# Patient Record
Sex: MALE | Race: OTHER | Employment: OTHER | URBAN - METROPOLITAN AREA
[De-identification: names, ages, dates, MRNs, and addresses within clinical notes are randomized per-mention and may not be internally consistent; named-entity substitution may affect disease eponyms.]

---

## 2017-02-22 ENCOUNTER — TELEPHONE (OUTPATIENT)
Dept: CARDIOLOGY CLINIC | Age: 82
End: 2017-02-22

## 2017-03-19 ENCOUNTER — APPOINTMENT (OUTPATIENT)
Dept: MRI IMAGING | Age: 82
DRG: 065 | End: 2017-03-19
Attending: INTERNAL MEDICINE
Payer: MEDICARE

## 2017-03-19 ENCOUNTER — APPOINTMENT (OUTPATIENT)
Dept: CT IMAGING | Age: 82
DRG: 065 | End: 2017-03-19
Attending: EMERGENCY MEDICINE
Payer: MEDICARE

## 2017-03-19 ENCOUNTER — HOSPITAL ENCOUNTER (INPATIENT)
Age: 82
LOS: 2 days | Discharge: NURSING, REHAB OR LONG-TERM CARE | DRG: 065 | End: 2017-03-21
Attending: EMERGENCY MEDICINE | Admitting: INTERNAL MEDICINE
Payer: MEDICARE

## 2017-03-19 ENCOUNTER — APPOINTMENT (OUTPATIENT)
Dept: GENERAL RADIOLOGY | Age: 82
DRG: 065 | End: 2017-03-19
Attending: EMERGENCY MEDICINE
Payer: MEDICARE

## 2017-03-19 DIAGNOSIS — E78.2 MIXED HYPERLIPIDEMIA: ICD-10-CM

## 2017-03-19 DIAGNOSIS — I63.02 CEREBROVASCULAR ACCIDENT (CVA) DUE TO THROMBOSIS OF BASILAR ARTERY (HCC): ICD-10-CM

## 2017-03-19 DIAGNOSIS — I10 ESSENTIAL HYPERTENSION: ICD-10-CM

## 2017-03-19 DIAGNOSIS — I63.9 ACUTE CVA (CEREBROVASCULAR ACCIDENT) (HCC): Primary | ICD-10-CM

## 2017-03-19 PROBLEM — I69.351 HEMIPLEGIA AND HEMIPARESIS FOLLOWING CEREBRAL INFARCTION AFFECTING RIGHT DOMINANT SIDE (HCC): Status: ACTIVE | Noted: 2017-03-19

## 2017-03-19 PROBLEM — E78.5 HYPERLIPIDEMIA: Status: ACTIVE | Noted: 2017-03-19

## 2017-03-19 PROBLEM — N40.0 PROSTATISM: Status: ACTIVE | Noted: 2017-03-19

## 2017-03-19 PROBLEM — R20.2 HEMIPARESTHESIA: Status: ACTIVE | Noted: 2017-03-19
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
## 2017-03-19 PROBLEM — I69.322 DYSARTHRIA FOLLOWING CEREBROVASCULAR ACCIDENT: Status: ACTIVE | Noted: 2017-03-19

## 2017-03-19 LAB
ALBUMIN SERPL BCP-MCNC: 3.4 G/DL (ref 3.5–5)
ALBUMIN/GLOB SERPL: 0.7 {RATIO} (ref 1.1–2.2)
ALP SERPL-CCNC: 86 U/L (ref 45–117)
ALT SERPL-CCNC: 14 U/L (ref 12–78)
ANION GAP BLD CALC-SCNC: 12 MMOL/L (ref 5–15)
APPEARANCE UR: ABNORMAL
APTT PPP: 25.7 SEC (ref 22.1–32.5)
AST SERPL W P-5'-P-CCNC: 17 U/L (ref 15–37)
BACTERIA URNS QL MICRO: NEGATIVE /HPF
BASOPHILS # BLD AUTO: 0 K/UL (ref 0–0.1)
BASOPHILS # BLD: 0 % (ref 0–1)
BILIRUB SERPL-MCNC: 0.5 MG/DL (ref 0.2–1)
BILIRUB UR QL CFM: NEGATIVE
BUN SERPL-MCNC: 15 MG/DL (ref 6–20)
BUN/CREAT SERPL: 11 (ref 12–20)
CALCIUM SERPL-MCNC: 9.4 MG/DL (ref 8.5–10.1)
CHLORIDE SERPL-SCNC: 103 MMOL/L (ref 97–108)
CO2 SERPL-SCNC: 26 MMOL/L (ref 21–32)
COLOR UR: ABNORMAL
CREAT SERPL-MCNC: 1.34 MG/DL (ref 0.7–1.3)
EOSINOPHIL # BLD: 0.1 K/UL (ref 0–0.4)
EOSINOPHIL NFR BLD: 2 % (ref 0–7)
EPITH CASTS URNS QL MICRO: ABNORMAL /LPF
ERYTHROCYTE [DISTWIDTH] IN BLOOD BY AUTOMATED COUNT: 13.3 % (ref 11.5–14.5)
ERYTHROCYTE [DISTWIDTH] IN BLOOD BY AUTOMATED COUNT: 13.4 % (ref 11.5–14.5)
GLOBULIN SER CALC-MCNC: 4.8 G/DL (ref 2–4)
GLUCOSE BLD STRIP.AUTO-MCNC: 116 MG/DL (ref 65–100)
GLUCOSE SERPL-MCNC: 137 MG/DL (ref 65–100)
GLUCOSE UR STRIP.AUTO-MCNC: NEGATIVE MG/DL
HCT VFR BLD AUTO: 42.5 % (ref 36.6–50.3)
HCT VFR BLD AUTO: 43.8 % (ref 36.6–50.3)
HGB BLD-MCNC: 14.1 G/DL (ref 12.1–17)
HGB BLD-MCNC: 14.5 G/DL (ref 12.1–17)
HGB UR QL STRIP: NEGATIVE
HYALINE CASTS URNS QL MICRO: ABNORMAL /LPF (ref 0–5)
INR PPP: 1 (ref 0.9–1.1)
KETONES UR QL STRIP.AUTO: ABNORMAL MG/DL
LEUKOCYTE ESTERASE UR QL STRIP.AUTO: NEGATIVE
LYMPHOCYTES # BLD AUTO: 21 % (ref 12–49)
LYMPHOCYTES # BLD: 1.2 K/UL (ref 0.8–3.5)
MCH RBC QN AUTO: 28.7 PG (ref 26–34)
MCH RBC QN AUTO: 29.1 PG (ref 26–34)
MCHC RBC AUTO-ENTMCNC: 33.1 G/DL (ref 30–36.5)
MCHC RBC AUTO-ENTMCNC: 33.2 G/DL (ref 30–36.5)
MCV RBC AUTO: 86.6 FL (ref 80–99)
MCV RBC AUTO: 87.8 FL (ref 80–99)
MONOCYTES # BLD: 0.4 K/UL (ref 0–1)
MONOCYTES NFR BLD AUTO: 8 % (ref 5–13)
NEUTS SEG # BLD: 3.7 K/UL (ref 1.8–8)
NEUTS SEG NFR BLD AUTO: 69 % (ref 32–75)
NITRITE UR QL STRIP.AUTO: NEGATIVE
PH UR STRIP: 5.5 [PH] (ref 5–8)
PLATELET # BLD AUTO: 219 K/UL (ref 150–400)
PLATELET # BLD AUTO: 242 K/UL (ref 150–400)
POTASSIUM SERPL-SCNC: 3.5 MMOL/L (ref 3.5–5.1)
PROT SERPL-MCNC: 8.2 G/DL (ref 6.4–8.2)
PROT UR STRIP-MCNC: 30 MG/DL
PROTHROMBIN TIME: 10.1 SEC (ref 9–11.1)
RBC # BLD AUTO: 4.91 M/UL (ref 4.1–5.7)
RBC # BLD AUTO: 4.99 M/UL (ref 4.1–5.7)
RBC #/AREA URNS HPF: ABNORMAL /HPF (ref 0–5)
SERVICE CMNT-IMP: ABNORMAL
SODIUM SERPL-SCNC: 141 MMOL/L (ref 136–145)
SP GR UR REFRACTOMETRY: 1.02 (ref 1–1.03)
THERAPEUTIC RANGE,PTTT: NORMAL SECS (ref 58–77)
TROPONIN I SERPL-MCNC: <0.04 NG/ML
UROBILINOGEN UR QL STRIP.AUTO: 1 EU/DL (ref 0.2–1)
WBC # BLD AUTO: 5.4 K/UL (ref 4.1–11.1)
WBC # BLD AUTO: 8.5 K/UL (ref 4.1–11.1)
WBC URNS QL MICRO: ABNORMAL /HPF (ref 0–4)

## 2017-03-19 PROCEDURE — 93005 ELECTROCARDIOGRAM TRACING: CPT

## 2017-03-19 PROCEDURE — 74011250637 HC RX REV CODE- 250/637: Performed by: INTERNAL MEDICINE

## 2017-03-19 PROCEDURE — 85610 PROTHROMBIN TIME: CPT | Performed by: EMERGENCY MEDICINE

## 2017-03-19 PROCEDURE — 80053 COMPREHEN METABOLIC PANEL: CPT | Performed by: EMERGENCY MEDICINE

## 2017-03-19 PROCEDURE — 81001 URINALYSIS AUTO W/SCOPE: CPT | Performed by: EMERGENCY MEDICINE

## 2017-03-19 PROCEDURE — 70553 MRI BRAIN STEM W/O & W/DYE: CPT

## 2017-03-19 PROCEDURE — 73502 X-RAY EXAM HIP UNI 2-3 VIEWS: CPT

## 2017-03-19 PROCEDURE — 74011250636 HC RX REV CODE- 250/636: Performed by: INTERNAL MEDICINE

## 2017-03-19 PROCEDURE — 74011250636 HC RX REV CODE- 250/636: Performed by: HOSPITALIST

## 2017-03-19 PROCEDURE — 85025 COMPLETE CBC W/AUTO DIFF WBC: CPT | Performed by: EMERGENCY MEDICINE

## 2017-03-19 PROCEDURE — 65660000000 HC RM CCU STEPDOWN

## 2017-03-19 PROCEDURE — 82962 GLUCOSE BLOOD TEST: CPT

## 2017-03-19 PROCEDURE — 70548 MR ANGIOGRAPHY NECK W/DYE: CPT

## 2017-03-19 PROCEDURE — 99285 EMERGENCY DEPT VISIT HI MDM: CPT

## 2017-03-19 PROCEDURE — 74011250637 HC RX REV CODE- 250/637: Performed by: EMERGENCY MEDICINE

## 2017-03-19 PROCEDURE — 71010 XR CHEST PORT: CPT

## 2017-03-19 PROCEDURE — 36415 COLL VENOUS BLD VENIPUNCTURE: CPT | Performed by: INTERNAL MEDICINE

## 2017-03-19 PROCEDURE — 99218 HC RM OBSERVATION: CPT

## 2017-03-19 PROCEDURE — 85027 COMPLETE CBC AUTOMATED: CPT | Performed by: INTERNAL MEDICINE

## 2017-03-19 PROCEDURE — A9585 GADOBUTROL INJECTION: HCPCS | Performed by: HOSPITALIST

## 2017-03-19 PROCEDURE — 70450 CT HEAD/BRAIN W/O DYE: CPT

## 2017-03-19 PROCEDURE — 84484 ASSAY OF TROPONIN QUANT: CPT | Performed by: EMERGENCY MEDICINE

## 2017-03-19 PROCEDURE — 85730 THROMBOPLASTIN TIME PARTIAL: CPT | Performed by: EMERGENCY MEDICINE

## 2017-03-19 RX ORDER — ACETAMINOPHEN 650 MG/1
650 SUPPOSITORY RECTAL
Status: DISCONTINUED | OUTPATIENT
Start: 2017-03-19 | End: 2017-03-21 | Stop reason: HOSPADM

## 2017-03-19 RX ORDER — DIAZEPAM 5 MG/1
5 TABLET ORAL
Status: COMPLETED | OUTPATIENT
Start: 2017-03-19 | End: 2017-03-19

## 2017-03-19 RX ORDER — ERGOCALCIFEROL 1.25 MG/1
50000 CAPSULE ORAL
COMMUNITY
End: 2017-06-22

## 2017-03-19 RX ORDER — AMLODIPINE BESYLATE 10 MG/1
10 TABLET ORAL EVERY EVENING
COMMUNITY
End: 2018-08-05

## 2017-03-19 RX ORDER — LABETALOL HYDROCHLORIDE 5 MG/ML
5 INJECTION, SOLUTION INTRAVENOUS
Status: DISCONTINUED | OUTPATIENT
Start: 2017-03-19 | End: 2017-03-21 | Stop reason: HOSPADM

## 2017-03-19 RX ORDER — PANTOPRAZOLE SODIUM 20 MG/1
20 TABLET, DELAYED RELEASE ORAL
Status: DISCONTINUED | OUTPATIENT
Start: 2017-03-20 | End: 2017-03-19 | Stop reason: SDUPTHER

## 2017-03-19 RX ORDER — GUAIFENESIN 100 MG/5ML
81 LIQUID (ML) ORAL DAILY
Status: DISCONTINUED | OUTPATIENT
Start: 2017-03-20 | End: 2017-03-21 | Stop reason: HOSPADM

## 2017-03-19 RX ORDER — AMLODIPINE BESYLATE 5 MG/1
10 TABLET ORAL DAILY
Status: DISCONTINUED | OUTPATIENT
Start: 2017-03-20 | End: 2017-03-21 | Stop reason: HOSPADM

## 2017-03-19 RX ORDER — HEPARIN SODIUM 5000 [USP'U]/ML
5000 INJECTION, SOLUTION INTRAVENOUS; SUBCUTANEOUS EVERY 8 HOURS
Status: DISCONTINUED | OUTPATIENT
Start: 2017-03-19 | End: 2017-03-20

## 2017-03-19 RX ORDER — SODIUM CHLORIDE 0.9 % (FLUSH) 0.9 %
5-10 SYRINGE (ML) INJECTION AS NEEDED
Status: DISCONTINUED | OUTPATIENT
Start: 2017-03-19 | End: 2017-03-21 | Stop reason: HOSPADM

## 2017-03-19 RX ORDER — OMEPRAZOLE 20 MG/1
20 CAPSULE, DELAYED RELEASE ORAL DAILY
COMMUNITY
End: 2018-07-19

## 2017-03-19 RX ORDER — SODIUM CHLORIDE 0.9 % (FLUSH) 0.9 %
5-10 SYRINGE (ML) INJECTION EVERY 8 HOURS
Status: DISCONTINUED | OUTPATIENT
Start: 2017-03-19 | End: 2017-03-21 | Stop reason: HOSPADM

## 2017-03-19 RX ORDER — PANTOPRAZOLE SODIUM 40 MG/1
40 TABLET, DELAYED RELEASE ORAL
Status: DISCONTINUED | OUTPATIENT
Start: 2017-03-20 | End: 2017-03-21 | Stop reason: HOSPADM

## 2017-03-19 RX ORDER — GUAIFENESIN 100 MG/5ML
162 LIQUID (ML) ORAL DAILY
Status: DISCONTINUED | OUTPATIENT
Start: 2017-03-19 | End: 2017-03-19

## 2017-03-19 RX ORDER — SODIUM CHLORIDE 9 MG/ML
50 INJECTION, SOLUTION INTRAVENOUS CONTINUOUS
Status: DISPENSED | OUTPATIENT
Start: 2017-03-19 | End: 2017-03-20

## 2017-03-19 RX ORDER — ASPIRIN 325 MG
325 TABLET ORAL ONCE
Status: COMPLETED | OUTPATIENT
Start: 2017-03-19 | End: 2017-03-19

## 2017-03-19 RX ORDER — ATORVASTATIN CALCIUM 20 MG/1
20 TABLET, FILM COATED ORAL
Status: DISCONTINUED | OUTPATIENT
Start: 2017-03-19 | End: 2017-03-21 | Stop reason: HOSPADM

## 2017-03-19 RX ORDER — ACETAMINOPHEN 325 MG/1
650 TABLET ORAL
Status: DISCONTINUED | OUTPATIENT
Start: 2017-03-19 | End: 2017-03-21 | Stop reason: HOSPADM

## 2017-03-19 RX ORDER — TAMSULOSIN HYDROCHLORIDE 0.4 MG/1
0.4 CAPSULE ORAL DAILY
Status: DISCONTINUED | OUTPATIENT
Start: 2017-03-19 | End: 2017-03-21 | Stop reason: HOSPADM

## 2017-03-19 RX ADMIN — HEPARIN SODIUM 5000 UNITS: 5000 INJECTION, SOLUTION INTRAVENOUS; SUBCUTANEOUS at 12:55

## 2017-03-19 RX ADMIN — ASPIRIN 325 MG ORAL TABLET 325 MG: 325 PILL ORAL at 07:58

## 2017-03-19 RX ADMIN — DIAZEPAM 5 MG: 5 TABLET ORAL at 07:58

## 2017-03-19 RX ADMIN — Medication 10 ML: at 21:22

## 2017-03-19 RX ADMIN — SODIUM CHLORIDE 50 ML/HR: 900 INJECTION, SOLUTION INTRAVENOUS at 11:11

## 2017-03-19 RX ADMIN — ASPIRIN 81 MG CHEWABLE TABLET 162 MG: 81 TABLET CHEWABLE at 12:56

## 2017-03-19 RX ADMIN — ATORVASTATIN CALCIUM 20 MG: 20 TABLET, FILM COATED ORAL at 21:20

## 2017-03-19 RX ADMIN — GADOBUTROL 8 ML: 604.72 INJECTION INTRAVENOUS at 17:53

## 2017-03-19 RX ADMIN — HEPARIN SODIUM 5000 UNITS: 5000 INJECTION, SOLUTION INTRAVENOUS; SUBCUTANEOUS at 18:55

## 2017-03-19 RX ADMIN — TAMSULOSIN HYDROCHLORIDE 0.4 MG: 0.4 CAPSULE ORAL at 12:55

## 2017-03-19 NOTE — PROGRESS NOTES
Stroke Education provided to patient and the following topics were discussed    1. Patients personal risk factors for stroke are hypertension and hyperlipidemia    2. Warning signs of Stroke:        * Sudden numbness or weakness of the face, arm or leg, especially on one side of          The body            * Sudden confusion, trouble speaking or understanding        * Sudden trouble seeing in one or both eyes        * Sudden trouble walking, dizziness, loss of balance or coordination        * Sudden severe headache with no known cause      3. Importance of activation Emergency Medical Services ( 9-1-1 ) immediately if experience any warning signs of stroke. 4. Be sure and schedule a follow-up appointment with your primary care doctor or any specialists as instructed. 5. You must take medicine every day to treat your risk factors for stroke. Be sure to take your medicines exactly as your doctor tells you: no more, no less. Know what your medicines are for , what they do. Anti-thrombotics /anticoagulants can help prevent strokes. You are taking the following medicine(s)  Heparin 5,000 units    6. Smoking and second-hand smoke greatly increase your risk of stroke, cardiovascular disease and death. Smoking history never    7. Information provided was [facility] Stroke Education Binder    8. Documentation of teaching completed in Patient Education Activity and on Care Plan with teaching response noted?   yes

## 2017-03-19 NOTE — ED NOTES
Bedside report received from Lauren Alcantara [facility]. Pt resting in stretcher with wife at bedside.

## 2017-03-19 NOTE — PROGRESS NOTES
STAND test redone, and patient passed purees, water by cup, water by straw. Per Dr. Andree Guy order to advance diet if patient able to pass STAND. Put patient on \"soft solids\" instead of regular texture because patient stated that he \"sometimes has trouble chewing harder foods with his dentures in.\"    Will continue to monitor.

## 2017-03-19 NOTE — PROGRESS NOTES
Oral and Written notification given to patient and/or caregiver informing them that they are currently an Outpatient receiving care in our facility. Outpatient services include Observation Services. CM placed copy on bedside chart. Pt is unable to sign.      CLAYTON Burton, [address] [place] [address]   [phone]

## 2017-03-19 NOTE — H&P
History and Physical          Pt Name  Mitra Traore   Date of Birth 9/3/1931   Medical Record Number  [id number]      Age  80 y.o. PCP Isabel Alba MD   Admit date:  3/19/2017    Room Number  DI81/29  @ [facility]   Date of Service  3/19/2017     Admission Diagnoses:  Acute CVA (cerebrovascular accident) [facility])     Certification: We are admitting Mitra Traore 80 y.o. male with a principle diagnosis of Acute CVA (cerebrovascular accident) [facility])  This patient also suffers from other comorbidities listed below. I have a high level of concern for progressive decline or recurrence of CVA  leading to life threatening complications      Assessment and plan:    * Acute CVA (cerebrovascular accident) ([facility] [address]  · Admit to tele-neurology floor   · NPO until bedside swallow evaluation is done. - then start cardiac diet   · I have initiated the stroke orderset   ·  mg PO daily (pt is Aspirin naive )   · Permissive hypertension for today ; labetalol for severe elevation of BP   · Resume antiHTN meds starting tomorrow   · Stroke labs including Lipid panel, hgbA1C, TSH, Free T4,   · MRI MRA CNS and neck arteries   · Echo complete - systolic murmur   · Fall and aspiration precautions   · neurocheck per stroke protocol   · Formal neurologist consultation    Hemiplegia and hemiparesis following cerebral infarction affecting right dominant side ([facility] [address]  Watch closely     Dysarthria following cerebrovascular accident  Improving according to his family present in the ER. Hemiparesthesia  \"right side is tingling \" remains     Fall at home  GLF with no obvious bone fracture or serious complaints. Watch for falls     Essential hypertension  Resume home meds starting tomorrow     Hyperlipidemia  Resume statin as was   Lipid panel pending     Prostatism  Pt complaints of classic symptoms and he is having to get up every three to four hours.    We can start him on Flomax starting tomorrow as well CODE STATUS  Full     Functional Status  Pt is  and lives with his wife   He is quite active and he performs most of the ADLs in their home. He is a retired     Surrogate decision maker: Pt's wife    Prophylaxis  Hep SQ   Discharge Plan: SAH/Rehab,     There are currently no Active Isolations Payor: [employer] MEDICARE / Plan: [facility] [employer] MEDICARE CHOICE PPO/PFFS / Product Type: Managed Care Medicare /    Social issues  Date Comment    03/19/17   9:26 AM  Met with pt's wife and daughter at the pt's bedside and we discussed clinical issues in simple language and answered all questions. Prognosis  Fair      Subjective Data         History of Present Illness : The patient had been feeling dizzy for the last day and a half. He didn't do much all day, since he was feeling unwell with dizziness that resembles being drunk and off balance and not spinning symptoms. His wife did the chores at his house yesterday while he rested most of the day. This morning around 5am as usual he woke up planning to go to bathroom. He felt sweaty and weak and a sensation of tingling along his RUE. He was able to go to the bathroom and then on his way to bed his RLE felt tingly and he lost power leading to Ground level fall. His wife witnessed that the patient was not able to get himself up off of floor due to lack of power in whole right side. EMS were called in and pt was brought to the ER. Since then his wife reports the pt's speech which initially was garbled is improved and his power on the Right upper extremity is better. The pt still is not able to pull himself up on the bed using his RUE.         Past Medical History:   Diagnosis Date    Hypertension     Other ill-defined conditions(799.89)     high cholesterol           Past Surgical History:   Procedure Laterality Date    HX CHOLECYSTECTOMY           Social History   Substance Use Topics    Smoking status: Never Smoker    Smokeless tobacco: Never Used    Alcohol use Yes       History reviewed. No pertinent family history. Review of Systems - History obtained from spouse and the patient  General ROS: positive for  - fatigue  negative for - fever, weight gain or weight loss  Psychological ROS: negative for - anxiety or behavioral disorder  Ophthalmic ROS: negative for - blurry vision or decreased vision  ENT ROS: negative for - epistaxis, headaches or hearing change  Allergy and Immunology ROS: negative for - nasal congestion, postnasal drip or seasonal allergies  Respiratory ROS: no cough, shortness of breath, or wheezing  Cardiovascular ROS: no chest pain or dyspnea on exertion  Gastrointestinal ROS: no abdominal pain, change in bowel habits, or black or bloody stools  Genito-Urinary ROS: positive for - nocturia and urinary frequency/urgency  negative for - dysuria or hematuria  Musculoskeletal ROS: positive for - gait disturbance and muscular weakness  negative for - joint pain or joint stiffness  Neurological ROS: positive for - dizziness, gait disturbance, speech problems and weakness  negative for - confusion or impaired coordination/balance  Dermatological ROS: negative       Objective data     Comment:  Very pleasant elderly man lying in bed in no distress   His wife and daughter are at his side. Patient Vitals for the past 24 hrs:   Temp   03/19/17 0643 97.6 °F (36.4 °C)    Patient Vitals for the past 24 hrs:   Pulse   03/19/17 0715 (!) 125   03/19/17 0700 (!) 122   03/19/17 0643 (!) 121    Patient Vitals for the past 24 hrs:   Resp   03/19/17 0715 17   03/19/17 0700 29   03/19/17 0643 18    Patient Vitals for the past 24 hrs:   BP   03/19/17 0715 (!) 130/91   03/19/17 0700 115/86   03/19/17 0643 (!) 151/94   03/19/17 0642 (!) 151/94        SpO2 Readings from Last 6 Encounters:   03/19/17 98%   01/18/11 96%         O2 Device: Room air   Body mass index is 29.59 kg/(m^2).  - Wt Readings from Last 10 Encounters:   03/19/17 85.7 kg (188 lb 15 oz)   01/18/11 86.2 kg (190 lb)          Physical Exam:             General:  Alert, cooperative,   well noursished,   well developed,   appears stated age    Ears/Eyes:  Hearing intact  Sclera anicteric. Pupils equal   Mouth/Throat:  Mucous membranes normal pink and moist  Oral pharynx clear    Neck:  I couldn't hear any bruit    Lungs:  Trachea midline  Chest excursion symmetrical   Auscultation B/L Symmetrical with   Vesicular breath sounds     No Crepitations noted   Percussion note resonant on mid Clavicular line; no sign of pneumothorax        CVS:  Regular rate and rhythm   Systolic murmur, on the left lower parasternal border - MR   No click, rub or gallop  S1 normal   S2 normal   Pedal pulses  b/l symmetrical   Abdomen:    Soft, non-tender  Bowel sounds normal  No distension   Percussion note tympanitic   Extremities:    No cyanosis, jaundice  No edema noted   No sign of DVT/cord like lesion on palpation  No sign of acute trauma   Age appropriate OA changes noted. Skin:    Skin color, texture, turgor normal. no acute rash or lesions    Lymph nodes:     Musculoskeletal Muscle bulk B/L symmetrical   Neuro Pt has tingling hypoaesthesia involving the right lower face and right neck, along with RUE and RLE   His power in both RUE and RLE are about 4/5   No clonus noted. Mild dysarthria noted    Psych:  Alert and oriented, normal mood & affect given the clinical scenario          Medications reviewed     No current facility-administered medications for this encounter. Current Outpatient Prescriptions   Medication Sig    ergocalciferol (VITAMIN D2) 50,000 unit capsule Take 50,000 Units by mouth.  omeprazole (PRILOSEC) 20 mg capsule Take 20 mg by mouth daily.  amLODIPine (NORVASC) 10 mg tablet Take 10 mg by mouth daily.  hydrochlorothiazide (HYDRODIURIL) 25 mg tablet Take 25 mg by mouth daily.  simvastatin (ZOCOR) 20 mg tablet Take 20 mg by mouth nightly.        Relevant other informations: Other medical conditions listed in this following active hospital problem list section; all of these and other pertinent data were taken into consideration when treatment plan is developed and customized to this patient's unique overall circumstances and needs. We have reviewed available old medical records within the constraints of this admission process. Present on Admission:   Hemiplegia and hemiparesis following cerebral infarction affecting right dominant side ([facility] [address]   Acute CVA (cerebrovascular accident) ([facility] [address]   Dysarthria following cerebrovascular accident   Hemiparesthesia   Essential hypertension   Hyperlipidemia   Prostatism   Fall at home       Data Review:   Recent Days:  All Micro Results     None          Recent Labs      03/19/17   0705   WBC  5.4   HGB  14.5   HCT  43.8   PLT  219     Recent Labs      03/19/17   0705   NA  141   K  3.5   CL  103   CO2  26   GLU  137*   BUN  15   CREA  1.34*   CA  9.4   ALB  3.4*   TBILI  0.5   SGOT  17   ALT  14   INR  1.0      No results found for: TSH, TSHEXT      Care Plan discussed with: Patient/Family and Nurse   Other medical conditions are listed in the active hospital problem list section; these and other pertinent data were taken into consideration when the treatment plan was developed and customized to this patient's unique overall circumstances and needs. High complexity decision making was performed for this patient who is at high risk for decompensation with multiple organ involvement. Today total floor/unit time was 65 minutes while caring for this patient and greater than 50% of that time was spent with patient (and/or family) coordinating patients clinical issues.      Lisa Maguire MD MPH  FACP                               3/19/2017

## 2017-03-19 NOTE — IP AVS SNAPSHOT
[address] [facility] 
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) Your diagnoses also included:  Hemiplegia And Hemiparesis Following Cerebral Infarction Affecting Right Dominant Side (Hcc), Dysarthria Following Cerebrovascular Accident, Hemiparesthesia, Essential Hypertension, Hyperlipidemia, Prostatism, Fall At [facility] Providers Seen During Your Hospitalizations Provider Role Specialty Primary office phone Juana White MD Attending Provider Emergency Medicine [phone] Pieter Watson MD Attending Provider Internal Medicine [phone] Denise Guevara MD Attending Provider Internal Medicine [phone] Your Primary Care Physician (PCP) Primary Care Physician Office Phone Office Fax [address] [phone] Follow-up Information Follow up With Details Comments Contact Info [address]  This is your post acute care provider of choice  [address] [place] [address] 
[phone] Mandi St MD   [address] [place] [address] 
[phone] Current Discharge Medication List  
  
START taking these medications Dose & Instructions Dispensing Information Comments Morning Noon Evening Bedtime  
 aspirin 81 mg chewable tablet Your last dose was: Your next dose is:    
   
   
 Dose:  81 mg Take 1 Tab by mouth daily. Quantity:  30 Tab Refills:  0  
     
   
   
   
  
 atorvastatin 20 mg tablet Commonly known as:  LIPITOR Replaces:  simvastatin 20 mg tablet Your last dose was: Your next dose is:    
   
   
 Dose:  20 mg Take 1 Tab by mouth nightly. Quantity:  30 Tab Refills:  0  
     
   
   
   
  
 tamsulosin 0.4 mg capsule Commonly known as:  FLOMAX Your last dose was: Your next dose is:    
   
   
 Dose:  0.4 mg Take 1 Cap by mouth daily. Quantity:  30 Cap Refills:  0 CONTINUE these medications which have NOT CHANGED Dose & Instructions Dispensing Information Comments Morning Noon Evening Bedtime  
 amLODIPine 10 mg tablet Commonly known as:  Leoniecarlos Capps Your last dose was: Your next dose is:    
   
   
 Dose:  10 mg Take 10 mg by mouth daily. Refills:  0  
     
   
   
   
  
 hydroCHLOROthiazide 25 mg tablet Commonly known as:  HYDRODIURIL Your last dose was: Your next dose is:    
   
   
 Dose:  25 mg Take 25 mg by mouth daily. Refills:  0  
     
   
   
   
  
 omeprazole 20 mg capsule Commonly known as:  PRILOSEC Your last dose was: Your next dose is:    
   
   
 Dose:  20 mg Take 20 mg by mouth daily. Refills:  0  
     
   
   
   
  
 VITAMIN D2 50,000 unit capsule Generic drug:  ergocalciferol Your last dose was: Your next dose is:    
   
   
 Dose:  20430 Units Take 50,000 Units by mouth. Refills:  0 STOP taking these medications   
 simvastatin 20 mg tablet Commonly known as:  ZOCOR Replaced by:  atorvastatin 20 mg tablet Where to Get Your Medications Information on where to get these meds will be given to you by the nurse or doctor. ! Ask your nurse or doctor about these medications  
  aspirin 81 mg chewable tablet  
 atorvastatin 20 mg tablet  
 tamsulosin 0.4 mg capsule Discharge Instructions None Discharge Orders None [employer] Announcement We are excited to announce that we are making your provider's discharge notes available to you in [employer]. You will see these notes when they are completed and signed by the physician that discharged you from your recent hospital stay. If you have any questions or concerns about any information you see in [employer], please call the Health Information Department where you were seen or reach out to your Primary Care Provider for more information about your plan of care. Introducing \[facility]\"" & HEALTH SERVICES! Rajan Youngblood introduces [employer] patient portal. Now you can access parts of your medical record, email your doctor's office, and request medication refills online. 1. In your internet browser, go to [email or web address] 2. Click on the First Time User? Click Here link in the Sign In box. You will see the New Member Sign Up page. 3. Enter your [employer] Access Code exactly as it appears below. You will not need to use this code after youve completed the sign-up process. If you do not sign up before the expiration date, you must request a new code. · [employer] Access Code: [email or web address] Expires: 6/17/2017  6:42 AM 
 
4. Enter the last four digits of your Social Security Number (xxxx) and Date of Birth (mm/dd/yyyy) as indicated and click Submit. You will be taken to the next sign-up page. 5. Create a [employer] ID. This will be your [employer] login ID and cannot be changed, so think of one that is secure and easy to remember. 6. Create a [employer] password. You can change your password at any time. 7. Enter your Password Reset Question and Answer. This can be used at a later time if you forget your password. 8. Enter your e-mail address. You will receive e-mail notification when new information is available in [address] 9. Click Sign Up. You can now view and download portions of your medical record. 10. Click the Download Summary menu link to download a portable copy of your medical information. If you have questions, please visit the Frequently Asked Questions section of the [employer] website. Remember, [employer] is NOT to be used for urgent needs. For medical emergencies, dial 911. Now available from your iPhone and Android! General Information Please provide this summary of care documentation to your next provider. Patient Signature:  ____________________________________________________________ Date:  ____________________________________________________________  
  
Melissa Nguyen Provider Signature:  ____________________________________________________________ Date:  ____________________________________________________________

## 2017-03-19 NOTE — IP AVS SNAPSHOT
Current Discharge Medication List  
  
START taking these medications Dose & Instructions Dispensing Information Comments Morning Noon Evening Bedtime  
 aspirin 81 mg chewable tablet Your last dose was: Your next dose is:    
   
   
 Dose:  81 mg Take 1 Tab by mouth daily. Quantity:  30 Tab Refills:  0  
     
   
   
   
  
 atorvastatin 20 mg tablet Commonly known as:  LIPITOR Replaces:  simvastatin 20 mg tablet Your last dose was: Your next dose is:    
   
   
 Dose:  20 mg Take 1 Tab by mouth nightly. Quantity:  30 Tab Refills:  0  
     
   
   
   
  
 tamsulosin 0.4 mg capsule Commonly known as:  FLOMAX Your last dose was: Your next dose is:    
   
   
 Dose:  0.4 mg Take 1 Cap by mouth daily. Quantity:  30 Cap Refills:  0 CONTINUE these medications which have NOT CHANGED Dose & Instructions Dispensing Information Comments Morning Noon Evening Bedtime  
 amLODIPine 10 mg tablet Commonly known as:  Lenallen Eagles Your last dose was: Your next dose is:    
   
   
 Dose:  10 mg Take 10 mg by mouth daily. Refills:  0  
     
   
   
   
  
 hydroCHLOROthiazide 25 mg tablet Commonly known as:  HYDRODIURIL Your last dose was: Your next dose is:    
   
   
 Dose:  25 mg Take 25 mg by mouth daily. Refills:  0  
     
   
   
   
  
 omeprazole 20 mg capsule Commonly known as:  PRILOSEC Your last dose was: Your next dose is:    
   
   
 Dose:  20 mg Take 20 mg by mouth daily. Refills:  0  
     
   
   
   
  
 VITAMIN D2 50,000 unit capsule Generic drug:  ergocalciferol Your last dose was: Your next dose is:    
   
   
 Dose:  63827 Units Take 50,000 Units by mouth. Refills:  0 STOP taking these medications   
 simvastatin 20 mg tablet Commonly known as:  ZOCOR  
 Replaced by:  atorvastatin 20 mg tablet Where to Get Your Medications Information on where to get these meds will be given to you by the nurse or doctor. ! Ask your nurse or doctor about these medications  
  aspirin 81 mg chewable tablet  
 atorvastatin 20 mg tablet  
 tamsulosin 0.4 mg capsule

## 2017-03-19 NOTE — ED NOTES
Bedside shift change report given to Ivan Mccormack RN (oncoming nurse) by Giulia Richardson RN (offgoing nurse).  Report included the following information SBAR, Kardex, ED Summary, Procedure Summary and Cardiac Rhythm ST.

## 2017-03-19 NOTE — PROGRESS NOTES
Orders received, chart reviewed, pt cleared for participation with therapy by RN. Pt received supine in bed, agreeable to participation with therapy. Obtain social history/PLOF with pt reporting independence w/ ambulation and ADLs prior to admission. Pt lives with wife who has dementia. Pt reports that he performs all cooking, cleaning, and household chores for he and his wife. Pt reports hx of 1 fall which occurred in the bathroom just prior to admission. Wears glasses at baseline. Pt with reports of diminished sensation/tingling in R LE, R UE, and R side of neck which extends to top of his head.  strength diminished through R UE. Attempted to initiate OOB mobility with pt requiring CGA to assist R LE during supine >> sit transfer. However, upon assuming seated position EOB, pt became extremely dizzy and was unable to tolerate seated position, immediately returning himself to half seated/half lying position as well as closing his eyes and holding his head in his hands. Unable to obtain vital signs in seated position due to increasing dizziness with associated nausea. Pt returned to supine position with BP assessed and vital signs stable. Further mobility deferred. Will follow up tomorrow AM for PT evaluation. Thank you    Lorena Patel.  Brunilda Benavidez DPT

## 2017-03-19 NOTE — PROGRESS NOTES
Primary Nurse Spring Mcdermott RN and Tamiko Jason RN performed a dual skin assessment on this patient No impairment noted  Urban score is 20

## 2017-03-19 NOTE — ED PROVIDER NOTES
HPI Comments: Marie Morrow is a 80 y.o. male with PMhx significant for HTN and HLD who presents via EMS to the ED with cc of acute numbness, tingling, and weakness to his right arm and right leg. Pt also c/o mild right lateral hip pain. He notes he fell out of bed onto his right hip this morning at ~0500. Per EMS, pt presents with R sided deficit and slurred speech. He notes his last known well time was  ~2200 last night. Pt denies any other injuries or pain complaints. He denies any prior hx of CVA and is not currently taking coagulants. Pt specifically denies any fevers, chills, chest pain, SOB, abdominal pain, N/V/D, and dysuria. Social: (-) tobacco, (+) EtOH, (-) illicit drugs  PCP: Merlyn Hayes MD    There are no other complaints, changes or physical findings at this time. The history is provided by the patient and the EMS personnel. Past Medical History:   Diagnosis Date    Hypertension     Other ill-defined conditions(799.89)     high cholesterol       Past Surgical History:   Procedure Laterality Date    HX CHOLECYSTECTOMY           History reviewed. No pertinent family history. Social History     Social History    Marital status:      Spouse name: N/A    Number of children: N/A    Years of education: N/A     Occupational History    Not on file. Social History Main Topics    Smoking status: Never Smoker    Smokeless tobacco: Never Used    Alcohol use Yes    Drug use: No    Sexual activity: Not on file     Other Topics Concern    Not on file     Social History Narrative         ALLERGIES: Review of patient's allergies indicates no known allergies. Review of Systems   Constitutional: Negative for activity change, chills and fever. HENT: Negative for congestion and sore throat. Eyes: Negative for pain and redness. Respiratory: Negative for cough, chest tightness and shortness of breath. Cardiovascular: Negative for chest pain and palpitations. Gastrointestinal: Negative for abdominal pain, diarrhea, nausea and vomiting. Genitourinary: Negative for dysuria, frequency and urgency. Musculoskeletal: Negative for back pain and neck pain.        + R hip pain   Skin: Negative for rash. Neurological: Positive for weakness (R sided) and numbness (R sided). Negative for syncope, light-headedness and headaches.        + R sided tingling  + slurred speech     Psychiatric/Behavioral: Negative for confusion. All other systems reviewed and are negative. Patient Vitals for the past 12 hrs:   Temp Pulse Resp BP SpO2   03/19/17 1057 97.7 °F (36.5 °C) 82 20 146/71 96 %   03/19/17 0732 - 99 23 119/76 (!) 86 %   03/19/17 0715 - (!) 125 17 (!) 130/91 98 %   03/19/17 0700 - (!) 122 29 115/86 97 %   03/19/17 0643 97.6 °F (36.4 °C) (!) 121 18 (!) 151/94 96 %   03/19/17 0642 - - - (!) 151/94 -            Physical Exam   Constitutional: He is oriented to person, place, and time. He appears well-developed and well-nourished. No distress. HENT:   Head: Normocephalic and atraumatic. Nose: Nose normal.   Mouth/Throat: Oropharynx is clear and moist.   Eyes: Conjunctivae and EOM are normal. Pupils are equal, round, and reactive to light. No scleral icterus. Conjunctiva clear bilaterally; Neck: Normal range of motion. Neck supple. No JVD present. No tracheal deviation present. No thyromegaly present. Cardiovascular: Normal rate, regular rhythm and intact distal pulses. Exam reveals no gallop and no friction rub. No murmur heard. Pulmonary/Chest: Effort normal and breath sounds normal. No stridor. No respiratory distress. He has no wheezes. He has no rales. He exhibits no tenderness. Abdominal: Soft. Bowel sounds are normal. He exhibits no distension. There is no tenderness. There is no rebound and no guarding. Musculoskeletal: Normal range of motion. He exhibits no edema.    Mild ttp of right lateral hip; no other leg ttp;  full arom; appears in joint; toes up/down; 2+ dp and pt pulses; brisk cr; pelvis stable to ap and lateral compression;    Neurological: He is alert and oriented to person, place, and time. He displays normal reflexes. No cranial nerve deficit. He exhibits normal muscle tone. Coordination normal.   Mildly slurred but appropriate and understandable speech; Mild weakness in rue and rle compared to left;  no past pointing; good heel to shin; negative romberg; holds both arms extended in front of them for 10 seconds without difficulty or drift; lifts legs off of bed without difficulty; no pronator drift;  no facial asymmetry;      Skin: Skin is warm and dry. No rash noted. He is not diaphoretic. No erythema. Psychiatric:   Very anxious   Nursing note and vitals reviewed. MDM  Number of Diagnoses or Management Options  Diagnosis management comments: DDx: CVA, ICH, ACS, contusion, fracture       Amount and/or Complexity of Data Reviewed  Clinical lab tests: ordered and reviewed  Tests in the radiology section of CPT®: ordered and reviewed  Tests in the medicine section of CPT®: ordered and reviewed  Obtain history from someone other than the patient: yes (EMS)  Review and summarize past medical records: yes  Discuss the patient with other providers: yes (Hospitalist)  Independent visualization of images, tracings, or specimens: yes    Risk of Complications, Morbidity, and/or Mortality  General comments:  Will admit to hospitalist for cva workup; pt awoke at 5am with right ue and le numbness, weakness; mild weakness in [place] and rle compared to left; mild slurred speech; pt very anxious; non acute head ct; no indication for tpa; cbc cmp unremarkable; negative troponin; Lukasz Graham MD      Patient Progress  Patient progress: stable    ED Course       Procedures    Cedeno Part  9/3/1931    Arrival time to ED: Lucy Ledbetter S Called: [id number]    Physician at Bedside: 0630     CT Order Time: 0630    ACT Page: Not called    ACT Call Back: Not called    Cancel Code S: [id number]      ED EKG interpretation:06:46  Rhythm: sinus tachycardia; and regular . Rate (approx.): 124; Axis: normal; PA Interval: normal; QRS interval: normal ; ST/T wave: non-specific changes; This EKG was interpreted by Nano Verdin MD,ED Provider. PROGRESS NOTE:  7:03 AM  Pt awoke with deficits. No indication for TPA. Written by Pete Pacheco, ED Scribe, as dictated by Nano Verdin MD.    CONSULT NOTE:   8:24 Micah Draper MD spoke with Dr. Jeri Cobos,   Specialty: Hospitalist  Discussed pt's hx, disposition, and available diagnostic and imaging results. Reviewed care plans. Consultant will evaluate pt for admission. Written by Pete Pacheco, ED Scribe, as dictated by Nano Verdin MD.    LABORATORY TESTS:  Recent Results (from the past 12 hour(s))   EKG, 12 LEAD, INITIAL    Collection Time: 03/19/17  6:46 AM   Result Value Ref Range    Ventricular Rate 124 BPM    Atrial Rate 125 BPM    QRS Duration 78 ms    Q-T Interval 330 ms    QTC Calculation (Bezet) 474 ms    Calculated R Axis 1 degrees    Calculated T Axis 32 degrees    Diagnosis       Accelerated Junctional rhythm  Nonspecific ST abnormality  Abnormal ECG  No previous ECGs available     CBC WITH AUTOMATED DIFF    Collection Time: 03/19/17  7:05 AM   Result Value Ref Range    WBC 5.4 4.1 - 11.1 K/uL    RBC 4.99 4. 10 - 5.70 M/uL    HGB 14.5 12.1 - 17.0 g/dL    HCT 43.8 36.6 - 50.3 %    MCV 87.8 80.0 - 99.0 FL    MCH 29.1 26.0 - 34.0 PG    MCHC 33.1 30.0 - 36.5 g/dL    RDW 13.4 11.5 - 14.5 %    PLATELET 696 111 - 016 K/uL    NEUTROPHILS 69 32 - 75 %    LYMPHOCYTES 21 12 - 49 %    MONOCYTES 8 5 - 13 %    EOSINOPHILS 2 0 - 7 %    BASOPHILS 0 0 - 1 %    ABS. NEUTROPHILS 3.7 1.8 - 8.0 K/UL    ABS. LYMPHOCYTES 1.2 0.8 - 3.5 K/UL    ABS. MONOCYTES 0.4 0.0 - 1.0 K/UL    ABS. EOSINOPHILS 0.1 0.0 - 0.4 K/UL    ABS.  BASOPHILS 0.0 0.0 - 0.1 K/UL   METABOLIC PANEL, COMPREHENSIVE    Collection Time: 03/19/17  7:05 AM   Result Value Ref Range    Sodium 141 136 - 145 mmol/L    Potassium 3.5 3.5 - 5.1 mmol/L    Chloride 103 97 - 108 mmol/L    CO2 26 21 - 32 mmol/L    Anion gap 12 5 - 15 mmol/L    Glucose 137 (H) 65 - 100 mg/dL    BUN 15 6 - 20 MG/DL    Creatinine 1.34 (H) 0.70 - 1.30 MG/DL    BUN/Creatinine ratio 11 (L) 12 - 20      GFR est AA >60 >60 ml/min/1.73m2    GFR est non-AA 51 (L) >60 ml/min/1.73m2    Calcium 9.4 8.5 - 10.1 MG/DL    Bilirubin, total 0.5 0.2 - 1.0 MG/DL    ALT (SGPT) 14 12 - 78 U/L    AST (SGOT) 17 15 - 37 U/L    Alk.  phosphatase 86 45 - 117 U/L    Protein, total 8.2 6.4 - 8.2 g/dL    Albumin 3.4 (L) 3.5 - 5.0 g/dL    Globulin 4.8 (H) 2.0 - 4.0 g/dL    A-G Ratio 0.7 (L) 1.1 - 2.2     PROTHROMBIN TIME + INR    Collection Time: 03/19/17  7:05 AM   Result Value Ref Range    INR 1.0 0.9 - 1.1      Prothrombin time 10.1 9.0 - 11.1 sec   PTT    Collection Time: 03/19/17  7:05 AM   Result Value Ref Range    aPTT 25.7 22.1 - 32.5 sec    aPTT, therapeutic range     58.0 - 77.0 SECS   TROPONIN I    Collection Time: 03/19/17  7:05 AM   Result Value Ref Range    Troponin-I, Qt. <0.04 <0.05 ng/mL   GLUCOSE, POC    Collection Time: 03/19/17  7:56 AM   Result Value Ref Range    Glucose (POC) 116 (H) 65 - 100 mg/dL    Performed by Elio William*    CBC W/O DIFF    Collection Time: 03/19/17 12:01 PM   Result Value Ref Range    WBC 8.5 4.1 - 11.1 K/uL    RBC 4.91 4.10 - 5.70 M/uL    HGB 14.1 12.1 - 17.0 g/dL    HCT 42.5 36.6 - 50.3 %    MCV 86.6 80.0 - 99.0 FL    MCH 28.7 26.0 - 34.0 PG    MCHC 33.2 30.0 - 36.5 g/dL    RDW 13.3 11.5 - 14.5 %    PLATELET 490 753 - 250 K/uL   URINALYSIS W/MICROSCOPIC    Collection Time: 03/19/17  1:26 PM   Result Value Ref Range    Color YELLOW/STRAW      Appearance CLOUDY (A) CLEAR      Specific gravity 1.020 1.003 - 1.030      pH (UA) 5.5 5.0 - 8.0      Protein 30 (A) NEG mg/dL    Glucose NEGATIVE  NEG mg/dL    Ketone TRACE (A) NEG mg/dL    Blood NEGATIVE  NEG      Urobilinogen 1.0 0.2 - 1.0 EU/dL Nitrites NEGATIVE  NEG      Leukocyte Esterase NEGATIVE  NEG      WBC 0-4 0 - 4 /hpf    RBC 0-5 0 - 5 /hpf    Epithelial cells FEW FEW /lpf    Bacteria NEGATIVE  NEG /hpf    Hyaline cast 0-2 0 - 5 /lpf   BILIRUBIN, CONFIRM    Collection Time: 03/19/17  1:26 PM   Result Value Ref Range    Bilirubin UA, confirm NEGATIVE  NEG         IMAGING RESULTS:  XR CHEST PORT   Final Result   EXAM: Portable CXR. 0816 hours      INDICATION: Chest Pain     The lungs show shallow volumes with some bibasilar haziness favoring  atelectasis. Heart is normal in size. There is no overt pulmonary edema. There  is no evident pneumothorax, apparent adenopathy or sizable pleural effusion.     IMPRESSION  IMPRESSION: No significant finding.      XR HIP RT W OR WO PELV 2-3 VWS   Final Result   EXAM: XR HIP RT W OR WO PELV 2-3 VWS     INDICATION: fall. Right hip pain.     COMPARISON: 2011.     FINDINGS: An AP view of the pelvis and a frogleg lateral view of the right hip  demonstrate no fracture, dislocation or other acute abnormality. No significant  arthritis. No change.     IMPRESSION  IMPRESSION: No acute abnormality. CT CODE NEURO HEAD WO CONTRAST   Final Result   INDICATION: CODE S Last well 2200 last evening     EXAM: HEAD CT WITHOUT CONTRAST     COMPARISON: January 18, 2011     TECHNIQUE: Routine noncontrast axial head CT was performed. Sagittal and  coronal reconstructions were generated.      CT dose reduction was achieved through use of a standardized protocol tailored  for this examination and automatic exposure control for dose modulation.     FINDINGS:     Ventricles: Midline, no hydrocephalus. Intracranial Hemorrhage: None. Brain Parenchyma/Brainstem: Normal for age. Basal Cisterns: Normal.   Paranasal Sinuses: Visualized sinuses are clear.    Additional Comments: N/A.     IMPRESSION  IMPRESSION:     No acute process.         MRI BRAIN W CONT    (Results Pending)   MRA NECK W CONT    (Results Pending)       MEDICATIONS GIVEN:  Medications   amLODIPine (NORVASC) tablet 10 mg (not administered)   atorvastatin (LIPITOR) tablet 20 mg (not administered)   sodium chloride (NS) flush 5-10 mL (not administered)   sodium chloride (NS) flush 5-10 mL (not administered)   0.9% sodium chloride infusion (50 mL/hr IntraVENous New Bag 3/19/17 1111)   aspirin chewable tablet 162 mg (162 mg Oral Given 3/19/17 1256)   labetalol (NORMODYNE;TRANDATE) injection 5 mg (not administered)   acetaminophen (TYLENOL) tablet 650 mg (not administered)     Or   acetaminophen (TYLENOL) solution 650 mg (not administered)     Or   acetaminophen (TYLENOL) suppository 650 mg (not administered)   heparin (porcine) injection 5,000 Units (5,000 Units SubCUTAneous Given 3/19/17 1255)   tamsulosin (FLOMAX) capsule 0.4 mg (0.4 mg Oral Given 3/19/17 1255)   pantoprazole (PROTONIX) tablet 40 mg (not administered)   aspirin (ASPIRIN) tablet 325 mg (325 mg Oral Given 3/19/17 0758)   diazePAM (VALIUM) tablet 5 mg (5 mg Oral Given 3/19/17 0758)       IMPRESSION:  1. Acute CVA (cerebrovascular accident) ([facility] [address]        PLAN:  1. Admit to hospitalist     8:25 AM  Patient is being admitted to the hospital by Dr. Caryle Plover. The results of their tests and reasons for their admission have been discussed with them and/or available family. They convey agreement and understanding for the need to be admitted and for their admission diagnosis. Consultation has been made with the inpatient physician specialist for hospitalization. Written by MARIKA Pacheco, as dictated by Eugenia Rubi MD.    This note is prepared by Amberly Wilson, acting as Scribe for Eugenia Rubi MD.    Eugenia Rubi MD: The scribe's documentation has been prepared under my direction and personally reviewed by me in its entirety. I confirm that the note above accurately reflects all work, treatment, procedures, and medical decision making performed by me.

## 2017-03-19 NOTE — ASSESSMENT & PLAN NOTE
· Admit to tele-neurology floor   · NPO until bedside swallow evaluation is done.  - then start cardiac diet   · I have initiated the stroke orderset   ·  mg PO daily (pt is Aspirin naive )   · Permissive hypertension for today ; labetalol for severe elevation of BP   · Resume antiHTN meds starting tomorrow   · Stroke labs including Lipid panel, hgbA1C, TSH, Free T4,   · MRI MRA CNS and neck arteries   · Echo complete - systolic murmur   · Fall and aspiration precautions   · neurocheck per stroke protocol   · Formal neurologist consultation

## 2017-03-19 NOTE — ED NOTES
Pt is tearful in bed d/t \"what's going on\", pt reassured that he is in the right place to receive the best care.

## 2017-03-19 NOTE — PROGRESS NOTES
TRANSFER - IN REPORT:    Verbal report received from Lorena Davies RN (name) on [employer]  being received from ED (unit) for routine progression of care      Report consisted of patients Situation, Background, Assessment and   Recommendations(SBAR). Information from the following report(s) SBAR, Kardex, ED Summary, Intake/Output, MAR and Recent Results was reviewed with the receiving nurse. Opportunity for questions and clarification was provided.

## 2017-03-19 NOTE — PROGRESS NOTES
* No surgery found *  Bedside and Verbal shift change report given to Dominique RN (oncoming nurse) by Marlon Henderson RN (offgoing nurse). Report included the following information SBAR, Kardex, Intake/Output, MAR and Recent Results. Zone Phone:   [phone]      Significant changes during shift:  Patient is a new admit to the unit. NIH Stroke Scale of 5. Patient passes his stand and was advanced to a cardiac soft liquid diet. Patient Information    Anh Lombardi  80 y.o.  3/19/2017  6:29 AM by Lemuel Zamudio MD. Anh Lombardi was admitted from Home    Problem List    Patient Active Problem List    Diagnosis Date Noted    Hemiplegia and hemiparesis following cerebral infarction affecting right dominant side ([facility] [address] 03/19/2017     Class: Present on Admission    Acute CVA (cerebrovascular accident) ([facility] [address] 03/19/2017     Class: Acute    Dysarthria following cerebrovascular accident 03/19/2017     Class: Present on Admission    Hemiparesthesia 03/19/2017     Class: Present on Admission    Essential hypertension 03/19/2017     Class: Chronic    Hyperlipidemia 03/19/2017     Class: Chronic    Prostatism 03/19/2017     Class: Chronic    Fall at home 03/19/2017     Class: Present on Admission     Past Medical History:   Diagnosis Date    Hypertension     Other ill-defined conditions(799.89)     high cholesterol         Core Measures:    CVA: Yes Yes  CHF:No No  PNA:No No    Post Op Surgical (If Applicable):     N/A    Activity Status:    OOB to Chair No  Ambulated this shift No   Bed Rest No    Supplemental O2: (If Applicable)    N/A      LINES AND DRAINS:    Central Line? No Placement date N/A Reason Medically Necessary N/A    PICC LINE? No Placement date N/AReason Medically Necessary N/A    Urinary Catheter?  No Placement Date N/A Reason Medically Necessary N/A    DVT prophylaxis:    DVT prophylaxis Med- Yes  DVT prophylaxis SCD or SOLIS- No     Wounds: (If Applicable)    Wounds- Not applicable    Location N/A    Patient Safety:    Falls Score Total Score: 3  Safety Level_______  Bed Alarm On? Yes  Sitter?  No    Plan for upcoming shift: Continue observation        Discharge Plan: No     Active Consults:

## 2017-03-19 NOTE — ASSESSMENT & PLAN NOTE
Pt complaints of classic symptoms and he is having to get up every three to four hours.    We can start him on Flomax starting tomorrow as well

## 2017-03-19 NOTE — CONSULTS
NEUROLOGY CONSULTATION NOTE       DATE OF CONSULTATION: 3/19/2017    CONSULTED BY: Brianna Sims MD    Reason for Consult  I have been asked to see the patient in neurological consultation to render advice and opinion regarding stroke. HISTORY OF PRESENT ILLNESS  Theron Fuller is a 80 y.o. male who presents to the hospital because since Friday, he had been having dizziness that he describes as a room spinning sensation but without nausea or vomiting. It gets worse on standing. Today am, he had gone to the bathroom and he had sudden onset right sided numbness and weakness. He fell down to the floor and was not able to get up. He was also having problems with speech. He does have risk factors in the form of HTN and HLD. He is not taking any aspirin at home. He was brought to the hospital and CT head was performed and that was normal. He has improved since admission, but still has right sided symptoms. ROS  A ten system review of constitutional, cardiovascular, respiratory, musculoskeletal, endocrine, skin, SHEENT, genitourinary, psychiatric and neurologic systems was obtained and is unremarkable except as stated in HPI     PMH  Past Medical History:   Diagnosis Date    Hypertension     Other ill-defined conditions(799.89)     high cholesterol       FH  History reviewed. No pertinent family history.     SH  Social History     Social History    Marital status:      Spouse name: N/A    Number of children: N/A    Years of education: N/A     Social History Main Topics    Smoking status: Never Smoker    Smokeless tobacco: Never Used    Alcohol use Yes    Drug use: No    Sexual activity: Not Asked     Other Topics Concern    None     Social History Narrative       ALLERGIES  No Known Allergies    PHYSICAL EXAM  EXAMINATION:   Patient Vitals for the past 24 hrs:   Temp Pulse Resp BP SpO2   03/19/17 1500 - 85 - 142/79 -   03/19/17 1057 97.7 °F (36.5 °C) 82 20 146/71 96 %   03/19/17 0732 - 99 23 119/76 (!) 86 %   03/19/17 0715 - (!) 125 17 (!) 130/91 98 %   03/19/17 0700 - (!) 122 29 115/86 97 %   03/19/17 0643 97.6 °F (36.4 °C) (!) 121 18 (!) 151/94 96 %   03/19/17 0642 - - - (!) 151/94 -        General:   General appearance: Pt is in no acute distress   Distal pulses are preserved  Fundoscopic exam: attempted    Neurological Examination:   Mental Status:  AAO x3. Speech is fluent. Follows commands, has normal fund of knowledge, attention, short term recall, comprehension and insight. Cranial Nerves: Visual fields are full. PERRL, Extraocular movements are full. Facial sensation decreased on the right. Facial movement intact, symmetric. Hearing intact to conversation. Palate elevates symmetrically. Shoulder shrug symmetric. Tongue midline. Motor: Strength is 5/5 on the left and 4/5 on the right. Normal tone. No atrophy. Sensation: Decreased LT on the right UE and LE    Reflexes: DTRs 2+ throughout. Plantar responses downgoing. Coordination/Cerebellar: Intact on the left    Gait: deferred    Skin: No significant bruising or lacerations. LAB DATA REVIEWED:    Results for orders placed or performed during the hospital encounter of 03/19/17   CBC WITH AUTOMATED DIFF   Result Value Ref Range    WBC 5.4 4.1 - 11.1 K/uL    RBC 4.99 4. 10 - 5.70 M/uL    HGB 14.5 12.1 - 17.0 g/dL    HCT 43.8 36.6 - 50.3 %    MCV 87.8 80.0 - 99.0 FL    MCH 29.1 26.0 - 34.0 PG    MCHC 33.1 30.0 - 36.5 g/dL    RDW 13.4 11.5 - 14.5 %    PLATELET 530 313 - 995 K/uL    NEUTROPHILS 69 32 - 75 %    LYMPHOCYTES 21 12 - 49 %    MONOCYTES 8 5 - 13 %    EOSINOPHILS 2 0 - 7 %    BASOPHILS 0 0 - 1 %    ABS. NEUTROPHILS 3.7 1.8 - 8.0 K/UL    ABS. LYMPHOCYTES 1.2 0.8 - 3.5 K/UL    ABS. MONOCYTES 0.4 0.0 - 1.0 K/UL    ABS. EOSINOPHILS 0.1 0.0 - 0.4 K/UL    ABS.  BASOPHILS 0.0 0.0 - 0.1 K/UL   METABOLIC PANEL, COMPREHENSIVE   Result Value Ref Range    Sodium 141 136 - 145 mmol/L    Potassium 3.5 3.5 - 5.1 mmol/L    Chloride 103 97 - 108 mmol/L    CO2 26 21 - 32 mmol/L    Anion gap 12 5 - 15 mmol/L    Glucose 137 (H) 65 - 100 mg/dL    BUN 15 6 - 20 MG/DL    Creatinine 1.34 (H) 0.70 - 1.30 MG/DL    BUN/Creatinine ratio 11 (L) 12 - 20      GFR est AA >60 >60 ml/min/1.73m2    GFR est non-AA 51 (L) >60 ml/min/1.73m2    Calcium 9.4 8.5 - 10.1 MG/DL    Bilirubin, total 0.5 0.2 - 1.0 MG/DL    ALT (SGPT) 14 12 - 78 U/L    AST (SGOT) 17 15 - 37 U/L    Alk.  phosphatase 86 45 - 117 U/L    Protein, total 8.2 6.4 - 8.2 g/dL    Albumin 3.4 (L) 3.5 - 5.0 g/dL    Globulin 4.8 (H) 2.0 - 4.0 g/dL    A-G Ratio 0.7 (L) 1.1 - 2.2     PROTHROMBIN TIME + INR   Result Value Ref Range    INR 1.0 0.9 - 1.1      Prothrombin time 10.1 9.0 - 11.1 sec   PTT   Result Value Ref Range    aPTT 25.7 22.1 - 32.5 sec    aPTT, therapeutic range     58.0 - 77.0 SECS   TROPONIN I   Result Value Ref Range    Troponin-I, Qt. <0.04 <0.05 ng/mL   URINALYSIS W/MICROSCOPIC   Result Value Ref Range    Color YELLOW/STRAW      Appearance CLOUDY (A) CLEAR      Specific gravity 1.020 1.003 - 1.030      pH (UA) 5.5 5.0 - 8.0      Protein 30 (A) NEG mg/dL    Glucose NEGATIVE  NEG mg/dL    Ketone TRACE (A) NEG mg/dL    Blood NEGATIVE  NEG      Urobilinogen 1.0 0.2 - 1.0 EU/dL    Nitrites NEGATIVE  NEG      Leukocyte Esterase NEGATIVE  NEG      WBC 0-4 0 - 4 /hpf    RBC 0-5 0 - 5 /hpf    Epithelial cells FEW FEW /lpf    Bacteria NEGATIVE  NEG /hpf    Hyaline cast 0-2 0 - 5 /lpf   CBC W/O DIFF   Result Value Ref Range    WBC 8.5 4.1 - 11.1 K/uL    RBC 4.91 4.10 - 5.70 M/uL    HGB 14.1 12.1 - 17.0 g/dL    HCT 42.5 36.6 - 50.3 %    MCV 86.6 80.0 - 99.0 FL    MCH 28.7 26.0 - 34.0 PG    MCHC 33.2 30.0 - 36.5 g/dL    RDW 13.3 11.5 - 14.5 %    PLATELET 674 274 - 797 K/uL   BILIRUBIN, CONFIRM   Result Value Ref Range    Bilirubin UA, confirm NEGATIVE  NEG     GLUCOSE, POC   Result Value Ref Range    Glucose (POC) 116 (H) 65 - 100 mg/dL    Performed by Elio Garrett*    EKG, 12 LEAD, INITIAL   Result Value Ref Range    Ventricular Rate 124 BPM    Atrial Rate 125 BPM    QRS Duration 78 ms    Q-T Interval 330 ms    QTC Calculation (Bezet) 474 ms    Calculated R Axis 1 degrees    Calculated T Axis 32 degrees    Diagnosis       Accelerated Junctional rhythm  Nonspecific ST abnormality  Abnormal ECG  No previous ECGs available          Imaging review:  CT Head  Normal    Stroke workup    MRI Brain:   Pending    MRA neck:   Pending    TTE:   Pending    Stroke labs:  HgBA1c    No results found for: HBA1C, CTA7ORXD  LDL   No results found for: LDL, DLDL, LDLC, DLDLP    HOME MEDS  Prior to Admission Medications   Prescriptions Last Dose Informant Patient Reported? Taking? amLODIPine (NORVASC) 10 mg tablet 3/18/2017 at Unknown time  Yes Yes   Sig: Take 10 mg by mouth daily. ergocalciferol (VITAMIN D2) 50,000 unit capsule 3/12/2017 at Unknown time  Yes Yes   Sig: Take 50,000 Units by mouth. hydrochlorothiazide (HYDRODIURIL) 25 mg tablet 3/18/2017 at Unknown time  Yes Yes   Sig: Take 25 mg by mouth daily. omeprazole (PRILOSEC) 20 mg capsule 3/18/2017 at Unknown time  Yes Yes   Sig: Take 20 mg by mouth daily. simvastatin (ZOCOR) 20 mg tablet 3/18/2017 at Unknown time  Yes Yes   Sig: Take 20 mg by mouth nightly.       Facility-Administered Medications: None       CURRENT MEDS  Current Facility-Administered Medications   Medication Dose Route Frequency    [START ON 3/20/2017] amLODIPine (NORVASC) tablet 10 mg  10 mg Oral DAILY    atorvastatin (LIPITOR) tablet 20 mg  20 mg Oral QHS    sodium chloride (NS) flush 5-10 mL  5-10 mL IntraVENous Q8H    0.9% sodium chloride infusion  50 mL/hr IntraVENous CONTINUOUS    aspirin chewable tablet 162 mg  162 mg Oral DAILY    heparin (porcine) injection 5,000 Units  5,000 Units SubCUTAneous Q8H    tamsulosin (FLOMAX) capsule 0.4 mg  0.4 mg Oral DAILY    [START ON 3/20/2017] pantoprazole (PROTONIX) tablet 40 mg  40 mg Oral ACB       IMPRESSION:  Mitra Traore is a 80 y.o. male who presents with new onset vertigo and right sided weakness and numbness. Suspect left hemispheric infarct. +HTN and HLD. 1. Probable left hemispheric infarct  2. HTN  3. HLD    RECOMMENDATIONS:  - MRI brain w/o C - Pending  - MRA Neck - Pending  - TTE - Pending  - Telemetry  - Permissive HTN (SBP<220/<120) for 24 hrs from symptom onset and then BP goal is less than 140/90  - Stroke labs (HgbA1c, TSH, lipid panel) - Pending  - Start ASA 81 mg daily  - Start atorvastatin 20mg daily (Pt is on statin at home) and increase to 40 mg if LDL more than 70.  - ST/OT/PT eval    Thank you very much for this consultation. We will follow up on the above studies and give further recommendations as indicated.       Georgette Black MD  Diplomate, American Board of Psychiatry & Neurology (Neurology)  [facility] Board of Psychiatry & Neurology (Clinical Neurophysiology)

## 2017-03-19 NOTE — ED NOTES
Assumed care of pt at this time. Pt states that he had been feeling dizzy all day and got up to use the bathroom around 5 am this morning, got dizzy and fell. Pt complains of right hip pain and right sided tingling. Assessment done at this time. Pt has been to CT and Dr. Katalina Steele has laid eyes on him, prior to CT. Call bell in reach, wife at bedside.

## 2017-03-20 PROBLEM — I63.02 CEREBROVASCULAR ACCIDENT (CVA) DUE TO THROMBOSIS OF BASILAR ARTERY (HCC): Status: ACTIVE | Noted: 2017-03-19

## 2017-03-20 LAB
ANION GAP BLD CALC-SCNC: 10 MMOL/L (ref 5–15)
ATRIAL RATE: 125 BPM
BUN SERPL-MCNC: 18 MG/DL (ref 6–20)
BUN/CREAT SERPL: 15 (ref 12–20)
CALCIUM SERPL-MCNC: 9.3 MG/DL (ref 8.5–10.1)
CALCULATED R AXIS, ECG10: 1 DEGREES
CALCULATED T AXIS, ECG11: 32 DEGREES
CHLORIDE SERPL-SCNC: 105 MMOL/L (ref 97–108)
CHOLEST SERPL-MCNC: 143 MG/DL
CO2 SERPL-SCNC: 29 MMOL/L (ref 21–32)
CREAT SERPL-MCNC: 1.2 MG/DL (ref 0.7–1.3)
DIAGNOSIS, 93000: NORMAL
ERYTHROCYTE [DISTWIDTH] IN BLOOD BY AUTOMATED COUNT: 13.3 % (ref 11.5–14.5)
EST. AVERAGE GLUCOSE BLD GHB EST-MCNC: 148 MG/DL
GLUCOSE SERPL-MCNC: 104 MG/DL (ref 65–100)
HBA1C MFR BLD: 6.8 % (ref 4.2–6.3)
HCT VFR BLD AUTO: 39.1 % (ref 36.6–50.3)
HDLC SERPL-MCNC: 52 MG/DL
HDLC SERPL: 2.8 {RATIO} (ref 0–5)
HGB BLD-MCNC: 13.1 G/DL (ref 12.1–17)
LDLC SERPL CALC-MCNC: 69.8 MG/DL (ref 0–100)
LIPID PROFILE,FLP: NORMAL
MCH RBC QN AUTO: 29.3 PG (ref 26–34)
MCHC RBC AUTO-ENTMCNC: 33.5 G/DL (ref 30–36.5)
MCV RBC AUTO: 87.5 FL (ref 80–99)
PLATELET # BLD AUTO: 237 K/UL (ref 150–400)
POTASSIUM SERPL-SCNC: 3.7 MMOL/L (ref 3.5–5.1)
Q-T INTERVAL, ECG07: 330 MS
QRS DURATION, ECG06: 78 MS
QTC CALCULATION (BEZET), ECG08: 474 MS
RBC # BLD AUTO: 4.47 M/UL (ref 4.1–5.7)
SODIUM SERPL-SCNC: 144 MMOL/L (ref 136–145)
T4 FREE SERPL-MCNC: 1.3 NG/DL (ref 0.8–1.5)
TRIGL SERPL-MCNC: 106 MG/DL (ref ?–150)
TSH SERPL DL<=0.05 MIU/L-ACNC: 2.44 UIU/ML (ref 0.36–3.74)
VENTRICULAR RATE, ECG03: 124 BPM
VLDLC SERPL CALC-MCNC: 21.2 MG/DL
WBC # BLD AUTO: 6.6 K/UL (ref 4.1–11.1)

## 2017-03-20 PROCEDURE — 80061 LIPID PANEL: CPT | Performed by: INTERNAL MEDICINE

## 2017-03-20 PROCEDURE — 97535 SELF CARE MNGMENT TRAINING: CPT

## 2017-03-20 PROCEDURE — G8979 MOBILITY GOAL STATUS: HCPCS

## 2017-03-20 PROCEDURE — 65660000000 HC RM CCU STEPDOWN

## 2017-03-20 PROCEDURE — 84443 ASSAY THYROID STIM HORMONE: CPT | Performed by: INTERNAL MEDICINE

## 2017-03-20 PROCEDURE — 97112 NEUROMUSCULAR REEDUCATION: CPT

## 2017-03-20 PROCEDURE — 97110 THERAPEUTIC EXERCISES: CPT

## 2017-03-20 PROCEDURE — 80048 BASIC METABOLIC PNL TOTAL CA: CPT | Performed by: INTERNAL MEDICINE

## 2017-03-20 PROCEDURE — 83036 HEMOGLOBIN GLYCOSYLATED A1C: CPT | Performed by: INTERNAL MEDICINE

## 2017-03-20 PROCEDURE — 93306 TTE W/DOPPLER COMPLETE: CPT

## 2017-03-20 PROCEDURE — 74011250637 HC RX REV CODE- 250/637: Performed by: PSYCHIATRY & NEUROLOGY

## 2017-03-20 PROCEDURE — 36415 COLL VENOUS BLD VENIPUNCTURE: CPT | Performed by: INTERNAL MEDICINE

## 2017-03-20 PROCEDURE — 92523 SPEECH SOUND LANG COMPREHEN: CPT

## 2017-03-20 PROCEDURE — 97161 PT EVAL LOW COMPLEX 20 MIN: CPT

## 2017-03-20 PROCEDURE — 97165 OT EVAL LOW COMPLEX 30 MIN: CPT

## 2017-03-20 PROCEDURE — 97116 GAIT TRAINING THERAPY: CPT

## 2017-03-20 PROCEDURE — 84439 ASSAY OF FREE THYROXINE: CPT | Performed by: INTERNAL MEDICINE

## 2017-03-20 PROCEDURE — 74011250637 HC RX REV CODE- 250/637: Performed by: INTERNAL MEDICINE

## 2017-03-20 PROCEDURE — 85027 COMPLETE CBC AUTOMATED: CPT | Performed by: INTERNAL MEDICINE

## 2017-03-20 PROCEDURE — 97530 THERAPEUTIC ACTIVITIES: CPT

## 2017-03-20 PROCEDURE — 74011250636 HC RX REV CODE- 250/636: Performed by: INTERNAL MEDICINE

## 2017-03-20 PROCEDURE — G8978 MOBILITY CURRENT STATUS: HCPCS

## 2017-03-20 PROCEDURE — 74011250637 HC RX REV CODE- 250/637: Performed by: HOSPITALIST

## 2017-03-20 RX ORDER — HYDROCHLOROTHIAZIDE 25 MG/1
25 TABLET ORAL DAILY
Status: DISCONTINUED | OUTPATIENT
Start: 2017-03-21 | End: 2017-03-21 | Stop reason: HOSPADM

## 2017-03-20 RX ORDER — ENOXAPARIN SODIUM 100 MG/ML
40 INJECTION SUBCUTANEOUS EVERY 24 HOURS
Status: DISCONTINUED | OUTPATIENT
Start: 2017-03-21 | End: 2017-03-21 | Stop reason: HOSPADM

## 2017-03-20 RX ADMIN — Medication 10 ML: at 12:49

## 2017-03-20 RX ADMIN — HEPARIN SODIUM 5000 UNITS: 5000 INJECTION, SOLUTION INTRAVENOUS; SUBCUTANEOUS at 12:47

## 2017-03-20 RX ADMIN — AMLODIPINE BESYLATE 10 MG: 5 TABLET ORAL at 08:41

## 2017-03-20 RX ADMIN — Medication 10 ML: at 03:36

## 2017-03-20 RX ADMIN — PANTOPRAZOLE SODIUM 40 MG: 40 TABLET, DELAYED RELEASE ORAL at 07:02

## 2017-03-20 RX ADMIN — ASPIRIN 81 MG CHEWABLE TABLET 81 MG: 81 TABLET CHEWABLE at 08:41

## 2017-03-20 RX ADMIN — TAMSULOSIN HYDROCHLORIDE 0.4 MG: 0.4 CAPSULE ORAL at 08:41

## 2017-03-20 RX ADMIN — ATORVASTATIN CALCIUM 20 MG: 20 TABLET, FILM COATED ORAL at 21:35

## 2017-03-20 RX ADMIN — HEPARIN SODIUM 5000 UNITS: 5000 INJECTION, SOLUTION INTRAVENOUS; SUBCUTANEOUS at 03:34

## 2017-03-20 RX ADMIN — Medication 10 ML: at 21:35

## 2017-03-20 NOTE — PROGRESS NOTES
Letter of Status Determination:   Recommend hospitalization status upgraded from Observation to Inpatient  Status    Pt Name:  Regina Cueva   MR#  [id number]   [facility]#   [id number]   [address] [place] [address]  6194/94  @ [address] date  3/19/2017  6:29 AM   Current Attending Physician  Denise Guevara MD   Principal diagnosis  Acute CVA (cerebrovascular accident) [facility])      Clinicals  80 y.o. y.o  male hospitalized with above diagnosis   The pt was admitted with significant symptoms including hemiparesis of the dominant side. MRI done on 03/19/17 shows Acute small CVA      Milliman (MCG) criteria   Does   apply Acute CVA    STATUS DETERMINATION  Based on documented presenting clinical data, comorbid conditions, high risk of adverse events and deterioration, it is our recommendation that the patient's status should be upgraded from OBSERVATION to INPATIENT status. The final decision of the patient's hospitalization status depends on the attending physician's judgment.          Additional comments     Payor: Mitchell Aguilar / Plan: [facility] [place] CHO[facility] PPO/PFFS / Product Type: Managed Care Medicare /         Katelynn Ramirez MD MPH FACP     Physician Advisor    [address]   [facility] Staff, [address] [place] [address]    Cell  [phone]

## 2017-03-20 NOTE — PROGRESS NOTES
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, dysarthria  -symptoms are improving  -appreciate neurology eval  -bilateral carotid 60% stenosis at origin  -will resume antihypertensives  -lipid panel okay, hba1c slightly elevated at 6.8%, thyroid studies okay  -f/u echo  -PT recommending inpatient rehab    Fall at home  -ground level    Hypertension  -resume home medications    Hyperlipidemia  -statin    Likely BPH  -started flomax, continue on dc      Code status: Full  Prophylaxis: Lovenox  Recommended Disposition: [facility]/Rehab     Subjective:     Chief Complaint: right arm weakness    Patient feeling well, weakness and tingling in right arm has improved, only feeling weak in fingers        Review of Systems:  Symptom Y/N Comments  Symptom Y/N Comments   Fever/Chills    Chest Pain     Poor Appetite    Edema     Cough    Abdominal Pain     Sputum    Joint Pain     SOB/WEST    Pruritis/Rash     Nausea/vomit    Tolerating PT/OT     Diarrhea    Tolerating Diet     Constipation    Other       Could NOT obtain due to:      Objective:     VITALS:   Last 24hrs VS reviewed since prior progress note.  Most recent are:  Patient Vitals for the past 24 hrs:   Temp Pulse Resp BP SpO2   17 1143 97.8 °F (36.6 °C) 84 18 152/75 100 %   17 0732 98.1 °F (36.7 °C) 86 18 144/66 100 %   17 0337 97.7 °F (36.5 °C) 70 18 114/56 98 %   17 2238 98.6 °F (37 °C) 80 18 121/75 94 %   17 1925 98.7 °F (37.1 °C) 77 20 138/85 94 %   17 1718 97.2 °F (36.2 °C) 82 - (!) 161/92 100 %   17 1500 - 85 - 142/79 -       Intake/Output Summary (Last 24 hours) at 17 1342  Last data filed at 17 0914   Gross per 24 hour   Intake              200 ml   Output              325 ml   Net             -125 ml        PHYSICAL EXAM:  General: Alert, cooperative, no acute distress    EENT:  EOMI. Anicteric sclerae. Mucous membranes moist  Resp:  CTA bilaterally, no wheezing or rales. No accessory muscle use  CV:  Regular rhythm, no edema  GI:  Soft, non distended, non tender. +Bowel sounds  Neurologic:  Alert and oriented X 3, normal speech  Psych:   Good insight, not anxious nor agitated  Skin:  No rashes, no jaundice  ________________________________________________________________________  Care Plan discussed with:    Comments   Patient x    Family  x    RN x    Care Manager x    Consultant  x                     x Multidiciplinary team rounds were held today with , nursing, pharmacist and clinical coordinator. Patient's plan of care was discussed; medications were reviewed and discharge planning was addressed. ________________________________________________________________________  Total NON critical care TIME:  20   Minutes  ________________________________________________________________________  Erica Yeung MD     Procedures: see electronic medical records for all procedures/Xrays and details which were not copied into this note but were reviewed prior to creation of Plan. LABS:  I reviewed today's most current labs and imaging studies.   Pertinent labs include:  Recent Labs      03/20/17   0021  03/19/17   1201  03/19/17   0705   WBC  6.6  8.5  5.4   HGB  13.1  14.1  14.5   HCT  39.1  42.5  43.8   PLT  237  242  219     Recent Labs      03/20/17   0021  03/19/17   0705   NA  144  141   K  3.7  3.5   CL  105  103   CO2  29  26   GLU  104*  137*   BUN  18  15   CREA  1.20  1.34*   CA  9.3  9.4   ALB   --   3.4*   TBILI   --   0.5   SGOT   --   17   ALT   --   14   INR   --   1.0       Signed: Erica Yeung MD

## 2017-03-20 NOTE — PROGRESS NOTES
Occupational Therapy    Acknowledge OT orders and completed chart review. Attempted to see patient for OT evaluation. Patient off floor for testing. WIll follow up.     Thank you,    Mike Sherwood OTR/L

## 2017-03-20 NOTE — PROGRESS NOTES
Problem: Self Care Deficits Care Plan (Adult)  Goal: *Acute Goals and Plan of Care (Insert Text)  Occupational Therapy Goals  Initiated 3/20/2017   1. Patient will perform lower body dressing with dangelo-dressing techniques with CGA within 7 day(s). 2. Patient will perform upper body dressing in sitting with supervision/set-up within 7 day(s). 3. Patient will perform grooming with RUE while standing at sink with minimal assistance within 7 day(s). 4. Patient will perform toilet transfers with minimal assistance without AD within 7 day(s). 5. Patient will perform all aspects of toileting with contact guard assist within 7 day(s). 6. Patient will participate in R upper extremity neuro therapeutic exercise/activities with hand out at modified independent level for 10 minutes within 7 day(s). 7. Patient will improve their Fugl Anne score by 5 points within 7 days. OCCUPATIONAL THERAPY EVALUATION  Patient: Wilian Adam (48 y.o. male)  Date: 3/20/2017  Primary Diagnosis: ? CVA  Acute CVA (cerebrovascular accident) [facility])        Precautions: Bed Alarm, Fall      ASSESSMENT :  Based on the objective data described below, the patient presents with impaired RUE coordination, RUE strength, RUE sensation, balance, and functional mobility s/p small L pontine infarct with GLF at home. Patient lives at home with wife who he is primary caregiver for since she has dementia. He was independent in all ADL tasks without AD, denies falls other than one PTA. Patient's wife and daughter present and supportive, asking appropriate questions. Provided education on dressing techniques and safety, needing additional verbal cues initially secondary to impulsive behavior. Patient with 55/66 on Adriana Nel with deficits to R UE coordination and  strengths. Completed LB with max A secondary to impaired balance and additional cues for safe sequencing.   Provided educationn on BE FAST stroke recognition, neuroplasticity and R UE coordination/strengthening/sensation exercises. Patient with excellent understanding. Patient is excellent inpatient rehab candidate secondary to reports of RUE functional return since yesterday, motivation, high PLOF, and supportive family. He is agreeable and would like to go to inpatient rehab as well as family. Patient will benefit from skilled intervention to address the above impairments. Patients rehabilitation potential is considered to be Good  Factors which may influence rehabilitation potential include:   [X]                None noted  [ ]                Mental ability/status  [ ]                Medical condition  [ ]                Home/family situation and support systems  [ ]                Safety awareness  [ ]                Pain tolerance/management  [ ]                Other:        PLAN :  Recommendations and Planned Interventions:  [X]                  Self Care Training                  [X]           Therapeutic Activities  [X]                  Functional Mobility Training    [ ]           Cognitive Retraining  [X]                  Therapeutic Exercises           [X]           Endurance Activities  [X]                  Balance Training                   [X]           Neuromuscular Re-Education  [X]                  Visual/Perceptual Training     [X]      Home Safety Training  [X]                  Patient Education                 [X]           Family Training/Education  [ ]                  Other (comment):     Frequency/Duration: Patient will be followed by occupational therapy 5 times a week to address goals. Discharge Recommendations: Inpatient Rehab  Further Equipment Recommendations for Discharge: TBD rehab       SUBJECTIVE:   Patient stated You mean this(touching back of his left side of end) cause this side to be in trouble. (regarding R deficits)      OBJECTIVE DATA SUMMARY:   HISTORY:   Past Medical History:   Diagnosis Date    Hypertension      Other ill-defined conditions(799.89)       high cholesterol     Past Surgical History:   Procedure Laterality Date    HX CHOLECYSTECTOMY            Prior Level of Function/Home Situation: Home with wife. Independent in all ADL tasks and IADLs. 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 She is physically able but he provides supervision. Home Situation  Home Environment: Private residence  # Steps to Enter: 5  Rails to Enter: Yes  Hand Rails : Right  One/Two Story Residence: Split level  # of Interior Steps: 9  Interior Rails: Right  Lift Chair Available: No  Living Alone: No  Support Systems: Child(morena), Spouse/Significant Other/Partner  Patient Expects to be Discharged to[de-identified] Private residence  Current DME Used/Available at Home: None  Tub or Shower Type: Shower  [X]  Right hand dominant             [ ]  Left hand dominant     EXAMINATION OF PERFORMANCE DEFICITS:  Cognitive/Behavioral Status:  Neurologic State: Alert  Orientation Level: Oriented X4  Cognition: Appropriate for age attention/concentration;Poor safety awareness  Perception: Appears intact  Perseveration: No perseveration noted  Safety/Judgement: Awareness of environment  Skin: BUE intact   Edema: BUE normal  Hearing: Auditory  Auditory Impairment: Hard of hearing, bilateral  Vision/Perceptual:    Tracking: Able to track stimulus in all quadrants w/o difficulty    Saccades: Within Defined Limits    Convergence: Normal (within 6 inches from nose)    Visual Fields:  (WDL)  Diplopia: No    Acuity: Within Defined Limits;Able to read clock/calendar on wall without difficulty; Able to read employee name badge without difficulty    Corrective Lenses: Glasses (bifocals)  Range of Motion:  AROM: Within functional limits  PROM: Within functional limits   Strength:  Strength: Generally decreased, functional (RUE)     Coordination:  Coordination: Generally decreased, functional RUE  Fine Motor Skills-Upper: Right Impaired;Left Intact    Gross Motor Skills-Upper: Right Impaired;Left Intact      Tone & Sensation:  Tone: Normal  Sensation: Impaired (reports hypersensitivity to all of RUE into neck)           Balance:  Sitting: Intact; Without support  Standing: Impaired  Standing - Static: Poor;Fair;Constant support  Standing - Dynamic : Poor     Functional Mobility and Transfers for ADLs:  Bed Mobility:  Rolling:  (at bedside chair)     Transfers:  Sit to Stand: Minimum assistance  Stand to Sit: Minimum assistance     ADL Assessment:  Feeding: Setup  Oral Facial Hygiene/Grooming: Setup; Additional time  Bathing: Minimum assistance  Upper Body Dressing: Minimum assistance  Lower Body Dressing: Maximum assistance  Toileting: Maximum assistance        ADL Intervention and task modifications:  Feeding  Feeding Assistance: Supervision/set-up  Cutting Food: Supervision/set-up; Compensatory technique training  Food to Mouth: Supervision/set-up  Drink to Mouth: Supervision/set-up  Adaptive Equipment: Built up fork;Built up knife     Lower Body Dressing Assistance  Underpants: Compensatory technique training;Maximum assistance  Pants With Button/Zipper: Compensatory technique training;Maximum assistance        Provided education on dressing dangelo techniques and safety. He needed additional verbal cues initially secondary to impulsive behavior and attempts to thread pants in standing vs sitting. Completed LB with max A secondary to impaired balance and additional cues for safe sequencing. Needing additional time to complete and process new sequencing. Issued red built up  to use for utensils if needed with cutting items. Provided education on BE FAST stroke recognition and neuro plasticity. R UE coordination/strengthening/sensation exercises with blue foam square with hand out. Patient and daughter with excellent understanding.            Cognitive Retraining  Safety/Judgement: Awareness of environment     Functional Measure:   Fugl-Anne Assessment of Motor Recovery after Stroke:       Reflex Activity  Flexors/Biceps/Fingers: Can be elicited  Extensors/Triceps: Can be elicited  Reflex Subtotal: 4     Volitional Movement Within Synergies  Shoulder Retraction: Full  Shoulder Elevation: Full  Shoulder Abduction (90 degrees): Full  Shoulder External Rotation: Full  Elbow Flexion: Full  Forearm Supination: Full  Shoulder Adduction/Internal Rotation: Full  Elbow Extension: Full  Forearm Pronation: Full  Subtotal: 18     Volitional Movement Mixing Synergies  Hand to Lumbar Spine: Full  Shoulder Flexion (0-90 degrees): Full  Pronation-Supination: Full  Subtotal: 6     Volitional Movement With Little or No Synergy  Shoulder Abduction (0-90 degrees): Full  Shoulder Flexion ( degrees): Full  Pronation/Supination: Full  Subtotal : 6     Normal Reflex Activity  Biceps, Triceps, Finger Flexors: Full  Subtotal : 2     Upper Extremity Total   Upper Extremity Total: 36     Wrist  Stability at 15 Degree Dorsiflexion: Partial  Repeated Dorsiflexion/ Volar Flexion: Full  Stability at 15 Degree Dorsiflexion: Partial  Repeated Dorsiflexion/ Volar Flexion: Full  Circumduction: Full  Wrist Total: 8     Hand  Mass Flexion: Full  Mass Extension: Full  Grasp A: Partial  Grasp B: Partial  Grasp C: Partial  Grasp D: Partial  Grasp E: Partial  Hand Total: 9     Coordination/Speed  Tremor: None  Dysmetria: Marked  Time: >5s  Coordination/Speed Total : 2     Total A-D  Total A-D (Motor Function): 55/66      Percentage of impairment CH  0% CI  1-19% CJ  20-39% CK  40-59% CL  60-79% CM  80-99% CN  100%   Fugl-Anne score: 0-66 66 53-65 39-52 26-38 13-25 1-12    0      This is a reliable/valid measure of arm function after a neurological event. It has established value to characterize functional status and for measuring spontaneous and therapy-induced recovery; tests proximal and distal motor functions.  Fugl-Anne Assessment  UE scores recorded between five and 30 days post neurologic event can be used to predict UE recovery at six months post neurologic event. Severe = 0-21 points   Moderately Severe = 22-33 points   Moderate = 34-47 points   Mild = 48-66 points  ELMER Sifuentes, DEBBIE Griffiths, & JUANY Posada (1992). Measurement of motor recovery after stroke: Outcome assessment and sample size requirements. Stroke, 23, pp. 2724-4209.   ------------------------------------------------------------------------------------------------------------------------------------------------------------------  MCID:  Stroke:   Maru Reagan et al, 2001; n = 171; mean age 79 (5) years; assessed within 16 (12) days of stroke, Acute Stroke)  FMA Motor Scores from Admission to Discharge   · 10 point increase in FMA Upper Extremity = 1.5 change in discharge FIM  · 10 point increase in FMA Lower Extremity = 1.9 change in discharge FIM  MDC:   Stroke:   Larwance Memory et al, 2008, n = 14, mean age = 59.9 (14.6) years, assessed on average 14 (6.5) months post stroke, Chronic Stroke)   · FMA = 5.2 points for the Upper Extremity portion of the assessment      G codes: In compliance with CMSs Claims Based Outcome Reporting, the following G-code set was chosen for this patient based on their primary functional limitation being treated: The outcome measure chosen to determine the severity of the functional limitation was the [employer] Assessment of UE with a score of 55/66 which was correlated with the impairment scale.       · Other PT/OT Primary Functional Limitations:               - CURRENT STATUS:    CI - 1%-19% impaired, limited or restricted               - GOAL STATUS:           CH - 0% impaired, limited or restricted               - D/C STATUS:                       ---------------To be determined---------------       Occupational Therapy Evaluation Charge Determination   History Examination Decision-Making   LOW Complexity : Brief history review  MEDIUM Complexity : 3-5 performance deficits relating to physical, cognitive , or psychosocial skils that result in activity limitations and / or participation restrictions LOW Complexity : No comorbidities that affect functional and no verbal or physical assistance needed to complete eval tasks       Based on the above components, the patient evaluation is determined to be of the following complexity level: LOW      Pain:  Pain Scale 1: Numeric (0 - 10)  Pain Intensity 1: 9         Activity Tolerance:   Good for dressing, education for NMR, and assessment. Denies dizziness     After treatment:   [X]  Patient left in no apparent distress sitting up in chair  [ ]  Patient left in no apparent distress in bed  [X]  Call bell left within reach  [X]  Nursing notified  [X]  Caregiver present  [ ]  Bed alarm activated      COMMUNICATION/EDUCATION:   The patients plan of care was discussed with: Physical Therapist, Registered Nurse,  and Patient, daughter and wife. Patient was educated regarding His deficit(s) of RUE coordination, sensation and strength as this relates to His diagnosis of L pontine infarct. He demonstrated Good understanding as evidenced by participation, carry over and family support. Patient and/or family was verbally educated on the BE FAST acronym for signs/symptoms of CVA and TIA. BE FAST was written on patient's communication board  for visual education and reinforcement. All questions answered with patient indicating good understanding.      [X]      Home safety education was provided and the patient/caregiver indicated understanding. [X]      Patient/family have participated as able and agree with findings and recommendations. [ ]      Patient is unable to participate in plan of care at this time.   Findings and recommendations were discussed with: Physical Therapist, Registered Nurse,  and Patient, wife, daughter     Thank you for this referral.  Cindi Christianson OT  Time Calculation: 50 mins

## 2017-03-20 NOTE — ROUTINE PROCESS
* No surgery found *  Bedside and Verbal shift change report given to Porfirio Reynaga RN (oncoming nurse) by Suhail Graham RN (offgoing nurse). Report included the following information SBAR, Kardex, MAR and Recent Results. Zone Phone:   [phone]      Significant changes during shift:    1) able to stand & pivot to chair        Patient Qian Christensen  80 y.o.  3/19/2017  6:29 AM by Dallin Ramires MD. Nohemy Childers was admitted from Home    Problem List    Patient Active Problem List    Diagnosis Date Noted    Hemiplegia and hemiparesis following cerebral infarction affecting right dominant side ([facility] [address] 03/19/2017     Class: Present on Admission    Acute CVA (cerebrovascular accident) ([facility] [address] 03/19/2017     Class: Acute    Dysarthria following cerebrovascular accident 03/19/2017     Class: Present on Admission    Hemiparesthesia 03/19/2017     Class: Present on Admission    Essential hypertension 03/19/2017     Class: Chronic    Hyperlipidemia 03/19/2017     Class: Chronic    Prostatism 03/19/2017     Class: Chronic    Fall at home 03/19/2017     Class: Present on Admission     Past Medical History:   Diagnosis Date    Hypertension     Other ill-defined conditions(799.89)     high cholesterol         Core Measures:    CVA: Yes Yes  CHF:No Not applicable  PNA:No Not applicable    Activity Status:    OOB to Chair Yes  Ambulated this shift No   Bed Rest No    Supplemental O2: (If Applicable)    NC No  NRB No  Venti-mask No  On room air Liters/min      LINES AND DRAINS:    Central Line? No    PICC LINE? No     Urinary Catheter? No   DVT prophylaxis:    DVT prophylaxis Med- Yes heparin  DVT prophylaxis SCD or SOLIS- No     Wounds: (If Applicable)    Wounds- No    Location none    Patient Safety:    Falls Score Total Score: 3  Safety Level_______  Bed Alarm On? Yes  Sitter?  No    Plan for upcoming shift: PT, OT, Speech, Echocardiogram        Discharge Plan: yes CM following    Active Consults:  IP CONSULT TO NEUROLOGY

## 2017-03-20 NOTE — PROGRESS NOTES
Problem: Mobility Impaired (Adult and Pediatric)  Goal: *Acute Goals and Plan of Care (Insert Text)  Physical Therapy Goals  Initiated 3/20/2017  1. Patient will move from supine to sit and sit to supine , scoot up and down and roll side to side in bed with moderate assist within 7 day(s). 2. Patient will transfer from bed to chair and chair to bed with moderate assistance using the least restrictive device within 7 day(s). 3. Patient will perform sit to stand with moderate assistance within 7 day(s). 4. Patient will ambulate with moderate assistance for 75 feet with the least restrictive device within 7 day(s). 5. Patient will improve Solorio Balance score by 7 points within 7 days. PHYSICAL THERAPY EVALUATION- NEURO POPULATION     Patient: Rajan Salmeron (33 y.o. male)  Date: 3/20/2017  Primary Diagnosis: ? CVA  Acute CVA (cerebrovascular accident) [facility])        Precautions:  Bed Alarm, Fall      ASSESSMENT :  Based on the objective data described below, the patient presents with impaired sensation and strength to R extremities, decreased stability, ataxia and impaired coordination that impacts functional mobility. Patient with L pontine infarct per MRI, with ICA stenosis B. Patient fully I PTA living with spouse who he cares for 2/2 her dementia. Received in bedside chair this date, agreeable. Patient states he has been up 'all night' performing AROM as ble to improve strength in affected extremities and rubbing his R side in attempt to improve sensation. Patient required mod-max of 1-2 throughout, quite unsteady, pushing heavily into author's and tech's HHA. When cued to relax UE, very hesitant, all improved once RW provided although hesitant to do so 2/2 compensation vs recovery. Patient able to utilize dangelo-rail in hallway to good effect, to include side-stepping to L and R, with constant cueing to avoid scissoring and slowing his swing phase as attempted to rush through and worsen coordination.  Recommend use of mirror, targets for stepping placement next session. Patient educated on role of neuro-plasticity, related to their specific deficits and goals for recovery ahead, utilizing importance of specificity, use it or lose it, use it and improve it, and saliency to promote functional return and best participation. His best option for DC at this time is IP rehabilitation. His prior function, motivation for improvement, and capacity for improvement make him an excellent candidate. Patient will benefit from skilled intervention to address the above impairments. Patients rehabilitation potential is considered to be Good  Factors which may influence rehabilitation potential include:   [ ]           None noted  [ ]           Mental ability/status  [ ]           Medical condition  [X]           Home/family situation and support systems  [ ]           Safety awareness  [ ]           Pain tolerance/management  [ ]           Other:        PLAN :  Recommendations and Planned Interventions:  [X]             Bed Mobility Training             [X]      Neuromuscular Re-Education  [X]             Transfer Training                   [ ]      Orthotic/Prosthetic Training  [X]             Gait Training                         [ ]      Modalities  [X]             Therapeutic Exercises           [ ]      Edema Management/Control  [X]             Therapeutic Activities            [X]      Patient and Family Training/Education  [ ]             Other (comment):  Frequency/Duration: Patient will be followed by physical therapy 5 times a week to address goals. Discharge Recommendations: Inpatient Rehab  Further Equipment Recommendations for Discharge: defer to rehab       SUBJECTIVE:   Patient stated I'll do boot camp.       OBJECTIVE DATA SUMMARY:   HISTORY:    Past Medical History:   Diagnosis Date    Hypertension      Other ill-defined conditions(569.89)       high cholesterol     Past Surgical History:   Procedure Laterality Date  HX CHOLECYSTECTOMY         Prior Level of Function/Home Situation: Independent, high level of mobility; was working in his garden on Saturday. Cares for his wife, who has dementia, to include ADL's; his daughter (who works typically) and family will care for her while he's admitted. x1 fall just PTA, otherwise none, was driving. Personal factors and/or comorbidities impacting plan of care:      Home Situation  Home Environment: Private residence  # Steps to Enter: 5  Rails to Enter: Yes  Hand Rails : Right  One/Two Story Residence: Split level  # of Interior Steps: 9  Interior Rails: Right  Lift Chair Available: No  Living Alone: No  Support Systems: Child(morena), Spouse/Significant Other/Partner  Patient Expects to be Discharged to[de-identified] Private residence  Current DME Used/Available at Home: None  Tub or Shower Type: Shower     EXAMINATION/PRESENTATION/DECISION MAKING:   Critical Behavior:  Neurologic State: Alert  Orientation Level: Oriented X4  Cognition: Appropriate decision making  Safety/Judgement: Awareness of environment  Hearing: Auditory  Auditory Impairment: Hard of hearing, bilateral  Range Of Motion:  AROM: Generally decreased, functional           PROM: Within functional limits           Strength:    Strength: Generally decreased, functional (3/5 RLE to formal MMT )                    Tone & Sensation:   Tone: Normal              Sensation: Impaired ('dull' to LT at R LE, R UE, R side of neck)               Coordination:  Coordination: Generally decreased, functional  Vision: denied changes     Functional Mobility:  Bed Mobility:  Rolling:  (at bedside chair)           Transfers:  Sit to Stand: Minimum assistance;Assist x2  Stand to Sit: Moderate assistance;Assist x2                       Balance:   Sitting: Intact; Without support  Standing: Impaired; With support  Standing - Static: Fair;Poor;Constant support  Standing - Dynamic : Fair (to poor )  Ambulation/Gait Training:  Distance (ft): 50 Feet (ft)  Assistive Device: Gait belt; Other (comment) (2 HHA at times, RW, then dangelo-rail)  Ambulation - Level of Assistance: Moderate assistance;Maximum assistance;Assist x2     Gait Description (WDL): Exceptions to WDL  Gait Abnormalities: Ataxic;Decreased step clearance; Path deviations;Scissoring        Base of Support: Narrowed     Speed/Yudith: Slow  Step Length: Right shortened;Left shortened (R>L )                             Utilized recovery rather than compensation where possible, though safety concerns (with heavy push into UE's, instability) prohibit likely full utilization of recovery strategies at this time. In R swing phase patient crosses midline, or close to it, improving well with manual cueing (author foot placed at correct location on floor) improving. Functional Measure  Solorio Balance Test:      Sitting to Standin  Standing Unsupported: 0  Sitting with Back Unsupported: 4  Standing to Sittin  Transfers: 0  Standing Unsupported with Eyes Closed: 0  Standing Unsupported with Feet Together: 0  Reach Forward with Outstretched Arm: 0   Object: 0  Turn to Look Over Shoulders: 1  Turn 360 Degrees: 0  Alternate Foot on Step/Stool: 0  Standing Unsupported One Foot in Front: 0  Stand on One Le  Total: 6             56=Maximum possible score;   0-20=High fall risk  21-40=Moderate fall risk   41-56=Low fall risk      Solorio Balance Test and G-code impairment scale:  Percentage of Impairment CH     0%    CI     1-19% CJ     20-39% CK     40-59% CL     60-79% CM     80-99% CN      100%   Solorio   Score 0-56 56 45-55 34-44 23-33 12-22 1-11 0         G codes: In compliance with CMSs Claims Based Outcome Reporting, the following G-code set was chosen for this patient based on their primary functional limitation being treated: The outcome measure chosen to determine the severity of the functional limitation was the Gibson with a score of 6/100 which was correlated with the impairment scale. · Mobility - Walking and Moving Around:               - CURRENT STATUS:    CM - 80%-99% impaired, limited or restricted               - GOAL STATUS:           CL - 60%-79% impaired, limited or restricted               - D/C STATUS:                       ---------------To be determined---------------   Pain:  Pain Scale 1: Numeric (0 - 10)  Pain Intensity 1: 9              Activity Tolerance: WNL     Please refer to the flowsheet for vital signs taken during this treatment. After treatment:   [X]     Patient left in no apparent distress sitting up in chair  [ ]     Patient left in no apparent distress in bed  [X]     Call bell left within reach  [X]     Nursing notified  [X]     Transport tech present  [ ]     Bed alarm activated      COMMUNICATION/EDUCATION:   The patients plan of care was discussed with: Occupational Therapist, Registered Nurse,  and Rehabilitation Attendant. Patient was educated regarding His deficit(s) of above as this relates to His diagnosis of L pontine CVA. He demonstrated Good understanding as evidenced by voiced understanding. Patient was verbally educated on the BE FAST acronym for signs/symptoms of CVA and TIA. BE FAST was written on patient's communication board  for visual education and reinforcement. All questions answered with patient indicating full understanding.      [X]  Fall prevention education was provided and the patient/caregiver indicated understanding. [X]  Patient/family have participated as able in goal setting and plan of care. [X]  Patient/family agree to work toward stated goals and plan of care. [ ]  Patient understands intent and goals of therapy, but is neutral about his/her participation. [ ]  Patient is unable to participate in goal setting and plan of care.      Thank you for this referral.  Fahad Sandhu, PT, DPT    Time Calculation: 31 mins

## 2017-03-20 NOTE — PROGRESS NOTES
Speech pathology  Orders received, chart reviewed. Attempted to see patient but he is currently working with PT. Will f/u later this morning. Jesús Cutler M.S., CCC-SLP    10:35 Re-attempted to see patient but he is currently LEANN for testing. Will continue to follow up.  Jesús Cutler M.S., CCC-SLP

## 2017-03-20 NOTE — ROUTINE PROCESS
* No surgery found *  Bedside and Verbal shift change report given to John Dubon 9 (oncoming nurse) by Simi Nevarez RN (offgoing nurse). Report included the following information SBAR, Kardex, MAR and Recent Results. Zone Phone:   [phone]      Significant changes during shift:    1) up to BR with one and a walker. Up in trivedi with PT. IV fluids discontinued. Echo done        Patient Information    Elbert Woodson  80 y.o.  3/19/2017  6:29 AM by Erick Dowd MD. Elbert Woodson was admitted from Home    Problem List    Patient Active Problem List    Diagnosis Date Noted    Hemiplegia and hemiparesis following cerebral infarction affecting right dominant side ([facility] [address] 03/19/2017     Class: Present on Admission    Acute CVA (cerebrovascular accident) ([facility] [address] 03/19/2017     Class: Acute    Dysarthria following cerebrovascular accident 03/19/2017     Class: Present on Admission    Hemiparesthesia 03/19/2017     Class: Present on Admission    Essential hypertension 03/19/2017     Class: Chronic    Hyperlipidemia 03/19/2017     Class: Chronic    Prostatism 03/19/2017     Class: Chronic    Fall at home 03/19/2017     Class: Present on Admission     Past Medical History:   Diagnosis Date    Hypertension     Other ill-defined conditions(799.89)     high cholesterol         Core Measures:    CVA: Yes Yes  CHF:No Not applicable  PNA:No Not applicable    Activity Status:    OOB to Chair Yes  Ambulated this shift yes   Bed Rest No    Supplemental O2: (If Applicable)    NC No  NRB No  Venti-mask No  On room air Liters/min      LINES AND DRAINS:    Central Line? No    PICC LINE? No     Urinary Catheter? No   DVT prophylaxis:    DVT prophylaxis Med- Yes lovenox  DVT prophylaxis SCD or SOLIS- No     Wounds: (If Applicable)    Wounds- No    Location none    Patient Safety:    Falls Score Total Score: 4  Safety Level_______  Bed Alarm On? Yes  Sitter?  No    Plan for upcoming shift:safety        Discharge Plan: inpatient rehab    Active Consults:  IP CONSULT TO NEUROLOGY

## 2017-03-20 NOTE — PROGRESS NOTES
Bedside and Verbal shift change report given to Yuli Jack RN (oncoming nurse) by Antonio Gonzalez RN (offgoing nurse). Report included the following information SBAR, Kardex, MAR and Recent Results.     Zone Phone: [phone]        Significant changes during shift:   1) up to BR with one and a walker. Up in trivedi with PT. IV fluids discontinued. Echo done           Patient Information     Alba Burden  80 y.o.  3/19/2017 6:29 AM by Domi Toscano MD. Alba Burden was admitted from Home     Problem List           Patient Active Problem List     Diagnosis Date Noted    Hemiplegia and hemiparesis following cerebral infarction affecting right dominant side ([facility] [address] 03/19/2017       Class: Present on Admission    Acute CVA (cerebrovascular accident) ([facility] [address] 03/19/2017       Class: Acute    Dysarthria following cerebrovascular accident 03/19/2017       Class: Present on Admission    Hemiparesthesia 03/19/2017       Class: Present on Admission    Essential hypertension 03/19/2017       Class: Chronic    Hyperlipidemia 03/19/2017       Class: Chronic    Prostatism 03/19/2017       Class: Chronic    Fall at home 03/19/2017       Class: Present on Admission           Past Medical History:   Diagnosis Date    Hypertension      Other ill-defined conditions(799.89)       high cholesterol            Core Measures:     CVA: Yes Yes  CHF:No Not applicable  PNA:No Not applicable     Activity Status:     OOB to Chair Yes  Ambulated this shift yes   Bed Rest No     Supplemental O2: (If Applicable)     NC No  NRB No  Venti-mask No  On room air Liters/min        LINES AND DRAINS:     Central Line? No     PICC LINE? No      Urinary Catheter? No   DVT prophylaxis:     DVT prophylaxis Med- Yes lovenox  DVT prophylaxis SCD or SOLIS- No      Wounds: (If Applicable)     Wounds- No     Location none     Patient Safety:     Falls Score Total Score: 4  Safety Level_______  Bed Alarm On? Yes  Sitter?  No     Plan for upcoming shift:safety           Discharge Plan: inpatient rehab     Active Consults:  IP CONSULT TO NEUROLOGY

## 2017-03-20 NOTE — PROGRESS NOTES
Problem: Communication Impaired (Adult)  Goal: *Acute Goals and Plan of Care (Insert Text)  Speech goals initiated 3/20/2017   1. Patient will complete mod-complex level divergent naming tasks with 90% accuracy within 7 days  2. Patient will complete picture description task with no more than 1 dysfluency within 7 days  SPEECH LANGUAGE PATHOLOGY EVALUATION  Patient: Anh Lombardi (8 y.o. male)  Date: 3/20/2017  Primary Diagnosis: ? CVA  Acute CVA (cerebrovascular accident) [facility])        Precautions: Bed Alarm      ASSESSMENT :  Based on the objective data described below, the patient presents with minimal word retrieval, dysarthria and neurogenic stuttering; however, this is not impacting his ability to effectively and intelligibly communicate his wants/needs or carry on a conversation. Patient able to complete sentence formulation, complex sentence completion and divergent naming tasks without difficulty. His speech is characterized by mildly blended word boundaries but per family, significantly better than yesterday. He demonstrated occasional sound syllable and whole word repetitions in conversation. He will benefit from follow up to further assess higher level word retrieval tasks to maximize communication. Observed patient with successive sips of water and no overt s/s aspiration noted. He passed his  ED and has been tolerating a dental soft diet since. Formal swallow evaluation not indicated at this time. Patient will benefit from skilled intervention to address the above impairments.   Patients rehabilitation potential is considered to be Excellent  Factors which may influence rehabilitation potential include:   [X]              None noted  [ ]              Mental ability/status  [ ]              Medical condition  [ ]              Home/family situation and support systems  [ ]              Safety awareness  [ ]              Pain tolerance/management  [ ]              Other:        PLAN :  Recommendations and Planned Interventions:  -- SLP will follow for assessment of high level word retrieval tasks  Frequency/Duration: Patient will be followed by speech-language pathology 1-2 time a week to address goals. Discharge Recommendations: Outpatient - can certainly be addressed in inpatient if this is discharge disposition       SUBJECTIVE:   Patient stated Rafi Oakes just need to get home to care for my wife.       OBJECTIVE:       Past Medical History:   Diagnosis Date    Hypertension      Other ill-defined conditions(799.89)       high cholesterol     Past Surgical History:   Procedure Laterality Date    HX CHOLECYSTECTOMY         Prior Level of Function/Home Situation:  Home Situation  Home Environment: Private residence  # Steps to Enter: 5  Rails to Enter: Yes  Hand Rails : Right  One/Two Story Residence: Split level  # of Interior Steps: 9  Interior Rails: Right  Lift Chair Available: No  Living Alone: No  Support Systems: Child(morena), Spouse/Significant Other/Partner  Patient Expects to be Discharged to[de-identified] Private residence  Current DME Used/Available at Home: None  Tub or Shower Type: Shower  Mental Status:  Neurologic State: Alert  Orientation Level: Oriented X4  Cognition: Appropriate decision making  Perception: Appears intact  Perseveration: No perseveration noted  Safety/Judgement: Awareness of environment  Motor Speech:  Oral-Motor Structure/Motor Speech  Apraxic Characteristics: None  Dysarthric Characteristics: Blended word boundaries  Intelligibility: No impairment  Overall Impairment Severity: Minimal  Language Comprehension and Expression:  Auditory Comprehension  Auditory Impairment: No   Hearing Aid: At home  Verbal Expression  Primary Mode of Expression: Verbal  Initiation: No impairment  Automatic Speech Task: No impairment  Repetition: No impairment  Naming: No impairment  Sentence Completion: No impairment; at complex level   Sentence Formulation: No impairment- when provided with noun  Conversation: No impairment (occasional dysfluencies)  Overall Impairment: Mild     G Codes: In compliance with CMSs Claims Based Outcome Reporting, the following G-code set was chosen for this patient based the use of the NOMS functional outcome to quantify this patient's level of 6 impairment. Using the NOMS, the patient was determined to be at level 6 for expressive aphasua which correlates with the CI= 1-19% level of severity. Based on the objective assessment provided within this note, the current, goal, and discharge g-codes are as follows:     Expression   Current  CI= 1-19%   Goals  CI= 1-19%     The NOMS functional outcome measure was used to quantify this patient's level of expressive language impairment. Based on the NOMS, the patient was determined to be at level 6 for spoken language expression. NOMS Spoken Language Expression:  Level 1 (CN): Verbalizations not meaningful to anyone. Level 2 (CM): Few attempts accurate/appropriate. Max cues to elicit automatic/imitative words/phrases. Level 3 (CL): Communication partner responsible for communication; Mod cues for words/phrases meaningful in context  Level 4 (CK): Initiate during simple, routine activities w/familiar partner. Mod cues to produce simple sentences  Level 5 (Ladi Spann): Initiates communication with familiar and unfamiliar partners. Min cues for complex sentences. Level 6 (CI): Communicates for most activities. Some limitations still present for vocational/social activities. Rarely cued for complex info  Level 7 Critical access hospital): Independent communication. GABY (2003). National Outcomes Measurement System (NOMS): Adult Speech-Language Pathology User's Guide.       Pain:  Pain Scale 1: Numeric (0 - 10)  Pain Intensity 1: 9     After treatment:   [X]              Patient left in no apparent distress sitting up in chair  [ ]              Patient left in no apparent distress in bed  [X]              Call bell left within reach  [X]              Nursing notified  [X]              Caregiver present  [ ]              Bed alarm activated      COMMUNICATION/EDUCATION:   The patients plan of care including recommendations and planned interventions was discussed with: Registered Nurse. Patient was educated regarding His deficit(s) of minimal dysarthria, stuttering, word retrieval as this relates to His diagnosis of cva. He demonstrated Excellent understanding as evidenced by verbalization. [ ]  Patient/family have participated as able in goal setting and plan of care. [X]  Patient/family agree to work toward stated goals and plan of care. [ ]  Patient understands intent and goals of therapy, but is neutral about his/her participation. [ ]  Patient is unable to participate in goal setting and plan of care.      Thank you for this referral.  Anthony Peacock M.S. CCC-SLP  Time Calculation: 20 mins

## 2017-03-20 NOTE — PROGRESS NOTES
Pt is a pleasant alert and oriented gentleman who is a little [place]. He was admitted through the ED via EMS for CVA. Pt reported being independent with all ADLs and IADLs prior to admission. He lives in a tri level home with his spouse who is living with dementia and he is her primary caregiver (pt's daughter is providing care for spouse during his hospitalization) Pt reported using no DME in the home and driving prior to admission. Pt stated he drives. His PCP has recently changed to Dr. Jersey Bonilla (demo changed). Pt has been evaluated by PT/OT/SLP and recommendation of inpatient rehab services was made. Pt in agreement with this and choose [facility]. Referral sent via [facility] to [facility]. Insurance authorization is required for inpt rehab services. I explained this to pt and provided him with a list of area SNFs as a back up plan. CM will continue to follow.     Care Management Interventions  PCP Verified by CM:  (He has established with Dr. Jersey Bonilla)  Transition of Care Consult (CM Consult): Discharge Planning  Discharge Durable Medical Equipment: No  Physical Therapy Consult: Yes  Occupational Therapy Consult: Yes  Speech Therapy Consult: Yes  Current Support Network: Lives with Spouse, Own Home  Confirm Follow Up Transport: Self  Plan discussed with Pt/Family/Caregiver: Yes  Freedom of Choice Offered: Yes (inpt rehab)  Discharge Location  Discharge Placement: Rehab hospital/unit acute (hopes for [facility])    Fabi Keating, [address] [place] [address]

## 2017-03-20 NOTE — PROGRESS NOTES
Neurology Progress Note    Patient ID:  Vivien Sicard  [id number]  80 y.o.  9/3/1931    Chief Complaint: I'm still numb    Subjective:   Pt reports continued right sided numbness and weakness. Vertigo improving. Discussed MRI result. Objective: All records in [facility] reviewed and noted    ROS:  Per HPI  All other 12 pt ROS negative    Meds:  Current Facility-Administered Medications   Medication Dose Route Frequency    [START ON 3/21/2017] enoxaparin (LOVENOX) injection 40 mg  40 mg SubCUTAneous Q24H    [START ON 3/21/2017] hydroCHLOROthiazide (HYDRODIURIL) tablet 25 mg  25 mg Oral DAILY    amLODIPine (NORVASC) tablet 10 mg  10 mg Oral DAILY    atorvastatin (LIPITOR) tablet 20 mg  20 mg Oral QHS    sodium chloride (NS) flush 5-10 mL  5-10 mL IntraVENous Q8H    sodium chloride (NS) flush 5-10 mL  5-10 mL IntraVENous PRN    labetalol (NORMODYNE;TRANDATE) injection 5 mg  5 mg IntraVENous Q10MIN PRN    acetaminophen (TYLENOL) tablet 650 mg  650 mg Oral Q4H PRN    Or    acetaminophen (TYLENOL) solution 650 mg  650 mg Per NG tube Q4H PRN    Or    acetaminophen (TYLENOL) suppository 650 mg  650 mg Rectal Q4H PRN    tamsulosin (FLOMAX) capsule 0.4 mg  0.4 mg Oral DAILY    pantoprazole (PROTONIX) tablet 40 mg  40 mg Oral ACB    aspirin chewable tablet 81 mg  81 mg Oral DAILY       Imaging:  MRI brain: left pontine infarct (I personally reviewed these images in PACS and this is my impression)  MRA neck: 60% stenosis  TTE: EF 50%    Lab Review   Results for orders placed or performed during the hospital encounter of 03/19/17   CBC WITH AUTOMATED DIFF   Result Value Ref Range    WBC 5.4 4.1 - 11.1 K/uL    RBC 4.99 4. 10 - 5.70 M/uL    HGB 14.5 12.1 - 17.0 g/dL    HCT 43.8 36.6 - 50.3 %    MCV 87.8 80.0 - 99.0 FL    MCH 29.1 26.0 - 34.0 PG    MCHC 33.1 30.0 - 36.5 g/dL    RDW 13.4 11.5 - 14.5 %    PLATELET 118 523 - 473 K/uL    NEUTROPHILS 69 32 - 75 %    LYMPHOCYTES 21 12 - 49 %    MONOCYTES 8 5 - 13 % EOSINOPHILS 2 0 - 7 %    BASOPHILS 0 0 - 1 %    ABS. NEUTROPHILS 3.7 1.8 - 8.0 K/UL    ABS. LYMPHOCYTES 1.2 0.8 - 3.5 K/UL    ABS. MONOCYTES 0.4 0.0 - 1.0 K/UL    ABS. EOSINOPHILS 0.1 0.0 - 0.4 K/UL    ABS. BASOPHILS 0.0 0.0 - 0.1 K/UL   METABOLIC PANEL, COMPREHENSIVE   Result Value Ref Range    Sodium 141 136 - 145 mmol/L    Potassium 3.5 3.5 - 5.1 mmol/L    Chloride 103 97 - 108 mmol/L    CO2 26 21 - 32 mmol/L    Anion gap 12 5 - 15 mmol/L    Glucose 137 (H) 65 - 100 mg/dL    BUN 15 6 - 20 MG/DL    Creatinine 1.34 (H) 0.70 - 1.30 MG/DL    BUN/Creatinine ratio 11 (L) 12 - 20      GFR est AA >60 >60 ml/min/1.73m2    GFR est non-AA 51 (L) >60 ml/min/1.73m2    Calcium 9.4 8.5 - 10.1 MG/DL    Bilirubin, total 0.5 0.2 - 1.0 MG/DL    ALT (SGPT) 14 12 - 78 U/L    AST (SGOT) 17 15 - 37 U/L    Alk.  phosphatase 86 45 - 117 U/L    Protein, total 8.2 6.4 - 8.2 g/dL    Albumin 3.4 (L) 3.5 - 5.0 g/dL    Globulin 4.8 (H) 2.0 - 4.0 g/dL    A-G Ratio 0.7 (L) 1.1 - 2.2     PROTHROMBIN TIME + INR   Result Value Ref Range    INR 1.0 0.9 - 1.1      Prothrombin time 10.1 9.0 - 11.1 sec   PTT   Result Value Ref Range    aPTT 25.7 22.1 - 32.5 sec    aPTT, therapeutic range     58.0 - 77.0 SECS   TROPONIN I   Result Value Ref Range    Troponin-I, Qt. <0.04 <0.05 ng/mL   URINALYSIS W/MICROSCOPIC   Result Value Ref Range    Color YELLOW/STRAW      Appearance CLOUDY (A) CLEAR      Specific gravity 1.020 1.003 - 1.030      pH (UA) 5.5 5.0 - 8.0      Protein 30 (A) NEG mg/dL    Glucose NEGATIVE  NEG mg/dL    Ketone TRACE (A) NEG mg/dL    Blood NEGATIVE  NEG      Urobilinogen 1.0 0.2 - 1.0 EU/dL    Nitrites NEGATIVE  NEG      Leukocyte Esterase NEGATIVE  NEG      WBC 0-4 0 - 4 /hpf    RBC 0-5 0 - 5 /hpf    Epithelial cells FEW FEW /lpf    Bacteria NEGATIVE  NEG /hpf    Hyaline cast 0-2 0 - 5 /lpf   CBC W/O DIFF   Result Value Ref Range    WBC 8.5 4.1 - 11.1 K/uL    RBC 4.91 4.10 - 5.70 M/uL    HGB 14.1 12.1 - 17.0 g/dL    HCT 42.5 36.6 - 50.3 % MCV 86.6 80.0 - 99.0 FL    MCH 28.7 26.0 - 34.0 PG    MCHC 33.2 30.0 - 36.5 g/dL    RDW 13.3 11.5 - 14.5 %    PLATELET 144 503 - 197 K/uL   BILIRUBIN, CONFIRM   Result Value Ref Range    Bilirubin UA, confirm NEGATIVE  NEG     CBC W/O DIFF   Result Value Ref Range    WBC 6.6 4.1 - 11.1 K/uL    RBC 4.47 4.10 - 5.70 M/uL    HGB 13.1 12.1 - 17.0 g/dL    HCT 39.1 36.6 - 50.3 %    MCV 87.5 80.0 - 99.0 FL    MCH 29.3 26.0 - 34.0 PG    MCHC 33.5 30.0 - 36.5 g/dL    RDW 13.3 11.5 - 14.5 %    PLATELET 226 944 - 578 K/uL   LIPID PANEL   Result Value Ref Range    LIPID PROFILE          Cholesterol, total 143 <200 MG/DL    Triglyceride 106 <150 MG/DL    HDL Cholesterol 52 MG/DL    LDL, calculated 69.8 0 - 100 MG/DL    VLDL, calculated 21.2 MG/DL    CHOL/HDL Ratio 2.8 0 - 5.0     HEMOGLOBIN A1C WITH EAG   Result Value Ref Range    Hemoglobin A1c 6.8 (H) 4.2 - 6.3 %    Est. average glucose 697 mg/dL   METABOLIC PANEL, BASIC   Result Value Ref Range    Sodium 144 136 - 145 mmol/L    Potassium 3.7 3.5 - 5.1 mmol/L    Chloride 105 97 - 108 mmol/L    CO2 29 21 - 32 mmol/L    Anion gap 10 5 - 15 mmol/L    Glucose 104 (H) 65 - 100 mg/dL    BUN 18 6 - 20 MG/DL    Creatinine 1.20 0.70 - 1.30 MG/DL    BUN/Creatinine ratio 15 12 - 20      GFR est AA >60 >60 ml/min/1.73m2    GFR est non-AA 58 (L) >60 ml/min/1.73m2    Calcium 9.3 8.5 - 10.1 MG/DL   T4, FREE   Result Value Ref Range    T4, Free 1.3 0.8 - 1.5 NG/DL   TSH 3RD GENERATION   Result Value Ref Range    TSH 2.44 0.36 - 3.74 uIU/mL   GLUCOSE, POC   Result Value Ref Range    Glucose (POC) 116 (H) 65 - 100 mg/dL    Performed by Royal Hatfield*    EKG, 12 LEAD, INITIAL   Result Value Ref Range    Ventricular Rate 124 BPM    Atrial Rate 125 BPM    QRS Duration 78 ms    Q-T Interval 330 ms    QTC Calculation (Bezet) 474 ms    Calculated R Axis 1 degrees    Calculated T Axis 32 degrees    Diagnosis       Sinus tachycardia  Nonspecific ST abnormality    No previous ECGs available  Confirmed by Bijal Gifford ([id number]) on 3/20/2017 8:05:34 AM         Exam:  Visit Vitals    /56    Pulse 93    Temp 98 °F (36.7 °C)    Resp 18    Ht 5' 7\" (1.702 m)    Wt 188 lb 15 oz (85.7 kg)    SpO2 96%    BMI 29.59 kg/m2     Gen: Well developed, Pt leaning to the left in his chair  CV: RRR  Lungs: non labored breathing  Abd: non distending  Neuro: A&O x 3, no dysarthria or aphasia  CN II-XII: PERRL, EOMI, face symmetric, tongue/palate midline  Motor: strength 5/5 on left, 4/5 on right  Sensory: dec to LT on left      Assessment:     Patient Active Problem List   Diagnosis Code    Hemiplegia and 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 Fr[place]att Roberts, Y92.099       Kim Krishnamurthy is a 80 y.o. male who presents with new onset vertigo and right sided weakness and numbness. MRI shows left pontine infarct. Stroke risk factors include: +HTN and HLD.     1. Pontine infarct on left  2. HTN  3. HLD         Plan:     - MRI brain w/o C -with left pontine stroke  - MRA Neck - 60% stenosis  - TTE - wnl  - Telemetry  - Permissive HTN (SBP<220/<120) for 24 hrs from symptom onset and then BP goal is less than 140/90  - Stroke labs (HgbA1c-elevated, TSH, lipid panel) -as above  - Cont ASA 81 mg daily  - Cont atorvastatin 20mg daily   - ST/OT/PT eval- plan is for rehab  - Will need to follow HgBA1c since it is elevated    No further neuro recs at this time. Pt should f/u with neuro in 2 weeks as an outpt. Will sign off but please call with further questions.     Signed:  Tiffanie Macias MD  3/20/2017  4:43 PM    Over 35 minutes was spent reviewing records, discussing with Dr. Maria Ines Robin and nursing, obtaining history, examining patient and discussing treatment plans.

## 2017-03-21 ENCOUNTER — HOSPITAL ENCOUNTER (OUTPATIENT)
Age: 82
Discharge: HOME HEALTH | End: 2017-04-01
Attending: PHYSICAL MEDICINE & REHABILITATION | Admitting: PHYSICAL MEDICINE & REHABILITATION

## 2017-03-21 VITALS
RESPIRATION RATE: 18 BRPM | DIASTOLIC BLOOD PRESSURE: 77 MMHG | HEART RATE: 92 BPM | HEIGHT: 67 IN | TEMPERATURE: 97.4 F | BODY MASS INDEX: 29.65 KG/M2 | OXYGEN SATURATION: 98 % | SYSTOLIC BLOOD PRESSURE: 145 MMHG | WEIGHT: 188.93 LBS

## 2017-03-21 LAB
ANION GAP BLD CALC-SCNC: 9 MMOL/L (ref 5–15)
BUN SERPL-MCNC: 21 MG/DL (ref 6–20)
BUN/CREAT SERPL: 18 (ref 12–20)
CALCIUM SERPL-MCNC: 9.1 MG/DL (ref 8.5–10.1)
CHLORIDE SERPL-SCNC: 105 MMOL/L (ref 97–108)
CO2 SERPL-SCNC: 27 MMOL/L (ref 21–32)
CREAT SERPL-MCNC: 1.18 MG/DL (ref 0.7–1.3)
ERYTHROCYTE [DISTWIDTH] IN BLOOD BY AUTOMATED COUNT: 13.6 % (ref 11.5–14.5)
GLUCOSE SERPL-MCNC: 104 MG/DL (ref 65–100)
HCT VFR BLD AUTO: 37.4 % (ref 36.6–50.3)
HGB BLD-MCNC: 12.1 G/DL (ref 12.1–17)
MCH RBC QN AUTO: 28.1 PG (ref 26–34)
MCHC RBC AUTO-ENTMCNC: 32.4 G/DL (ref 30–36.5)
MCV RBC AUTO: 87 FL (ref 80–99)
PLATELET # BLD AUTO: 209 K/UL (ref 150–400)
POTASSIUM SERPL-SCNC: 3.9 MMOL/L (ref 3.5–5.1)
RBC # BLD AUTO: 4.3 M/UL (ref 4.1–5.7)
SODIUM SERPL-SCNC: 141 MMOL/L (ref 136–145)
WBC # BLD AUTO: 6.1 K/UL (ref 4.1–11.1)

## 2017-03-21 PROCEDURE — 97112 NEUROMUSCULAR REEDUCATION: CPT

## 2017-03-21 PROCEDURE — 74011250637 HC RX REV CODE- 250/637: Performed by: INTERNAL MEDICINE

## 2017-03-21 PROCEDURE — 85027 COMPLETE CBC AUTOMATED: CPT | Performed by: INTERNAL MEDICINE

## 2017-03-21 PROCEDURE — 74011250636 HC RX REV CODE- 250/636: Performed by: INTERNAL MEDICINE

## 2017-03-21 PROCEDURE — 36415 COLL VENOUS BLD VENIPUNCTURE: CPT | Performed by: INTERNAL MEDICINE

## 2017-03-21 PROCEDURE — 74011250637 HC RX REV CODE- 250/637: Performed by: HOSPITALIST

## 2017-03-21 PROCEDURE — 80048 BASIC METABOLIC PNL TOTAL CA: CPT | Performed by: INTERNAL MEDICINE

## 2017-03-21 PROCEDURE — 74011250637 HC RX REV CODE- 250/637: Performed by: PSYCHIATRY & NEUROLOGY

## 2017-03-21 PROCEDURE — 97116 GAIT TRAINING THERAPY: CPT

## 2017-03-21 PROCEDURE — 74011250637 HC RX REV CODE- 250/637: Performed by: PHYSICAL MEDICINE & REHABILITATION

## 2017-03-21 RX ORDER — PANTOPRAZOLE SODIUM 40 MG/1
40 TABLET, DELAYED RELEASE ORAL
Status: DISCONTINUED | OUTPATIENT
Start: 2017-03-22 | End: 2017-04-01 | Stop reason: HOSPADM

## 2017-03-21 RX ORDER — ATORVASTATIN CALCIUM 20 MG/1
20 TABLET, FILM COATED ORAL
Qty: 30 TAB | Refills: 0 | Status: SHIPPED | OUTPATIENT
Start: 2017-03-21 | End: 2017-06-22

## 2017-03-21 RX ORDER — ACETAMINOPHEN 325 MG/1
650 TABLET ORAL
Status: DISCONTINUED | OUTPATIENT
Start: 2017-03-21 | End: 2017-04-01 | Stop reason: HOSPADM

## 2017-03-21 RX ORDER — ADHESIVE BANDAGE
30 BANDAGE TOPICAL DAILY PRN
Status: DISCONTINUED | OUTPATIENT
Start: 2017-03-21 | End: 2017-04-01 | Stop reason: HOSPADM

## 2017-03-21 RX ORDER — HYDROCHLOROTHIAZIDE 25 MG/1
25 TABLET ORAL DAILY
Status: DISCONTINUED | OUTPATIENT
Start: 2017-03-22 | End: 2017-04-01 | Stop reason: HOSPADM

## 2017-03-21 RX ORDER — ATORVASTATIN CALCIUM 20 MG/1
20 TABLET, FILM COATED ORAL
Status: DISCONTINUED | OUTPATIENT
Start: 2017-03-21 | End: 2017-04-01 | Stop reason: HOSPADM

## 2017-03-21 RX ORDER — ENOXAPARIN SODIUM 100 MG/ML
40 INJECTION SUBCUTANEOUS DAILY
Status: DISCONTINUED | OUTPATIENT
Start: 2017-03-22 | End: 2017-03-27

## 2017-03-21 RX ORDER — ERGOCALCIFEROL 1.25 MG/1
50000 CAPSULE ORAL
Status: DISCONTINUED | OUTPATIENT
Start: 2017-03-22 | End: 2017-04-01 | Stop reason: HOSPADM

## 2017-03-21 RX ORDER — ONDANSETRON 4 MG/1
4 TABLET, ORALLY DISINTEGRATING ORAL
Status: DISCONTINUED | OUTPATIENT
Start: 2017-03-21 | End: 2017-04-01 | Stop reason: HOSPADM

## 2017-03-21 RX ORDER — GUAIFENESIN 100 MG/5ML
81 LIQUID (ML) ORAL DAILY
Qty: 30 TAB | Refills: 0 | Status: SHIPPED | OUTPATIENT
Start: 2017-03-21 | End: 2018-02-20 | Stop reason: DRUGHIGH

## 2017-03-21 RX ORDER — ZOLPIDEM TARTRATE 5 MG/1
5 TABLET ORAL
Status: DISCONTINUED | OUTPATIENT
Start: 2017-03-21 | End: 2017-04-01 | Stop reason: HOSPADM

## 2017-03-21 RX ORDER — FACIAL-BODY WIPES
10 EACH TOPICAL DAILY PRN
Status: DISCONTINUED | OUTPATIENT
Start: 2017-03-21 | End: 2017-04-01 | Stop reason: HOSPADM

## 2017-03-21 RX ORDER — TAMSULOSIN HYDROCHLORIDE 0.4 MG/1
0.4 CAPSULE ORAL DAILY
Status: DISCONTINUED | OUTPATIENT
Start: 2017-03-22 | End: 2017-04-01 | Stop reason: HOSPADM

## 2017-03-21 RX ORDER — TAMSULOSIN HYDROCHLORIDE 0.4 MG/1
0.4 CAPSULE ORAL DAILY
Qty: 30 CAP | Refills: 0 | Status: SHIPPED | OUTPATIENT
Start: 2017-03-21 | End: 2017-06-22

## 2017-03-21 RX ORDER — GUAIFENESIN 100 MG/5ML
81 LIQUID (ML) ORAL DAILY
Status: DISCONTINUED | OUTPATIENT
Start: 2017-03-22 | End: 2017-04-01 | Stop reason: HOSPADM

## 2017-03-21 RX ORDER — AMLODIPINE BESYLATE 5 MG/1
10 TABLET ORAL DAILY
Status: DISCONTINUED | OUTPATIENT
Start: 2017-03-22 | End: 2017-03-22

## 2017-03-21 RX ORDER — AMOXICILLIN 250 MG
1 CAPSULE ORAL
Status: DISCONTINUED | OUTPATIENT
Start: 2017-03-21 | End: 2017-04-01 | Stop reason: HOSPADM

## 2017-03-21 RX ADMIN — ENOXAPARIN SODIUM 40 MG: 40 INJECTION SUBCUTANEOUS at 09:44

## 2017-03-21 RX ADMIN — TAMSULOSIN HYDROCHLORIDE 0.4 MG: 0.4 CAPSULE ORAL at 09:15

## 2017-03-21 RX ADMIN — AMLODIPINE BESYLATE 10 MG: 5 TABLET ORAL at 09:15

## 2017-03-21 RX ADMIN — ASPIRIN 81 MG CHEWABLE TABLET 81 MG: 81 TABLET CHEWABLE at 09:15

## 2017-03-21 RX ADMIN — PANTOPRAZOLE SODIUM 40 MG: 40 TABLET, DELAYED RELEASE ORAL at 06:48

## 2017-03-21 RX ADMIN — ATORVASTATIN CALCIUM 20 MG: 20 TABLET, FILM COATED ORAL at 21:56

## 2017-03-21 RX ADMIN — HYDROCHLOROTHIAZIDE 25 MG: 25 TABLET ORAL at 09:15

## 2017-03-21 RX ADMIN — Medication 10 ML: at 04:46

## 2017-03-21 RX ADMIN — DOCUSATE SODIUM AND SENNOSIDES 1 TABLET: 8.6; 5 TABLET, FILM COATED ORAL at 21:56

## 2017-03-21 NOTE — PROGRESS NOTES
TRANSFER - OUT REPORT:    Verbal report given to Roberto Carlos Bettencourt at [facility] on [employer]  being transferred to Room 127 at [facility]) for routine progression of care       Report consisted of patients Situation, Background, Assessment and   Recommendations(SBAR). Information from the following report(s) SBAR, Kardex and ED Summary was reviewed with the receiving nurse. Opportunity for questions and clarification was provided.

## 2017-03-21 NOTE — PROGRESS NOTES
Physical Therapy Goals  Initiated 3/20/2017  1. Patient will move from supine to sit and sit to supine , scoot up and down and roll side to side in bed with moderate assist within 7 day(s). 2. Patient will transfer from bed to chair and chair to bed with moderate assistance using the least restrictive device within 7 day(s). 3. Patient will perform sit to stand with moderate assistance within 7 day(s). 4. Patient will ambulate with moderate assistance for 75 feet with the least restrictive device within 7 day(s). 5. Patient will improve Solorio Balance score by 7 points within 7 days. physical Therapy TREATMENT  Patient: Mariel Hartley (62 y.o. male)  Date: 3/21/2017  Diagnosis: ? 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 Able to complete various gait and static/dynamic balance interventions with focus of control on RLE placement, avoidance of R knee buckling, stability with max A to correct lurching unsteadiness. Patient does demonstrate improved strength with improved MMT of R extremities, though this has yet to carry-over to functional activity. He remains highly motivated and eager and given improvements since evaluation 1 day prior is an excellent candidate for recovery in the near future. IP rehabilitation is the ideal option at [facility]. Recommend use of mirror for visual feedback next session, as well as developmental positions. Progression toward goals:  [x]       Improving appropriately and progressing toward goals  []       Improving slowly and progressing toward goals  []       Not making progress toward goals and plan of care will be adjusted     PLAN:  Patient continues to benefit from skilled intervention to address the above impairments.   Continue treatment per established plan of care. Discharge Recommendations:  Inpatient Rehab  Further Equipment Recommendations for Discharge:  Defer to rehab     SUBJECTIVE:   Patient stated Jennifer Mckee at that!    OBJECTIVE DATA SUMMARY:   Critical Behavior:  Neurologic State: Alert  Orientation Level: Oriented X4  Cognition: Follows commands  Safety/Judgement: Awareness of environment  Functional Mobility Training:  Bed Mobility:  Rolling:  (at bedside chair)                 Transfers:  Sit to Stand: Contact guard assistance  Stand to Sit: Minimum assistance; Additional time                             Balance:  Sitting: Intact; Without support  Standing: Impaired; With support  Standing - Static: Fair;Poor;Constant support  Standing - Dynamic : Fair (to poor at times)  Ambulation/Gait Training:  Distance (ft): 75 Feet (ft) (x3 with seated rest)  Assistive Device: Gait belt  Ambulation - Level of Assistance: Minimal assistance;Maximum assistance (up to max A of 2 to correct LOB)        Gait Abnormalities: Ataxic;Decreased step clearance;Lurching;Path deviations        Base of Support: Other (comment) (varies; prefers narrow)     Speed/Yudith: Pace decreased (<100 feet/min)  Step Length: Right lengthened                  Continues to have difficulty with placing of RLE, though with cues (verbal, visual, following therapists steps) improves and upon stop and correct improves again but fades quickly. Completed majority of gait without HHA for recovery, though at times utilized dangelo-rail and/or HHA for stability. Buckling seen on R stance phase at times, and with instability he lurches inappropriately. Neuro Re-Education:  1. Session largely emphasized utilization of recovery rather then compensatory strategies where deemed safe.  Interventions focused on role of neuro-plasticity, related to their specific deficits and goals for recovery ahead, utilizing importance of specificity, use it or lose it, use it and improve it, and saliency to promote functional return and best participation. 2. Static Stability: toe touches on floor target L and R for 5', up to max A to sustain upright 2/2 instability, followed by toe touches on raised height of glove boxes L and R with emphasis on light touch rather than stomping box. 3. Dynamic Stability: side-stepping to L and R with dangelo-rail hold of B hands, then single hand; retro-stepping with B hands, single hand. Each effort focused on control and maintenance of proper SUMI of RLE. All over 10 foot space. 4. During gait round 3, performed dual task training with counting from 0-100 in 2's, then holding object (marker) ahead as 'filled coffee cup', where noted surprisingly good ability to dual task and remain stable. Activity Tolerance: WNL    Please refer to the flowsheet for vital signs taken during this treatment. After treatment:   [x] Patient left in no apparent distress sitting up in chair  [] Patient left in no apparent distress in bed  [x] Call bell left within reach  [x] Nursing notified  [x] Caregiver present  [] Bed alarm activated    COMMUNICATION/EDUCATION:   The patients plan of care was discussed with: Registered Nurse and . Patient was educated regarding His deficit(s) of above as this relates to His diagnosis of CVA. He demonstrated Good understanding as evidenced by teach back. [x]  Fall prevention education was provided and the patient/caregiver indicated understanding. [x]  Patient/family have participated as able in goal setting and plan of care. [x]  Patient/family agree to work toward stated goals and plan of care. []  Patient understands intent and goals of therapy, but is neutral about his/her participation. []  Patient is unable to participate in goal setting and plan of care.     Thank you for this referral.  Thelma Cruz, PT, DPT    Time Calculation: 42 mins

## 2017-03-21 NOTE — PROGRESS NOTES
Problem: Ischemic Stroke: Day 2  Goal: Activity/Safety  Outcome: Progressing Towards Goal  Pt has not attempted to get out of bed. Uses urinal to void. Pt weak on R side, states he doesn't think he can walk.   Gets up with PT.

## 2017-03-21 NOTE — DISCHARGE SUMMARY
Hospitalist Discharge Summary     Patient ID:  Harsh Alejo  [id number]  80 y.o.  9/3/1931    PCP on record: Kaleigh Pierre MD    Admit date: 3/19/2017  Discharge date:       DISCHARGE DIAGNOSIS:    Acute left pontine infarct causing right hemiplegia and hemiparesis, dysarthria  Fall at home  Hypertension  Hyperlipidemia  Likely BPH      CONSULTATIONS:  IP CONSULT TO NEUROLOGY    Excerpted HPI from H&P of Leighton Wellington MD:  The patient had been feeling dizzy for the last day and a half. He didn't do much all day, since he was feeling unwell with dizziness that resembles being drunk and off balance and not spinning symptoms. His wife did the chores at his house yesterday while he rested most of the day.      This morning around 5am as usual he woke up planning to go to bathroom. He felt sweaty and weak and a sensation of tingling along his RUE. He was able to go to the bathroom and then on his way to bed his RLE felt tingly and he lost power leading to Ground level fall. His wife witnessed that the patient was not able to get himself up off of floor due to lack of power in whole right side.      EMS were called in and pt was brought to the ER. Since then his wife reports the pt's speech which initially was garbled is improved and his power on the Right upper extremity is better. The pt still is not able to pull himself up on the bed using his RUE.     ______________________________________________________________________  DISCHARGE SUMMARY/HOSPITAL COURSE:  for full details see H&P, daily progress notes, labs, consult notes.      Acute left pontine infarct causing right hemiplegia and hemiparesis, dysarthria  -start aspirin, atorvastatin  -lipid panel okay, hba1c slightly elevated at 6.8%  -f/u with Neurology in 2 weeks as outpatient  -bilateral carotid 60% stenosis at origin    Fall at home  -ground level     Hypertension  -resume home medications     Hyperlipidemia  -statin     Likely BPH  -started flomax, continue on discharge        _______________________________________________________________________  Patient seen and examined by me on discharge day. Pertinent Findings:  Gen:    Not in distress  Neuro:  Alert, oriented, ambulating well with PT with walker  _______________________________________________________________________  DISCHARGE MEDICATIONS:   Current Discharge Medication List      START taking these medications    Details   aspirin 81 mg chewable tablet Take 1 Tab by mouth daily. Qty: 30 Tab, Refills: 0      tamsulosin (FLOMAX) 0.4 mg capsule Take 1 Cap by mouth daily. Qty: 30 Cap, Refills: 0      atorvastatin (LIPITOR) 20 mg tablet Take 1 Tab by mouth nightly. Qty: 30 Tab, Refills: 0         CONTINUE these medications which have NOT CHANGED    Details   ergocalciferol (VITAMIN D2) 50,000 unit capsule Take 50,000 Units by mouth. omeprazole (PRILOSEC) 20 mg capsule Take 20 mg by mouth daily. amLODIPine (NORVASC) 10 mg tablet Take 10 mg by mouth daily. hydrochlorothiazide (HYDRODIURIL) 25 mg tablet Take 25 mg by mouth daily. STOP taking these medications       simvastatin (ZOCOR) 20 mg tablet Comments:   Reason for Stopping:               My Recommended Diet, Activity, Wound Care, and follow-up labs are listed in the patient's Discharge Insturctions which I have personally completed and reviewed.     _______________________________________________________________________  DISPOSITION:    Home with Family:    Home with HH/PT/OT/RN:    SNF/LTC:    BAKARI:    OTHER:        Condition at Discharge:  Stable  _______________________________________________________________________  Follow up with:   PCP : Urmila Linton MD  Follow-up Information     Follow up With Details Comments [address]  This is your post acute care provider of choice  [address] [place] [address] [address] [address]  [facility], [address] [facility] [address] [facility]  [phone]                Total time in minutes spent coordinating this discharge (includes going over instructions, follow-up, prescriptions, and preparing report for sign off to her PCP) :  35 minutes    Signed:  Dimitrios Goddard MD

## 2017-03-21 NOTE — PROGRESS NOTES
[phone] attempted to call report to [facility] says they don't have a room yet. They have taken our number down and will call back.

## 2017-03-21 NOTE — ROUTINE PROCESS
Bedside and Verbal shift change report given to Kraig Rodriguez 429 (oncoming nurse) by Shailesh Carr RN (offgoing nurse). Report included the following information SBAR, Kardex, MAR and Recent Results.      Zone Phone: [phone]          Significant changes during shift:   none              Patient Information  Iman Alvarez  80 y.o.  3/19/2017 6:29 AM by Florencio Barlow MD. Billie Duncan was admitted from Home      Problem List                Patient Active Problem List      Diagnosis Date Noted    Hemiplegia and hemiparesis following cerebral infarction affecting right dominant side ([address] 03/19/2017         Class: Present on Admission    Acute CVA (cerebrovascular accident) ([facility] [address] 03/19/2017         Class: Acute    Dysarthria following cerebrovascular accident 03/19/2017         Class: Present on Admission    Hemiparesthesia 03/19/2017         Class: Present on Admission    Essential hypertension 03/19/2017         Class: Chronic    Hyperlipidemia 03/19/2017         Class: Chronic    Prostatism 03/19/2017         Class: Chronic    Fall at home 03/19/2017         Class: Present on Admission               Past Medical History:   Diagnosis Date    Hypertension       Other ill-defined conditions(799.89)         high cholesterol               Core Measures:      CVA: Yes Yes  CHF:No Not applicable  PNA:No Not applicable      Activity Status:      OOB to Chair:  no  Ambulated this shift no  Bed Rest No      Supplemental O2: (If Applicable)      NC No  NRB No  Venti-mask No  On room air Liters/min          LINES AND DRAINS:  Lucero Douglas? No      PICC LINE? No       Urinary Catheter? No   DVT prophylaxis:      DVT prophylaxis Med- Yes lovenox  DVT prophylaxis SCD or SOLIS- No       Wounds: (If Applicable)      Wounds- No      Location none      Patient Safety:      Falls Score Total Score: 4  Safety Level_______  Bed Alarm On? Yes  Sitter?  No      Plan for upcoming shift:  Up with PT              Discharge Plan: inpatient rehab      Active Consults:  IP CONSULT TO NEUROLOGY

## 2017-03-22 LAB
ERYTHROCYTE [DISTWIDTH] IN BLOOD BY AUTOMATED COUNT: 13.4 % (ref 11.5–14.5)
HCT VFR BLD AUTO: 37.8 % (ref 36.6–50.3)
HGB BLD-MCNC: 12.3 G/DL (ref 12.1–17)
MCH RBC QN AUTO: 28.5 PG (ref 26–34)
MCHC RBC AUTO-ENTMCNC: 32.5 G/DL (ref 30–36.5)
MCV RBC AUTO: 87.5 FL (ref 80–99)
PLATELET # BLD AUTO: 209 K/UL (ref 150–400)
RBC # BLD AUTO: 4.32 M/UL (ref 4.1–5.7)
WBC # BLD AUTO: 5.9 K/UL (ref 4.1–11.1)

## 2017-03-22 PROCEDURE — 36415 COLL VENOUS BLD VENIPUNCTURE: CPT | Performed by: PHYSICAL MEDICINE & REHABILITATION

## 2017-03-22 PROCEDURE — 85027 COMPLETE CBC AUTOMATED: CPT | Performed by: PHYSICAL MEDICINE & REHABILITATION

## 2017-03-22 PROCEDURE — 74011250636 HC RX REV CODE- 250/636: Performed by: PHYSICAL MEDICINE & REHABILITATION

## 2017-03-22 PROCEDURE — 74011250637 HC RX REV CODE- 250/637: Performed by: PHYSICAL MEDICINE & REHABILITATION

## 2017-03-22 RX ORDER — AMLODIPINE BESYLATE 5 MG/1
5 TABLET ORAL DAILY
Status: DISCONTINUED | OUTPATIENT
Start: 2017-03-23 | End: 2017-04-01 | Stop reason: HOSPADM

## 2017-03-22 RX ADMIN — ENOXAPARIN SODIUM 40 MG: 40 INJECTION SUBCUTANEOUS at 08:04

## 2017-03-22 RX ADMIN — ASPIRIN 81 MG CHEWABLE TABLET 81 MG: 81 TABLET CHEWABLE at 08:05

## 2017-03-22 RX ADMIN — DOCUSATE SODIUM AND SENNOSIDES 1 TABLET: 8.6; 5 TABLET, FILM COATED ORAL at 21:32

## 2017-03-22 RX ADMIN — ATORVASTATIN CALCIUM 20 MG: 20 TABLET, FILM COATED ORAL at 21:33

## 2017-03-22 RX ADMIN — PANTOPRAZOLE SODIUM 40 MG: 40 TABLET, DELAYED RELEASE ORAL at 05:41

## 2017-03-22 RX ADMIN — TAMSULOSIN HYDROCHLORIDE 0.4 MG: 0.4 CAPSULE ORAL at 08:05

## 2017-03-22 RX ADMIN — HYDROCHLOROTHIAZIDE 25 MG: 25 TABLET ORAL at 08:05

## 2017-03-22 RX ADMIN — ERGOCALCIFEROL 50000 UNITS: 1.25 CAPSULE ORAL at 08:05

## 2017-03-23 PROCEDURE — 74011250636 HC RX REV CODE- 250/636: Performed by: PHYSICAL MEDICINE & REHABILITATION

## 2017-03-23 PROCEDURE — 74011250637 HC RX REV CODE- 250/637: Performed by: PHYSICAL MEDICINE & REHABILITATION

## 2017-03-23 RX ADMIN — TAMSULOSIN HYDROCHLORIDE 0.4 MG: 0.4 CAPSULE ORAL at 08:16

## 2017-03-23 RX ADMIN — DOCUSATE SODIUM AND SENNOSIDES 1 TABLET: 8.6; 5 TABLET, FILM COATED ORAL at 22:08

## 2017-03-23 RX ADMIN — ENOXAPARIN SODIUM 40 MG: 40 INJECTION SUBCUTANEOUS at 08:15

## 2017-03-23 RX ADMIN — ASPIRIN 81 MG CHEWABLE TABLET 81 MG: 81 TABLET CHEWABLE at 08:15

## 2017-03-23 RX ADMIN — ATORVASTATIN CALCIUM 20 MG: 20 TABLET, FILM COATED ORAL at 22:08

## 2017-03-23 RX ADMIN — AMLODIPINE BESYLATE 5 MG: 5 TABLET ORAL at 08:16

## 2017-03-23 RX ADMIN — HYDROCHLOROTHIAZIDE 25 MG: 25 TABLET ORAL at 08:16

## 2017-03-23 RX ADMIN — PANTOPRAZOLE SODIUM 40 MG: 40 TABLET, DELAYED RELEASE ORAL at 05:07

## 2017-03-24 PROCEDURE — 74011250636 HC RX REV CODE- 250/636: Performed by: PHYSICAL MEDICINE & REHABILITATION

## 2017-03-24 PROCEDURE — 74011250637 HC RX REV CODE- 250/637: Performed by: PHYSICAL MEDICINE & REHABILITATION

## 2017-03-24 RX ADMIN — ASPIRIN 81 MG CHEWABLE TABLET 81 MG: 81 TABLET CHEWABLE at 08:39

## 2017-03-24 RX ADMIN — PANTOPRAZOLE SODIUM 40 MG: 40 TABLET, DELAYED RELEASE ORAL at 05:46

## 2017-03-24 RX ADMIN — HYDROCHLOROTHIAZIDE 25 MG: 25 TABLET ORAL at 08:39

## 2017-03-24 RX ADMIN — DOCUSATE SODIUM AND SENNOSIDES 1 TABLET: 8.6; 5 TABLET, FILM COATED ORAL at 21:23

## 2017-03-24 RX ADMIN — AMLODIPINE BESYLATE 5 MG: 5 TABLET ORAL at 08:40

## 2017-03-24 RX ADMIN — ATORVASTATIN CALCIUM 20 MG: 20 TABLET, FILM COATED ORAL at 21:23

## 2017-03-24 RX ADMIN — ENOXAPARIN SODIUM 40 MG: 40 INJECTION SUBCUTANEOUS at 08:40

## 2017-03-24 RX ADMIN — TAMSULOSIN HYDROCHLORIDE 0.4 MG: 0.4 CAPSULE ORAL at 08:39

## 2017-03-25 PROCEDURE — 74011250636 HC RX REV CODE- 250/636: Performed by: PHYSICAL MEDICINE & REHABILITATION

## 2017-03-25 PROCEDURE — 74011250637 HC RX REV CODE- 250/637: Performed by: PHYSICAL MEDICINE & REHABILITATION

## 2017-03-25 RX ADMIN — TAMSULOSIN HYDROCHLORIDE 0.4 MG: 0.4 CAPSULE ORAL at 08:12

## 2017-03-25 RX ADMIN — HYDROCHLOROTHIAZIDE 25 MG: 25 TABLET ORAL at 08:14

## 2017-03-25 RX ADMIN — AMLODIPINE BESYLATE 5 MG: 5 TABLET ORAL at 08:12

## 2017-03-25 RX ADMIN — PANTOPRAZOLE SODIUM 40 MG: 40 TABLET, DELAYED RELEASE ORAL at 06:04

## 2017-03-25 RX ADMIN — ATORVASTATIN CALCIUM 20 MG: 20 TABLET, FILM COATED ORAL at 21:02

## 2017-03-25 RX ADMIN — ENOXAPARIN SODIUM 40 MG: 40 INJECTION SUBCUTANEOUS at 08:14

## 2017-03-25 RX ADMIN — ASPIRIN 81 MG CHEWABLE TABLET 81 MG: 81 TABLET CHEWABLE at 08:14

## 2017-03-26 PROCEDURE — 74011250637 HC RX REV CODE- 250/637: Performed by: PHYSICAL MEDICINE & REHABILITATION

## 2017-03-26 PROCEDURE — 74011250636 HC RX REV CODE- 250/636: Performed by: PHYSICAL MEDICINE & REHABILITATION

## 2017-03-26 RX ADMIN — PANTOPRAZOLE SODIUM 40 MG: 40 TABLET, DELAYED RELEASE ORAL at 05:23

## 2017-03-26 RX ADMIN — ENOXAPARIN SODIUM 40 MG: 40 INJECTION SUBCUTANEOUS at 08:28

## 2017-03-26 RX ADMIN — ATORVASTATIN CALCIUM 20 MG: 20 TABLET, FILM COATED ORAL at 21:43

## 2017-03-26 RX ADMIN — ASPIRIN 81 MG CHEWABLE TABLET 81 MG: 81 TABLET CHEWABLE at 08:28

## 2017-03-26 RX ADMIN — TAMSULOSIN HYDROCHLORIDE 0.4 MG: 0.4 CAPSULE ORAL at 08:28

## 2017-03-26 RX ADMIN — DOCUSATE SODIUM AND SENNOSIDES 1 TABLET: 8.6; 5 TABLET, FILM COATED ORAL at 21:43

## 2017-03-26 RX ADMIN — HYDROCHLOROTHIAZIDE 25 MG: 25 TABLET ORAL at 08:28

## 2017-03-26 RX ADMIN — AMLODIPINE BESYLATE 5 MG: 5 TABLET ORAL at 08:28

## 2017-03-27 LAB
ANION GAP BLD CALC-SCNC: 10 MMOL/L (ref 5–15)
BUN SERPL-MCNC: 30 MG/DL (ref 6–20)
BUN/CREAT SERPL: 23 (ref 12–20)
CALCIUM SERPL-MCNC: 8.8 MG/DL (ref 8.5–10.1)
CHLORIDE SERPL-SCNC: 107 MMOL/L (ref 97–108)
CO2 SERPL-SCNC: 26 MMOL/L (ref 21–32)
CREAT SERPL-MCNC: 1.3 MG/DL (ref 0.7–1.3)
GLUCOSE SERPL-MCNC: 94 MG/DL (ref 65–100)
POTASSIUM SERPL-SCNC: 3.6 MMOL/L (ref 3.5–5.1)
SODIUM SERPL-SCNC: 143 MMOL/L (ref 136–145)

## 2017-03-27 PROCEDURE — 74011250637 HC RX REV CODE- 250/637: Performed by: PHYSICAL MEDICINE & REHABILITATION

## 2017-03-27 PROCEDURE — 74011250636 HC RX REV CODE- 250/636: Performed by: PHYSICAL MEDICINE & REHABILITATION

## 2017-03-27 PROCEDURE — 36415 COLL VENOUS BLD VENIPUNCTURE: CPT | Performed by: PHYSICAL MEDICINE & REHABILITATION

## 2017-03-27 PROCEDURE — 80048 BASIC METABOLIC PNL TOTAL CA: CPT | Performed by: PHYSICAL MEDICINE & REHABILITATION

## 2017-03-27 RX ADMIN — ASPIRIN 81 MG CHEWABLE TABLET 81 MG: 81 TABLET CHEWABLE at 08:39

## 2017-03-27 RX ADMIN — PANTOPRAZOLE SODIUM 40 MG: 40 TABLET, DELAYED RELEASE ORAL at 05:11

## 2017-03-27 RX ADMIN — AMLODIPINE BESYLATE 5 MG: 5 TABLET ORAL at 08:40

## 2017-03-27 RX ADMIN — TAMSULOSIN HYDROCHLORIDE 0.4 MG: 0.4 CAPSULE ORAL at 08:40

## 2017-03-27 RX ADMIN — DOCUSATE SODIUM AND SENNOSIDES 1 TABLET: 8.6; 5 TABLET, FILM COATED ORAL at 21:57

## 2017-03-27 RX ADMIN — ATORVASTATIN CALCIUM 20 MG: 20 TABLET, FILM COATED ORAL at 21:57

## 2017-03-27 RX ADMIN — ENOXAPARIN SODIUM 40 MG: 40 INJECTION SUBCUTANEOUS at 08:40

## 2017-03-27 RX ADMIN — HYDROCHLOROTHIAZIDE 25 MG: 25 TABLET ORAL at 08:39

## 2017-03-28 ENCOUNTER — APPOINTMENT (OUTPATIENT)
Dept: GENERAL RADIOLOGY | Age: 82
End: 2017-03-28
Attending: PHYSICAL MEDICINE & REHABILITATION

## 2017-03-28 PROCEDURE — 74011250637 HC RX REV CODE- 250/637: Performed by: PHYSICAL MEDICINE & REHABILITATION

## 2017-03-28 PROCEDURE — 71020 XR CHEST PA LAT: CPT

## 2017-03-28 RX ORDER — METHYLPHENIDATE HYDROCHLORIDE 5 MG/1
5 TABLET ORAL 2 TIMES DAILY
Status: DISCONTINUED | OUTPATIENT
Start: 2017-03-29 | End: 2017-03-30

## 2017-03-28 RX ADMIN — ASPIRIN 81 MG CHEWABLE TABLET 81 MG: 81 TABLET CHEWABLE at 08:35

## 2017-03-28 RX ADMIN — PANTOPRAZOLE SODIUM 40 MG: 40 TABLET, DELAYED RELEASE ORAL at 06:13

## 2017-03-28 RX ADMIN — HYDROCHLOROTHIAZIDE 25 MG: 25 TABLET ORAL at 08:35

## 2017-03-28 RX ADMIN — DOCUSATE SODIUM AND SENNOSIDES 1 TABLET: 8.6; 5 TABLET, FILM COATED ORAL at 21:31

## 2017-03-28 RX ADMIN — TAMSULOSIN HYDROCHLORIDE 0.4 MG: 0.4 CAPSULE ORAL at 08:35

## 2017-03-28 RX ADMIN — AMLODIPINE BESYLATE 5 MG: 5 TABLET ORAL at 08:35

## 2017-03-28 RX ADMIN — ATORVASTATIN CALCIUM 20 MG: 20 TABLET, FILM COATED ORAL at 21:32

## 2017-03-29 VITALS
HEIGHT: 67 IN | SYSTOLIC BLOOD PRESSURE: 141 MMHG | DIASTOLIC BLOOD PRESSURE: 73 MMHG | BODY MASS INDEX: 28.56 KG/M2 | HEART RATE: 77 BPM | WEIGHT: 182 LBS

## 2017-03-29 PROCEDURE — 74011250637 HC RX REV CODE- 250/637: Performed by: PHYSICAL MEDICINE & REHABILITATION

## 2017-03-29 RX ADMIN — HYDROCHLOROTHIAZIDE 25 MG: 25 TABLET ORAL at 08:09

## 2017-03-29 RX ADMIN — AMLODIPINE BESYLATE 5 MG: 5 TABLET ORAL at 08:09

## 2017-03-29 RX ADMIN — ASPIRIN 81 MG CHEWABLE TABLET 81 MG: 81 TABLET CHEWABLE at 08:09

## 2017-03-29 RX ADMIN — METHYLPHENIDATE HYDROCHLORIDE 5 MG: 5 TABLET ORAL at 05:49

## 2017-03-29 RX ADMIN — ERGOCALCIFEROL 50000 UNITS: 1.25 CAPSULE ORAL at 08:09

## 2017-03-29 RX ADMIN — METHYLPHENIDATE HYDROCHLORIDE 5 MG: 5 TABLET ORAL at 12:12

## 2017-03-29 RX ADMIN — TAMSULOSIN HYDROCHLORIDE 0.4 MG: 0.4 CAPSULE ORAL at 08:09

## 2017-03-29 RX ADMIN — ATORVASTATIN CALCIUM 20 MG: 20 TABLET, FILM COATED ORAL at 21:43

## 2017-03-29 RX ADMIN — PANTOPRAZOLE SODIUM 40 MG: 40 TABLET, DELAYED RELEASE ORAL at 05:49

## 2017-03-29 RX ADMIN — DOCUSATE SODIUM AND SENNOSIDES 1 TABLET: 8.6; 5 TABLET, FILM COATED ORAL at 21:43

## 2017-03-30 PROCEDURE — 74011250637 HC RX REV CODE- 250/637: Performed by: PHYSICAL MEDICINE & REHABILITATION

## 2017-03-30 RX ADMIN — ASPIRIN 81 MG CHEWABLE TABLET 81 MG: 81 TABLET CHEWABLE at 08:02

## 2017-03-30 RX ADMIN — PANTOPRAZOLE SODIUM 40 MG: 40 TABLET, DELAYED RELEASE ORAL at 05:44

## 2017-03-30 RX ADMIN — HYDROCHLOROTHIAZIDE 25 MG: 25 TABLET ORAL at 08:02

## 2017-03-30 RX ADMIN — ATORVASTATIN CALCIUM 20 MG: 20 TABLET, FILM COATED ORAL at 21:43

## 2017-03-30 RX ADMIN — TAMSULOSIN HYDROCHLORIDE 0.4 MG: 0.4 CAPSULE ORAL at 08:02

## 2017-03-30 RX ADMIN — AMLODIPINE BESYLATE 5 MG: 5 TABLET ORAL at 08:02

## 2017-03-30 RX ADMIN — METHYLPHENIDATE HYDROCHLORIDE 5 MG: 5 TABLET ORAL at 05:44

## 2017-03-31 LAB
ANION GAP BLD CALC-SCNC: 9 MMOL/L (ref 5–15)
BUN SERPL-MCNC: 26 MG/DL (ref 6–20)
BUN/CREAT SERPL: 21 (ref 12–20)
CALCIUM SERPL-MCNC: 9.1 MG/DL (ref 8.5–10.1)
CHLORIDE SERPL-SCNC: 108 MMOL/L (ref 97–108)
CO2 SERPL-SCNC: 27 MMOL/L (ref 21–32)
CREAT SERPL-MCNC: 1.22 MG/DL (ref 0.7–1.3)
GLUCOSE SERPL-MCNC: 91 MG/DL (ref 65–100)
POTASSIUM SERPL-SCNC: 4 MMOL/L (ref 3.5–5.1)
SODIUM SERPL-SCNC: 144 MMOL/L (ref 136–145)

## 2017-03-31 PROCEDURE — 74011250637 HC RX REV CODE- 250/637: Performed by: PHYSICAL MEDICINE & REHABILITATION

## 2017-03-31 PROCEDURE — 36415 COLL VENOUS BLD VENIPUNCTURE: CPT | Performed by: PHYSICAL MEDICINE & REHABILITATION

## 2017-03-31 PROCEDURE — 80048 BASIC METABOLIC PNL TOTAL CA: CPT | Performed by: PHYSICAL MEDICINE & REHABILITATION

## 2017-03-31 RX ADMIN — TAMSULOSIN HYDROCHLORIDE 0.4 MG: 0.4 CAPSULE ORAL at 08:31

## 2017-03-31 RX ADMIN — PANTOPRAZOLE SODIUM 40 MG: 40 TABLET, DELAYED RELEASE ORAL at 06:15

## 2017-03-31 RX ADMIN — HYDROCHLOROTHIAZIDE 25 MG: 25 TABLET ORAL at 08:31

## 2017-03-31 RX ADMIN — ATORVASTATIN CALCIUM 20 MG: 20 TABLET, FILM COATED ORAL at 21:51

## 2017-03-31 RX ADMIN — ASPIRIN 81 MG CHEWABLE TABLET 81 MG: 81 TABLET CHEWABLE at 08:31

## 2017-03-31 RX ADMIN — AMLODIPINE BESYLATE 5 MG: 5 TABLET ORAL at 08:31

## 2017-04-01 PROCEDURE — 74011250637 HC RX REV CODE- 250/637: Performed by: PHYSICAL MEDICINE & REHABILITATION

## 2017-04-01 RX ADMIN — AMLODIPINE BESYLATE 5 MG: 5 TABLET ORAL at 08:39

## 2017-04-01 RX ADMIN — TAMSULOSIN HYDROCHLORIDE 0.4 MG: 0.4 CAPSULE ORAL at 08:39

## 2017-04-01 RX ADMIN — PANTOPRAZOLE SODIUM 40 MG: 40 TABLET, DELAYED RELEASE ORAL at 05:47

## 2017-04-01 RX ADMIN — ASPIRIN 81 MG CHEWABLE TABLET 81 MG: 81 TABLET CHEWABLE at 08:39

## 2017-04-01 RX ADMIN — HYDROCHLOROTHIAZIDE 25 MG: 25 TABLET ORAL at 08:39

## 2017-05-03 ENCOUNTER — TELEPHONE (OUTPATIENT)
Dept: CARDIOLOGY CLINIC | Age: 82
End: 2017-05-03

## 2017-05-17 ENCOUNTER — TELEPHONE ANTICOAG (OUTPATIENT)
Dept: CARDIOLOGY CLINIC | Age: 82
End: 2017-05-17

## 2017-06-06 ENCOUNTER — TELEPHONE ANTICOAG (OUTPATIENT)
Dept: CARDIOLOGY CLINIC | Age: 82
End: 2017-06-06

## 2017-06-22 RX ORDER — SIMVASTATIN 20 MG/1
20 TABLET, FILM COATED ORAL EVERY EVENING
COMMUNITY
End: 2021-01-04

## 2017-06-22 NOTE — PERIOP NOTES
[facility]  Ambulatory Surgery Unit  Pre-operative Instructions    Surgery/Procedure Date  6/26/17            Tentative Arrival Time 0700      1. On the day of your surgery/procedure, please report to the Ambulatory Surgery Unit Registration Desk and sign in at your designated time. The Ambulatory Surgery Unit is located in [facility] on the [facility] side of the [facility] across from the [address] [place] [address]. Please have all of your health insurance cards and a photo ID. 2. You must have someone with you to drive you home, as you should not drive a car for 24 hours following anesthesia. Please make arrangements for a responsible adult friend or family member to stay with you for at least the first 24 hours after your surgery. 3. Do not have anything to eat or drink (including water, gum, mints, coffee, juice) after midnight   6/25/17. This may not apply to medications prescribed by your physician. (Please note below the special instructions with medications to take the morning of surgery, if applicable.)    4. We recommend you do not drink any alcoholic beverages for 24 hours before and after your surgery. 5. Stop all Aspirin, non-steroidal anti-inflammatory drugs (i.e. Advil, Aleve), vitamins, and supplements as directed by your surgeon's office. **If you are currently taking Plavix, Coumadin, or other blood-thinning agents, contact your surgeon for instructions. **    6. In an effort to help prevent surgical site infection, we ask that you shower with an anti-bacterial soap (i.e. Dial or Safeguard) on the morning of surgery. Do not apply any lotions, powders, or deodorants after the shower on the day of your procedure. If applicable, please do not shave the operative site for 48 hours prior to surgery. 7. Wear comfortable clothes. Wear glasses instead of contacts. Do not bring any jewelry or money (other than copays or fees as instructed).  Do not wear make-up, particularly chacorta, the morning of your surgery. Do not wear nail polish, particularly if you are having foot /hand surgery. Wear your hair loose or down, no ponytails, buns, penny pins or clips. All body piercings must be removed. 8. You should understand that if you do not follow these instructions your surgery may be cancelled. If your physical condition changes (i.e. fever, cold or flu) please contact your surgeon as soon as possible. 9. It is important that you be on time. If a situation occurs where you may be late, or if you have any questions or problems, please call [phone].    10. Your surgery time may be subject to change. You will receive a phone call the day prior to surgery to confirm your arrival time. 11. Pediatric patients: please bring a change of clothes, diapers, bottle/sippy cup, pacifier, etc.      Special Instructions: Take all medications and inhalers, as prescribed, on the morning of surgery with a sip of water EXCEPT: none      I understand a pre-operative phone call will be made to verify my surgery time. In the event that I am not available, I give permission for a message to be left on my answering service and/or with another person?       Yes     (instructions given verbally during phone assessment- pt voiced understanding)     ___________________      ___________________      ________________  (Signature of Patient)          (Witness)                   (Date and Time)

## 2017-06-23 ENCOUNTER — ANESTHESIA EVENT (OUTPATIENT)
Dept: SURGERY | Age: 82
End: 2017-06-23
Payer: MEDICARE

## 2017-06-26 ENCOUNTER — ANESTHESIA (OUTPATIENT)
Dept: SURGERY | Age: 82
End: 2017-06-26
Payer: MEDICARE

## 2017-06-26 ENCOUNTER — HOSPITAL ENCOUNTER (OUTPATIENT)
Age: 82
Setting detail: OUTPATIENT SURGERY
Discharge: HOME | End: 2017-06-26
Attending: OPHTHALMOLOGY | Admitting: OPHTHALMOLOGY
Payer: MEDICARE

## 2017-06-26 VITALS
RESPIRATION RATE: 18 BRPM | TEMPERATURE: 97.8 F | HEIGHT: 67 IN | OXYGEN SATURATION: 97 % | BODY MASS INDEX: 27.78 KG/M2 | DIASTOLIC BLOOD PRESSURE: 70 MMHG | SYSTOLIC BLOOD PRESSURE: 126 MMHG | WEIGHT: 177 LBS | HEART RATE: 80 BPM

## 2017-06-26 PROCEDURE — 77030018846 HC SOL IRR STRL H20 ICUM -A: Performed by: OPHTHALMOLOGY

## 2017-06-26 PROCEDURE — 76210000050 HC AMBSU PH II REC 0.5 TO 1 HR: Performed by: OPHTHALMOLOGY

## 2017-06-26 PROCEDURE — 77030021352 HC CBL LD SYS DISP COVD -B: Performed by: OPHTHALMOLOGY

## 2017-06-26 PROCEDURE — 76060000073 HC AMB SURG ANES FIRST 0.5 HR: Performed by: OPHTHALMOLOGY

## 2017-06-26 PROCEDURE — 74011000250 HC RX REV CODE- 250: Performed by: OPHTHALMOLOGY

## 2017-06-26 PROCEDURE — 76030000002 HC AMB SURG OR TIME FIRST 0.: Performed by: OPHTHALMOLOGY

## 2017-06-26 PROCEDURE — 74011250636 HC RX REV CODE- 250/636

## 2017-06-26 PROCEDURE — 74011250636 HC RX REV CODE- 250/636: Performed by: OPHTHALMOLOGY

## 2017-06-26 PROCEDURE — V2632 POST CHMBR INTRAOCULAR LENS: HCPCS | Performed by: OPHTHALMOLOGY

## 2017-06-26 DEVICE — LENS IOL POST 1-PC 6X13 24.5 -- ACRYSOF: Type: IMPLANTABLE DEVICE | Site: EYE | Status: FUNCTIONAL

## 2017-06-26 RX ORDER — ONDANSETRON 2 MG/ML
4 INJECTION INTRAMUSCULAR; INTRAVENOUS AS NEEDED
Status: DISCONTINUED | OUTPATIENT
Start: 2017-06-26 | End: 2017-06-26 | Stop reason: HOSPADM

## 2017-06-26 RX ORDER — SODIUM CHLORIDE, SODIUM LACTATE, POTASSIUM CHLORIDE, CALCIUM CHLORIDE 600; 310; 30; 20 MG/100ML; MG/100ML; MG/100ML; MG/100ML
25 INJECTION, SOLUTION INTRAVENOUS CONTINUOUS
Status: DISCONTINUED | OUTPATIENT
Start: 2017-06-26 | End: 2017-06-26 | Stop reason: HOSPADM

## 2017-06-26 RX ORDER — NEOMYCIN SULFATE, POLYMYXIN B SULFATE, AND DEXAMETHASONE 3.5; 10000; 1 MG/G; [USP'U]/G; MG/G
OINTMENT OPHTHALMIC AS NEEDED
Status: DISCONTINUED | OUTPATIENT
Start: 2017-06-26 | End: 2017-06-26 | Stop reason: HOSPADM

## 2017-06-26 RX ORDER — TIMOLOL MALEATE 5 MG/ML
SOLUTION/ DROPS OPHTHALMIC AS NEEDED
Status: DISCONTINUED | OUTPATIENT
Start: 2017-06-26 | End: 2017-06-26 | Stop reason: HOSPADM

## 2017-06-26 RX ORDER — SODIUM CHLORIDE 0.9 % (FLUSH) 0.9 %
5-10 SYRINGE (ML) INJECTION AS NEEDED
Status: DISCONTINUED | OUTPATIENT
Start: 2017-06-26 | End: 2017-06-26 | Stop reason: HOSPADM

## 2017-06-26 RX ORDER — DIPHENHYDRAMINE HYDROCHLORIDE 50 MG/ML
12.5 INJECTION, SOLUTION INTRAMUSCULAR; INTRAVENOUS AS NEEDED
Status: DISCONTINUED | OUTPATIENT
Start: 2017-06-26 | End: 2017-06-26 | Stop reason: HOSPADM

## 2017-06-26 RX ORDER — LIDOCAINE HYDROCHLORIDE 10 MG/ML
0.1 INJECTION, SOLUTION EPIDURAL; INFILTRATION; INTRACAUDAL; PERINEURAL AS NEEDED
Status: DISCONTINUED | OUTPATIENT
Start: 2017-06-26 | End: 2017-06-26 | Stop reason: HOSPADM

## 2017-06-26 RX ORDER — OFLOXACIN 3 MG/ML
1 SOLUTION/ DROPS OPHTHALMIC
Status: COMPLETED | OUTPATIENT
Start: 2017-06-26 | End: 2017-06-26

## 2017-06-26 RX ORDER — SODIUM CHLORIDE 9 MG/ML
25 INJECTION, SOLUTION INTRAVENOUS CONTINUOUS
Status: DISCONTINUED | OUTPATIENT
Start: 2017-06-26 | End: 2017-06-26 | Stop reason: HOSPADM

## 2017-06-26 RX ORDER — SODIUM CHLORIDE 0.9 % (FLUSH) 0.9 %
5-10 SYRINGE (ML) INJECTION EVERY 8 HOURS
Status: DISCONTINUED | OUTPATIENT
Start: 2017-06-26 | End: 2017-06-26 | Stop reason: HOSPADM

## 2017-06-26 RX ORDER — SODIUM CHLORIDE 9 MG/ML
INJECTION, SOLUTION INTRAVENOUS
Status: DISCONTINUED | OUTPATIENT
Start: 2017-06-26 | End: 2017-06-26 | Stop reason: HOSPADM

## 2017-06-26 RX ORDER — FENTANYL CITRATE 50 UG/ML
25 INJECTION, SOLUTION INTRAMUSCULAR; INTRAVENOUS
Status: DISCONTINUED | OUTPATIENT
Start: 2017-06-26 | End: 2017-06-26 | Stop reason: HOSPADM

## 2017-06-26 RX ORDER — TROPICAMIDE 10 MG/ML
1 SOLUTION/ DROPS OPHTHALMIC
Status: COMPLETED | OUTPATIENT
Start: 2017-06-26 | End: 2017-06-26

## 2017-06-26 RX ADMIN — OFLOXACIN 1 DROP: 3 SOLUTION OPHTHALMIC at 07:48

## 2017-06-26 RX ADMIN — TROPICAMIDE 1 DROP: 10 SOLUTION/ DROPS OPHTHALMIC at 07:53

## 2017-06-26 RX ADMIN — OFLOXACIN 1 DROP: 3 SOLUTION OPHTHALMIC at 07:53

## 2017-06-26 RX ADMIN — OFLOXACIN 1 DROP: 3 SOLUTION OPHTHALMIC at 07:43

## 2017-06-26 RX ADMIN — TROPICAMIDE 1 DROP: 10 SOLUTION/ DROPS OPHTHALMIC at 07:43

## 2017-06-26 RX ADMIN — TROPICAMIDE 1 DROP: 10 SOLUTION/ DROPS OPHTHALMIC at 07:42

## 2017-06-26 RX ADMIN — SODIUM CHLORIDE: 9 INJECTION, SOLUTION INTRAVENOUS at 08:36

## 2017-06-26 NOTE — OP NOTES
Preoperative Diagnosis: Nuclear Sclerotic CATARACT LEFT EYE H25.12  Postoperative Diagnosis: Nuclear Sclerotic CATARACT LEFT EYE H25.12  Procedure: Extracapsular cataract extraction with lens implant left eye  Anesthesia: MAC with local  Estimated Blood Loss: None  Complications: None  Specimens: None  Assistants: None    The patient's left eye was dilated with mydriacyl 1% and ofloxacin 0.3% for 3 doses preoperatively. The patient was taken to the operating room and was given sedation. Tetracaine was given topically to the left eye, and the eye was prepped and draped in the usual manner for sterile eye surgery, including Betadine solution being dropped onto the conjunctiva and the beginning of the prep. The eyelashes were isolated with a plastic drape. A lid speculum was placed. A #15 blade was used to make a paracentesis at the 5:00 location. The eye was flushed with a lidocaine / epinephrine mixture (\"Shugarcaine\"). The eye was filled with Viscoat, and a crescent blade was used to make a 2.5 mm incision at the limbus temporally. This was dissected 2 mm into clear cornea, and the eye was entered with a 2.4 mm keratome. A 0.12 forceps was used for fixation during the procedure. A capsulorhexis flap was started with a cystotome, and this was completed 360 degrees with Utrata forceps. The capsular piece was removed. [place] dissection was performed with the \"Shugarcaine\" mixture on a cannula. The lens nucleus was removed using phacoemulsification with a total phaco time of 1:50 minutes. The lens was cracked and manipulated with a Sinsky hook. Residual cortex was removed using irrigation / aspiration. The capsule remained intact. The capsule was refilled with Healon. An Cuate Intraocular lens model [id number] power 24.50 was placed in a lens folding cartridge with Healon. The lens was unfolded into the capsular bag. The lens centered well. Residual Healon and Viscoat were removed using I / A. No suture was required to close the incision. The eye was flushed with BSS through the paracentesis. Betadine solution was placed on the conjunctival surface at the end of the case. The eye was left soft and formed at the end of the case. The incision site was water tight. The speculum was removed, and a drop of timolol 0.5% and Maxitrol ointment was placed on the eye followed by a shield. The patient tolerated the procedure well and is to follow-up in one day.

## 2017-06-26 NOTE — DISCHARGE INSTRUCTIONS
Rashaun Bowman MD  [facility]  [place], [address] [place] [address]  Phone: [phone]       Fax: [phone]  If you are unable to keep appointment, kindly give 24 hours notice please. REMOVE PATCH  START DROPS WHEN YOU GET HOME  PUT PATCH BACK ON AT BEDTIME    1. DO NOT RUB the eye that was operated on. 2. Do not strain excessively. It is all right to bend as long as you do not strain. 3. It is safe to take a shower, wash your face, and wash your hair. Just keep the eye closed. 4. Do not swim for 1 week after surgery. 5. If you have any problems or questions, do not hesitate to call. There is always a physician on call at [phone] ext. [phone].   6. Follow instructions on eye drops from office. If you were given prescriptions, please review the written information on the prescribed medications. DO NOT DRIVE WHILE TAKING NARCOTIC PAIN MEDICATIONS. DISCHARGE SUMMARY from Nurse    The following personal items collected during your admission are returned to you:   Dental Appliance: Dental Appliances: Lowers, Uppers  Vision: Visual Aid: Glasses  Hearing Aid:    Jewelry: Jewelry: Ring, Watch  Clothing: Clothing: Footwear, Pants, Shirt, Socks, Undergarments, With patient  Other Valuables: Other Valuables: None  Valuables sent to safe:      PATIENT INSTRUCTIONS:    After general anesthesia or intravenous sedation, for 24 hours or while taking prescription Narcotics:  · Someone should be with you for the next 24 hours. · For your own safety, a responsible adult must drive you home. · Limit your activities  · Recommended activity: Rest today, Do not climb stairs or shower unattended for the next 24 hours. · Do not drive and operate hazardous machinery  · Do not make important personal or business decisions  · Do  not drink alcoholic beverages  · If you have not urinated within 8 hours after discharge, please contact your surgeon on call.     Report the following to your surgeon:  · Excessive pain, swelling, redness or odor of or around the surgical area  · Temperature over 100.5  · Nausea and vomiting lasting longer than 4 hours or if unable to take medications  · Any signs of decreased circulation or nerve impairment to extremity: change in color, persistent  numbness, tingling, coldness or increase pain  ·   ·   · You will receive a Post Operative Call from one of the Recovery Room Nurses on the day after your surgery to check on you. It is very important for us to know how you are recovering after your surgery. · You may receive an e-mail or letter in the mail from [place] regarding your experience with us in the Ambulatory Surgery Unit. Your feedback is valuable to us and we appreciate your participation in the survey. · If the above instructions are not adequate, please contact Jeovanny Hawkins RN, Rebeca anesthesia Nurse Manager or our Anesthesiologist, at [phone]. ·   · We wish youre a speedy recovery ? What to do at Home:      *  Please give a list of your current medications to your Primary Care Provider. *  Please update this list whenever your medications are discontinued, doses are      changed, or new medications (including over-the-counter products) are added. *  Please carry medication information at all times in case of emergency situations. These are general instructions for a healthy lifestyle:    No smoking/ No tobacco products/ Avoid exposure to second hand smoke    Surgeon General's Warning:  Quitting smoking now greatly reduces serious risk to your health.     Obesity, smoking, and sedentary lifestyle greatly increases your risk for illness    A healthy diet, regular physical exercise & weight monitoring are important for maintaining a healthy lifestyle    You may be retaining fluid if you have a history of heart failure or if you experience any of the following symptoms:  Weight gain of 3 pounds or more overnight or 5 pounds in a week, increased swelling in our hands or feet or shortness of breath while lying flat in bed. Please call your doctor as soon as you notice any of these symptoms; do not wait until your next office visit. Recognize signs and symptoms of STROKE:    F-face looks uneven    A-arms unable to move or move even    S-speech slurred or non-existent    T-time-call 911 as soon as signs and symptoms begin-DO NOT go       Back to bed or wait to see if you get better-TIME IS BRAIN. If you have not received your influenza and/or pneumococcal vaccine, please follow up with your primary care physician. The discharge information has been reviewed with the patient and caregiver. The patient and caregiver verbalized understanding.

## 2017-06-26 NOTE — IP AVS SNAPSHOT
[address] [facility] 
[phone] Patient: Dennise Keyes MRN: [id number] PQG:[birth year] You are allergic to the following No active allergies Recent Documentation Height Weight BMI Smoking Status 1.689 m 80.3 kg 28.14 kg/m2 Former Smoker Emergency Contacts Name Discharge Info Relation Home Work Mobile Ashia Alexander  Spouse [3] [phone] Yodit Alexander  Spouse [3] [phone] Leigh Rogers  Child [2]   [phone] About your hospitalization You were admitted on:  June 26, 2017 You last received care in the:  [facility] You were discharged on:  June 26, 2017 Unit phone number:  [phone] Why you were hospitalized Your primary diagnosis was:  Not on File Providers Seen During Your Hospitalizations Provider Role Specialty Primary office phone Arjun Mcdonald MD Attending Provider Ophthalmology [phone] Your Primary Care Physician (PCP) Primary Care Physician Office Phone Office Fax [address] [phone] Follow-up Information None Your Appointments Monday June 26, 2017 CATARACT EXTRACTION WITH INTRA OCULAR LENS IMPLANT with Arjun Mcdonald MD  
hospitals AMB SURGERY UNIT (RI OR PRE ASSESSMENT) [facility]  
[phone] Current Discharge Medication List  
  
ASK your doctor about these medications Dose & Instructions Dispensing Information Comments Morning Noon Evening Bedtime  
 amLODIPine 10 mg tablet Commonly known as:  [place] Your last dose was: Your next dose is:    
   
   
 Dose:  10 mg Take 10 mg by mouth daily. Refills:  0  
     
   
   
   
  
 aspirin 81 mg chewable tablet Your last dose was:     
   
Your next dose is:    
   
   
 Dose:  81 mg  
 Take 1 Tab by mouth daily. Quantity:  30 Tab Refills:  0  
     
   
   
   
  
 hydroCHLOROthiazide 25 mg tablet Commonly known as:  HYDRODIURIL Your last dose was: Your next dose is:    
   
   
 Dose:  25 mg Take 25 mg by mouth daily. Refills:  0  
     
   
   
   
  
 omeprazole 20 mg capsule Commonly known as:  PRILOSEC Your last dose was: Your next dose is:    
   
   
 Dose:  20 mg Take 20 mg by mouth daily. Refills:  0 ZOCOR 20 mg tablet Generic drug:  simvastatin Your last dose was: Your next dose is:    
   
   
 Dose:  20 mg Take 20 mg by mouth daily. Refills:  0 Discharge Instructions MD KURTIS Fung [address], [address] [place] [address] Phone: [phone]       Fax: [phone] If you are unable to keep appointment, kindly give 24 hours notice please. REMOVE PATCH 
START DROPS WHEN YOU GET HOME 
PUT PATCH BACK ON AT BEDTIME 1. DO NOT RUB the eye that was operated on. 2. Do not strain excessively. It is all right to bend as long as you do not strain. 3. It is safe to take a shower, wash your face, and wash your hair. Just keep the eye closed. 4. Do not swim for 1 week after surgery. 5. If you have any problems or questions, do not hesitate to call. There is always a physician on call at [phone] ext. [phone].  
6. Follow instructions on eye drops from office. If you were given prescriptions, please review the written information on the prescribed medications. DO NOT DRIVE WHILE TAKING NARCOTIC PAIN MEDICATIONS. DISCHARGE SUMMARY from Nurse The following personal items collected during your admission are returned to you:  
Dental Appliance: Dental Appliances: Lowers, Uppers Vision: Visual Aid: Glasses Hearing Aid:   
Jewelry: Jewelry: Woody Balta Clothing: Clothing: Footwear, Pants, Shirt, Socks, Undergarments, With patient Other Valuables: Other Valuables: None Valuables sent to safe:   
 
PATIENT INSTRUCTIONS: 
 
After general anesthesia or intravenous sedation, for 24 hours or while taking prescription Narcotics: · Someone should be with you for the next 24 hours. · For your own safety, a responsible adult must drive you home. · Limit your activities · Recommended activity: Rest today, Do not climb stairs or shower unattended for the next 24 hours. · Do not drive and operate hazardous machinery · Do not make important personal or business decisions · Do  not drink alcoholic beverages · If you have not urinated within 8 hours after discharge, please contact your surgeon on call. Report the following to your surgeon: 
· Excessive pain, swelling, redness or odor of or around the surgical area · Temperature over 100.5 · Nausea and vomiting lasting longer than 4 hours or if unable to take medications · Any signs of decreased circulation or nerve impairment to extremity: change in color, persistent  numbness, tingling, coldness or increase pain ·  
·  
· You will receive a Post Operative Call from one of the Recovery Room Nurses on the day after your surgery to check on you. It is very important for us to know how you are recovering after your surgery. · You may receive an e-mail or letter in the mail from [employer] regarding your experience with us in the Ambulatory Surgery Unit. Your feedback is valuable to us and we appreciate your participation in the survey. · If the above instructions are not adequate, please contact Blake Valdez RN, Rebeca anesthesia Nurse Manager or our Anesthesiologist, at [phone]. ·  
· We wish youre a speedy recovery ? What to do at Home: *  Please give a list of your current medications to your Primary Care Provider. *  Please update this list whenever your medications are discontinued, doses are 
    changed, or new medications (including over-the-counter products) are added. *  Please carry medication information at all times in case of emergency situations. These are general instructions for a healthy lifestyle: No smoking/ No tobacco products/ Avoid exposure to second hand smoke Surgeon General's Warning:  Quitting smoking now greatly reduces serious risk to your health. Obesity, smoking, and sedentary lifestyle greatly increases your risk for illness A healthy diet, regular physical exercise & weight monitoring are important for maintaining a healthy lifestyle You may be retaining fluid if you have a history of heart failure or if you experience any of the following symptoms:  Weight gain of 3 pounds or more overnight or 5 pounds in a week, increased swelling in our hands or feet or shortness of breath while lying flat in bed. Please call your doctor as soon as you notice any of these symptoms; do not wait until your next office visit. Recognize signs and symptoms of STROKE: 
 
F-face looks uneven A-arms unable to move or move even S-speech slurred or non-existent T-time-call 911 as soon as signs and symptoms begin-DO NOT go Back to bed or wait to see if you get better-TIME IS BRAIN. If you have not received your influenza and/or pneumococcal vaccine, please follow up with your primary care physician. The discharge information has been reviewed with the patient and caregiver. The patient and caregiver verbalized understanding. Discharge Orders None Introducing [facility]! [employer] introduces [employer] patient portal. Now you can access parts of your medical record, email your doctor's office, and request medication refills online. 1. In your internet browser, go to [email or web address] 2. Click on the First Time User? Click Here link in the Sign In box. You will see the New Member Sign Up page. 3. Enter your [employer] Access Code exactly as it appears below. You will not need to use this code after youve completed the sign-up process. If you do not sign up before the expiration date, you must request a new code. · [employer] Access Code: [email or web address] Expires: 9/21/2017  7:09 AM 
 
4. Enter the last four digits of your Social Security Number (xxxx) and Date of Birth (mm/dd/yyyy) as indicated and click Submit. You will be taken to the next sign-up page. 5. Create a [employer] ID. This will be your [employer] login ID and cannot be changed, so think of one that is secure and easy to remember. 6. Create a [employer] password. You can change your password at any time. 7. Enter your Password Reset Question and Answer. This can be used at a later time if you forget your password. 8. Enter your e-mail address. You will receive e-mail notification when new information is available in [address] 9. Click Sign Up. You can now view and download portions of your medical record. 10. Click the Download Summary menu link to download a portable copy of your medical information. If you have questions, please visit the Frequently Asked Questions section of the [employer] website. Remember, [employer] is NOT to be used for urgent needs. For medical emergencies, dial 911. Now available from your iPhone and Android! General Information Please provide this summary of care documentation to your next provider. Patient Signature:  ____________________________________________________________ Date:  ____________________________________________________________  
  
Janeth Curlin Provider Signature:  ____________________________________________________________ Date:  ____________________________________________________________

## 2017-06-26 NOTE — ANESTHESIA PREPROCEDURE EVALUATION
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)       Cardiovascular    Hypertension              Exercise tolerance: >4 METS     GI/Hepatic/Renal     GERD: well controlled           Endo/Other  Within defined limits           Other Findings            Physical Exam    Airway  Mallampati: I  TM Distance: 4 - 6 cm  Neck ROM: normal range of motion   Mouth opening: Normal     Cardiovascular    Rhythm: regular  Rate: normal      Pertinent negatives: No murmur   Dental    Dentition: Full upper dentures and Full lower dentures     Pulmonary  Breath sounds clear to auscultation               Abdominal  GI exam deferred       Other Findings            Anesthetic Plan    ASA: 3  Anesthesia type: MAC          Induction: Intravenous  Anesthetic plan and risks discussed with: Patient

## 2017-06-26 NOTE — PERIOP NOTES
3804  All discharge instructions have been reviewed with pt and daughter. Verbalized understanding. Pt declined po fluids. 5410 awake and alert, denies complaints  [phone] discharged to car via wheelchair, all belongings with pt and daughter. Pt is awake and alert, oriented x 4. Denies complaints.

## 2017-06-26 NOTE — BRIEF OP NOTE
BRIEF OPERATIVE NOTE    Date of Procedure: 6/26/2017   Preoperative Diagnosis: Age-related nuclear cataract of left eye [H25.12]  Postoperative Diagnosis: Age-related nuclear cataract of left eye [H25.12]    Procedure(s):  CATARACT EXTRACTION WITH INTRA OCULAR LENS IMPLANT LEFT EYE  Surgeon(s) and Role:     * Mary Quezada MD - Primary         Assistant Staff:       Surgical Staff:  Circ-1: Neeru Saab RN  Scrub Tech-1: Radha Alcala  Event Time In   Incision Start 4492   Incision Close      Anesthesia: MAC   Estimated Blood Loss: none  Specimens: * No specimens in log *   Findings: cataract left eye  Complications: none  Implants:   Implant Name Type Inv.  Item Serial No.  Lot No. LRB No. Used Action   LENS IOL POST 1-PC 6X13 24.5 -- [employer] - [id number]   LENS IOL POST 1-PC 6X13 24.5 -- [employer] [id number] [employer] N/A Left 1 Implanted

## 2017-06-26 NOTE — PERIOP NOTES
[facility]  9/3/1931  [id number]    Situation:  Verbal report given from: Juni Layne CRNA  Procedure: Procedure(s):  CATARACT EXTRACTION WITH INTRA OCULAR LENS IMPLANT LEFT EYE    Background:    Preoperative diagnosis: Age-related nuclear cataract of left eye [H25.12]    Postoperative diagnosis: Age-related nuclear cataract of left eye [H25.12]    :  Dr. Rubin Galeazzi     Assistant(s): Circ-1: Jhon Lr RN  Scrub Tech-1: Lopez Quiros    Specimens: * No specimens in log *    Assessment:  Intra-procedure medications         Anesthesia gave intra-procedure sedation and medications, see anesthesia flow sheet     Intravenous fluids: ns@ KVO     Vital signs stable       Recommendation:    Permission to share finding with family or friend yes (given preop)

## 2017-06-26 NOTE — PERIOP NOTES
#1RAC by Ricardo CHILDS;bleeding controlled;minimal swelling & bruising; pt denies pain at site. #1 R wrist & #2 R hand by Kati Lozada rn;bleeding controlled;minimal swelling & bruising;pt denies pain at site.

## 2017-06-26 NOTE — ANESTHESIA POSTPROCEDURE EVALUATION
Post-Anesthesia Evaluation and Assessment    Patient: Ivan Egan MRN: [id number]  SSN: [id number]    YOB: 1931  Age: 80 y.o. Sex: male       Cardiovascular Function/Vital Signs  Visit Vitals    /70    Pulse 80    Temp 36.6 °C (97.8 °F)    Resp 18    Ht 5' 6.5\" (1.689 m)    Wt 80.3 kg (177 lb)    SpO2 97%    BMI 28.14 kg/m2       Patient is status post MAC anesthesia for Procedure(s):  CATARACT EXTRACTION WITH INTRA OCULAR LENS IMPLANT LEFT EYE. Nausea/Vomiting: None    Postoperative hydration reviewed and adequate. Pain:  Pain Scale 1: Numeric (0 - 10) (06/26/17 0913)  Pain Intensity 1: 0 (06/26/17 0913)   Managed    Neurological Status:   Neuro (WDL): Within Defined Limits (06/26/17 0913)  Neuro  RUE Motor Response: Tingling;Numbness (fingers) (06/26/17 0724)   At baseline    Mental Status and Level of Consciousness: Arousable    Pulmonary Status:   O2 Device: CO2 nasal cannula (06/26/17 0915)   Adequate oxygenation and airway patent    Complications related to anesthesia: None    Post-anesthesia assessment completed.  No concerns    Signed By: Daphne George MD     June 26, 2017

## 2017-10-23 ENCOUNTER — TELEPHONE ANTICOAG (OUTPATIENT)
Dept: CARDIOLOGY CLINIC | Age: 82
End: 2017-10-23

## 2017-10-31 ENCOUNTER — TELEPHONE ANTICOAG (OUTPATIENT)
Dept: CARDIOLOGY CLINIC | Age: 82
End: 2017-10-31

## 2017-11-21 ENCOUNTER — TELEPHONE (OUTPATIENT)
Dept: CARDIOLOGY CLINIC | Age: 82
End: 2017-11-21

## 2017-12-05 ENCOUNTER — TELEPHONE (OUTPATIENT)
Dept: CARDIOLOGY CLINIC | Age: 82
End: 2017-12-05

## 2017-12-13 ENCOUNTER — TELEPHONE ANTICOAG (OUTPATIENT)
Dept: CARDIOLOGY CLINIC | Age: 82
End: 2017-12-13

## 2018-02-20 ENCOUNTER — HOSPITAL ENCOUNTER (INPATIENT)
Age: 83
LOS: 8 days | Discharge: HOME HEALTH | DRG: 327 | End: 2018-02-28
Attending: EMERGENCY MEDICINE | Admitting: SURGERY
Payer: MEDICARE

## 2018-02-20 ENCOUNTER — APPOINTMENT (OUTPATIENT)
Dept: GENERAL RADIOLOGY | Age: 83
DRG: 327 | End: 2018-02-20
Attending: EMERGENCY MEDICINE
Payer: MEDICARE

## 2018-02-20 ENCOUNTER — APPOINTMENT (OUTPATIENT)
Dept: CT IMAGING | Age: 83
DRG: 327 | End: 2018-02-20
Attending: EMERGENCY MEDICINE
Payer: MEDICARE

## 2018-02-20 DIAGNOSIS — K31.89 GASTRIC VOLVULUS: ICD-10-CM

## 2018-02-20 DIAGNOSIS — N17.9 AKI (ACUTE KIDNEY INJURY) (HCC): ICD-10-CM

## 2018-02-20 DIAGNOSIS — R79.89 ELEVATED LACTIC ACID LEVEL: ICD-10-CM

## 2018-02-20 DIAGNOSIS — K92.0 HEMATEMESIS WITH NAUSEA: Primary | ICD-10-CM

## 2018-02-20 DIAGNOSIS — J18.9 PNEUMONIA DUE TO INFECTIOUS ORGANISM, UNSPECIFIED LATERALITY, UNSPECIFIED PART OF LUNG: ICD-10-CM

## 2018-02-20 DIAGNOSIS — K31.1 GASTRIC OUTLET OBSTRUCTION: ICD-10-CM

## 2018-02-20 LAB
ALBUMIN SERPL-MCNC: 3.7 G/DL (ref 3.5–5)
ALBUMIN/GLOB SERPL: 0.6 {RATIO} (ref 1.1–2.2)
ALP SERPL-CCNC: 91 U/L (ref 45–117)
ALT SERPL-CCNC: 14 U/L (ref 12–78)
ANION GAP SERPL CALC-SCNC: 7 MMOL/L (ref 5–15)
AST SERPL-CCNC: 26 U/L (ref 15–37)
BASOPHILS # BLD: 0 K/UL (ref 0–0.1)
BASOPHILS NFR BLD: 0 % (ref 0–1)
BILIRUB SERPL-MCNC: 0.7 MG/DL (ref 0.2–1)
BUN SERPL-MCNC: 22 MG/DL (ref 6–20)
BUN/CREAT SERPL: 12 (ref 12–20)
CALCIUM SERPL-MCNC: 10.2 MG/DL (ref 8.5–10.1)
CHLORIDE SERPL-SCNC: 96 MMOL/L (ref 97–108)
CO2 SERPL-SCNC: 38 MMOL/L (ref 21–32)
CREAT SERPL-MCNC: 1.89 MG/DL (ref 0.7–1.3)
DIFFERENTIAL METHOD BLD: ABNORMAL
EOSINOPHIL # BLD: 0 K/UL (ref 0–0.4)
EOSINOPHIL NFR BLD: 0 % (ref 0–7)
ERYTHROCYTE [DISTWIDTH] IN BLOOD BY AUTOMATED COUNT: 14 % (ref 11.5–14.5)
GASTROCULT GAST QL: POSITIVE
GLOBULIN SER CALC-MCNC: 6.2 G/DL (ref 2–4)
GLUCOSE SERPL-MCNC: 153 MG/DL (ref 65–100)
HCT VFR BLD AUTO: 43.6 % (ref 36.6–50.3)
HEMOCCULT STL QL: NEGATIVE
HGB BLD-MCNC: 13.7 G/DL (ref 12.1–17)
IMM GRANULOCYTES # BLD: 0 K/UL (ref 0–0.04)
IMM GRANULOCYTES NFR BLD AUTO: 0 % (ref 0–0.5)
LACTATE SERPL-SCNC: 3.1 MMOL/L (ref 0.4–2)
LIPASE SERPL-CCNC: 250 U/L (ref 73–393)
LYMPHOCYTES # BLD: 0.5 K/UL (ref 0.8–3.5)
LYMPHOCYTES NFR BLD: 5 % (ref 12–49)
MCH RBC QN AUTO: 27.3 PG (ref 26–34)
MCHC RBC AUTO-ENTMCNC: 31.4 G/DL (ref 30–36.5)
MCV RBC AUTO: 87 FL (ref 80–99)
MONOCYTES # BLD: 0.9 K/UL (ref 0–1)
MONOCYTES NFR BLD: 9 % (ref 5–13)
NEUTS SEG # BLD: 8.8 K/UL (ref 1.8–8)
NEUTS SEG NFR BLD: 86 % (ref 32–75)
NRBC # BLD: 0 K/UL (ref 0–0.01)
NRBC BLD-RTO: 0 PER 100 WBC
PH GAST: 4.5 [PH] (ref 1.5–3.5)
PLATELET # BLD AUTO: 264 K/UL (ref 150–400)
PMV BLD AUTO: 10.4 FL (ref 8.9–12.9)
POTASSIUM SERPL-SCNC: 3.6 MMOL/L (ref 3.5–5.1)
PROT SERPL-MCNC: 9.9 G/DL (ref 6.4–8.2)
RBC # BLD AUTO: 5.01 M/UL (ref 4.1–5.7)
RBC MORPH BLD: ABNORMAL
SODIUM SERPL-SCNC: 141 MMOL/L (ref 136–145)
WBC # BLD AUTO: 10.2 K/UL (ref 4.1–11.1)

## 2018-02-20 PROCEDURE — 99285 EMERGENCY DEPT VISIT HI MDM: CPT

## 2018-02-20 PROCEDURE — 77030008771 HC TU NG SALEM SUMP -A

## 2018-02-20 PROCEDURE — 82272 OCCULT BLD FECES 1-3 TESTS: CPT | Performed by: EMERGENCY MEDICINE

## 2018-02-20 PROCEDURE — 74011000258 HC RX REV CODE- 258: Performed by: EMERGENCY MEDICINE

## 2018-02-20 PROCEDURE — 82271 OCCULT BLOOD OTHER SOURCES: CPT | Performed by: EMERGENCY MEDICINE

## 2018-02-20 PROCEDURE — 36415 COLL VENOUS BLD VENIPUNCTURE: CPT | Performed by: EMERGENCY MEDICINE

## 2018-02-20 PROCEDURE — 74011000250 HC RX REV CODE- 250: Performed by: EMERGENCY MEDICINE

## 2018-02-20 PROCEDURE — 96367 TX/PROPH/DG ADDL SEQ IV INF: CPT

## 2018-02-20 PROCEDURE — 74011250636 HC RX REV CODE- 250/636: Performed by: EMERGENCY MEDICINE

## 2018-02-20 PROCEDURE — 65270000029 HC RM PRIVATE

## 2018-02-20 PROCEDURE — 77030019563 HC DEV ATTCH FEED HOLL -A

## 2018-02-20 PROCEDURE — 96361 HYDRATE IV INFUSION ADD-ON: CPT

## 2018-02-20 PROCEDURE — 74011000250 HC RX REV CODE- 250: Performed by: SURGERY

## 2018-02-20 PROCEDURE — 71046 X-RAY EXAM CHEST 2 VIEWS: CPT

## 2018-02-20 PROCEDURE — 83605 ASSAY OF LACTIC ACID: CPT | Performed by: EMERGENCY MEDICINE

## 2018-02-20 PROCEDURE — 87040 BLOOD CULTURE FOR BACTERIA: CPT | Performed by: EMERGENCY MEDICINE

## 2018-02-20 PROCEDURE — 96365 THER/PROPH/DIAG IV INF INIT: CPT

## 2018-02-20 PROCEDURE — 74176 CT ABD & PELVIS W/O CONTRAST: CPT

## 2018-02-20 PROCEDURE — 85025 COMPLETE CBC W/AUTO DIFF WBC: CPT | Performed by: EMERGENCY MEDICINE

## 2018-02-20 PROCEDURE — C9113 INJ PANTOPRAZOLE SODIUM, VIA: HCPCS | Performed by: EMERGENCY MEDICINE

## 2018-02-20 PROCEDURE — 96376 TX/PRO/DX INJ SAME DRUG ADON: CPT

## 2018-02-20 PROCEDURE — 93005 ELECTROCARDIOGRAM TRACING: CPT

## 2018-02-20 PROCEDURE — 96375 TX/PRO/DX INJ NEW DRUG ADDON: CPT

## 2018-02-20 PROCEDURE — 74011250636 HC RX REV CODE- 250/636: Performed by: SURGERY

## 2018-02-20 PROCEDURE — 80053 COMPREHEN METABOLIC PANEL: CPT | Performed by: EMERGENCY MEDICINE

## 2018-02-20 PROCEDURE — 74018 RADEX ABDOMEN 1 VIEW: CPT

## 2018-02-20 PROCEDURE — 83690 ASSAY OF LIPASE: CPT | Performed by: EMERGENCY MEDICINE

## 2018-02-20 RX ORDER — HYDROMORPHONE HYDROCHLORIDE 2 MG/ML
1 INJECTION, SOLUTION INTRAMUSCULAR; INTRAVENOUS; SUBCUTANEOUS ONCE
Status: COMPLETED | OUTPATIENT
Start: 2018-02-20 | End: 2018-02-20

## 2018-02-20 RX ORDER — LIDOCAINE HYDROCHLORIDE 20 MG/ML
15 SOLUTION OROPHARYNGEAL
Status: DISCONTINUED | OUTPATIENT
Start: 2018-02-20 | End: 2018-02-20

## 2018-02-20 RX ORDER — ASPIRIN 81 MG/1
81 TABLET ORAL EVERY EVENING
COMMUNITY

## 2018-02-20 RX ORDER — SODIUM CHLORIDE 0.9 % (FLUSH) 0.9 %
5-10 SYRINGE (ML) INJECTION AS NEEDED
Status: DISCONTINUED | OUTPATIENT
Start: 2018-02-20 | End: 2018-02-28 | Stop reason: HOSPADM

## 2018-02-20 RX ORDER — SODIUM CHLORIDE 0.9 % (FLUSH) 0.9 %
5-10 SYRINGE (ML) INJECTION AS NEEDED
Status: DISCONTINUED | OUTPATIENT
Start: 2018-02-20 | End: 2018-02-20

## 2018-02-20 RX ORDER — CLOBETASOL PROPIONATE 0.5 MG/G
CREAM TOPICAL 2 TIMES DAILY
COMMUNITY
End: 2018-07-19

## 2018-02-20 RX ORDER — HYDROMORPHONE HYDROCHLORIDE 2 MG/ML
0.5 INJECTION, SOLUTION INTRAMUSCULAR; INTRAVENOUS; SUBCUTANEOUS
Status: DISCONTINUED | OUTPATIENT
Start: 2018-02-20 | End: 2018-02-22

## 2018-02-20 RX ORDER — ONDANSETRON 2 MG/ML
4 INJECTION INTRAMUSCULAR; INTRAVENOUS
Status: DISCONTINUED | OUTPATIENT
Start: 2018-02-20 | End: 2018-02-28 | Stop reason: HOSPADM

## 2018-02-20 RX ORDER — LORAZEPAM 2 MG/ML
1 INJECTION INTRAMUSCULAR ONCE
Status: COMPLETED | OUTPATIENT
Start: 2018-02-20 | End: 2018-02-20

## 2018-02-20 RX ORDER — ERGOCALCIFEROL 1.25 MG/1
50000 CAPSULE ORAL
COMMUNITY
End: 2018-07-19

## 2018-02-20 RX ORDER — SODIUM CHLORIDE 0.9 % (FLUSH) 0.9 %
5-10 SYRINGE (ML) INJECTION EVERY 8 HOURS
Status: DISCONTINUED | OUTPATIENT
Start: 2018-02-20 | End: 2018-02-28 | Stop reason: HOSPADM

## 2018-02-20 RX ORDER — SODIUM CHLORIDE, SODIUM LACTATE, POTASSIUM CHLORIDE, CALCIUM CHLORIDE 600; 310; 30; 20 MG/100ML; MG/100ML; MG/100ML; MG/100ML
150 INJECTION, SOLUTION INTRAVENOUS CONTINUOUS
Status: DISCONTINUED | OUTPATIENT
Start: 2018-02-20 | End: 2018-02-22

## 2018-02-20 RX ORDER — ONDANSETRON 2 MG/ML
4 INJECTION INTRAMUSCULAR; INTRAVENOUS
Status: COMPLETED | OUTPATIENT
Start: 2018-02-20 | End: 2018-02-20

## 2018-02-20 RX ORDER — HYDROCHLOROTHIAZIDE 12.5 MG/1
12.5 TABLET ORAL DAILY
COMMUNITY
End: 2018-08-01

## 2018-02-20 RX ORDER — ONDANSETRON 2 MG/ML
4 INJECTION INTRAMUSCULAR; INTRAVENOUS
Status: ACTIVE | OUTPATIENT
Start: 2018-02-20 | End: 2018-02-21

## 2018-02-20 RX ADMIN — HYDROMORPHONE HYDROCHLORIDE 1 MG: 2 INJECTION INTRAMUSCULAR; INTRAVENOUS; SUBCUTANEOUS at 20:07

## 2018-02-20 RX ADMIN — BENZOCAINE 1 SPRAY: 200 SPRAY DENTAL; ORAL; PERIODONTAL at 20:30

## 2018-02-20 RX ADMIN — AZITHROMYCIN 500 MG: 500 INJECTION, POWDER, LYOPHILIZED, FOR SOLUTION INTRAVENOUS at 19:13

## 2018-02-20 RX ADMIN — ONDANSETRON HYDROCHLORIDE 4 MG: 2 INJECTION, SOLUTION INTRAMUSCULAR; INTRAVENOUS at 17:35

## 2018-02-20 RX ADMIN — LORAZEPAM 1 MG: 2 INJECTION INTRAMUSCULAR; INTRAVENOUS at 20:08

## 2018-02-20 RX ADMIN — FAMOTIDINE 20 MG: 10 INJECTION, SOLUTION INTRAVENOUS at 17:34

## 2018-02-20 RX ADMIN — SODIUM CHLORIDE 40 MG: 9 INJECTION INTRAMUSCULAR; INTRAVENOUS; SUBCUTANEOUS at 17:31

## 2018-02-20 RX ADMIN — SODIUM CHLORIDE 2040 ML: 900 INJECTION, SOLUTION INTRAVENOUS at 17:26

## 2018-02-20 RX ADMIN — CEFTRIAXONE SODIUM 2 G: 2 INJECTION, POWDER, FOR SOLUTION INTRAMUSCULAR; INTRAVENOUS at 18:37

## 2018-02-20 NOTE — ED PROVIDER NOTES
EMERGENCY DEPARTMENT HISTORY AND PHYSICAL EXAM      Date: 2/20/2018  Patient Name: Virgen Ocasio    History of Presenting Illness     Chief Complaint   Patient presents with    Vomiting     pt vomitted coffee ground emesis since last night, unable to keep food down    Bleeding/Bruising       History Provided By: Patient    HPI: Virgen Ocasio, 80 y.o. male with PMHx significant for HTN, GERD, CVA, cholecystectomy, bleeding ulcers, presents via EMS to the ED with cc of sudden onset nausea, vomiting and increased thirst with associated periumbilical abd pain x last night. He reports that his vomit is black in color and has been that way since first episode. He does not have a hx of bleeding ulcers and he does endorse hx of cholecystectomy. His last episode of vomiting was upon arrival to the ED. Pt reports drinking a small amount of wine at dinner but does not drink heavily. He does endorse hx of a CVA, but does not take blood thinners, large amounts of NSAIDS or ASA. He specifically denies melena, diarrhea, or chest pain. PCP: Kary Severance, MD    There are no other complaints, changes, or physical findings at this time. Current Facility-Administered Medications   Medication Dose Route Frequency Provider Last Rate Last Dose    sodium chloride (NS) flush 5-10 mL  5-10 mL IntraVENous PRN Gladys Stein. Lv Lua MD        azithromycin [facility]) 500 mg in 0.9% sodium chloride 250 mL IVPB  500 mg IntraVENous NOW Gladys Stein. Lv Lua  mL/hr at 02/20/18 1913 500 mg at 02/20/18 1913    ondansetron [facility]) injection 4 mg  4 mg IntraVENous NOW Gladys Stein. Lv Lua MD        lidocaine (XYLOCAINE) 2 % viscous solution 15 mL  15 mL Mouth/Throat NOW Makenzie Lua MD        HYDROmorphone (DILAUDID) injection 1 mg  1 mg IntraVENous ONCE Jimi Jean MD        LORazepam (ATIVAN) injection 1 mg  1 mg IntraVENous ONCE Jimi Jean MD        benzocaine (HURRICANE) 20 % spray 1 Spray  1 Spray Mucous Membrane ONCE Phu D Alex Adhikari MD        sodium chloride 0.9 % bolus infusion 1,000 mL  1,000 mL IntraVENous ONCE Griffin Jacobsen MD        lactated Ringers infusion  150 mL/hr IntraVENous CONTINUOUS Griffin Jacobsen MD         Current Outpatient Prescriptions   Medication Sig Dispense Refill    hydroCHLOROthiazide (HYDRODIURIL) 12.5 mg tablet Take 12.5 mg by mouth daily.  aspirin delayed-release 81 mg tablet Take 81 mg by mouth every evening.  ergocalciferol (VITAMIN D2) 50,000 unit capsule Take 50,000 Units by mouth every seven (7) days. on Wednesday.  clobetasol (TEMOVATE) 0.05 % topical cream Apply  to affected area two (2) times a day. Patient applies this all over body for 'spots and redness on skin.'      simvastatin (ZOCOR) 20 mg tablet Take 20 mg by mouth every evening.  omeprazole (PRILOSEC) 20 mg capsule Take 20 mg by mouth daily.  amLODIPine (NORVASC) 10 mg tablet Take 10 mg by mouth daily. Past History     Past Medical History:  Past Medical History:   Diagnosis Date    GERD (gastroesophageal reflux disease)     High cholesterol     Hypertension     Stroke ([facility] [address] 03/2017    numbness in fingertips on R hand; legs;stammering       Past Surgical History:  Past Surgical History:   Procedure Laterality Date    HX CHOLECYSTECTOMY      HX HEENT Right     cataract       Family History:  No family history on file. Social History:  Social History   Substance Use Topics    Smoking status: Former Smoker    Smokeless tobacco: Never Used    Alcohol use Yes      Comment: rare wine       Allergies:  No Known Allergies      Review of Systems   Review of Systems   Constitutional: Negative for chills and fever. + increased thirst   HENT: Negative for congestion. Eyes: Negative for visual disturbance. Respiratory: Negative for chest tightness. Cardiovascular: Negative for chest pain and leg swelling.    Gastrointestinal: Positive for abdominal pain (periumbilical), nausea and vomiting (black in color). Negative for blood in stool and diarrhea. Endocrine: Negative for polyuria. Genitourinary: Negative for dysuria and frequency. Musculoskeletal: Negative for myalgias. Skin: Negative for color change. Allergic/Immunologic: Negative for immunocompromised state. Neurological: Negative for numbness. Physical Exam   Physical Exam  Nursing note and vitals reviewed. General appearance: moderately distressed, uncomfortable appearing   Eyes: PERRL, EOMI, conjunctiva normal, anicteric sclera  HEENT: mucous membranes moist, oropharynx is clear, edentulous top gingiva, poor dentition   Pulmonary: bibasilar crackles  Cardiac: tachycardic and regular rhythm, 2/6 systolic murmur R/L sternal border, gallops, or rubs, 2+DP pulses, 2+ radial pulses, PT pulses are palpable  Abdomen: soft, ttp to LUQ and epigastrium, nondistended, bowel sounds present  MSK: no pre-tibial edema  Neuro: Alert, answers questions appropriately  Skin: capillary refill brisk, skin is dry    Diagnostic Study Results     Labs -     Recent Results (from the past 12 hour(s))   CBC WITH AUTOMATED DIFF    Collection Time: 02/20/18  5:00 PM   Result Value Ref Range    WBC 10.2 4.1 - 11.1 K/uL    RBC 5.01 4.10 - 5.70 M/uL    HGB 13.7 12.1 - 17.0 g/dL    HCT 43.6 36.6 - 50.3 %    MCV 87.0 80.0 - 99.0 FL    MCH 27.3 26.0 - 34.0 PG    MCHC 31.4 30.0 - 36.5 g/dL    RDW 14.0 11.5 - 14.5 %    PLATELET 513 267 - 897 K/uL    MPV 10.4 8.9 - 12.9 FL    NRBC 0.0 0  WBC    ABSOLUTE NRBC 0.00 0.00 - 0.01 K/uL    NEUTROPHILS 86 (H) 32 - 75 %    LYMPHOCYTES 5 (L) 12 - 49 %    MONOCYTES 9 5 - 13 %    EOSINOPHILS 0 0 - 7 %    BASOPHILS 0 0 - 1 %    IMMATURE GRANULOCYTES 0 0.0 - 0.5 %    ABS. NEUTROPHILS 8.8 (H) 1.8 - 8.0 K/UL    ABS. LYMPHOCYTES 0.5 (L) 0.8 - 3.5 K/UL    ABS. MONOCYTES 0.9 0.0 - 1.0 K/UL    ABS. EOSINOPHILS 0.0 0.0 - 0.4 K/UL    ABS. BASOPHILS 0.0 0.0 - 0.1 K/UL    ABS. IMM.  GRANS. 0.0 0.00 - 0.04 K/UL    DF SMEAR SCANNED RBC COMMENTS NORMOCYTIC, NORMOCHROMIC     METABOLIC PANEL, COMPREHENSIVE    Collection Time: 02/20/18  5:00 PM   Result Value Ref Range    Sodium 141 136 - 145 mmol/L    Potassium 3.6 3.5 - 5.1 mmol/L    Chloride 96 (L) 97 - 108 mmol/L    CO2 38 (H) 21 - 32 mmol/L    Anion gap 7 5 - 15 mmol/L    Glucose 153 (H) 65 - 100 mg/dL    BUN 22 (H) 6 - 20 MG/DL    Creatinine 1.89 (H) 0.70 - 1.30 MG/DL    BUN/Creatinine ratio 12 12 - 20      GFR est AA 41 (L) >60 ml/min/1.73m2    GFR est non-AA 34 (L) >60 ml/min/1.73m2    Calcium 10.2 (H) 8.5 - 10.1 MG/DL    Bilirubin, total 0.7 0.2 - 1.0 MG/DL    ALT (SGPT) 14 12 - 78 U/L    AST (SGOT) 26 15 - 37 U/L    Alk.  phosphatase 91 45 - 117 U/L    Protein, total 9.9 (H) 6.4 - 8.2 g/dL    Albumin 3.7 3.5 - 5.0 g/dL    Globulin 6.2 (H) 2.0 - 4.0 g/dL    A-G Ratio 0.6 (L) 1.1 - 2.2     LACTIC ACID    Collection Time: 02/20/18  5:00 PM   Result Value Ref Range    Lactic acid 3.1 (HH) 0.4 - 2.0 MMOL/L   LIPASE    Collection Time: 02/20/18  5:00 PM   Result Value Ref Range    Lipase 250 73 - 393 U/L   EKG, 12 LEAD, INITIAL    Collection Time: 02/20/18  5:02 PM   Result Value Ref Range    Ventricular Rate 117 BPM    Atrial Rate 117 BPM    P-R Interval 146 ms    QRS Duration 82 ms    Q-T Interval 310 ms    QTC Calculation (Bezet) 432 ms    Calculated P Axis 16 degrees    Calculated R Axis 14 degrees    Calculated T Axis 36 degrees    Diagnosis       Sinus tachycardia with premature atrial complexes in a pattern of bigeminy  Possible Left atrial enlargement  Nonspecific T wave abnormality  When compared with ECG of 19-MAR-2017 06:46,  premature atrial complexes are now present  ST no longer depressed in Inferior leads  Nonspecific T wave abnormality now evident in Lateral leads     OCCULT BLOOD, GASTRIC    Collection Time: 02/20/18  5:10 PM   Result Value Ref Range    OCCULT BLOOD,GASTRIC POSITIVE (A) NEG      pH,GASTRIC 4.5 (H) 1.5 - 3.5     OCCULT BLOOD, STOOL    Collection Time: 02/20/18 5:10 PM   Result Value Ref Range    Occult blood, stool NEGATIVE  NEG         Radiologic Studies -   CT Results  (Last 48 hours)               02/20/18 1814  CT ABD PELV WO CONT Final result    Impression:  IMPRESSION:       1. 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 Additional large   sliding-type hiatus hernia of the proximal stomach. This finding was discussed   with Dr. Monroe Sims on 2/20/2018 at 1913 hours. 2. Colonic diverticulosis without evidence of acute diverticulitis. 3. Severe interstitial lung disease with bibasilar fibrosis. No definite   superimposed acute airspace disease. Narrative:  EXAM:  CT ABD PELV WO CONT       INDICATION: Nausea, vomiting  , coffee-ground emesis last night       COMPARISON: CT pelvis of 1/18/2011. No prior CT of the abdomen or chest.       CONTRAST:  None. TECHNIQUE:    Thin axial images were obtained through the abdomen and pelvis. Coronal and   sagittal reconstructions were generated. Oral contrast was not administered. CT   dose reduction was achieved through use of a standardized protocol tailored for   this examination and automatic exposure control for dose modulation. The absence of intravenous contrast material reduces the sensitivity for   evaluation of the solid parenchymal organs of the abdomen. FINDINGS:    LUNG BASES: Severe interstitial lung disease with pulmonary fibrosis in the lung   bases, somewhat more extensive on the right than on the left. No definite   superimposed acute airspace disease. INCIDENTALLY IMAGED HEART AND MEDIASTINUM: Unremarkable. LIVER: No mass or biliary dilatation. GALLBLADDER: Surgically absent. SPLEEN: No mass. PANCREAS: No mass or ductal dilatation. ADRENALS: Unremarkable. KIDNEYS/URETERS: No mass, calculus, or hydronephrosis. STOMACH: Markedly dilated with fluid.  Large hiatus hernia involving the gastric   fundus. Torsion and herniation of the distal body and antrum of the stomach   through the diaphragmatic hernia (coronal images 51-70, sagittal images 82   through 87). Findings consistent with closed loop gastric outlet obstruction. .   No gastric wall thickening or pneumatosis. SMALL BOWEL: No dilatation or wall thickening. COLON: Extensive colonic diverticulosis, particularly in the left colon. No CT   evidence of acute diverticulitis. APPENDIX: Unremarkable. PERITONEUM: No ascites or pneumoperitoneum. 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 No   retroperitoneal adenopathy. REPRODUCTIVE ORGANS: Moderate prostatic enlargement. URINARY BLADDER: No mass or calculus. BONES: Grade 1 spondylolisthesis at L5-S1 with bilateral spondylolysis. Mild   chronic appearing compression of T10. No bone destruction or acute osseous   lesion. ADDITIONAL COMMENTS: N/A               CXR Results  (Last 48 hours)               02/20/18 1724  XR CHEST PA LAT Final result    Impression:  IMPRESSION:        Severe chronic interstitial lung disease with superimposed right lower lobe   airspace disease. This can be further evaluated on the CT of the abdomen which   has been ordered. Narrative:  EXAM:  XR CHEST PA LAT       INDICATION:  vomiting, abd pain       COMPARISON:  3/28/2017        FINDINGS:       PA and lateral radiographs of the chest demonstrate a background of severe   interstitial lung disease with a peripheral distribution. There does appear to   be superimposed patchy left lower lobe airspace disease. No other focal airspace   disease is demonstrated. The cardiomediastinal silhouette is stable but the   previously demonstrated large fluid-containing hiatus hernia is no longer   identified. Degenerative changes in the spine are noted.                     Medical Decision Making I am the first provider for this patient. I reviewed the vital signs, available nursing notes, past medical history, past surgical history, family history and social history. Vital Signs-Reviewed the patient's vital signs. Patient Vitals for the past 12 hrs:   Temp Pulse Resp BP SpO2   02/20/18 1930 - (!) 106 18 153/82 -   02/20/18 1915 - 93 19 152/88 -   02/20/18 1900 - 90 18 (!) 156/91 -   02/20/18 1845 - 94 18 156/70 -   02/20/18 1830 - 98 23 (!) 150/91 -   02/20/18 1824 - 95 21 136/89 -   02/20/18 1745 - 91 20 (!) 140/93 -   02/20/18 1730 - (!) 106 21 146/90 -   02/20/18 1724 - (!) 111 25 - -   02/20/18 1715 - (!) 107 21 (!) 123/97 -   02/20/18 1647 - (!) 114 24 (!) 124/93 -   02/20/18 1645 98 °F (36.7 °C) (!) 114 19 (!) 124/93 96 %       Pulse Oximetry Analysis - 96% on RA    Cardiac Monitor:   Rate: 114 bpm  Rhythm: Normal Sinus Rhythm       EKG interpretation: (Preliminary) 1702  Rhythm: sinus tachycardia with premature PAC's in pattern of bigeminy and regular . Rate (approx.): 117 bpm; Axis: possible L atrial enlargements; QRS interval: normal ; ST/T wave: no ST elevation, no ST depression; non-specific T wave abnormality  AL interval 146 ms, QRS 82 ms,  ms, QTc 432 ms. Written by Chio Pierre, as dictated by Mary Jo Dorman MD.      Records Reviewed: Nursing Notes and Old Medical Records    Provider Notes (Medical Decision Making):   DDx: gastric ulcer, PUD, GERD, pancreatitis, dieulafoy's lesion, perforated viscus, PNA    ED Course:   Initial assessment performed. The patients presenting problems have been discussed, and they are in agreement with the care plan formulated and outlined with them. I have encouraged them to ask questions as they arise throughout their visit. Procedure Note - Rectal Exam:   5:01 PM  Performed by: Evelyn Anders MD  Chaperoned by: Tiera Goncalves RN  Rectal exam performed. [place] stool was collected, no blood or melena.   Stool was Hemoccult tested, and found to be heme negative. External hemorrhoids, no thrombosis, prostate is firm  The procedure took 1-15 minutes, and pt tolerated well. Written by Yamilka Granda, MARIKA Scribe, as dictated by Ester Ernst MD.    CONSULT NOTE:   7:30 PM  Ester Ernst MD spoke with Dr. Neda Horton,   Specialty: General Surgery  Discussed pt's hx, disposition, and available diagnostic and imaging results. Reviewed care plans. Consultant recommends placing an NG tube and admission to general surgery. Written by Yamilka Granda, MARIKA Scribe, as dictated by Ester Ernst MD.      Disposition:  PLAN: Admit to General Surgery    7:44 PM  Patient is being admitted to the hospital by Dr. Neda Horton. The results of their tests and reasons for their admission have been discussed with them and/or available family. They convey agreement and understanding for the need to be admitted and for their admission diagnosis. Written by Nik Nichols, ED Scribe, as dictated by Ester Ernst MD.    Diagnosis     Clinical Impression:   1. Hematemesis with nausea    2. Elevated lactic acid level    3. Pneumonia due to infectious organism, unspecified laterality, unspecified part of lung    4. ERICA (acute kidney injury) ([facility] [address]    5. Gastric outlet obstruction        Attestations: This note is prepared by Yamilka Granda acting as scribe for MD Ester Licona MD : The scribe's documentation has been prepared under my direction and personally reviewed by me in its entirety.  I confirm that the note above accurately reflects all work, treatment, procedures, and medical decision making performed by me

## 2018-02-20 NOTE — ED TRIAGE NOTES
Assumed care of pt from EMS. Pt reports nausea and need to vomit. Emesis bag given to pt. Pt is alert and oriented x 4. Denies abd pain but reports vomiting since last night, coffee ground appearance, no appetite and not eating or drinking. No fevers or chills. Pt is labored breathing. Pt reports difficulty catching breath. Pt vomited at time small amount of black coffee ground emesis.

## 2018-02-20 NOTE — IP AVS SNAPSHOT
[address] [facility] 
[phone] Patient: Abhinav Parikh MRN: [id number] ICR:[id number] About your hospitalization You were admitted on:  February 20, 2018 You last received care in the:  [facility] 2 GENERAL SURGERY You were discharged on:  February 28, 2018 Why you were hospitalized Your primary diagnosis was:  Not on File Your diagnoses also included:  Gastric Volvulus Follow-up Information Follow up With Details Comments Contact Info Fay Cortez MD Go on 3/15/2018 For hospital follow up appointment at 10:00AM  [address] [address] [address] [facility] 
[phone] [facility]  On 2/28/2018 THIS IS YOUR HOME HEALTH AGENCY. IF YOU DO NOT HEAR FROM THEM WITHIN 24-48HRS, PLEASE CONTACT THEM DIRECTLY [phone] Your Scheduled Appointments Thursday March 15, 2018 10:20 AM EDT  
POST OP with Fay Cortez MD  
[facility] ([address] [facility] [address] [place] [address], [address] [place] [address]  
[phone] Discharge Orders None A check madina indicates which time of day the medication should be taken. My Medications START taking these medications Instructions Each Dose to Equal  
 Morning Noon Evening Bedtime  
 docusate sodium 100 mg capsule Commonly known as:  Aloma Dears Your last dose was: Your next dose is: Take 1 Cap by mouth two (2) times a day. May use OTC instead. Prevents constipation from narcotics. 100 mg HYDROcodone-acetaminophen 5-325 mg per tablet Commonly known as:  Clearnce Denilson Your last dose was: Your next dose is: Take 1-2 Tabs by mouth every four (4) hours as needed for Pain. Max Daily Amount: 12 Tabs. 1-2 Tab CHANGE how you take these medications Instructions Each Dose to Equal  
 Morning Noon Evening Bedtime  
 aspirin delayed-release 81 mg tablet What changed:  Another medication with the same name was removed. Continue taking this medication, and follow the directions you see here. Your last dose was: Your next dose is: Take 81 mg by mouth every evening. 81 mg CONTINUE taking these medications Instructions Each Dose to Equal  
 Morning Noon Evening Bedtime  
 amLODIPine 10 mg tablet Commonly known as:  Felecia Granda Your last dose was: Your next dose is: Take 10 mg by mouth daily. 10 mg  
    
   
   
   
  
 clobetasol 0.05 % topical cream  
Commonly known as:  Elke Couch Your last dose was: Your next dose is:    
   
   
 Apply  to affected area two (2) times a day. Patient applies this all over body for 'spots and redness on skin.'  
     
   
   
   
  
 hydroCHLOROthiazide 12.5 mg tablet Commonly known as:  HYDRODIURIL Your last dose was: Your next dose is: Take 12.5 mg by mouth daily. 12.5 mg  
    
   
   
   
  
 omeprazole 20 mg capsule Commonly known as:  PRILOSEC Your last dose was: Your next dose is: Take 20 mg by mouth daily. 20 mg  
    
   
   
   
  
 VITAMIN D2 50,000 unit capsule Generic drug:  ergocalciferol Your last dose was: Your next dose is: Take 50,000 Units by mouth every seven (7) days. on Wednesday. 13457 Units ZOCOR 20 mg tablet Generic drug:  simvastatin Your last dose was: Your next dose is: Take 20 mg by mouth every evening. 20 mg Where to Get Your Medications These medications were sent to [facility] [address], [address] [place] [address] Phone:  [phone] docusate sodium 100 mg capsule Information on where to get these meds will be given to you by the nurse or doctor. ! Ask your nurse or doctor about these medications HYDROcodone-acetaminophen 5-325 mg per tablet Discharge Instructions Discharge Instructions:  Dr. Kemal Dacosta Call on next business day to arrange office appointment in 2 weeks -- [phone]. Activity: 
Walk regularly. No lifting more than 10 -15 pounds for 6 weeks. Light aerobic activity is okay when you feel up to it. Diet: Low-Residue Diet: 
 
When your bowels are inflamed, you need to limit bulky fiber (such as uncooked fruits and vegetables) in your diet until the the inflammation resolves. You should maintain a low-residue diet for one month. Suggestions: 
-Choose white or refined grains, and avoid whole grains. That means eating white or refined cereals, breads, crackers, rice, or pasta. 
-Peel the skin from fruits and vegetables before you eat or cook them. Avoid eating skins, seeds, and hulls. -You can eat frozen or canned fruit. Low-fiber fruits include applesauce, ripe bananas, and fruit juice without pulp. 
-Eat low-fiber vegetables, which include well-cooked vegetables and vegetable juice. 
-Drink or eat milk, yogurt, or other milk products, if you can digest dairy without too many problems.  
-Eat well-cooked meat, such as chicken, turkey, fish, or lean meat. You also can eat eggs and tofu. Avoid these foods: 
-Nuts, seeds, popcorn, granola, whole grains.   
-Bran, brown or wild rice, oatmeal, corn, shanda crackers, barley, and whole wheat and other whole grain breads. 
-Cereals with more than 3 grams of fiber per serving. 
-Fresh and dried fruits including raisins.   
-Raw vegetables, and even some cooked vegetable including cabbage, broccoli, brussels sprouts, and cauliflower. 
-Beans, lentils, and split peas. -Crunchy peanut butter. - dry crusty breads. - steak (unless its cut up very small). Wound Care: No swimming or baths for 2 weeks. You may shower. If you experience a lot of drainage, develop redness around the wound, or a fever over 101 F occurs please call the office. Medications: 
See attached \"Medication Reconciliation. \" 
 
Resume home medications as indicated on the Medical Reconciliation form. Do not use blood thinners (such as Aspirin, Coumadin, or Plavix) until 3 days after surgery. Pain medications:  Non steroidal antiinflammatories (ibuprofen -- Advil or Motrin) seem to work best for post surgical pain. Try these first.  A narcotic prescription will also be given for breakthrough pain. Colace should be used to prevent constipation while on narcotics. If you are having diarrhea you can stop the colace. Narcotics and anesthesia sometimes cause nausea and vomiting. If persistent please call the office. Do not hesitate to call with questions or concerns. Jaden Bradley MD 
Tel [phone] Fax [phone] Jet Announcement We are excited to announce that we are making your provider's discharge notes available to you in Jet. You will see these notes when they are completed and signed by the physician that discharged you from your recent hospital stay. If you have any questions or concerns about any information you see in Jet, please call the Health Information Department where you were seen or reach out to your Primary Care Provider for more information about your plan of care. Introducing [facility]! Kaylie Atkins introduces Jet patient portal. Now you can access parts of your medical record, email your doctor's office, and request medication refills online. 1. In your internet browser, go to [email or web address] 2. Click on the First Time User? Click Here link in the Sign In box. You will see the New Member Sign Up page. 3. Enter your [employer] Access Code exactly as it appears below. You will not need to use this code after youve completed the sign-up process. If you do not sign up before the expiration date, you must request a new code. · [employer] Access Code: [email or web address] Expires: 5/29/2018 11:07 AM 
 
4. Enter the last four digits of your Social Security Number (xxxx) and Date of Birth (mm/dd/yyyy) as indicated and click Submit. You will be taken to the next sign-up page. 5. Create a Bonfairet ID. This will be your [employer] login ID and cannot be changed, so think of one that is secure and easy to remember. 6. Create a [employer] password. You can change your password at any time. 7. Enter your Password Reset Question and Answer. This can be used at a later time if you forget your password. 8. Enter your e-mail address. You will receive e-mail notification when new information is available in [address] 9. Click Sign Up. You can now view and download portions of your medical record. 10. Click the Download Summary menu link to download a portable copy of your medical information. If you have questions, please visit the Frequently Asked Questions section of the [employer] website. Remember, [employer] is NOT to be used for urgent needs. For medical emergencies, dial 911. Now available from your iPhone and Android! Providers Seen During Your Hospitalization Provider Specialty Primary office phone Inez Lomeli. Constantino Paul MD Emergency Medicine [phone] Griffin Jacobsen MD General Surgery [phone] Fay Cortez MD General Surgery [phone] Your Primary Care Physician (PCP) Primary Care Physician Office Phone Office Fax [address] [phone] You are allergic to the following No active allergies Recent Documentation Height Weight BMI Smoking Status 1.702 m 81.2 kg 28.04 kg/m2 Former Smoker Emergency Contacts Name Discharge Info Relation Home Work Mobile Candelaria Alexanders DISCHARGE CAREGIVER [3] Spouse [3] [phone] Leigh Rogers  Child [2] [phone] Patient Belongings The following personal items are in your possession at time of discharge: 
  Dental Appliances: Uppers, Partials, Lowers, At home  Visual Aid: Glasses   Hearing Aids/Status: At home Please provide this summary of care documentation to your next provider. Signatures-by signing, you are acknowledging that this After Visit Summary has been reviewed with you and you have received a copy. Patient Signature:  ____________________________________________________________ Date:  ____________________________________________________________  
  
Calista Saver Provider Signature:  ____________________________________________________________ Date:  ____________________________________________________________

## 2018-02-21 ENCOUNTER — APPOINTMENT (OUTPATIENT)
Dept: GENERAL RADIOLOGY | Age: 83
DRG: 327 | End: 2018-02-21
Attending: SURGERY
Payer: MEDICARE

## 2018-02-21 ENCOUNTER — ANESTHESIA (OUTPATIENT)
Dept: SURGERY | Age: 83
DRG: 327 | End: 2018-02-21
Payer: MEDICARE

## 2018-02-21 ENCOUNTER — ANESTHESIA EVENT (OUTPATIENT)
Dept: SURGERY | Age: 83
DRG: 327 | End: 2018-02-21
Payer: MEDICARE

## 2018-02-21 LAB
ANION GAP SERPL CALC-SCNC: 5 MMOL/L (ref 5–15)
ATRIAL RATE: 117 BPM
ATRIAL RATE: 136 BPM
BASOPHILS # BLD: 0 K/UL (ref 0–0.1)
BASOPHILS NFR BLD: 0 % (ref 0–1)
BUN SERPL-MCNC: 24 MG/DL (ref 6–20)
BUN/CREAT SERPL: 17 (ref 12–20)
CALCIUM SERPL-MCNC: 8.5 MG/DL (ref 8.5–10.1)
CALCULATED P AXIS, ECG09: 16 DEGREES
CALCULATED R AXIS, ECG10: 12 DEGREES
CALCULATED R AXIS, ECG10: 14 DEGREES
CALCULATED T AXIS, ECG11: -93 DEGREES
CALCULATED T AXIS, ECG11: 36 DEGREES
CHLORIDE SERPL-SCNC: 104 MMOL/L (ref 97–108)
CO2 SERPL-SCNC: 37 MMOL/L (ref 21–32)
CREAT SERPL-MCNC: 1.4 MG/DL (ref 0.7–1.3)
DIAGNOSIS, 93000: NORMAL
DIAGNOSIS, 93000: NORMAL
DIFFERENTIAL METHOD BLD: ABNORMAL
EOSINOPHIL # BLD: 0 K/UL (ref 0–0.4)
EOSINOPHIL NFR BLD: 0 % (ref 0–7)
ERYTHROCYTE [DISTWIDTH] IN BLOOD BY AUTOMATED COUNT: 14 % (ref 11.5–14.5)
GLUCOSE SERPL-MCNC: 115 MG/DL (ref 65–100)
HCT VFR BLD AUTO: 35.6 % (ref 36.6–50.3)
HGB BLD-MCNC: 11.2 G/DL (ref 12.1–17)
IMM GRANULOCYTES # BLD: 0.1 K/UL (ref 0–0.04)
IMM GRANULOCYTES NFR BLD AUTO: 1 % (ref 0–0.5)
LACTATE SERPL-SCNC: 1.2 MMOL/L (ref 0.4–2)
LYMPHOCYTES # BLD: 0.7 K/UL (ref 0.8–3.5)
LYMPHOCYTES NFR BLD: 7 % (ref 12–49)
MCH RBC QN AUTO: 27.9 PG (ref 26–34)
MCHC RBC AUTO-ENTMCNC: 31.5 G/DL (ref 30–36.5)
MCV RBC AUTO: 88.6 FL (ref 80–99)
MONOCYTES # BLD: 0.9 K/UL (ref 0–1)
MONOCYTES NFR BLD: 9 % (ref 5–13)
NEUTS SEG # BLD: 8.7 K/UL (ref 1.8–8)
NEUTS SEG NFR BLD: 84 % (ref 32–75)
NRBC # BLD: 0 K/UL (ref 0–0.01)
NRBC BLD-RTO: 0 PER 100 WBC
P-R INTERVAL, ECG05: 146 MS
PLATELET # BLD AUTO: 240 K/UL (ref 150–400)
PMV BLD AUTO: 10.3 FL (ref 8.9–12.9)
POTASSIUM SERPL-SCNC: 3.2 MMOL/L (ref 3.5–5.1)
Q-T INTERVAL, ECG07: 304 MS
Q-T INTERVAL, ECG07: 310 MS
QRS DURATION, ECG06: 82 MS
QRS DURATION, ECG06: 84 MS
QTC CALCULATION (BEZET), ECG08: 432 MS
QTC CALCULATION (BEZET), ECG08: 448 MS
RBC # BLD AUTO: 4.02 M/UL (ref 4.1–5.7)
SODIUM SERPL-SCNC: 146 MMOL/L (ref 136–145)
TROPONIN I SERPL-MCNC: <0.04 NG/ML
VENTRICULAR RATE, ECG03: 117 BPM
VENTRICULAR RATE, ECG03: 131 BPM
WBC # BLD AUTO: 10.4 K/UL (ref 4.1–11.1)

## 2018-02-21 PROCEDURE — 0BQT4ZZ REPAIR DIAPHRAGM, PERCUTANEOUS ENDOSCOPIC APPROACH: ICD-10-PCS | Performed by: SURGERY

## 2018-02-21 PROCEDURE — 74011250636 HC RX REV CODE- 250/636

## 2018-02-21 PROCEDURE — 76060000039 HC ANESTHESIA 4 TO 4.5 HR: Performed by: SURGERY

## 2018-02-21 PROCEDURE — 77030022704 HC SUT VLOC COVD -B: Performed by: SURGERY

## 2018-02-21 PROCEDURE — 77030019908 HC STETH ESOPH SIMS -A: Performed by: NURSE ANESTHETIST, CERTIFIED REGISTERED

## 2018-02-21 PROCEDURE — 74011250636 HC RX REV CODE- 250/636: Performed by: SURGERY

## 2018-02-21 PROCEDURE — 77030026438 HC STYL ET INTUB CARD -A: Performed by: NURSE ANESTHETIST, CERTIFIED REGISTERED

## 2018-02-21 PROCEDURE — 0DV44ZZ RESTRICTION OF ESOPHAGOGASTRIC JUNCTION, PERCUTANEOUS ENDOSCOPIC APPROACH: ICD-10-PCS | Performed by: SURGERY

## 2018-02-21 PROCEDURE — 77030002996 HC SUT SLK J&J -A: Performed by: SURGERY

## 2018-02-21 PROCEDURE — 93005 ELECTROCARDIOGRAM TRACING: CPT

## 2018-02-21 PROCEDURE — 77030010507 HC ADH SKN DERMBND J&J -B: Performed by: SURGERY

## 2018-02-21 PROCEDURE — 77030008684 HC TU ET CUF COVD -B: Performed by: NURSE ANESTHETIST, CERTIFIED REGISTERED

## 2018-02-21 PROCEDURE — 77030031139 HC SUT VCRL2 J&J -A: Performed by: SURGERY

## 2018-02-21 PROCEDURE — 77030012407 HC DRN WND BARD -B: Performed by: SURGERY

## 2018-02-21 PROCEDURE — 77030018719 HC DRSG PTCH ANTIMIC J&J -A: Performed by: SURGERY

## 2018-02-21 PROCEDURE — 85025 COMPLETE CBC W/AUTO DIFF WBC: CPT | Performed by: SURGERY

## 2018-02-21 PROCEDURE — 77030012406 HC DRN WND PENRS BARD -A: Performed by: SURGERY

## 2018-02-21 PROCEDURE — 77030011640 HC PAD GRND REM COVD -A: Performed by: SURGERY

## 2018-02-21 PROCEDURE — 77030013567 HC DRN WND RESERV BARD -A: Performed by: SURGERY

## 2018-02-21 PROCEDURE — 83605 ASSAY OF LACTIC ACID: CPT | Performed by: EMERGENCY MEDICINE

## 2018-02-21 PROCEDURE — 8E0W4CZ ROBOTIC ASSISTED PROCEDURE OF TRUNK REGION, PERCUTANEOUS ENDOSCOPIC APPROACH: ICD-10-PCS | Performed by: SURGERY

## 2018-02-21 PROCEDURE — 77030008756 HC TU IRR SUC STRY -B: Performed by: SURGERY

## 2018-02-21 PROCEDURE — 77030016151 HC PROTCTR LNS DFOG COVD -B: Performed by: SURGERY

## 2018-02-21 PROCEDURE — 74011250636 HC RX REV CODE- 250/636: Performed by: ANESTHESIOLOGY

## 2018-02-21 PROCEDURE — 36415 COLL VENOUS BLD VENIPUNCTURE: CPT | Performed by: EMERGENCY MEDICINE

## 2018-02-21 PROCEDURE — 77030034849: Performed by: SURGERY

## 2018-02-21 PROCEDURE — 77030002933 HC SUT MCRYL J&J -A: Performed by: SURGERY

## 2018-02-21 PROCEDURE — 80048 BASIC METABOLIC PNL TOTAL CA: CPT | Performed by: EMERGENCY MEDICINE

## 2018-02-21 PROCEDURE — 77030002916 HC SUT ETHLN J&J -A: Performed by: SURGERY

## 2018-02-21 PROCEDURE — 77030013079 HC BLNKT BAIR HGGR 3M -A: Performed by: NURSE ANESTHETIST, CERTIFIED REGISTERED

## 2018-02-21 PROCEDURE — 76010000880 HC OR TIME 4 TO 4.5HR INTENSV - TIER 2: Performed by: SURGERY

## 2018-02-21 PROCEDURE — 74011000250 HC RX REV CODE- 250

## 2018-02-21 PROCEDURE — 77030035045 HC TRCR ENDOSC VRSPRT BLDLSS COVD -B: Performed by: SURGERY

## 2018-02-21 PROCEDURE — 74018 RADEX ABDOMEN 1 VIEW: CPT

## 2018-02-21 PROCEDURE — 77030035279 HC SEAL VSL ENDOWR XI INTU -I2: Performed by: SURGERY

## 2018-02-21 PROCEDURE — 65270000029 HC RM PRIVATE

## 2018-02-21 PROCEDURE — 77030018846 HC SOL IRR STRL H20 ICUM -A: Performed by: SURGERY

## 2018-02-21 PROCEDURE — 84484 ASSAY OF TROPONIN QUANT: CPT | Performed by: EMERGENCY MEDICINE

## 2018-02-21 PROCEDURE — 77030035048 HC TRCR ENDOSC OPTCL COVD -B: Performed by: SURGERY

## 2018-02-21 PROCEDURE — 76210000006 HC OR PH I REC 0.5 TO 1 HR: Performed by: SURGERY

## 2018-02-21 PROCEDURE — 77030020703 HC SEAL CANN DISP INTU -B: Performed by: SURGERY

## 2018-02-21 RX ORDER — SUCCINYLCHOLINE CHLORIDE 20 MG/ML
INJECTION INTRAMUSCULAR; INTRAVENOUS AS NEEDED
Status: DISCONTINUED | OUTPATIENT
Start: 2018-02-21 | End: 2018-02-22 | Stop reason: HOSPADM

## 2018-02-21 RX ORDER — ROCURONIUM BROMIDE 10 MG/ML
INJECTION, SOLUTION INTRAVENOUS AS NEEDED
Status: DISCONTINUED | OUTPATIENT
Start: 2018-02-21 | End: 2018-02-22 | Stop reason: HOSPADM

## 2018-02-21 RX ORDER — SODIUM CHLORIDE 0.9 % (FLUSH) 0.9 %
5-10 SYRINGE (ML) INJECTION EVERY 8 HOURS
Status: DISCONTINUED | OUTPATIENT
Start: 2018-02-21 | End: 2018-02-22 | Stop reason: HOSPADM

## 2018-02-21 RX ORDER — SODIUM CHLORIDE, SODIUM LACTATE, POTASSIUM CHLORIDE, CALCIUM CHLORIDE 600; 310; 30; 20 MG/100ML; MG/100ML; MG/100ML; MG/100ML
25 INJECTION, SOLUTION INTRAVENOUS CONTINUOUS
Status: DISCONTINUED | OUTPATIENT
Start: 2018-02-21 | End: 2018-02-22 | Stop reason: HOSPADM

## 2018-02-21 RX ORDER — METOPROLOL TARTRATE 5 MG/5ML
INJECTION INTRAVENOUS AS NEEDED
Status: DISCONTINUED | OUTPATIENT
Start: 2018-02-21 | End: 2018-02-22 | Stop reason: HOSPADM

## 2018-02-21 RX ORDER — EPHEDRINE SULFATE 50 MG/ML
INJECTION, SOLUTION INTRAVENOUS AS NEEDED
Status: DISCONTINUED | OUTPATIENT
Start: 2018-02-21 | End: 2018-02-22 | Stop reason: HOSPADM

## 2018-02-21 RX ORDER — PROPOFOL 10 MG/ML
INJECTION, EMULSION INTRAVENOUS AS NEEDED
Status: DISCONTINUED | OUTPATIENT
Start: 2018-02-21 | End: 2018-02-22 | Stop reason: HOSPADM

## 2018-02-21 RX ORDER — FENTANYL CITRATE 50 UG/ML
INJECTION, SOLUTION INTRAMUSCULAR; INTRAVENOUS AS NEEDED
Status: DISCONTINUED | OUTPATIENT
Start: 2018-02-21 | End: 2018-02-22 | Stop reason: HOSPADM

## 2018-02-21 RX ORDER — PHENYLEPHRINE HCL IN 0.9% NACL 0.4MG/10ML
SYRINGE (ML) INTRAVENOUS AS NEEDED
Status: DISCONTINUED | OUTPATIENT
Start: 2018-02-21 | End: 2018-02-22 | Stop reason: HOSPADM

## 2018-02-21 RX ORDER — LIDOCAINE HYDROCHLORIDE 20 MG/ML
INJECTION, SOLUTION EPIDURAL; INFILTRATION; INTRACAUDAL; PERINEURAL AS NEEDED
Status: DISCONTINUED | OUTPATIENT
Start: 2018-02-21 | End: 2018-02-22 | Stop reason: HOSPADM

## 2018-02-21 RX ORDER — CEFAZOLIN SODIUM/WATER 2 G/20 ML
2 SYRINGE (ML) INTRAVENOUS
Status: COMPLETED | OUTPATIENT
Start: 2018-02-21 | End: 2018-02-21

## 2018-02-21 RX ORDER — SODIUM CHLORIDE 0.9 % (FLUSH) 0.9 %
5-10 SYRINGE (ML) INJECTION AS NEEDED
Status: DISCONTINUED | OUTPATIENT
Start: 2018-02-21 | End: 2018-02-22 | Stop reason: HOSPADM

## 2018-02-21 RX ADMIN — LIDOCAINE HYDROCHLORIDE 80 MG: 20 INJECTION, SOLUTION EPIDURAL; INFILTRATION; INTRACAUDAL; PERINEURAL at 20:29

## 2018-02-21 RX ADMIN — SODIUM CHLORIDE, POTASSIUM CHLORIDE, SODIUM LACTATE AND CALCIUM CHLORIDE 150 ML/HR: 600; 310; 30; 20 INJECTION, SOLUTION INTRAVENOUS at 07:00

## 2018-02-21 RX ADMIN — FENTANYL CITRATE 50 MCG: 50 INJECTION, SOLUTION INTRAMUSCULAR; INTRAVENOUS at 21:20

## 2018-02-21 RX ADMIN — SODIUM CHLORIDE 1000 ML: 900 INJECTION, SOLUTION INTRAVENOUS at 00:07

## 2018-02-21 RX ADMIN — ROCURONIUM BROMIDE 30 MG: 10 INJECTION, SOLUTION INTRAVENOUS at 20:38

## 2018-02-21 RX ADMIN — Medication 80 MCG: at 20:47

## 2018-02-21 RX ADMIN — SODIUM CHLORIDE, POTASSIUM CHLORIDE, SODIUM LACTATE AND CALCIUM CHLORIDE: 600; 310; 30; 20 INJECTION, SOLUTION INTRAVENOUS at 20:22

## 2018-02-21 RX ADMIN — Medication 80 MCG: at 20:50

## 2018-02-21 RX ADMIN — SUCCINYLCHOLINE CHLORIDE 140 MG: 20 INJECTION INTRAMUSCULAR; INTRAVENOUS at 20:29

## 2018-02-21 RX ADMIN — EPHEDRINE SULFATE 10 MG: 50 INJECTION, SOLUTION INTRAVENOUS at 23:35

## 2018-02-21 RX ADMIN — Medication 80 MCG: at 23:33

## 2018-02-21 RX ADMIN — FENTANYL CITRATE 50 MCG: 50 INJECTION, SOLUTION INTRAMUSCULAR; INTRAVENOUS at 21:15

## 2018-02-21 RX ADMIN — ROCURONIUM BROMIDE 10 MG: 10 INJECTION, SOLUTION INTRAVENOUS at 22:40

## 2018-02-21 RX ADMIN — Medication 2 G: at 20:49

## 2018-02-21 RX ADMIN — Medication 80 MCG: at 20:38

## 2018-02-21 RX ADMIN — METOPROLOL TARTRATE 2 MG: 5 INJECTION INTRAVENOUS at 21:16

## 2018-02-21 RX ADMIN — FENTANYL CITRATE 100 MCG: 50 INJECTION, SOLUTION INTRAMUSCULAR; INTRAVENOUS at 20:29

## 2018-02-21 RX ADMIN — ROCURONIUM BROMIDE 10 MG: 10 INJECTION, SOLUTION INTRAVENOUS at 22:05

## 2018-02-21 RX ADMIN — PROPOFOL 130 MG: 10 INJECTION, EMULSION INTRAVENOUS at 20:29

## 2018-02-21 NOTE — ED NOTES
Bedside and Verbal shift change received from [facility]. Report included the following information: SBAR, ED Summary, MAR and Recent Results. Patient aware of NG tube insertion and denies any questions about procedure at this time. Awaiting orders from GI.

## 2018-02-21 NOTE — ED NOTES
Patient continues to bend his arm near IV site, IV antibiotics unable to infuse appropriately despite constant reminder. Will attempt alternative access.

## 2018-02-21 NOTE — PROGRESS NOTES
Admit Date: 2018    POD * No surgery found *    Procedure:  * No surgery found *    Subjective:     Patient feels much better this morning s/p 2 liters NG op from stomach. States he has had longstanding dysphagia symptoms. Objective:     Blood pressure 145/69, pulse 91, temperature 97.8 °F (36.6 °C), resp. rate 16, height 5' 7\" (1.702 m), weight 150 lb (68 kg), SpO2 99 %. Temp (24hrs), Av.8 °F (36.6 °C), Min:97.7 °F (36.5 °C), Max:98 °F (36.7 °C)      Physical Exam:  GENERAL: alert, cooperative, no distress, appears stated age, LUNG: clear to auscultation bilaterally, HEART: regular rate and rhythm, ABDOMEN: soft, non-tender. Bowel sounds normal. No masses,  no organomegaly, EXTREMITIES:  extremities normal, atraumatic, no cyanosis or edema    Labs:   Recent Results (from the past 24 hour(s))   CBC WITH AUTOMATED DIFF    Collection Time: 18  5:00 PM   Result Value Ref Range    WBC 10.2 4.1 - 11.1 K/uL    RBC 5.01 4.10 - 5.70 M/uL    HGB 13.7 12.1 - 17.0 g/dL    HCT 43.6 36.6 - 50.3 %    MCV 87.0 80.0 - 99.0 FL    MCH 27.3 26.0 - 34.0 PG    MCHC 31.4 30.0 - 36.5 g/dL    RDW 14.0 11.5 - 14.5 %    PLATELET 710 620 - 027 K/uL    MPV 10.4 8.9 - 12.9 FL    NRBC 0.0 0  WBC    ABSOLUTE NRBC 0.00 0.00 - 0.01 K/uL    NEUTROPHILS 86 (H) 32 - 75 %    LYMPHOCYTES 5 (L) 12 - 49 %    MONOCYTES 9 5 - 13 %    EOSINOPHILS 0 0 - 7 %    BASOPHILS 0 0 - 1 %    IMMATURE GRANULOCYTES 0 0.0 - 0.5 %    ABS. NEUTROPHILS 8.8 (H) 1.8 - 8.0 K/UL    ABS. LYMPHOCYTES 0.5 (L) 0.8 - 3.5 K/UL    ABS. MONOCYTES 0.9 0.0 - 1.0 K/UL    ABS. EOSINOPHILS 0.0 0.0 - 0.4 K/UL    ABS. BASOPHILS 0.0 0.0 - 0.1 K/UL    ABS. IMM.  GRANS. 0.0 0.00 - 0.04 K/UL    DF SMEAR SCANNED      RBC COMMENTS NORMOCYTIC, NORMOCHROMIC     METABOLIC PANEL, COMPREHENSIVE    Collection Time: 18  5:00 PM   Result Value Ref Range    Sodium 141 136 - 145 mmol/L    Potassium 3.6 3.5 - 5.1 mmol/L    Chloride 96 (L) 97 - 108 mmol/L    CO2 38 (H) 21 - 32 mmol/L    Anion gap 7 5 - 15 mmol/L    Glucose 153 (H) 65 - 100 mg/dL    BUN 22 (H) 6 - 20 MG/DL    Creatinine 1.89 (H) 0.70 - 1.30 MG/DL    BUN/Creatinine ratio 12 12 - 20      GFR est AA 41 (L) >60 ml/min/1.73m2    GFR est non-AA 34 (L) >60 ml/min/1.73m2    Calcium 10.2 (H) 8.5 - 10.1 MG/DL    Bilirubin, total 0.7 0.2 - 1.0 MG/DL    ALT (SGPT) 14 12 - 78 U/L    AST (SGOT) 26 15 - 37 U/L    Alk.  phosphatase 91 45 - 117 U/L    Protein, total 9.9 (H) 6.4 - 8.2 g/dL    Albumin 3.7 3.5 - 5.0 g/dL    Globulin 6.2 (H) 2.0 - 4.0 g/dL    A-G Ratio 0.6 (L) 1.1 - 2.2     LACTIC ACID    Collection Time: 02/20/18  5:00 PM   Result Value Ref Range    Lactic acid 3.1 (HH) 0.4 - 2.0 MMOL/L   LIPASE    Collection Time: 02/20/18  5:00 PM   Result Value Ref Range    Lipase 250 73 - 393 U/L   EKG, 12 LEAD, INITIAL    Collection Time: 02/20/18  5:02 PM   Result Value Ref Range    Ventricular Rate 117 BPM    Atrial Rate 117 BPM    P-R Interval 146 ms    QRS Duration 82 ms    Q-T Interval 310 ms    QTC Calculation (Bezet) 432 ms    Calculated P Axis 16 degrees    Calculated R Axis 14 degrees    Calculated T Axis 36 degrees    Diagnosis       Sinus tachycardia with premature atrial complexes in a pattern of bigeminy  Possible Left atrial enlargement  Nonspecific T wave abnormality  When compared with ECG of 19-MAR-2017 06:46,  premature atrial complexes are now present  ST no longer depressed in Inferior leads  Nonspecific T wave abnormality now evident in Lateral leads     OCCULT BLOOD, GASTRIC    Collection Time: 02/20/18  5:10 PM   Result Value Ref Range    OCCULT BLOOD,GASTRIC POSITIVE (A) NEG      pH,GASTRIC 4.5 (H) 1.5 - 3.5     OCCULT BLOOD, STOOL    Collection Time: 02/20/18  5:10 PM   Result Value Ref Range    Occult blood, stool NEGATIVE  NEG     TROPONIN I    Collection Time: 02/21/18  5:08 AM   Result Value Ref Range    Troponin-I, Qt. <0.04 <0.05 ng/mL   CBC WITH AUTOMATED DIFF    Collection Time: 02/21/18  5:08 AM   Result Value Ref Range    WBC 10.4 4.1 - 11.1 K/uL    RBC 4.02 (L) 4.10 - 5.70 M/uL    HGB 11.2 (L) 12.1 - 17.0 g/dL    HCT 35.6 (L) 36.6 - 50.3 %    MCV 88.6 80.0 - 99.0 FL    MCH 27.9 26.0 - 34.0 PG    MCHC 31.5 30.0 - 36.5 g/dL    RDW 14.0 11.5 - 14.5 %    PLATELET 012 114 - 610 K/uL    MPV 10.3 8.9 - 12.9 FL    NRBC 0.0 0  WBC    ABSOLUTE NRBC 0.00 0.00 - 0.01 K/uL    NEUTROPHILS 84 (H) 32 - 75 %    LYMPHOCYTES 7 (L) 12 - 49 %    MONOCYTES 9 5 - 13 %    EOSINOPHILS 0 0 - 7 %    BASOPHILS 0 0 - 1 %    IMMATURE GRANULOCYTES 1 (H) 0.0 - 0.5 %    ABS. NEUTROPHILS 8.7 (H) 1.8 - 8.0 K/UL    ABS. LYMPHOCYTES 0.7 (L) 0.8 - 3.5 K/UL    ABS. MONOCYTES 0.9 0.0 - 1.0 K/UL    ABS. EOSINOPHILS 0.0 0.0 - 0.4 K/UL    ABS. BASOPHILS 0.0 0.0 - 0.1 K/UL    ABS. IMM. GRANS. 0.1 (H) 0.00 - 0.04 K/UL    DF AUTOMATED     METABOLIC PANEL, BASIC    Collection Time: 02/21/18  5:08 AM   Result Value Ref Range    Sodium 146 (H) 136 - 145 mmol/L    Potassium 3.2 (L) 3.5 - 5.1 mmol/L    Chloride 104 97 - 108 mmol/L    CO2 37 (H) 21 - 32 mmol/L    Anion gap 5 5 - 15 mmol/L    Glucose 115 (H) 65 - 100 mg/dL    BUN 24 (H) 6 - 20 MG/DL    Creatinine 1.40 (H) 0.70 - 1.30 MG/DL    BUN/Creatinine ratio 17 12 - 20      GFR est AA 58 (L) >60 ml/min/1.73m2    GFR est non-AA 48 (L) >60 ml/min/1.73m2    Calcium 8.5 8.5 - 10.1 MG/DL       Data Review images and reports reviewed    Assessment:     Active Problems:    Gastric volvulus (2/20/2018)        Plan/Recommendations/Medical Decision Making:     Continue present treatment   Case reviewed with Dr. 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 Clois Aid.  Jeffry Ovalles MD, [facility]

## 2018-02-21 NOTE — ED NOTES
NG to low intermittent suction at this time. LR at 150 mL per hour. Sitter at bedside to ensure patient does not pull out his NG tube.

## 2018-02-21 NOTE — ED NOTES
Patient appears confused as, what appears to be, a result of medication administered prior to NG tube insertion. Patient in stretcher with wife at bedside. Side rails up on both sides. Patient drowsy but easily arousable.

## 2018-02-21 NOTE — ED NOTES
NG tube inserted. Verified by air bolus. Dark brown drainage from NG tube upon insertion. Inserted into right nare. Patient vomited, but tolerated well.

## 2018-02-21 NOTE — PERIOP NOTES
17:25= consent signed and placed on chart. 17:37= NGT not draining; currently at 50 madina in left nare; checked placement with 30 mL air and 40 mL NS; not much drainage came back; advanced NGT to 54 madina, then attached back to LWS; small amount of clear drng noted in return in tubing.

## 2018-02-21 NOTE — ED NOTES
Patient's O2 sats in mid-upper 80s. Patient placed on 2L/min NC. Patient still drowsy but arousable. Wife remains at bedside and provided with large bedside chair and blanket for comfort.

## 2018-02-21 NOTE — ROUTINE PROCESS
TRANSFER - OUT REPORT:    Verbal report given to [address], RN (name) on Stevie Tesfaye  being transferred to [address] [facility] Surgery (unit) for routine progression of care       Report consisted of patients Situation, Background, Assessment and   Recommendations(SBAR). Information from the following report(s) SBAR, ED Summary, [facility] and Recent Results was reviewed with the receiving nurse. Lines:   Peripheral IV 02/21/18 Right Antecubital (Active)   Site Assessment Clean, dry, & intact 2/21/2018  7:13 AM   Phlebitis Assessment 0 2/21/2018  7:13 AM   Infiltration Assessment 0 2/21/2018  7:13 AM   Dressing Type Tape;Transparent 2/21/2018  7:13 AM   Hub Color/Line Status Pink;Flushed;Patent 2/21/2018  7:13 AM   Action Taken Blood drawn 2/21/2018  7:13 AM        Opportunity for questions and clarification was provided.       Patient transported with:   O2 @ 2 liters

## 2018-02-21 NOTE — PERIOP NOTES
TRANSFER - IN REPORT:    Verbal report received from [address] RN(name) on [facility]  being received from [facility]) for ordered procedure      Report consisted of patients Situation, Background, Assessment and   Recommendations(SBAR). Information from the following report(s) SBAR, Kardex, Intake/Output, MAR and Recent Results was reviewed with the receiving nurse. Opportunity for questions and clarification was provided. Assessment completed upon patients arrival to unit and care assumed.

## 2018-02-21 NOTE — ED NOTES
Bedside and Verbal shift change report given to Fela Davies (oncoming nurse) by Camryn Ram RN (offgoing nurse). Report included the following information SBAR, ED Summary and Recent Results.

## 2018-02-21 NOTE — PROGRESS NOTES
Discussed with Dr. Brandon Iglesias On the schedule for OR this afternoon. ABDOULAYE looks to be in good position.

## 2018-02-21 NOTE — ED NOTES
Bedside and Verbal shift change report received from Valdemar Cedeno 44 (offgoing nurse). Report included the following information SBAR, MAR, Recent Results and Cardiac Rhythm sinus tachycardia.

## 2018-02-21 NOTE — ED NOTES
Dr. Lubna Trimble paged regarding change in patient's heart rate for past 30 minutes. Afebrile orally. Patient refuses rectal temperature. 20 mL voided urine output bladder scanned 229 mL. LR infusing at 150mL/hour.

## 2018-02-21 NOTE — H&P
Surgery History and Physcial    Subjective:      Leela Campbell is a 80 y.o. male who presents for evaluation of protracted nausea and vomiting with coffee ground emesis and mild upper abdominal discomfort. Vomiting started last night. He is s/p CVA with some residual weakness, and not on anticoagulation but does use low dose aspirin daily. 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 ([address] 03/19/2017     Class: Acute    Dysarthria following cerebrovascular accident 03/19/2017     Class: Present on Admission    Hemiparesthesia 03/19/2017     Class: Present on Admission    Essential hypertension 03/19/2017     Class: Chronic    Hyperlipidemia 03/19/2017     Class: Chronic    Prostatism 03/19/2017     Class: Chronic    Fall at home 03/19/2017     Class: Present on Admission     Past Medical History:   Diagnosis Date    GERD (gastroesophageal reflux disease)     High cholesterol     Hypertension     Stroke ([address] 03/2017    numbness in fingertips on R hand; legs;stammering      Past Surgical History:   Procedure Laterality Date    HX CHOLECYSTECTOMY      HX HEENT Right     cataract      Social History   Substance Use Topics    Smoking status: Former Smoker    Smokeless tobacco: Never Used    Alcohol use Yes      Comment: rare wine      No family history on file. Prior to Admission medications    Medication Sig Start Date End Date Taking? Authorizing Provider   hydroCHLOROthiazide (HYDRODIURIL) 12.5 mg tablet Take 12.5 mg by mouth daily. Yes Phys Other, MD   aspirin delayed-release 81 mg tablet Take 81 mg by mouth every evening.    Yes Historical Provider   ergocalciferol (VITAMIN D2) 50,000 unit capsule Take 50,000 Units by mouth every seven (7) days. on Wednesday. Yes Historical Provider   clobetasol (TEMOVATE) 0.05 % topical cream Apply  to affected area two (2) times a day. Patient applies this all over body for 'spots and redness on skin.'   Yes Historical Provider   simvastatin (ZOCOR) 20 mg tablet Take 20 mg by mouth every evening. Yes Historical Provider   omeprazole (PRILOSEC) 20 mg capsule Take 20 mg by mouth daily. Yes Aleksandra Xiong MD   amLODIPine (NORVASC) 10 mg tablet Take 10 mg by mouth daily. Yes Aleksandra Other, MD     No Known Allergies      Review of Systems   Constitutional: Positive for appetite change. Negative for chills, diaphoresis and fever. Respiratory: Negative for shortness of breath and wheezing. Cardiovascular: Negative for chest pain and palpitations. Gastrointestinal: Positive for abdominal pain, nausea and vomiting. Negative for diarrhea. Musculoskeletal: Negative for myalgias. Hematological: Does not bruise/bleed easily. Objective:     Visit Vitals    /82    Pulse (!) 106    Temp 98 °F (36.7 °C)    Resp 18    Ht 5' 7\" (1.702 m)    Wt 150 lb (68 kg)    SpO2 96%    BMI 23.49 kg/m2       Physical Exam   Constitutional: He appears well-developed and well-nourished. No distress. HENT:   Head: Normocephalic and atraumatic. Cardiovascular: Normal rate, regular rhythm, normal heart sounds and intact distal pulses. Pulmonary/Chest: Breath sounds normal. He has no wheezes. He has no rales. Abdominal: Soft. Normal appearance and bowel sounds are normal. He exhibits no distension and no mass. There is no hepatosplenomegaly. There is tenderness in the epigastric area. There is no rigidity, no rebound and no guarding. No hernia. Musculoskeletal: Normal range of motion. Lymphadenopathy:     He has no cervical adenopathy.        Imaging:  images and reports reviewed    Lab Review:    Recent Results (from the past 24 hour(s))   CBC WITH AUTOMATED DIFF    Collection Time: 02/20/18  5:00 PM   Result Value Ref Range    WBC 10.2 4.1 - 11.1 K/uL    RBC 5.01 4.10 - 5.70 M/uL    HGB 13.7 12.1 - 17.0 g/dL    HCT 43.6 36.6 - 50.3 %    MCV 87.0 80.0 - 99.0 FL    MCH 27.3 26.0 - 34.0 PG    MCHC 31.4 30.0 - 36.5 g/dL    RDW 14.0 11.5 - 14.5 %    PLATELET 382 424 - 419 K/uL    MPV 10.4 8.9 - 12.9 FL    NRBC 0.0 0  WBC    ABSOLUTE NRBC 0.00 0.00 - 0.01 K/uL    NEUTROPHILS 86 (H) 32 - 75 %    LYMPHOCYTES 5 (L) 12 - 49 %    MONOCYTES 9 5 - 13 %    EOSINOPHILS 0 0 - 7 %    BASOPHILS 0 0 - 1 %    IMMATURE GRANULOCYTES 0 0.0 - 0.5 %    ABS. NEUTROPHILS 8.8 (H) 1.8 - 8.0 K/UL    ABS. LYMPHOCYTES 0.5 (L) 0.8 - 3.5 K/UL    ABS. MONOCYTES 0.9 0.0 - 1.0 K/UL    ABS. EOSINOPHILS 0.0 0.0 - 0.4 K/UL    ABS. BASOPHILS 0.0 0.0 - 0.1 K/UL    ABS. IMM. GRANS. 0.0 0.00 - 0.04 K/UL    DF SMEAR SCANNED      RBC COMMENTS NORMOCYTIC, NORMOCHROMIC     METABOLIC PANEL, COMPREHENSIVE    Collection Time: 02/20/18  5:00 PM   Result Value Ref Range    Sodium 141 136 - 145 mmol/L    Potassium 3.6 3.5 - 5.1 mmol/L    Chloride 96 (L) 97 - 108 mmol/L    CO2 38 (H) 21 - 32 mmol/L    Anion gap 7 5 - 15 mmol/L    Glucose 153 (H) 65 - 100 mg/dL    BUN 22 (H) 6 - 20 MG/DL    Creatinine 1.89 (H) 0.70 - 1.30 MG/DL    BUN/Creatinine ratio 12 12 - 20      GFR est AA 41 (L) >60 ml/min/1.73m2    GFR est non-AA 34 (L) >60 ml/min/1.73m2    Calcium 10.2 (H) 8.5 - 10.1 MG/DL    Bilirubin, total 0.7 0.2 - 1.0 MG/DL    ALT (SGPT) 14 12 - 78 U/L    AST (SGOT) 26 15 - 37 U/L    Alk.  phosphatase 91 45 - 117 U/L    Protein, total 9.9 (H) 6.4 - 8.2 g/dL    Albumin 3.7 3.5 - 5.0 g/dL    Globulin 6.2 (H) 2.0 - 4.0 g/dL    A-G Ratio 0.6 (L) 1.1 - 2.2     LACTIC ACID    Collection Time: 02/20/18  5:00 PM   Result Value Ref Range    Lactic acid 3.1 (HH) 0.4 - 2.0 MMOL/L   LIPASE    Collection Time: 02/20/18  5:00 PM   Result Value Ref Range    Lipase 250 73 - 393 U/L   EKG, 12 LEAD, INITIAL    Collection Time: 02/20/18  5:02 PM   Result Value Ref Range    Ventricular Rate 117 BPM    Atrial Rate 117 BPM    P-R Interval 146 ms    QRS Duration 82 ms    Q-T Interval 310 ms    QTC Calculation (Bezet) 432 ms    Calculated P Axis 16 degrees    Calculated R Axis 14 degrees    Calculated T Axis 36 degrees    Diagnosis       Sinus tachycardia with premature atrial complexes in a pattern of bigeminy  Possible Left atrial enlargement  Nonspecific T wave abnormality  When compared with ECG of 19-MAR-2017 06:46,  premature atrial complexes are now present  ST no longer depressed in Inferior leads  Nonspecific T wave abnormality now evident in Lateral leads     OCCULT BLOOD, GASTRIC    Collection Time: 02/20/18  5:10 PM   Result Value Ref Range    OCCULT BLOOD,GASTRIC POSITIVE (A) NEG      pH,GASTRIC 4.5 (H) 1.5 - 3.5     OCCULT BLOOD, STOOL    Collection Time: 02/20/18  5:10 PM   Result Value Ref Range    Occult blood, stool NEGATIVE  NEG           Assessment:     79 yo man with 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 Mild lactic acidosis and dehydration secondary to gastric outlet obstruction and protracted emesis. Plan:     I recommend proceeding with inpatient admission with NGT decompression. Saline bolus and brisk maintenance fluids overnight, repeat labs in am.  Likely will require surgical management of complex paraesophageal hernia this admission.         Signed By: Spring Shelley MD, [facility]    February 20, 2018

## 2018-02-21 NOTE — ED NOTES
The documentation for this period is being entered following the guidelines as defined in the [facility] policy by Octavia Roach RN.

## 2018-02-21 NOTE — ED NOTES
Bedside shift change report given to Roma Moore RN  (oncoming nurse) by Carmel Raymond RN (offgoing nurse). Report included the following information SBAR, Kardex, ED Summary and MAR. Pt currently vomiting, black emesis. Pt asking for ice chips, this nurse and Marce Garcia advised we can not give anything PO at time. Wife at bedside.

## 2018-02-21 NOTE — ED NOTES
Called Dr. Theodore Leyva, was able to speak with one of his nurses to relay message in regards to patients increased HR (which has now decreased to 101 BPM) and low urine output. Nurse will relay message to Dr. Theodore Leyva.

## 2018-02-21 NOTE — ED NOTES
Dr. Darnell Mcgowan at bedside assessing patient and speaking with his wife at bedside. 1136: Assisted patient with using urinal. Patient reports that he has to stand to urinate in urinal. RN and PCT helped patient to stand.  190 mL's of urine voided into urinal.

## 2018-02-21 NOTE — PROGRESS NOTES
Just paged. Returned call immediately. No answer. Will be by to see patient in ~1 hour. Please page again if needed.

## 2018-02-22 ENCOUNTER — APPOINTMENT (OUTPATIENT)
Dept: GENERAL RADIOLOGY | Age: 83
DRG: 327 | End: 2018-02-22
Attending: SURGERY
Payer: MEDICARE

## 2018-02-22 LAB
ANION GAP SERPL CALC-SCNC: 0 MMOL/L (ref 5–15)
BASOPHILS # BLD: 0 K/UL (ref 0–0.1)
BASOPHILS NFR BLD: 0 % (ref 0–1)
BUN SERPL-MCNC: 25 MG/DL (ref 6–20)
BUN/CREAT SERPL: 23 (ref 12–20)
CALCIUM SERPL-MCNC: 8.2 MG/DL (ref 8.5–10.1)
CHLORIDE SERPL-SCNC: 106 MMOL/L (ref 97–108)
CO2 SERPL-SCNC: 38 MMOL/L (ref 21–32)
CREAT SERPL-MCNC: 1.11 MG/DL (ref 0.7–1.3)
DIFFERENTIAL METHOD BLD: ABNORMAL
EOSINOPHIL # BLD: 0 K/UL (ref 0–0.4)
EOSINOPHIL NFR BLD: 0 % (ref 0–7)
ERYTHROCYTE [DISTWIDTH] IN BLOOD BY AUTOMATED COUNT: 14.2 % (ref 11.5–14.5)
GLUCOSE SERPL-MCNC: 108 MG/DL (ref 65–100)
HCT VFR BLD AUTO: 32.7 % (ref 36.6–50.3)
HGB BLD-MCNC: 10.1 G/DL (ref 12.1–17)
IMM GRANULOCYTES # BLD: 0 K/UL (ref 0–0.04)
IMM GRANULOCYTES NFR BLD AUTO: 0 % (ref 0–0.5)
LYMPHOCYTES # BLD: 0.7 K/UL (ref 0.8–3.5)
LYMPHOCYTES NFR BLD: 7 % (ref 12–49)
MCH RBC QN AUTO: 27.9 PG (ref 26–34)
MCHC RBC AUTO-ENTMCNC: 30.9 G/DL (ref 30–36.5)
MCV RBC AUTO: 90.3 FL (ref 80–99)
MONOCYTES # BLD: 1 K/UL (ref 0–1)
MONOCYTES NFR BLD: 9 % (ref 5–13)
NEUTS SEG # BLD: 8.9 K/UL (ref 1.8–8)
NEUTS SEG NFR BLD: 84 % (ref 32–75)
NRBC # BLD: 0 K/UL (ref 0–0.01)
NRBC BLD-RTO: 0 PER 100 WBC
PLATELET # BLD AUTO: 244 K/UL (ref 150–400)
PMV BLD AUTO: 10.6 FL (ref 8.9–12.9)
POTASSIUM SERPL-SCNC: 3.3 MMOL/L (ref 3.5–5.1)
RBC # BLD AUTO: 3.62 M/UL (ref 4.1–5.7)
RBC MORPH BLD: ABNORMAL
SODIUM SERPL-SCNC: 144 MMOL/L (ref 136–145)
WBC # BLD AUTO: 10.6 K/UL (ref 4.1–11.1)

## 2018-02-22 PROCEDURE — 36415 COLL VENOUS BLD VENIPUNCTURE: CPT | Performed by: SURGERY

## 2018-02-22 PROCEDURE — 74011250636 HC RX REV CODE- 250/636: Performed by: SURGERY

## 2018-02-22 PROCEDURE — 65660000000 HC RM CCU STEPDOWN

## 2018-02-22 PROCEDURE — 80048 BASIC METABOLIC PNL TOTAL CA: CPT | Performed by: SURGERY

## 2018-02-22 PROCEDURE — 74011250636 HC RX REV CODE- 250/636

## 2018-02-22 PROCEDURE — 85025 COMPLETE CBC W/AUTO DIFF WBC: CPT | Performed by: SURGERY

## 2018-02-22 PROCEDURE — 77030018836 HC SOL IRR NACL ICUM -A

## 2018-02-22 PROCEDURE — 74011250636 HC RX REV CODE- 250/636: Performed by: ANESTHESIOLOGY

## 2018-02-22 PROCEDURE — 77010033678 HC OXYGEN DAILY

## 2018-02-22 PROCEDURE — 74011000250 HC RX REV CODE- 250: Performed by: SURGERY

## 2018-02-22 PROCEDURE — 71045 X-RAY EXAM CHEST 1 VIEW: CPT

## 2018-02-22 PROCEDURE — 74018 RADEX ABDOMEN 1 VIEW: CPT

## 2018-02-22 PROCEDURE — 77030036554

## 2018-02-22 RX ORDER — ONDANSETRON 2 MG/ML
INJECTION INTRAMUSCULAR; INTRAVENOUS AS NEEDED
Status: DISCONTINUED | OUTPATIENT
Start: 2018-02-22 | End: 2018-02-22 | Stop reason: HOSPADM

## 2018-02-22 RX ORDER — SODIUM CHLORIDE, SODIUM LACTATE, POTASSIUM CHLORIDE, CALCIUM CHLORIDE 600; 310; 30; 20 MG/100ML; MG/100ML; MG/100ML; MG/100ML
25 INJECTION, SOLUTION INTRAVENOUS CONTINUOUS
Status: DISCONTINUED | OUTPATIENT
Start: 2018-02-22 | End: 2018-02-22 | Stop reason: HOSPADM

## 2018-02-22 RX ORDER — SODIUM CHLORIDE 0.9 % (FLUSH) 0.9 %
5-10 SYRINGE (ML) INJECTION AS NEEDED
Status: DISCONTINUED | OUTPATIENT
Start: 2018-02-22 | End: 2018-02-22 | Stop reason: HOSPADM

## 2018-02-22 RX ORDER — MIDAZOLAM HYDROCHLORIDE 1 MG/ML
INJECTION, SOLUTION INTRAMUSCULAR; INTRAVENOUS AS NEEDED
Status: DISCONTINUED | OUTPATIENT
Start: 2018-02-22 | End: 2018-02-22 | Stop reason: HOSPADM

## 2018-02-22 RX ORDER — BUPIVACAINE HYDROCHLORIDE AND EPINEPHRINE 5; 5 MG/ML; UG/ML
INJECTION, SOLUTION EPIDURAL; INTRACAUDAL; PERINEURAL AS NEEDED
Status: DISCONTINUED | OUTPATIENT
Start: 2018-02-22 | End: 2018-02-22 | Stop reason: HOSPADM

## 2018-02-22 RX ORDER — HYDROMORPHONE HYDROCHLORIDE 1 MG/ML
.2-.5 INJECTION, SOLUTION INTRAMUSCULAR; INTRAVENOUS; SUBCUTANEOUS
Status: DISCONTINUED | OUTPATIENT
Start: 2018-02-22 | End: 2018-02-22 | Stop reason: HOSPADM

## 2018-02-22 RX ORDER — GLYCOPYRROLATE 0.2 MG/ML
INJECTION INTRAMUSCULAR; INTRAVENOUS AS NEEDED
Status: DISCONTINUED | OUTPATIENT
Start: 2018-02-22 | End: 2018-02-22 | Stop reason: HOSPADM

## 2018-02-22 RX ORDER — LORAZEPAM 2 MG/ML
1 INJECTION INTRAMUSCULAR
Status: DISCONTINUED | OUTPATIENT
Start: 2018-02-22 | End: 2018-02-23

## 2018-02-22 RX ORDER — DIPHENHYDRAMINE HYDROCHLORIDE 50 MG/ML
12.5 INJECTION, SOLUTION INTRAMUSCULAR; INTRAVENOUS AS NEEDED
Status: DISCONTINUED | OUTPATIENT
Start: 2018-02-22 | End: 2018-02-22 | Stop reason: HOSPADM

## 2018-02-22 RX ORDER — HYDROMORPHONE HYDROCHLORIDE 2 MG/ML
INJECTION, SOLUTION INTRAMUSCULAR; INTRAVENOUS; SUBCUTANEOUS
Status: COMPLETED
Start: 2018-02-22 | End: 2018-02-22

## 2018-02-22 RX ORDER — MIDAZOLAM HYDROCHLORIDE 1 MG/ML
INJECTION, SOLUTION INTRAMUSCULAR; INTRAVENOUS
Status: COMPLETED
Start: 2018-02-22 | End: 2018-02-22

## 2018-02-22 RX ORDER — FENTANYL CITRATE 50 UG/ML
25 INJECTION, SOLUTION INTRAMUSCULAR; INTRAVENOUS
Status: DISCONTINUED | OUTPATIENT
Start: 2018-02-22 | End: 2018-02-22 | Stop reason: HOSPADM

## 2018-02-22 RX ORDER — ONDANSETRON 2 MG/ML
4 INJECTION INTRAMUSCULAR; INTRAVENOUS AS NEEDED
Status: DISCONTINUED | OUTPATIENT
Start: 2018-02-22 | End: 2018-02-22 | Stop reason: HOSPADM

## 2018-02-22 RX ORDER — NEOSTIGMINE METHYLSULFATE 1 MG/ML
INJECTION INTRAVENOUS AS NEEDED
Status: DISCONTINUED | OUTPATIENT
Start: 2018-02-22 | End: 2018-02-22 | Stop reason: HOSPADM

## 2018-02-22 RX ORDER — MORPHINE SULFATE 10 MG/ML
2 INJECTION, SOLUTION INTRAMUSCULAR; INTRAVENOUS
Status: DISCONTINUED | OUTPATIENT
Start: 2018-02-22 | End: 2018-02-22 | Stop reason: HOSPADM

## 2018-02-22 RX ORDER — SODIUM CHLORIDE, SODIUM LACTATE, POTASSIUM CHLORIDE, CALCIUM CHLORIDE 600; 310; 30; 20 MG/100ML; MG/100ML; MG/100ML; MG/100ML
125 INJECTION, SOLUTION INTRAVENOUS CONTINUOUS
Status: DISCONTINUED | OUTPATIENT
Start: 2018-02-22 | End: 2018-02-26

## 2018-02-22 RX ORDER — HYDROMORPHONE HYDROCHLORIDE 2 MG/ML
0.5 INJECTION, SOLUTION INTRAMUSCULAR; INTRAVENOUS; SUBCUTANEOUS
Status: DISCONTINUED | OUTPATIENT
Start: 2018-02-22 | End: 2018-02-23

## 2018-02-22 RX ADMIN — GLYCOPYRROLATE 0.5 MG: 0.2 INJECTION INTRAMUSCULAR; INTRAVENOUS at 00:13

## 2018-02-22 RX ADMIN — Medication 10 ML: at 02:28

## 2018-02-22 RX ADMIN — Medication 10 ML: at 21:40

## 2018-02-22 RX ADMIN — GLYCOPYRROLATE 0.4 MG: 0.2 INJECTION INTRAMUSCULAR; INTRAVENOUS at 00:20

## 2018-02-22 RX ADMIN — METOPROLOL TARTRATE 2 MG: 5 INJECTION INTRAVENOUS at 00:17

## 2018-02-22 RX ADMIN — FENTANYL CITRATE 25 MCG: 50 INJECTION, SOLUTION INTRAMUSCULAR; INTRAVENOUS at 01:04

## 2018-02-22 RX ADMIN — MIDAZOLAM HYDROCHLORIDE 1 MG: 1 INJECTION, SOLUTION INTRAMUSCULAR; INTRAVENOUS at 00:37

## 2018-02-22 RX ADMIN — Medication 10 ML: at 15:10

## 2018-02-22 RX ADMIN — SODIUM CHLORIDE, SODIUM LACTATE, POTASSIUM CHLORIDE, AND CALCIUM CHLORIDE 125 ML/HR: 600; 310; 30; 20 INJECTION, SOLUTION INTRAVENOUS at 16:36

## 2018-02-22 RX ADMIN — NEOSTIGMINE METHYLSULFATE 2 MG: 1 INJECTION INTRAVENOUS at 00:20

## 2018-02-22 RX ADMIN — NEOSTIGMINE METHYLSULFATE 3 MG: 1 INJECTION INTRAVENOUS at 00:13

## 2018-02-22 RX ADMIN — HYDROMORPHONE HYDROCHLORIDE 0.5 MG: 2 INJECTION INTRAMUSCULAR; INTRAVENOUS; SUBCUTANEOUS at 19:26

## 2018-02-22 RX ADMIN — HYDROMORPHONE HYDROCHLORIDE 0.5 MG: 2 INJECTION INTRAMUSCULAR; INTRAVENOUS; SUBCUTANEOUS at 05:22

## 2018-02-22 RX ADMIN — SODIUM CHLORIDE, SODIUM LACTATE, POTASSIUM CHLORIDE, AND CALCIUM CHLORIDE 125 ML/HR: 600; 310; 30; 20 INJECTION, SOLUTION INTRAVENOUS at 05:23

## 2018-02-22 RX ADMIN — LORAZEPAM 1 MG: 2 INJECTION INTRAMUSCULAR; INTRAVENOUS at 09:20

## 2018-02-22 RX ADMIN — LORAZEPAM 1 MG: 2 INJECTION INTRAMUSCULAR; INTRAVENOUS at 02:27

## 2018-02-22 RX ADMIN — SODIUM CHLORIDE, SODIUM LACTATE, POTASSIUM CHLORIDE, AND CALCIUM CHLORIDE 125 ML/HR: 600; 310; 30; 20 INJECTION, SOLUTION INTRAVENOUS at 08:36

## 2018-02-22 RX ADMIN — LORAZEPAM 1 MG: 2 INJECTION INTRAMUSCULAR; INTRAVENOUS at 22:19

## 2018-02-22 RX ADMIN — ONDANSETRON 4 MG: 2 INJECTION INTRAMUSCULAR; INTRAVENOUS at 00:01

## 2018-02-22 RX ADMIN — MIDAZOLAM HYDROCHLORIDE 1 MG: 1 INJECTION, SOLUTION INTRAMUSCULAR; INTRAVENOUS at 00:39

## 2018-02-22 RX ADMIN — Medication 10 ML: at 05:50

## 2018-02-22 RX ADMIN — METOPROLOL TARTRATE 1 MG: 5 INJECTION INTRAVENOUS at 00:20

## 2018-02-22 RX ADMIN — FENTANYL CITRATE 25 MCG: 50 INJECTION, SOLUTION INTRAMUSCULAR; INTRAVENOUS at 01:10

## 2018-02-22 RX ADMIN — HYDROMORPHONE HYDROCHLORIDE 0.5 MG: 2 INJECTION INTRAMUSCULAR; INTRAVENOUS; SUBCUTANEOUS at 23:58

## 2018-02-22 RX ADMIN — SODIUM CHLORIDE, SODIUM LACTATE, POTASSIUM CHLORIDE, AND CALCIUM CHLORIDE 125 ML/HR: 600; 310; 30; 20 INJECTION, SOLUTION INTRAVENOUS at 01:00

## 2018-02-22 NOTE — PERIOP NOTES
TRANSFER - OUT REPORT:    Verbal report given to Darby Andrews RN (name) on [employer]  being transferred to PCU (unit) for routine post - op       Report consisted of patients Situation, Background, Assessment and   Recommendations(SBAR). Information from the following report(s) SBAR, Kardex, OR Summary, Procedure Summary, Intake/Output, MAR and Cardiac Rhythm NSR was reviewed with the receiving nurse. Lines:   Peripheral IV 02/21/18 Right Antecubital (Active)   Site Assessment Clean, dry, & intact 2/22/2018 12:33 AM   Phlebitis Assessment 0 2/22/2018 12:33 AM   Infiltration Assessment 0 2/22/2018 12:33 AM   Dressing Status Clean, dry, & intact 2/22/2018 12:33 AM   Dressing Type Tape;Transparent 2/22/2018 12:33 AM   Hub Color/Line Status Pink;Capped 2/22/2018 12:33 AM   Action Taken Blood drawn 2/21/2018  7:13 AM   Alcohol Cap Used No 2/21/2018  2:43 PM       Peripheral IV 02/21/18 Right Hand (Active)   Site Assessment Clean, dry, & intact 2/22/2018 12:33 AM   Phlebitis Assessment 0 2/22/2018 12:33 AM   Infiltration Assessment 0 2/22/2018 12:33 AM   Dressing Status Clean, dry, & intact 2/22/2018 12:33 AM   Dressing Type Tape;Transparent 2/22/2018 12:33 AM   Hub Color/Line Status Pink; Infusing 2/22/2018 12:33 AM        Opportunity for questions and clarification was provided.       Patient transported with:   Monitor  O2 @ 3 liters  Registered Nurse

## 2018-02-22 NOTE — PERIOP NOTES
Handoff Report from Operating Room to PACU    Report received from [facility], LUCI and Dino Babin RN regarding Terese Labs. Surgeon(s):  David Chahal MD  And Procedure(s) (LRB):  NISSEN FUNDOPLICATION ROBOTIC ASSISTED (N/A)  confirmed   with allergies, drains and dressings discussed. Anesthesia type, drugs, patient history, complications, estimated blood loss, vital signs, intake and output, and last pain medication, lines, reversal medications and temperature were reviewed.

## 2018-02-22 NOTE — PROGRESS NOTES
..    PCU SHIFT NURSING NOTE      Bedside shift change report given to Anabelle Braxton RN (oncoming nurse) by Brandan Schofield (offgoing nurse). Report included the following information SBAR, Kardex, Intake/Output, MAR and Recent Results. Shift Summary:   0035- Patient restless, pulling gown off. Yadiel estrada RN spoke with granddaughter during rounds. Notified her of restraints. 4908- Patient agitated trying to get out of bed. Medicated with Ativan 1 mg IV.  1020- Patient intermitantly resting. Continues to pull at lines. Remains with wrist restraints. 1215-Dr. Berrios at bedside talking to wife and granddaughter. Stated OK to place mitt on right hand. He clamped NGT. Stated to to leave clamped for 4 hours and if less than 50 cc, d/c NGT. Unable to get blood work after 4 sticks. 1430- Labs obtained by PICC nurse. Patient resting a little better, but still pulling at lines/tubes. Wife at bedside. Admission Date 2/20/2018   Admission Diagnosis Gastric volvulus  Esophageal Hernia   Consults None        Consults   []PT   []OT   []Speech   []Case Management      [] Palliative      Cardiac Monitoring Order   [x]Yes   []No     IV drips   []Yes    Drip:                            Dose:  Drip:                            Dose:  Drip:                            Dose:   [x]No     GI Prophylaxis   []Yes   [x]No         DVT Prophylaxis   SCDs:  Sequential Compression Device: Bilateral          Leonel stockings:         [] Medication   []Contraindicated   []None      Activity Level Activity Level: Bed Rest     Activity Assistance: Complete care   Purposeful Rounding every 1-2 hour?    [x]Yes   Green Score  Total Score: 4   Bed Alarm (If score 3 or >)   [x]Yes   [] Refused (See signed refusal form in chart)   Urban Score  Urban Score: 13   Urban Score (if score 14 or less)   []PMT consult   []Wound Care consult      []Specialty bed   [] Nutrition consult          Needs prior to discharge:   Home O2 required:    []Yes   [x]No    If yes, how much O2 required? Other:    Last Bowel Movement: Last Bowel Movement Date: 02/20/18      Influenza Vaccine          Pneumonia Vaccine           Diet Active Orders   Diet    DIET NPO      LDAs               Peripheral IV 02/21/18 Right Antecubital (Active)   Site Assessment Clean 2/22/2018  1:40 AM   Phlebitis Assessment 0 2/22/2018  1:40 AM   Infiltration Assessment 0 2/22/2018  1:40 AM   Dressing Status Clean, dry, & intact 2/22/2018  1:40 AM   Dressing Type Transparent 2/22/2018  1:40 AM   Hub Color/Line Status Pink;Capped 2/22/2018  1:40 AM   Action Taken Blood drawn 2/21/2018  7:13 AM   Alcohol Cap Used No 2/21/2018  2:43 PM       Peripheral IV 02/21/18 Right Hand (Active)   Site Assessment Clean 2/22/2018  1:40 AM   Phlebitis Assessment 0 2/22/2018  1:40 AM   Infiltration Assessment 0 2/22/2018  1:40 AM   Dressing Status Clean, dry, & intact 2/22/2018  1:40 AM   Dressing Type Transparent 2/22/2018  1:40 AM   Hub Color/Line Status Pink; Infusing 2/22/2018  1:40 AM          Nasogastric Tube 02/20/18 (Active)   Site Assessment Clean 2/22/2018  1:40 AM   Dressing Status Clean, dry, & intact 2/22/2018  1:40 AM   G Port Status Continuous Suction 2/22/2018  1:40 AM   External Insertion Phu (cms) 60 cms 2/22/2018  1:40 AM   Action Taken Placement verified (comment) 2/22/2018  1:40 AM   Drainage Description Ignacia Chick 2/22/2018  1:40 AM   Water Flush Volume (mL) 40 mL 2/21/2018  5:39 PM   Output (ml) 0 ml 2/22/2018  5:27 AM       Alex Drain #1 02/21/18 Anterior;Right;Upper Abdomen (Active)   Site Assessment Clean 2/22/2018  1:40 AM   Dressing Status Clean, dry, & intact 2/22/2018  1:40 AM   Drainage Description Sanguinous 2/22/2018  1:40 AM   Status Patent; Charged 2/22/2018  1:40 AM   Output (ml) 15 ml 2/22/2018  4:00 AM                Urinary Catheter Urinary Catheter 02/21/18 Pichardo-Criteria for Appropriate Use: Surgical procedure    Intake & Output   Date 02/21/18 0700 - 02/22/18 0659 02/22/18 0700 - 02/23/18 6691 Shift 2699-5781 0274-7408 24 Hour Total 8972-5502 1970-3619 24 Hour Total   I  N  T  A  K  E   I.V.  (mL/kg/hr) 4780  (5.9) 2956.3  (3.6) 7736.3  (4.7)         I.V. 4040  4040         Volume (lactated Ringers infusion) 740  740         Volume (lactated Ringers infusion)  2400 2400         Volume (lactated Ringers infusion)  556.3 556.3       NG/GT 40  40         Water Flush Volume (mL) (Nasogastric Tube 02/20/18) 40  40       Shift Total  (mL/kg) 4820  (70.8) 2956.3  (43.4) 7776.3  (114.3)      O  U  T  P  U  T   Urine  (mL/kg/hr) 300  (0.4) 425  (0.5) 725  (0.4)         Urine Voided 300  300         Urine Output  200 200         Urine Output (mL) (Urinary Catheter 02/21/18 Pichardo)  225 225       Emesis/NG output  0 0         Output (ml) (Nasogastric Tube 02/20/18)  0 0       Drains  30 30         Output (ml) (Alex Drain #1 02/21/18 Anterior;Right;Upper Abdomen)  30 30       Blood  50 50         Estimated Blood Loss  50 50       Shift Total  (mL/kg) 300  (4.4) 505  (7.4) 805  (11.8)      NET 4520 2451.3 6971.3      Weight (kg) 68 68 68 68 68 68         Readmission Risk Assessment Tool Score Medium Risk            19       Total Score        3 Has Seen PCP in Last 6 Months (Yes=3, No=0)    2 . Living with Significant Other. Assisted Living. LTAC. SNF. or   Rehab    4 IP Visits Last 12 Months (1-3=4, 4=9, >4=11)    5 Pt.  Coverage (Medicare=5 , Medicaid, or Self-Pay=4)    5 Charlson Comorbidity Score (Age + Comorbid Conditions)        Criteria that do not apply:    Patient Length of Stay (>5 days = 3)       Expected Length of Stay 2d 16h   Actual Length of Stay 2

## 2018-02-22 NOTE — PROGRESS NOTES
Subjective:    agitated overnight. Objective:    Blood pressure 138/63, pulse 79, temperature 97.6 °F (36.4 °C), resp. rate 18, height 5' 7\" (1.702 m), weight 68 kg (150 lb), SpO2 100 %. Drains - ss  Exam - confused, but can be re-oriented. adb soft, approp TTP. 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  - clamp NG trial.    - Remain in PCU due to MS.       Judie Abel MD

## 2018-02-22 NOTE — ROUTINE PROCESS
General Surgery End of Shift Nursing Note    Bedside shift change report given to 6800 Matt Jara (oncoming nurse) by Ashok Roman (offgoing nurse). Report included the following information SBAR, Kardex, ED Summary, OR Summary, Intake/Output, MAR and Recent Results. Shift worked:   7-730   Summary of shift:    Pt arrived to unit around 1440. Sitter in room as pt started walking down the trivedi as soon as got to unit. Sitter previously ordered as pt pulled out first NG tube, agitation and confusion. A/0 x 4 thru shift. CHG wipes done. Gave report to Oanh in OR holding @ 1924 for surgery. Oncoming nurse given report as well   Issues for physician to address:   none     Number times ambulated in hallway past shift: 1    Number of times OOB to chair past shift: 0    Pain Management:  Current medication: see MAR  Patient states pain is manageable on current pain medication: YES - none given during shift    GI:    Current diet:  DIET NPO    Tolerating current diet: YES  Passing flatus: YES  Last Bowel Movement: yesterday   Appearance: not observed    Respiratory:    Incentive Spirometer at bedside: NO  Patient instructed on use: NO    Patient Safety:    Falls Score: 4  Bed Alarm On? No  Sitter?  Yes    Maryjo Rodriguez

## 2018-02-22 NOTE — ANESTHESIA POSTPROCEDURE EVALUATION
Post-Anesthesia Evaluation and Assessment    Patient: Quin Schaffer MRN: [id number]  SSN: [id number]    YOB: 1931  Age: 80 y.o. Sex: male       Cardiovascular Function/Vital Signs  Visit Vitals    /73 (BP 1 Location: Left arm, BP Patient Position: At rest)    Pulse 85    Temp 36.6 °C (97.8 °F)    Resp 21    Ht 5' 7\" (1.702 m)    Wt 68 kg (150 lb)    SpO2 96%    BMI 23.49 kg/m2       Patient is status post general anesthesia for Procedure(s):  NISSEN FUNDOPLICATION ROBOTIC ASSISTED. Nausea/Vomiting: None    Postoperative hydration reviewed and adequate. Pain:  Pain Scale 1: FLACC (02/22/18 0115)  Pain Intensity 1: 0 (02/21/18 0500)   Managed    Neurological Status:   Neuro (WDL): Exceptions to WDL (02/22/18 0033)  Neuro  Neurologic State: Confused;Drowsy; Eyes open to voice (02/22/18 0033)  Orientation Level: Disoriented to place; Disoriented to situation;Disoriented to time (02/22/18 0033)  Cognition: Decreased attention/concentration;Decreased command following;Poor safety awareness (02/22/18 0033)  Speech: Clear (02/22/18 0033)  LUE Motor Response: Spontaneous  (02/22/18 0033)  LLE Motor Response: Spontaneous  (02/22/18 0033)  RUE Motor Response: Spontaneous  (02/22/18 0033)  RLE Motor Response: Spontaneous  (02/22/18 0033)   At baseline    Mental Status and Level of Consciousness: Arousable    Pulmonary Status:   O2 Device: Nasal cannula (02/22/18 0115)   Adequate oxygenation and airway patent    Complications related to anesthesia: None    Post-anesthesia assessment completed.  No concerns    Signed By: Hermelinda Salinas MD     February 22, 2018

## 2018-02-22 NOTE — ANESTHESIA PREPROCEDURE EVALUATION
Anesthetic History   No history of anesthetic complications            Review of Systems / Medical History  Patient summary reviewed, nursing notes reviewed and pertinent labs reviewed    Pulmonary  Within defined limits                 Neuro/Psych       CVA       Cardiovascular    Hypertension              Exercise tolerance: >4 METS  Comments: Pt with known murmur (according to pt), but he denies any s/s of acute valvular issues and he denies and angina or signs of ACS or CHF or pulm HTN. Echo last year showed normal EF, mild AS and trace TR   GI/Hepatic/Renal     GERD           Endo/Other  Within defined limits           Other Findings            Physical Exam    Airway  Mallampati: II  TM Distance: 4 - 6 cm  Neck ROM: normal range of motion   Mouth opening: Normal     Cardiovascular          Murmur: Grade 3, Tricuspid area     Dental    Dentition: Lower dentition intact and Poor dentition     Pulmonary  Breath sounds clear to auscultation               Abdominal  GI exam deferred       Other Findings            Anesthetic Plan    ASA: 3  Anesthesia type: general    Monitoring Plan: Arterial line        Anesthetic plan and risks discussed with: Patient      May place A-line if BP is labile or hypotensive.

## 2018-02-22 NOTE — BRIEF OP NOTE
BRIEF OPERATIVE NOTE    Date of Procedure: 2/21/2018   Preoperative Diagnosis: paresophageal Hernia with gastric volvulus  Postoperative Diagnosis: same  Procedure(s):  ROBOTIC ASSISTED repair of paraesophageal hernia  Surgeon(s) and Role:     * Esau Hassan MD - Primary         Assistant Staff: None      Surgical Staff:  Circ-1: Rahel Car  Circ-Relief: Mikey Bauman RN  Scrub Tech-1: Cesilia Kirk  Scrub Tech-Relief: Addy Friends  Surg Asst-1: Mickey HUTTONSt. Elizabeth's Hospitalirasema  Event Time In   Incision Start 2051   Incision Close 0018     Anesthesia: General   Estimated Blood Loss: 100mL  Specimens: * No specimens in log *   Findings: stomach and duodenum in chest.  fundus peritonealized within chest.     Complications: none immediate  Implants: * No implants in log *        [id number]

## 2018-02-22 NOTE — PROGRESS NOTES
0130: TRANSFER - IN REPORT:    Verbal report received from Will Prater RN (name) on [facility]  being received from [employer]) for routine progression of care      Report consisted of patients Situation, Background, Assessment and   Recommendations(SBAR). Information from the following report(s) SBAR, Kardex, ED Summary, OR Summary, Procedure Summary, Intake/Output and Cardiac Rhythm NSR was reviewed with the receiving nurse. Opportunity for questions and clarification was provided. Assessment will be completed upon patients arrival to unit and care assumed. 0140: Pt received per bed from PACU. Pt is very restless kicking with both legs and trying to pull at different tubes with both hands. Did manage to pull NG tube out about 5 inches, but was returned to level of 60 without difficulty. Primary Nurse Carina Cross RN and ELMER Herrera performed a dual skin assessment on this patient No impairment noted  Urban score is 13  0200: Call placed to Dr. Lubna Trimble re: pt restlessness. Order received for wrist restraints and prn ativan. Wrist restraints applied and will monitor. 0230: 1mg IV ativan given as pt remains restless and trying to pull at tubes. Pt now quiet and appears to be sleeping. No longer restless.

## 2018-02-23 ENCOUNTER — APPOINTMENT (OUTPATIENT)
Dept: GENERAL RADIOLOGY | Age: 83
DRG: 327 | End: 2018-02-23
Attending: SURGERY
Payer: MEDICARE

## 2018-02-23 LAB
ANION GAP SERPL CALC-SCNC: 5 MMOL/L (ref 5–15)
ARTERIAL PATENCY WRIST A: ABNORMAL
BASE EXCESS BLDA CALC-SCNC: 7.5 MMOL/L
BASOPHILS # BLD: 0 K/UL (ref 0–0.1)
BASOPHILS NFR BLD: 0 % (ref 0–1)
BDY SITE: ABNORMAL
BUN SERPL-MCNC: 22 MG/DL (ref 6–20)
BUN/CREAT SERPL: 21 (ref 12–20)
CALCIUM SERPL-MCNC: 8.1 MG/DL (ref 8.5–10.1)
CHLORIDE SERPL-SCNC: 108 MMOL/L (ref 97–108)
CO2 SERPL-SCNC: 33 MMOL/L (ref 21–32)
CREAT SERPL-MCNC: 1.03 MG/DL (ref 0.7–1.3)
DIFFERENTIAL METHOD BLD: ABNORMAL
EOSINOPHIL # BLD: 0.1 K/UL (ref 0–0.4)
EOSINOPHIL NFR BLD: 1 % (ref 0–7)
ERYTHROCYTE [DISTWIDTH] IN BLOOD BY AUTOMATED COUNT: 14.1 % (ref 11.5–14.5)
FIO2 ON VENT: 50 %
GAS FLOW.O2 O2 DELIVERY SYS: 12 L/MIN
GLUCOSE SERPL-MCNC: 84 MG/DL (ref 65–100)
HCO3 BLDA-SCNC: 34 MMOL/L (ref 22–26)
HCT VFR BLD AUTO: 31.9 % (ref 36.6–50.3)
HGB BLD-MCNC: 9.8 G/DL (ref 12.1–17)
IMM GRANULOCYTES # BLD: 0 K/UL (ref 0–0.04)
IMM GRANULOCYTES NFR BLD AUTO: 0 % (ref 0–0.5)
LYMPHOCYTES # BLD: 0.9 K/UL (ref 0.8–3.5)
LYMPHOCYTES NFR BLD: 9 % (ref 12–49)
MAGNESIUM SERPL-MCNC: 1.9 MG/DL (ref 1.6–2.4)
MCH RBC QN AUTO: 27.5 PG (ref 26–34)
MCHC RBC AUTO-ENTMCNC: 30.7 G/DL (ref 30–36.5)
MCV RBC AUTO: 89.6 FL (ref 80–99)
MONOCYTES # BLD: 0.8 K/UL (ref 0–1)
MONOCYTES NFR BLD: 8 % (ref 5–13)
NEUTS SEG # BLD: 8.2 K/UL (ref 1.8–8)
NEUTS SEG NFR BLD: 82 % (ref 32–75)
NRBC # BLD: 0 K/UL (ref 0–0.01)
NRBC BLD-RTO: 0 PER 100 WBC
PCO2 BLDA: 55 MMHG (ref 35–45)
PH BLDA: 7.41 [PH] (ref 7.35–7.45)
PHOSPHATE SERPL-MCNC: 2.4 MG/DL (ref 2.6–4.7)
PLATELET # BLD AUTO: 235 K/UL (ref 150–400)
PMV BLD AUTO: 10.6 FL (ref 8.9–12.9)
PO2 BLDA: 90 MMHG (ref 80–100)
POTASSIUM SERPL-SCNC: 3.6 MMOL/L (ref 3.5–5.1)
RBC # BLD AUTO: 3.56 M/UL (ref 4.1–5.7)
SAO2 % BLD: 97 % (ref 92–97)
SAO2% DEVICE SAO2% SENSOR NAME: ABNORMAL
SODIUM SERPL-SCNC: 146 MMOL/L (ref 136–145)
SPECIMEN SITE: ABNORMAL
WBC # BLD AUTO: 10 K/UL (ref 4.1–11.1)

## 2018-02-23 PROCEDURE — 84100 ASSAY OF PHOSPHORUS: CPT | Performed by: SURGERY

## 2018-02-23 PROCEDURE — 36415 COLL VENOUS BLD VENIPUNCTURE: CPT | Performed by: SURGERY

## 2018-02-23 PROCEDURE — 80048 BASIC METABOLIC PNL TOTAL CA: CPT | Performed by: SURGERY

## 2018-02-23 PROCEDURE — 85025 COMPLETE CBC W/AUTO DIFF WBC: CPT | Performed by: SURGERY

## 2018-02-23 PROCEDURE — 77010033678 HC OXYGEN DAILY

## 2018-02-23 PROCEDURE — 36600 WITHDRAWAL OF ARTERIAL BLOOD: CPT | Performed by: SURGERY

## 2018-02-23 PROCEDURE — 82803 BLOOD GASES ANY COMBINATION: CPT | Performed by: SURGERY

## 2018-02-23 PROCEDURE — 74011250636 HC RX REV CODE- 250/636: Performed by: SURGERY

## 2018-02-23 PROCEDURE — 77030011256 HC DRSG MEPILEX <16IN NO BORD MOLN -A

## 2018-02-23 PROCEDURE — 83735 ASSAY OF MAGNESIUM: CPT | Performed by: SURGERY

## 2018-02-23 PROCEDURE — 65660000000 HC RM CCU STEPDOWN

## 2018-02-23 PROCEDURE — 71045 X-RAY EXAM CHEST 1 VIEW: CPT

## 2018-02-23 RX ORDER — ENOXAPARIN SODIUM 100 MG/ML
40 INJECTION SUBCUTANEOUS EVERY 24 HOURS
Status: DISCONTINUED | OUTPATIENT
Start: 2018-02-23 | End: 2018-02-28 | Stop reason: HOSPADM

## 2018-02-23 RX ORDER — HYDROCODONE BITARTRATE AND ACETAMINOPHEN 5; 325 MG/1; MG/1
1-2 TABLET ORAL
Status: DISCONTINUED | OUTPATIENT
Start: 2018-02-23 | End: 2018-02-28 | Stop reason: HOSPADM

## 2018-02-23 RX ORDER — LORAZEPAM 2 MG/ML
0.5 INJECTION INTRAMUSCULAR
Status: DISCONTINUED | OUTPATIENT
Start: 2018-02-23 | End: 2018-02-23

## 2018-02-23 RX ORDER — KETOROLAC TROMETHAMINE 30 MG/ML
15 INJECTION, SOLUTION INTRAMUSCULAR; INTRAVENOUS
Status: ACTIVE | OUTPATIENT
Start: 2018-02-23 | End: 2018-02-28

## 2018-02-23 RX ADMIN — Medication 10 ML: at 08:55

## 2018-02-23 RX ADMIN — SODIUM CHLORIDE, SODIUM LACTATE, POTASSIUM CHLORIDE, AND CALCIUM CHLORIDE 125 ML/HR: 600; 310; 30; 20 INJECTION, SOLUTION INTRAVENOUS at 15:21

## 2018-02-23 RX ADMIN — LORAZEPAM 1 MG: 2 INJECTION INTRAMUSCULAR; INTRAVENOUS at 06:11

## 2018-02-23 RX ADMIN — HYDROMORPHONE HYDROCHLORIDE 0.5 MG: 2 INJECTION INTRAMUSCULAR; INTRAVENOUS; SUBCUTANEOUS at 08:55

## 2018-02-23 RX ADMIN — Medication 10 ML: at 13:34

## 2018-02-23 RX ADMIN — Medication 10 ML: at 06:00

## 2018-02-23 RX ADMIN — ENOXAPARIN SODIUM 40 MG: 40 INJECTION SUBCUTANEOUS at 15:26

## 2018-02-23 NOTE — PROGRESS NOTES
Problem: Falls - Risk of  Goal: *Absence of Falls  Document Dexter Fall Risk and appropriate interventions in the flowsheet.    Outcome: Progressing Towards Goal  Fall Risk Interventions:  Mobility Interventions: Assess mobility with egress test, Bed/chair exit alarm, Patient to call before getting OOB, PT Consult for assist device competence, Utilize gait belt for transfers/ambulation, Strengthening exercises (ROM-active/passive), PT Consult for mobility concerns, OT consult for ADLs, Communicate number of staff needed for ambulation/transfer    Mentation Interventions: Adequate sleep, hydration, pain control, Bed/chair exit alarm, Door open when patient unattended, Familiar objects from home, Reorient patient, Toileting rounds, More frequent rounding, Eyeglasses and hearing aids, Update white board, Room close to nurse's station, Evaluate medications/consider consulting pharmacy    Medication Interventions: Bed/chair exit alarm, Patient to call before getting OOB, Teach patient to arise slowly, Evaluate medications/consider consulting pharmacy    Elimination Interventions: Bed/chair exit alarm, Call light in reach, Elevated toilet seat, Toileting schedule/hourly rounds, Toilet paper/wipes in reach, Patient to call for help with toileting needs    History of Falls Interventions: Bed/chair exit alarm, Consult care management for discharge planning, Evaluate medications/consider consulting pharmacy, Room close to nurse's station, Utilize gait belt for transfer/ambulation, Door open when patient unattended

## 2018-02-23 NOTE — PROGRESS NOTES
PCU SHIFT NURSING NOTE      Bedside and Verbal shift change report given to Ang Ellison RN (oncoming nurse) by Lady Choudhary RN (offgoing nurse). Report included the following information SBAR, Kardex, ED Summary, OR Summary, Procedure Summary, Intake/Output, MAR, Recent Results, Med Rec Status, Cardiac Rhythm ST and Alarm Parameters . Shift Summary:   1255--Dr. Chencho Quan ordered nursing staff to take patient off of restraints and put him into chair. Vitals taken and patient is talking with nurse and is A+Ox 2. Patient is confused when waking up and not sure where he is at. Patient can be re-oriented within a few minutes of where he is at and he knows his name. Took patients KG drain out and vitals are stable. Took patient off of ventimask and he is now on 2 liters of nasal cannula. Patient reports that he isn't in pain at this time. Patient is a fall risk and will not follow commands at this time. 1600--Paged Dr. Chencho Quan to get order for sitter PRN. 1640--Paged Dr. Chencho Quan 2nd time for sitter PRN for patient. Nurse at office will relay the message and get back to me with a decision. 1655--Patient is sitting in chair with staff watching to make sure he doesn't fall. Admission Date 2/20/2018   Admission Diagnosis Gastric volvulus  Esophageal Hernia   Consults None        Consults   [x]PT   [x]OT   []Speech   [x]Case Management      [] Palliative      Cardiac Monitoring Order   [x]Yes   []No     IV drips   []Yes    Drip:                            Dose:  Drip:                            Dose:  Drip:                            Dose:   [x]No     GI Prophylaxis   [x]Yes   []No         DVT Prophylaxis   SCDs:  Sequential Compression Device: Bilateral          Leonel stockings:         [] Medication   []Contraindicated   []None      Activity Level Activity Level: Bed Rest     Activity Assistance: Partial (two people)   Purposeful Rounding every 1-2 hour?    [x]Yes   Christina Fitting Score Total Score: 3   Bed Alarm (If score 3 or >)   [x]Yes   [] Refused (See signed refusal form in chart)   Urban Score  Urban Score: 18   Urban Score (if score 14 or less)   [x]PMT consult   [x]Wound Care consult      []Specialty bed   [x] Nutrition consult          Needs prior to discharge:   Home O2 required:    []Yes   [x]No    If yes, how much O2 required? Other:    Last Bowel Movement: Last Bowel Movement Date: 02/20/18      Influenza Vaccine Received Flu Vaccine for Current Season (usually Sept-March): Yes        Pneumonia Vaccine           Diet Active Orders   Diet    DIET NPO      LDAs               Peripheral IV 02/22/18 Right Forearm (Active)   Site Assessment Clean, dry, & intact 2/23/2018  4:00 AM   Phlebitis Assessment 0 2/23/2018  4:00 AM   Infiltration Assessment 0 2/23/2018  4:00 AM   Dressing Status Clean, dry, & intact 2/23/2018  4:00 AM   Dressing Type Transparent 2/23/2018  4:00 AM   Hub Color/Line Status Blue 2/22/2018  7:24 PM   Alcohol Cap Used Yes 2/23/2018  4:00 AM          Alex Drain #1 02/21/18 Anterior;Right;Upper Abdomen (Active)   Site Assessment Clean, dry, & intact 2/23/2018  7:50 AM   Dressing Status Clean, dry, & intact 2/23/2018  7:50 AM   Drainage Description Serosanguinous 2/23/2018  7:50 AM   Alex Drain Airleak No 2/23/2018  7:50 AM   Status Patent; Charged;Draining 2/23/2018  7:50 AM   Output (ml) 15 ml 2/23/2018  7:50 AM                Urinary Catheter Urinary Catheter 02/21/18 Pichardo-Criteria for Appropriate Use: Strict I/Os, Surgical procedure    Intake & Output   Date 02/22/18 0700 - 02/23/18 0659 02/23/18 0700 - 02/24/18 0659   Shift 0700-1859 1900-0659 24 Hour Total 0700-1859 1900-0659 24 Hour Total   I  N  T  A  K  E   P. O. 0  0         P. O. 0  0       I.V.  (mL/kg/hr) 800  (1)  800  (0.5)         I.V. 800  800       Shift Total  (mL/kg) 800  (11.8)  800  (11.8)      O  U  T  P  U  T   Urine  (mL/kg/hr)  325  (0.4) 325  (0.2) 300  300      Urine Output (mL) (Urinary Catheter 02/21/18 Pichardo)  325 325 300  300    Emesis/NG output 0  0         Output (ml) ([REMOVED] Nasogastric Tube 02/20/18) 0  0       Drains 15 7.5 22.5 15  15      Output (ml) (Alex Drain #1 02/21/18 Anterior;Right;Upper Abdomen) 15 7.5 22.5 15  15    Shift Total  (mL/kg) 15  (0.2) 332.5  (4.9) 347.5  (5.1) 315  (4.6)  315  (4.6)    -332.5 452.5 -315  -315   Weight (kg) 68 68 68 68.2 68.2 68.2         Readmission Risk Assessment Tool Score Medium Risk            19       Total Score        3 Has Seen PCP in Last 6 Months (Yes=3, No=0)    2 . Living with Significant Other. Assisted Living. LTAC. SNF. or   Rehab    4 IP Visits Last 12 Months (1-3=4, 4=9, >4=11)    5 Pt.  Coverage (Medicare=5 , Medicaid, or Self-Pay=4)    5 Charlson Comorbidity Score (Age + Comorbid Conditions)        Criteria that do not apply:    Patient Length of Stay (>5 days = 3)       Expected Length of Stay 2d 16h   Actual Length of Stay 3

## 2018-02-23 NOTE — PROGRESS NOTES
Spoke with Afshin listed on the acct. Provided her a brief update on pt OOB in the chair. Requested for family to come and sit with patient. She communicated that her Mother in [address] came last night. Advised her Mother in [address] would not be the best option, the individual can not need medical assistance. She communicated her plan to come in tomorrow morning and that she was not able to come in tonight because she has the [employer]. She would work on getting someone to watch the [employer] and come in for a couple of hours Saturday night starting around 5-6pm. Updated primary RN with listed above details.

## 2018-02-23 NOTE — PROGRESS NOTES
2:55 PM pt up to chair per MD request despite safety issues including pulling at lines, removing equipment. Restraints removed. Unable to teach IS due to mental status. Primary RN aware. On list for tele sitter as none available.

## 2018-02-23 NOTE — PROGRESS NOTES
Bedside and Verbal shift change report given to Terence (oncoming nurse) by Masha Le (offgoing nurse). Report included the following information SBAR, Kardex, MAR and Recent Results.

## 2018-02-23 NOTE — PROGRESS NOTES
Initial Nutrition Assessment:    INTERVENTIONS/RECOMMENDATIONS:   · RD will follow closely. NG clamping trials. ASSESSMENT:   2/23:  Chart reviewed; med noted for gastric volvulus, s/pnissen fundoplication. Continue NPO with NGT to suction (clamping trials). Diet Order: NPO  % Eaten:  No data found. Pertinent Medications: [x]Reviewed []Other  Pertinent Labs: [x]Reviewed []Other: Na+ 146  Food Allergies: [x]None []Other   Last BM: 2/20   [x]Active     []Hyperactive  []Hypoactive       [] Absent BS  Skin:    [] Intact   [x] Incision  [] Breakdown  [] Other:    Anthropometrics:   Height: 5' 7\" (170.2 cm) Weight: 68.2 kg (150 lb 6.7 oz)   IBW (%IBW):   ( ) UBW (%UBW):   (  %)   Last Weight Metrics:  Weight Loss Metrics 2/23/2018 6/26/2017 3/21/2017 3/19/2017 1/18/2011   Today's Wt 150 lb 6.7 oz 177 lb 182 lb 188 lb 15 oz 190 lb   BMI 23.56 kg/m2 28.14 kg/m2 28.51 kg/m2 29.59 kg/m2 28.05 kg/m2       BMI: Body mass index is 23.56 kg/(m^2). This BMI is indicative of:   []Underweight    [x]Normal    []Overweight    [] Obesity   [] Extreme Obesity (BMI>40)     Estimated Nutrition Needs (Based on):   1714 Kcals/day (BMR (1319) x 1. 3AF) , 68 g (1.0 g/kg bw) Protein  Carbohydrate: At Least 130 g/day  Fluids: 1700 mL/day (1ml/kcal)    NUTRITION DIAGNOSES:   Problem:  Inadequate energy intake      Etiology: related to GOO     Signs/Symptoms: as evidenced by NPO, NGT in place to suction      NUTRITION INTERVENTIONS:  Meals/Snacks: General/healthful diet                  GOAL:   Resume nutrition regimen next 2-4 days    LEARNING NEEDS (Diet, Food/Nutrient-Drug Interaction):    [x] None Identified   [] Identified and Education Provided/Documented   [] Identified and Pt declined/was not appropriate     Cultureal, Anabaptist, OR Ethnic Dietary Needs:    [x] None Identified   [] Identified and Addressed     [x] Interdisciplinary Care Plan Reviewed/Documented    [x] Discharge Planning:  Too early to determine     MONITORING /EVALUATION:      Food/Nutrient Intake Outcomes:  Total energy intake  Physical Signs/Symptoms Outcomes: Weight/weight change, Electrolyte and renal profile, GI profile    NUTRITION RISK:    [x] Patient At Nutritional Risk    [x] High              [] Moderate/Mild           []  Low     [] Patient Not At Nutritional Risk    PT SEEN FOR:    []  MD Consult: []Calorie Count      []Diabetic Diet Education        []Diet Education     []Electrolyte Management     []General Nutrition Management and Supplements     []Management of Tube Feeding     []TPN Recommendations    [x]  RN Referral:  [x]MST score >=2     []Enteral/Parenteral Nutrition PTA     []Pregnant: Gestational DM or Multigestation     []Pressure Ulcer/Wound Care needs        []  Low BMI  []  ADELE Lanza, [address] [place] [address]  Pager [phone]  Weekend Pager [phone]

## 2018-02-23 NOTE — PROGRESS NOTES
Patient is over-sedated and his ABG PCO2 is 55. All the oxygen in the world will not help his hypercarbic somnolence. D/c dilaudid and ativan. Increase pulmonary toilet. Decrease nursing ratio.

## 2018-02-24 PROCEDURE — 77010033678 HC OXYGEN DAILY

## 2018-02-24 PROCEDURE — 74011250636 HC RX REV CODE- 250/636: Performed by: SURGERY

## 2018-02-24 PROCEDURE — 65660000000 HC RM CCU STEPDOWN

## 2018-02-24 RX ADMIN — SODIUM CHLORIDE, SODIUM LACTATE, POTASSIUM CHLORIDE, AND CALCIUM CHLORIDE 125 ML/HR: 600; 310; 30; 20 INJECTION, SOLUTION INTRAVENOUS at 01:00

## 2018-02-24 RX ADMIN — ENOXAPARIN SODIUM 40 MG: 40 INJECTION SUBCUTANEOUS at 17:09

## 2018-02-24 RX ADMIN — Medication 10 ML: at 06:00

## 2018-02-24 RX ADMIN — SODIUM CHLORIDE, SODIUM LACTATE, POTASSIUM CHLORIDE, AND CALCIUM CHLORIDE 125 ML/HR: 600; 310; 30; 20 INJECTION, SOLUTION INTRAVENOUS at 09:00

## 2018-02-24 RX ADMIN — Medication 10 ML: at 13:13

## 2018-02-24 NOTE — PROGRESS NOTES
PCU SHIFT NURSING NOTE      Bedside and Verbal shift change report given to Carlo Dexter RN (oncoming nurse) by Gracy Jovel RN (offgoing nurse). Report included the following information SBAR, Kardex, Intake/Output, MAR, Recent Results and Cardiac Rhythm ST/SR. Shift Summary:   12 - Previous nurse assisted pt back to bed from chair as pt started leaning forward from chair and almost fell on his head; nurse prevented fall and pt assisted back to bed. Bed alarm intact & call bell within reach. Pt remains confused at this time. 1045 - O2 Sats 93-95% on 1L NC; attempted to place on RA. O2 Sats 89%. Placed pt back on 1L NC.   1142 - Pt's daughter at the bedside, complaining that she was called by staff last night to come sit with pt for his agitation and stating how pt seems to be fine. Pt begins to be agitated & restless; complaining that he's tired of being in the bed. Pt's daughter states that his \"sarcastic rudeness\" is his baseline personality and he is [place] mean\" to people. Pt's level of agitation with staff appears to be a \"normal behavior\" per the daughter. Daughter states that he wasn't confused until after the procedure. Daughter stating that she cannot stay today d/t other obligations. Bed alarm intact & call bell within reach. 1729 - Pt still attempting to crawl out of bed without assistance. Call bell within reach & bed alarm intact.        Admission Date 2/20/2018   Admission Diagnosis Gastric volvulus  Esophageal Hernia   Consults IP CONSULT TO HOSPITALIST        Consults   [x]PT   [x]OT   []Speech   []Case Management      [] Palliative      Cardiac Monitoring Order   [x]Yes   []No     IV drips   []Yes    Drip:                            Dose:  Drip:                            Dose:  Drip:                            Dose:   [x]No     GI Prophylaxis   []Yes   []No         DVT Prophylaxis   SCDs:  Sequential Compression Device: Bilateral          Leonel stockings:         [] Medication   []Contraindicated []None      Activity Level Activity Level: Up ad hemant, Up with Assistance     Activity Assistance: Partial (one person)   Purposeful Rounding every 1-2 hour? [x]Yes   Green Score  Total Score: 4   Bed Alarm (If score 3 or >)   [x]Yes   [] Refused (See signed refusal form in chart)   Urban Score  Urban Score: 19   Urban Score (if score 14 or less)   []PMT consult   []Wound Care consult      []Specialty bed   [] Nutrition consult          Needs prior to discharge:   Home O2 required:    []Yes   [x]No    If yes, how much O2 required? Other:    Last Bowel Movement: Last Bowel Movement Date: 02/20/18      Influenza Vaccine Received Flu Vaccine for Current Season (usually Sept-March): Yes        Pneumonia Vaccine           Diet Active Orders   Diet    DIET NPO      LDAs               Peripheral IV 02/24/18 Right; Lower Arm (Active)   Site Assessment Clean, dry, & intact 2/24/2018  6:15 AM   Phlebitis Assessment 0 2/24/2018  6:15 AM   Infiltration Assessment 0 2/24/2018  6:15 AM   Dressing Status Clean, dry, & intact 2/24/2018  6:15 AM   Dressing Type Tape;Transparent 2/24/2018  6:15 AM   Hub Color/Line Status Blue; Infusing 2/24/2018  6:15 AM                      Urinary Catheter [REMOVED] Urinary Catheter 02/21/18 Pichardo-Criteria for Appropriate Use: Strict I/Os, Surgical procedure    Intake & Output   Date 02/23/18 0700 - 02/24/18 0659 02/24/18 0700 - 02/25/18 0659   Shift 2571-64751859 1900-0659 24 Hour Total 0700-1859 1900-0659 24 Hour Total   I  N  T  A  K  E   P. O.  780 780         P. O.  780 780       I.V.  (mL/kg/hr) 1500  (1.8)  1500  (0.9)         Volume (lactated Ringers infusion) 1500  1500       Shift Total  (mL/kg) 1500  (22) 780  (11.4) 2280  (33.4)      O  U  T  P  U  T   Urine  (mL/kg/hr) 1250  (1.5) 180  (0.2) 1430  (0.9)         Urine Voided 350 180 530         Urine Occurrence(s)  1 x 1 x         Urine Output (mL) ([REMOVED] Urinary Catheter 02/21/18 Pichardo) 900  900       Drains 30  30 Output (ml) ([REMOVED] Alex Drain #1 02/21/18 Anterior;Right;Upper Abdomen) 30  30       Stool            Stool Occurrence(s)  1 x 1 x       Shift Total  (mL/kg) 1280  (18.8) 180  (2.6) 1460  (21.4)       600 820      Weight (kg) 68.2 68.2 68.2 68.2 68.2 68.2         Readmission Risk Assessment Tool Score Medium Risk            19       Total Score        3 Has Seen PCP in Last 6 Months (Yes=3, No=0)    2 . Living with Significant Other. Assisted Living. LTAC. SNF. or   Rehab    4 IP Visits Last 12 Months (1-3=4, 4=9, >4=11)    5 Pt.  Coverage (Medicare=5 , Medicaid, or Self-Pay=4)    5 Charlson Comorbidity Score (Age + Comorbid Conditions)        Criteria that do not apply:    Patient Length of Stay (>5 days = 3)       Expected Length of Stay 2d 16h   Actual Length of Stay 4

## 2018-02-24 NOTE — PROGRESS NOTES
Admit Date: 2018    POD 3 Days Post-Op    Procedure:  Procedure(s):  NISSEN FUNDOPLICATION ROBOTIC ASSISTED    Subjective:     Patient has no new complaints. Much more alert. No n/v.  ministerio water in large volumes. Objective:     Blood pressure 141/60, pulse 72, temperature 98.1 °F (36.7 °C), resp. rate 20, height 5' 7\" (1.702 m), weight 150 lb 6.7 oz (68.2 kg), SpO2 92 %. Temp (24hrs), Av °F (36.7 °C), Min:97.8 °F (36.6 °C), Max:98.5 °F (36.9 °C)      Physical Exam:  GENERAL: alert, cooperative, no distress, appears stated age, LUNG: clear to auscultation bilaterally, HEART: regular rate and rhythm, ABDOMEN: soft, non-tender. Bowel sounds normal. No masses,  no organomegaly, wounds c/d/i, EXTREMITIES:  extremities normal, atraumatic, no cyanosis or edema    Labs:   Recent Results (from the past 24 hour(s))   BLOOD GAS, ARTERIAL    Collection Time: 18 12:27 PM   Result Value Ref Range    pH 7.41 7.35 - 7.45      PCO2 55 (H) 35.0 - 45.0 mmHg    PO2 90 80 - 100 mmHg    O2 SAT 97 92 - 97 %    BICARBONATE 34 (H) 22 - 26 mmol/L    BASE EXCESS 7.5 mmol/L    O2 METHOD VENTURI MASK      O2 FLOW RATE 12.00 L/min    FIO2 50 %    Sample source ARTERIAL      SITE RIGHT BRACHIAL      FINESSE'S TEST N/A         Data Review images and reports reviewed    Assessment:     Active Problems:    Gastric volvulus (2018)        Plan/Recommendations/Medical Decision Making:     Continue present treatment   Full liquid diet  Pt/ot    Jessica Castano MD, [facility]

## 2018-02-24 NOTE — PROGRESS NOTES
Problem: Falls - Risk of  Goal: *Absence of Falls  Document Dexter Fall Risk and appropriate interventions in the flowsheet.    Outcome: Progressing Towards Goal  Fall Risk Interventions:  Mobility Interventions: OT consult for ADLs, PT Consult for mobility concerns    Mentation Interventions: Bed/chair exit alarm, Door open when patient unattended, Evaluate medications/consider consulting pharmacy    Medication Interventions: Evaluate medications/consider consulting pharmacy    Elimination Interventions: Patient to call for help with toileting needs, Toileting schedule/hourly rounds    History of Falls Interventions: Evaluate medications/consider consulting pharmacy

## 2018-02-24 NOTE — PROGRESS NOTES
Bedside shift change report given to Gamal Roberts RN (oncoming nurse) by Theron Dyer RN (offgoing nurse). Report given with SBAR, MAR, Recent Results, Vital Signs, and plan of care. Pt is alert and oriented x 2 . Call bell within reach of patient. Safety/fall precautions in place. Remote sitter in place. Bed alarm under patient and operating well.

## 2018-02-25 LAB
BACTERIA SPEC CULT: NORMAL
SERVICE CMNT-IMP: NORMAL

## 2018-02-25 PROCEDURE — G8979 MOBILITY GOAL STATUS: HCPCS

## 2018-02-25 PROCEDURE — 97530 THERAPEUTIC ACTIVITIES: CPT

## 2018-02-25 PROCEDURE — 77010033678 HC OXYGEN DAILY

## 2018-02-25 PROCEDURE — 97161 PT EVAL LOW COMPLEX 20 MIN: CPT

## 2018-02-25 PROCEDURE — 65660000000 HC RM CCU STEPDOWN

## 2018-02-25 PROCEDURE — 74011250636 HC RX REV CODE- 250/636: Performed by: SURGERY

## 2018-02-25 PROCEDURE — G8978 MOBILITY CURRENT STATUS: HCPCS

## 2018-02-25 RX ADMIN — Medication 10 ML: at 14:41

## 2018-02-25 RX ADMIN — SODIUM CHLORIDE, SODIUM LACTATE, POTASSIUM CHLORIDE, AND CALCIUM CHLORIDE 125 ML/HR: 600; 310; 30; 20 INJECTION, SOLUTION INTRAVENOUS at 08:47

## 2018-02-25 RX ADMIN — ENOXAPARIN SODIUM 40 MG: 40 INJECTION SUBCUTANEOUS at 14:43

## 2018-02-25 RX ADMIN — SODIUM CHLORIDE, SODIUM LACTATE, POTASSIUM CHLORIDE, AND CALCIUM CHLORIDE 125 ML/HR: 600; 310; 30; 20 INJECTION, SOLUTION INTRAVENOUS at 19:25

## 2018-02-25 NOTE — PROGRESS NOTES
Problem: Mobility Impaired (Adult and Pediatric)  Goal: *Acute Goals and Plan of Care (Insert Text)  Physical Therapy Goals  Initiated 2/25/2018  1. Patient will move from supine to sit and sit to supine , scoot up and down and roll side to side in bed with independence within 7 day(s). 2.  Patient will transfer from bed to chair and chair to bed with independence using the least restrictive device within 7 day(s). 3.  Patient will perform sit to stand with independence within 7 day(s). 4.  Patient will ambulate with independence for 1000 feet with SPO2 >90% and the least restrictive device within 7 day(s). 5.  Patient will ascend/descend 12 stairs with single handrail(s) with independence within 7 day(s). physical Therapy EVALUATION  Patient: Kaitlin Lentz (29 y.o. male)  Date: 2/25/2018  Primary Diagnosis: Gastric volvulus  Esophageal Hernia  Procedure(s) (LRB):  NISSEN FUNDOPLICATION ROBOTIC ASSISTED (N/A) 4 Days Post-Op   Precautions:   Fall, Bed Alarm    ASSESSMENT :  Based on the objective data described below, the patient presents with impaired safety awareness, cognitive deficits, decreased stability and activity tolerance impacting functional mobility. Patient I PTA despite past CVA; this was confirmed by visitors who arrived mid-session as detailed below. Received at bedside chair and agreeable; per chart, nurse, patient with multiple episodes of AMS and agitation with family reporting patient is NOT at mental baseline. Per patient, he is at baseline, denying mental changes and pleasant during session. However, he was quite impulsive, with impaired stability and even posture during transfers indicative of fall risk at this time despite requiring no more than CGA for mobility without AD. Patient with significant drop in SPO2 and tachycardia with gait; lowest 70% RA activity and 's with patient endorsing minor WEST. He rested at >90% on 3L NCO2 with HR at .         Patient would benefit from additional cognitive screening ahead given concerns noted this date. In addition, teaching on energy conservation and activity tolerance is appropriate alongside ? Use of assistive device although patient likely to refuse use of. He'd also benefit from more formal balance testing (Solorio/FGA/TUG, etc) ahead. Recommend HH (at least safety evaluation) at [facility] at this time. Recommend with nursing patient to complete as able in order to maintain strength, endurance and independence: OOB to chair 3x/day and walking daily with CGA assist with VS monitored. Thank you for your assistance. Patient will benefit from skilled intervention to address the above impairments. Patients rehabilitation potential is considered to be Good  Factors which may influence rehabilitation potential include:   []         None noted  [x]         Mental ability/status  []         Medical condition  [x]         Home/family situation and support systems  [x]         Safety awareness  []         Pain tolerance/management  []         Other:      PLAN :  Recommendations and Planned Interventions:  [x]           Bed Mobility Training             []    Neuromuscular Re-Education  [x]           Transfer Training                   []    Orthotic/Prosthetic Training  [x]           Gait Training                         []    Modalities  [x]           Therapeutic Exercises           []    Edema Management/Control  [x]           Therapeutic Activities            [x]    Patient and Family Training/Education  []           Other (comment):    Frequency/Duration: Patient will be followed by physical therapy  5 times a week to address goals.   Discharge Recommendations: Home Health (at least safety evaluation) likely  Further Equipment Recommendations for Discharge: likely none     SUBJECTIVE:   Patient stated I remember you walked me across the trivedi\" patient reports remembering author from admission s/p CVA (author did work with patient then) OBJECTIVE DATA SUMMARY:   HISTORY:    Past Medical History:   Diagnosis Date    GERD (gastroesophageal reflux disease)     High cholesterol     Hypertension     Stroke ([facility] [address] 03/2017    numbness in fingertips on R hand; legs;stammering     Past Surgical History:   Procedure Laterality Date    HX CHOLECYSTECTOMY      HX HEENT Right     cataract     Prior Level of Function/Home Situation: Patient report with friends (Orthodox friends) arriving who confirmed: independent community ambulator living at home with spouse (see below), using no AD, wearing glasses and hearing aids, without fall history. Denies residual deficits from prior CVA. He does 'anything and everything' he wants reporting I with ADL's. No home O2 use. Personal factors and/or comorbidities impacting plan of care: ? Cognitive deficits, home support (caring for spouse, who is recovering s/p hip surgery and per nurse '[id number] [facility] Environment: Private residence  # Steps to Enter: 0  One/Two Story Residence: Two story, live on 1st floor  # of Interior Steps: 15  Height of Each Step (in): 8 inches  Interior Rails: Right  Lift Chair Available: No  Living Alone: No  Support Systems: Spouse/Significant Other/Partner, Child(morena)  Patient Expects to be Discharged to[de-identified] Private residence  Current DME Used/Available at Home: None    EXAMINATION/PRESENTATION/DECISION MAKING:   Critical Behavior:  Neurologic State: Alert, Confused  Orientation Level: Disoriented to time, Oriented to place, Oriented to person, Oriented to situation  Cognition: Decreased attention/concentration, Poor safety awareness  Safety/Judgement: Decreased awareness of need for safety  Hearing: Auditory  Auditory Impairment: Hard of hearing, bilateral  Hearing Aids/Status: At home  Range Of Motion:  AROM: Within functional limits                       Strength:    Strength:  Within functional limits                    Tone & Sensation:   Tone: Normal Sensation: Intact               Coordination:  Coordination: Within functional limits  Vision:  Wears glasses, but not present     Functional Mobility:     Transfers:  Sit to Stand: Contact guard assistance  Stand to Sit: Contact guard assistance (excessive forward head lean )                       Balance:   Sitting: Intact; Without support  Standing: Impaired; Without support  Standing - Static: Good  Standing - Dynamic : Fair  Ambulation/Gait Training:  Distance (ft): 310 Feet (ft) (multiple rest periods, standing/sitting for HR/O2)  Assistive Device: Gait belt  Ambulation - Level of Assistance: Stand-by assistance;Contact guard assistance (CGA for deviations; self corrects)     Gait Description (WDL): Exceptions to WDL  Gait Abnormalities: Altered arm swing;Path deviations (L lateral deviations most often)                          Interventions: Verbal cues; Tactile cues; Safety awareness training        Gait Speed:    Utilizing distance of 22 feet for test, as well as feet to meters calculation, patient ambulated at 0.83 m/s with self-selected gait speed. Ciro KC. \"Comfortable and maximum walking speed of adults aged 20-79 years: reference values and determinants. \" Age and Agin Volume 26(1):15-9. Edith Lima. \"Age- and gender-related test performance in community-dwelling elderly people: Six-Minute Walk Test, Solorio Balance Scale, Timed Up & Go Test, and gait speeds. \" Physical Therapy: 2002 Volume 82(2):128-37. Lisa Fulton DM, Edwin FLOREZ, Keo LEALD, [facility] CARMELINA. \"Assessing stability and change of four performance measures: a longitudinal study evaluating outcome following total hip and knee arthroplasty. \" [facility] Musculoskeletal Disorders: 2005 Volume 6(3). Melvi Watson, PhD; Uri Avelar, PhD. Carter Alcazar Paper: \"Walking Speed: the Sixth Vital Sign\" Journal of Geriatric Physical Therapy: 2009 - Volume 32 - Issue 2 - p 25 . Jameson Graves DPT;  Ralph Adan PhD; Milla Child PT PHD. Walking Speed: The Functional Vital Sign. Journal of Aging Phys Act 2015 April; 23(2):314-322. Stairs:  Number of Stairs Trained: 4  Stairs - Level of Assistance: Contact guard assistance   Rail Use: Right  (step-to; excessive knee/hip flexion )    Functional Measure:  Barthel Index:    Bathin  Bladder: 10  Bowels: 10  Groomin  Dressing: 10  Feeding: 10  Mobility: 10  Stairs: 5  Toilet Use: 5  Transfer (Bed to Chair and Back): 10  Total: 75       Barthel and G-code impairment scale:  Percentage of impairment CH  0% CI  1-19% CJ  20-39% CK  40-59% CL  60-79% CM  80-99% CN  100%   Barthel Score 0-100 100 99-80 79-60 59-40 20-39 1-19   0   Barthel Score 0-20 20 17-19 13-16 9-12 5-8 1-4 0      The Barthel ADL Index: Guidelines  1. The index should be used as a record of what a patient does, not as a record of what a patient could do. 2. The main aim is to establish degree of independence from any help, physical or verbal, however minor and for whatever reason. 3. The need for supervision renders the patient not independent. 4. A patient's performance should be established using the best available evidence. Asking the patient, friends/relatives and nurses are the usual sources, but direct observation and common sense are also important. However direct testing is not needed. 5. Usually the patient's performance over the preceding 24-48 hours is important, but occasionally longer periods will be relevant. 6. Middle categories imply that the patient supplies over 50 per cent of the effort. 7. Use of aids to be independent is allowed. Malia Kenny., Barthel, D.W. ([id number]). Functional evaluation: the Barthel Index. [address] [facility] (14)2. Norval Low aby [place], J.J.M.F, Mague Dias., Natalie Glez., Luis, [address] [place] [address] Ave (). Measuring the change indisability after inpatient rehabilitation; comparison of the responsiveness of the Barthel Index and Functional Merrick Measure.  Journal of Neurology, Neurosurgery, and Psychiatry, 66(4), 161-591. KEITH Barth, SONYA Kevin, & Elaine Frank M.A. (2004.) Assessment of post-stroke quality of life in cost-effectiveness studies: The usefulness of the Barthel Index and the EuroQoL-5D. Quality of Life Research, 13, 039-35       G codes: In compliance with CMSs Claims Based Outcome Reporting, the following G-code set was chosen for this patient based on their primary functional limitation being treated: The outcome measure chosen to determine the severity of the functional limitation was the Barthel with a score of 75/100 which was correlated with the impairment scale. ? Mobility - Walking and Moving Around:     - CURRENT STATUS: CJ - 20%-39% impaired, limited or restricted    - GOAL STATUS: CI - 1%-19% impaired, limited or restricted    - D/C STATUS:  ---------------To be determined---------------    Pain:  Pain Scale 1: Numeric (0 - 10)  Pain Intensity 1: 0              Activity Tolerance:   SPO2 down to 70% on RA with activity; stable on 4L NCO2 with activity and 3L NCO2 at rest.     Please refer to the flowsheet for vital signs taken during this treatment. After treatment:   [x]         Patient left in no apparent distress sitting up in chair  []         Patient left in no apparent distress in bed  [x]         Call bell left within reach  [x]         Nursing notified  [x]         Caregiver present  [x]         Bed alarm activated    COMMUNICATION/EDUCATION:   The patients plan of care was discussed with: Registered Nurse. [x]         Fall prevention education was provided and the patient/caregiver indicated understanding. [x]         Patient/family have participated as able in goal setting and plan of care. [x]         Patient/family agree to work toward stated goals and plan of care. []         Patient understands intent and goals of therapy, but is neutral about his/her participation.   []         Patient is unable to participate in goal setting and plan of care.     Thank you for this referral.  Lucie Thurston, PT, DPT, CEEAA      Time Calculation: 24 mins

## 2018-02-25 NOTE — PROGRESS NOTES
Admit Date: 2018    POD 4 Days Post-Op    Procedure:  Procedure(s):  NISSEN FUNDOPLICATION ROBOTIC ASSISTED    Subjective:     Patient has no new complaints. Much more alert. No n/v.  ministerio full liquids. desat to 70 on RA and HR to 140 with ambulation with PT    Objective:     Blood pressure 154/66, pulse 72, temperature 97.7 °F (36.5 °C), resp. rate 20, height 5' 7\" (1.702 m), weight 179 lb 0.2 oz (81.2 kg), SpO2 91 %. Temp (24hrs), Av °F (36.7 °C), Min:97.6 °F (36.4 °C), Max:98.3 °F (36.8 °C)      Physical Exam:  GENERAL: alert, cooperative, no distress, appears stated age, LUNG: clear to auscultation bilaterally, HEART: regular rate and rhythm, ABDOMEN: soft, non-tender. Bowel sounds normal. No masses,  no organomegaly, wounds c/d/i, EXTREMITIES:  extremities normal, atraumatic, no cyanosis or edema    Labs:   No results found for this or any previous visit (from the past 24 hour(s)). Data Review images and reports reviewed    Assessment:     Active Problems:    Gastric volvulus (2018)        Plan/Recommendations/Medical Decision Making:     Continue present treatment   Full liquid diet  Pt/ot eval ongoing  Wilkins back tomorrow. Bishop Huffman.  Eric Bauman MD, [facility]

## 2018-02-25 NOTE — PROGRESS NOTES
PCU SHIFT NURSING NOTE      Bedside shift change report given to Amanda (oncoming nurse) by Jacqueline Zambrano (offgoing nurse). Report included the following information SBAR. Shift Summary:       Admission Date 2/20/2018   Admission Diagnosis Gastric volvulus  Esophageal Hernia   Consults IP CONSULT TO HOSPITALIST        Consults   []PT   []OT   []Speech   []Case Management      [] Palliative      Cardiac Monitoring Order   []Yes   []No     IV drips   []Yes    Drip:                            Dose:  Drip:                            Dose:  Drip:                            Dose:   []No     GI Prophylaxis   []Yes   []No         DVT Prophylaxis   SCDs:  Sequential Compression Device: Bilateral          Leonel stockings:         [] Medication   []Contraindicated   []None      Activity Level Activity Level: Up with Assistance (to urinate)     Activity Assistance: Partial (two people)   Purposeful Rounding every 1-2 hour? []Yes   Green Score  Total Score: 4   Bed Alarm (If score 3 or >)   []Yes   [] Refused (See signed refusal form in chart)   Urban Score  Urban Score: 19   Urban Score (if score 14 or less)   []PMT consult   []Wound Care consult      []Specialty bed   [] Nutrition consult          Needs prior to discharge:   Home O2 required:    []Yes   []No    If yes, how much O2 required? Other:    Last Bowel Movement: Last Bowel Movement Date: 02/24/18      Influenza Vaccine Received Flu Vaccine for Current Season (usually Sept-March): Yes        Pneumonia Vaccine           Diet Active Orders   Diet    DIET FULL LIQUID      LDAs               Peripheral IV 02/24/18 Right; Lower Arm (Active)   Site Assessment Clean, dry, & intact 2/25/2018  3:28 AM   Phlebitis Assessment 0 2/25/2018  3:28 AM   Infiltration Assessment 0 2/25/2018  3:28 AM   Dressing Status Clean, dry, & intact 2/25/2018  3:28 AM   Dressing Type Transparent 2/25/2018  3:28 AM   Hub Color/Line Status Blue; Infusing 2/24/2018  2:22 PM   Alcohol Cap Used Yes 2/25/2018  3:28 AM                      Urinary Catheter [REMOVED] Urinary Catheter 02/21/18 Pichardo-Criteria for Appropriate Use: Strict I/Os, Surgical procedure    Intake & Output   Date 02/24/18 0700 - 02/25/18 0659 02/25/18 0700 - 02/26/18 0659   Shift 2310-3293 2598-5820 24 Hour Total 6484-4972 7805-6509 24 Hour Total   I  N  T  A  K  E   P.O. 240  240         P. O. 240  240       I.V.  (mL/kg/hr) 922.9  (1.1)  922.9  (0.5)         Volume (lactated Ringers infusion) 922.9  922.9       Shift Total  (mL/kg) 1162.9  (17)  1162.9  (14.3)      O  U  T  P  U  T   Urine  (mL/kg/hr) 300  (0.4) 225  (0.2) 525  (0.3)         Urine Voided 300 225 525         Urine Occurrence(s) 1 x  1 x       Shift Total  (mL/kg) 300  (4.4) 225  (2.8) 525  (6.5)      .9 -225 637.9      Weight (kg) 68.2 81.2 81.2 81.2 81.2 81.2         Readmission Risk Assessment Tool Score Medium Risk            19       Total Score        3 Has Seen PCP in Last 6 Months (Yes=3, No=0)    2 . Living with Significant Other. Assisted Living. LTAC. SNF. or   Rehab    4 IP Visits Last 12 Months (1-3=4, 4=9, >4=11)    5 Pt.  Coverage (Medicare=5 , Medicaid, or Self-Pay=4)    5 Charlson Comorbidity Score (Age + Comorbid Conditions)        Criteria that do not apply:    Patient Length of Stay (>5 days = 3)       Expected Length of Stay 2d 16h   Actual Length of Stay 5

## 2018-02-26 PROCEDURE — 74011250636 HC RX REV CODE- 250/636: Performed by: SURGERY

## 2018-02-26 PROCEDURE — 74011250637 HC RX REV CODE- 250/637: Performed by: SURGERY

## 2018-02-26 PROCEDURE — 97535 SELF CARE MNGMENT TRAINING: CPT | Performed by: OCCUPATIONAL THERAPIST

## 2018-02-26 PROCEDURE — 97116 GAIT TRAINING THERAPY: CPT

## 2018-02-26 PROCEDURE — 65270000029 HC RM PRIVATE

## 2018-02-26 PROCEDURE — G8987 SELF CARE CURRENT STATUS: HCPCS | Performed by: OCCUPATIONAL THERAPIST

## 2018-02-26 PROCEDURE — G8988 SELF CARE GOAL STATUS: HCPCS | Performed by: OCCUPATIONAL THERAPIST

## 2018-02-26 PROCEDURE — 97530 THERAPEUTIC ACTIVITIES: CPT

## 2018-02-26 PROCEDURE — 77010033678 HC OXYGEN DAILY

## 2018-02-26 PROCEDURE — 97165 OT EVAL LOW COMPLEX 30 MIN: CPT | Performed by: OCCUPATIONAL THERAPIST

## 2018-02-26 RX ORDER — METOPROLOL TARTRATE 25 MG/1
12.5 TABLET, FILM COATED ORAL 2 TIMES DAILY
Status: DISCONTINUED | OUTPATIENT
Start: 2018-02-26 | End: 2018-02-28 | Stop reason: HOSPADM

## 2018-02-26 RX ADMIN — Medication 10 ML: at 05:50

## 2018-02-26 RX ADMIN — METOPROLOL TARTRATE 12.5 MG: 50 TABLET ORAL at 22:09

## 2018-02-26 RX ADMIN — Medication 10 ML: at 22:09

## 2018-02-26 RX ADMIN — Medication 10 ML: at 14:04

## 2018-02-26 RX ADMIN — SODIUM CHLORIDE, SODIUM LACTATE, POTASSIUM CHLORIDE, AND CALCIUM CHLORIDE 125 ML/HR: 600; 310; 30; 20 INJECTION, SOLUTION INTRAVENOUS at 03:32

## 2018-02-26 RX ADMIN — ENOXAPARIN SODIUM 40 MG: 40 INJECTION SUBCUTANEOUS at 14:05

## 2018-02-26 NOTE — PROGRESS NOTES
Subjective:    No agitation today. Objective:    Blood pressure 160/78, pulse 77, temperature 98 °F (36.7 °C), resp. rate 18, height 5' 7\" (1.702 m), weight 81.2 kg (179 lb 0.2 oz), SpO2 100 %. Exam - presently A&O, NAD, CTAB, RRR, adb soft, approp TTP, CDI. Assessment:   ROBOTIC paraesophageal hernia repair 2/21/2018    Active Hospital Problems    Diagnosis Date Noted    Gastric volvulus 02/20/2018     Plan:  - move to [facility].  - increase activity. - trial GI lite diet. - anticipate d/c Wednesday, possibly with home PT.       Alm Osgood, MD

## 2018-02-26 NOTE — PROGRESS NOTES
Nutrition Assessment:    INTERVENTIONS/RECOMMENDATIONS:   Meals/Snacks: General/healthful diet: GI lite diet    ASSESSMENT:   Patient medically noted for paraesophageal hernia and gastric volvulus. Has been tolerating full liquids well with good PO intake. Patient anxious to have diet advanced; reports MD said he could have solids today but orders didn't reflect this. Nursing entered room during visit and confirmed plans for GI lite diet. Nursing placed order for GI lite diet and patient very happy. Menu provided and room service explained. Patient excited about having squash and eggs. Expect good PO intake as diet advances. Will monitor PO and diet tolerance. Diet Order:  (GI Lite)  % Eaten:  Patient Vitals for the past 72 hrs:   % Diet Eaten   02/26/18 0930 80 %   02/24/18 1312 100 %     Pertinent Medications: [x] Reviewed []Other: PRN zofran  Pertinent Labs: [x]Reviewed  []Other:   Food Allergies: [x]None []Other:     Last BM: 2/24   [x]Active     []Hyperactive  []Hypoactive       [] Absent  BS  Skin:    [] Intact   [x] Incision  [] Breakdown   []Edema   []Other:    Anthropometrics: Height: 5' 7\" (170.2 cm) Weight: 81.2 kg (179 lb 0.2 oz)    IBW (%IBW):   ( ) UBW (%UBW):   (  %)    BMI: Body mass index is 28.04 kg/(m^2). This BMI is indicative of:  []Underweight   []Normal   [x]Overweight   [] Obesity   [] Extreme Obesity (BMI>40)  Last Weight Metrics:  Weight Loss Metrics 2/25/2018 6/26/2017 3/21/2017 3/19/2017 1/18/2011   Today's Wt 179 lb 0.2 oz 177 lb 182 lb 188 lb 15 oz 190 lb   BMI 28.04 kg/m2 28.14 kg/m2 28.51 kg/m2 29.59 kg/m2 28.05 kg/m2       Estimated Nutrition Needs (Based on): 1883 Kcals/day (BMR (1449) x 1. 3AF) , 81 g (1.0 g/kg bw) Protein  Carbohydrate:  At Least 130 g/day  Fluids: 1850 mL/day     Pt expected to meet estimated nutrient needs: [x]Yes []No    NUTRITION DIAGNOSES:   Problem:  Inadequate energy intake      Etiology: related to GOO     Signs/Symptoms: as evidenced by NPO, NGT in place to suction      NUTRITION INTERVENTIONS:  Meals/Snacks: General/healthful diet                  GOAL:   PO intake >70% of meals next 3-5 days    NUTRITION MONITORING AND EVALUATION   Food/Nutrient Intake Outcomes:  Total energy intake  Physical Signs/Symptoms Outcomes: Weight/weight change, GI profile, Electrolyte and renal profile    Previous Goal Met:   [x] Met              [] Progressing Towards Goal              [] Not Progressing Towards Goal   Previous Recommendations:   [x] Implemented          [] Not Implemented          [] Not Applicable    LEARNING NEEDS (Diet, Food/Nutrient-Drug Interaction):    [x] None Identified   [] Identified and Education Provided/Documented   [] Identified and Pt declined/was not appropriate     Cultural, Samaritan, OR Ethnic Dietary Needs:    [x] None Identified   [] Identified and Addressed     [x] Interdisciplinary Care Plan Reviewed/Documented    [x] Discharge Planning: Heart healthy diet    [] Participated in Interdisciplinary Rounds    NUTRITION RISK:    [] High              [x] Moderate           []  Low  []  Minimal/Uncompromised      Erick Garza  Pager [phone]              Weekend Pager [phone]

## 2018-02-26 NOTE — PROGRESS NOTES
Bedside report given to Connie Barillas RN. Pt resting quietly in bed, no complaints or needs expressed. Relinquishing care at this time.

## 2018-02-26 NOTE — PROGRESS NOTES
Problem: Self Care Deficits Care Plan (Adult)  Goal: *Acute Goals and Plan of Care (Insert Text)  Occupational Therapy Goals  Initiated 2018  1. Patient will perform grooming while standing at the sink with modified independence within 7 day(s). 2.  Patient will perform bathing with supervision/set-up within 7 day(s). 3.  Patient will perform lower body dressing with modified independence within 7 day(s). 4.  Patient will perform toilet transfers with modified independence within 7 day(s). 5.  Patient will perform all aspects of toileting with modified independence within 7 day(s). Occupational Therapy EVALUATION  Patient: Ann-Marie Ortega (89 y.o. male)  Date: 2018  Primary Diagnosis: Gastric volvulus  Esophageal Hernia  Procedure(s) (LRB):  NISSEN FUNDOPLICATION ROBOTIC ASSISTED (N/A) 5 Days Post-Op   Precautions:  Fall, Bed Alarm    ASSESSMENT :  Based on the objective data described below, the patient presents with deficits in self-care, primarily due to  safety, general weakness, decreased activity tolerance, decreased dynamic balance, and decreased cognition. He lives at home with his wife who has dementia. Apparently he does most of the IADL. His daughter lives nearby and is very supportive. He will benefit from skilled OT treatment to maximize independence and safety in ADL. Patient will benefit from skilled intervention to address the above impairments.   Patients rehabilitation potential is considered to be Good  Factors which may influence rehabilitation potential include:   []             None noted  [x]             Mental ability/status  []             Medical condition  []             Home/family situation and support systems  [x]             Safety awareness  []             Pain tolerance/management  []             Other:      PLAN :  Recommendations and Planned Interventions:  [x]               Self Care Training                  [x]        Therapeutic Activities  [x] Functional Mobility Training    [x]        Cognitive Retraining  [x]               Therapeutic Exercises           [x]        Endurance Activities  [x]               Balance Training                   []        Neuromuscular Re-Education  []               Visual/Perceptual Training     [x]   Home Safety Training  [x]               Patient Education                 [x]        Family Training/Education  []               Other (comment):    Frequency/Duration: Patient will be followed by occupational therapy 3 times a week to address goals. Discharge Recommendations: Home Health  Further Equipment Recommendations for Discharge: To be determined     SUBJECTIVE:   Patient stated Look who's here.  (Patient's daughter and wife arrived near the end of the session.)    OBJECTIVE DATA SUMMARY:   HISTORY:   Past Medical History:   Diagnosis Date    GERD (gastroesophageal reflux disease)     High cholesterol     Hypertension     Stroke ([facility] [address] 03/2017    numbness in fingertips on R hand; legs;stammering     Past Surgical History:   Procedure Laterality Date    HX CHOLECYSTECTOMY      HX HEENT Right     cataract       Prior Level of Function/Environment/Context: see above  Expanded or extensive additional review of patient history:     Home Situation  Home Environment: Private residence  # Steps to Enter: 0  One/Two Story Residence: Two story, live on 1st floor  # of Interior Steps: 15  Height of Each Step (in): 8 inches  Interior Rails: Right  Lift Chair Available: No  Living Alone: No  Support Systems: Spouse/Significant Other/Partner, Child(morena)  Patient Expects to be Discharged to[de-identified] Private residence  Current DME Used/Available at Home: None  Tub or Shower Type: Shower  [x]  Right hand dominant   []  Left hand dominant    EXAMINATION OF PERFORMANCE DEFICITS:  Cognitive/Behavioral Status:  Neurologic State: Alert;Confused  Orientation Level: Oriented to person;Oriented to place;Oriented to situation;Disoriented to time  Cognition: Follows commands;Poor safety awareness  Perception: Appears intact  Perseveration: No perseveration noted  Safety/Judgement: Awareness of environment;Decreased awareness of need for safety      Hearing: Auditory  Auditory Impairment: Hard of hearing, bilateral  Hearing Aids/Status: At home    Vision/Perceptual:    Not tested                                Range of Motion:  AROM: Within functional limits                         Strength:  Strength: Within functional limits                Coordination:  Coordination: Within functional limits  Fine Motor Skills-Upper: Left Intact; Right Intact    Gross Motor Skills-Upper: Left Intact; Right Intact    Tone & Sensation:  Tone: Normal  Sensation: Intact                      Balance:  Sitting: Intact; With support  Standing: Impaired  Standing - Static: Good  Standing - Dynamic : Fair    Functional Mobility and Transfers for ADLs:  Bed Mobility:       Transfers:  Sit to Stand: Contact guard assistance  Stand to Sit: Contact guard assistance  Toilet Transfer : Contact guard assistance    ADL Assessment:  Feeding: Supervision    Oral Facial Hygiene/Grooming: Contact guard assistance    Bathing: Moderate assistance    Upper Body Dressing: Supervision    Lower Body Dressing: Moderate assistance    Toileting: Minimum assistance                ADL Intervention and task modifications:  Discussed safety and fall prevention during ADL. Provided verbal and visual cues throughout most tasks for safety.          Cognitive Retraining  Safety/Judgement: Awareness of environment;Decreased awareness of need for safety    Functional Measure:  Barthel Index:    Bathin  Bladder: 10  Bowels: 10  Groomin  Dressin  Feeding: 10  Mobility: 10  Stairs: 5  Toilet Use: 5  Transfer (Bed to Chair and Back): 10  Total: 70       Barthel and G-code impairment scale:  Percentage of impairment CH  0% CI  1-19% CJ  20-39% CK  40-59% CL  60-79% CM  80-99% CN  100%   Barthel Score 0-100 100 99-80 79-60 59-40 20-39 1-19   0   Barthel Score 0-20 20 17-19 13-16 9-12 5-8 1-4 0      The Barthel ADL Index: Guidelines  1. The index should be used as a record of what a patient does, not as a record of what a patient could do. 2. The main aim is to establish degree of independence from any help, physical or verbal, however minor and for whatever reason. 3. The need for supervision renders the patient not independent. 4. A patient's performance should be established using the best available evidence. Asking the patient, friends/relatives and nurses are the usual sources, but direct observation and common sense are also important. However direct testing is not needed. 5. Usually the patient's performance over the preceding 24-48 hours is important, but occasionally longer periods will be relevant. 6. Middle categories imply that the patient supplies over 50 per cent of the effort. 7. Use of aids to be independent is allowed. Jan Pickett., Barthel, D.W. (4289). Functional evaluation: the Barthel Index. [facility] (14)2. CELY Riojas, Augusta Espinosa., Lorraine Beckford., [place], [facility] (1999). Measuring the change indisability after inpatient rehabilitation; comparison of the responsiveness of the Barthel Index and Functional [place] Measure. Journal of Neurology, Neurosurgery, and Psychiatry, 66(4), 320-111. Emi Nguyen, N.J.A, GEOVANNA Kevin.J.VAZQUEZ, & Shelly Barlow, M.A. (2004.) Assessment of post-stroke quality of life in cost-effectiveness studies: The usefulness of the Barthel Index and the EuroQoL-5D. Quality of Life Research, 13, 477-98       G codes: In compliance with CMSs Claims Based Outcome Reporting, the following G-code set was chosen for this patient based on their primary functional limitation being treated:     The outcome measure chosen to determine the severity of the functional limitation was the Barthel Index with a score of 70/100 which was correlated with the impairment scale. ? Self Care:     - CURRENT STATUS: CJ - 20%-39% impaired, limited or restricted    - GOAL STATUS: CI - 1%-19% impaired, limited or restricted    - D/C STATUS:  ---------------To be determined---------------     Occupational Therapy Evaluation Charge Determination   History Examination Decision-Making   LOW Complexity : Brief history review  MEDIUM Complexity : 3-5 performance deficits relating to physical, cognitive , or psychosocial skils that result in activity limitations and / or participation restrictions LOW Complexity : No comorbidities that affect functional and no verbal or physical assistance needed to complete eval tasks       Based on the above components, the patient evaluation is determined to be of the following complexity level: LOW   Pain:  Pain Scale 1: Numeric (0 - 10)  Pain Intensity 1: 0              Activity Tolerance:   Fair  After treatment:   [x] Patient left in no apparent distress sitting up in chair  [] Patient left in no apparent distress in bed  [x] Call bell left within reach  [x] Nursing notified  [x] Caregiver present  [x] Bed alarm activated    COMMUNICATION/EDUCATION:   The patients plan of care was discussed with: Physical Therapist and Registered Nurse. [x] Home safety education was provided and the patient/caregiver indicated understanding. [] Patient/family have participated as able in goal setting and plan of care. [x] Patient/family agree to work toward stated goals and plan of care. [] Patient understands intent and goals of therapy, but is neutral about his/her participation. [] Patient is unable to participate in goal setting and plan of care. This patients plan of care is appropriate for delegation to [facility].     Thank you for this referral.  Emmie Bojorquez, OTR/L  Time Calculation: 29 mins

## 2018-02-26 NOTE — PROGRESS NOTES
Problem: Mobility Impaired (Adult and Pediatric)  Goal: *Acute Goals and Plan of Care (Insert Text)  Physical Therapy Goals  Initiated 2/25/2018  1. Patient will move from supine to sit and sit to supine , scoot up and down and roll side to side in bed with independence within 7 day(s). 2.  Patient will transfer from bed to chair and chair to bed with independence using the least restrictive device within 7 day(s). 3.  Patient will perform sit to stand with independence within 7 day(s). 4.  Patient will ambulate with independence for 1000 feet with SPO2 >90% and the least restrictive device within 7 day(s). 5.  Patient will ascend/descend 12 stairs with single handrail(s) with independence within 7 day(s). physical Therapy TREATMENT  Patient: Artis Gannon (89 y.o. male)  Date: 2/26/2018  Diagnosis: Gastric volvulus  Esophageal Hernia <principal problem not specified>  Procedure(s) (LRB):  NISSEN FUNDOPLICATION ROBOTIC ASSISTED (N/A) 5 Days Post-Op  Precautions: Fall, Bed Alarm  Chart, physical therapy assessment, plan of care and goals were reviewed. ASSESSMENT:  Patient remains limited in large by sudden tachycardia with activity, alongside continued O2 desaturation. While he has improved in terms of gait distance, assistance level (no more than S throughout), he was mildly unsteady but self correcting during high level dual-task demand within trivedi. Patient's HR stable for most of gait activity at 100-110's, however after approximately 500' patient's HR up to 140-150's. Patient denied subjective tachycardia; HR returned once resting within minutes of seated rest. During activity, patient required up to 4L NCO2 despite excellent return of pursed lip breathing teaching with 2L NCO2 adequate at rest. RN notified of labile HR. Patient + screen for dementia based on Mini-Cog result with score of 2 (<3 indicative of +) indicative of need for further assessment of ? Cognitive impairment.  Recommend f/u to OP neuropsychiatry for further testing, especially given patient's spouse 'has dementia' per spouse and family. RN notified. Patient likely to benefit from OP pulmonary rehab at [place] given continued activity intolerance and prior high level of mobility. He is very interested in this and eager to return to prior functioning. Progression toward goals:  []    Improving appropriately and progressing toward goals  [x]    Improving slowly and progressing toward goals  []    Not making progress toward goals and plan of care will be adjusted     PLAN:  Patient continues to benefit from skilled intervention to address the above impairments. Continue treatment per established plan of care. Discharge Recommendations:  Outpatient pulmonary rehab  Further Equipment Recommendations for Discharge:  Portable O2      SUBJECTIVE:   Patient stated My wife has dementia.     OBJECTIVE DATA SUMMARY:   Critical Behavior:  Neurologic State: Alert, Confused  Orientation Level: Oriented to person, Oriented to place, Oriented to situation, Disoriented to time  Cognition: Follows commands, Poor safety awareness  Safety/Judgement: Awareness of environment, Decreased awareness of need for safety  Functional Mobility Training:  Bed Mobility:                    Transfers:  Sit to Stand: Supervision  Stand to Sit: Supervision                             Balance:  Sitting: Intact; Without support  Standing: Impaired; Without support  Standing - Static: Good  Standing - Dynamic : Good (periods of fair with high level challenge)  Ambulation/Gait Training:  Distance (ft): 560 Feet (ft)  Assistive Device: Gait belt  Ambulation - Level of Assistance: Supervision        Gait Abnormalities: Altered arm swing;Path deviations (deviates with high level environment)                                   Pain:  Pain Scale 1: Numeric (0 - 10)  Pain Intensity 1: 0              Activity Tolerance:   POOR with periods of high tachycardia, O2 drop     Please refer to the flowsheet for vital signs taken during this treatment.   After treatment:   [x]    Patient left in no apparent distress sitting up in chair  []    Patient left in no apparent distress in bed  [x]    Call bell left within reach  [x]    Nursing notified  []    Caregiver present  []    Bed alarm activated    COMMUNICATION/COLLABORATION:   The patients plan of care was discussed with: Registered Nurse    Christa Quintero, PT, DPT, RANDALL      Time Calculation: 30 mins

## 2018-02-26 NOTE — PROGRESS NOTES
Spiritual Care Assessment/Progress Notes    Wolf Holder [id number]  [id number]    9/3/1931  80 y.o.  male    Patient Telephone Number: [phone] (home)   Hoahaoism Affiliation: Bertrand Everett   Language: English   Extended Emergency Contact Information  Primary Emergency Contact: Yodit Alexander  [facility] [address] Phone: [phone]  Relation: Spouse  Secondary Emergency Contact: [address] [facility] Phone: [phone]  Mobile Phone: [phone]  Relation: Child   Patient Active Problem List    Diagnosis Date Noted    Gastric volvulus 02/20/2018    Hemiplegia and hemiparesis following cerebral infarction affecting right dominant side ([address] 03/19/2017     Class: Present on Admission    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 ([facility] [address] 03/19/2017     Class: Acute    Dysarthria following cerebrovascular accident 03/19/2017     Class: Present on Admission    Hemiparesthesia 03/19/2017     Class: Present on Admission    Essential hypertension 03/19/2017     Class: Chronic    Hyperlipidemia 03/19/2017     Class: Chronic    Prostatism 03/19/2017     Class: Chronic    Fall at home 03/19/2017     Class: Present on Admission        Date: 2/26/2018       Level of Hoahaoism/Spiritual Activity:  [x]         Involved in oskar tradition/spiritual practice    []         Not involved in oskar tradition/spiritual practice  [x]         Spiritually oriented    []         Claims no spiritual orientation    []         seeking spiritual identity  []         Feels alienated from Sabianist practice/tradition  []         Feels angry about Sabianist practice/tradition  [x]         Spirituality/Sabianist tradition is a resource for coping at this time.   []         Not able to assess due to medical condition    Services Provided Today:  []         crisis intervention    []         reading Scriptures  [x] spiritual assessment    [x]         prayer  [x]         empathic listening/emotional support  []         rites and rituals (cite in comments)  []         life review     []         Confucianism support  []         theological development    []         advocacy  []         ethical dialog     []         blessing  []         bereavement support    []         support to family  []         anticipatory grief support   []         help with AMD  []         spiritual guidance    []         meditation      Spiritual Care Needs  [x]         Emotional Support  [x]         Spiritual/Latter day Care  []         Loss/Adjustment  []         Advocacy/Referral                /Ethics  []         No needs expressed at               this time  []         Other: (note in               comments)  Spiritual Care Plan  []         Follow up visits with               pt/family  []         Provide materials  []         Schedule sacraments  []         Contact Community               Clergy  [x]         Follow up as needed  []         Other: (note in               comments)     Comments:   Initial visit on PCU for spiritual assessment. No visitors present. Patient shared that he has been a member of [employer] for 37 years. His daughter and son-in-law have visited, but his wife has not been able to come. He has also had some friends visit. He appears to be in good spirits and coping effectively. He did not have any concerns to share at this time. Offered assurance of prayer. Mr. Silverio Sherwood is receptive to future visits from chaplains as able.   ABHAY Stapleton, [facility], [address]    [address] Paging Service  [phone])

## 2018-02-26 NOTE — PROGRESS NOTES
PCU SHIFT NURSING NOTE      Bedside and Verbal shift change report given to William Sanchez RN (oncoming nurse) by Irene Avila RN (offgoing nurse). Report included the following information SBAR, Kardex, Intake/Output, MAR, Recent Results and Cardiac Rhythm SB/SR. Shift Summary:   56 - Dr. Cong Betancourt notified of pt's HR A-Fib 140s-150s & increased O2 needs for 4L NC during ambulation with physical therapy; HR returns to SR 80s-90s upon resting. MD said to continue transferring to telemetry floor. 1649 - Attempted to call report x2 without success; noone answers the phone for 10 min. Will attempt to call again later. [address] [facility]  [address] Nurse [address] [facility], RN to call back for report when able. 1830 - TRANSFER - OUT REPORT:    Verbal report given to Coco Mcmillan RN(name) on [facility]  being transferred to [facility]) for routine progression of care       Report consisted of patients Situation, Background, Assessment and   Recommendations(SBAR). Information from the following report(s) SBAR, Kardex, Intake/Output, MAR, Recent Results and Cardiac Rhythm SB/SR/ST was reviewed with the receiving nurse. Lines:   Peripheral IV 02/26/18 Right Antecubital (Active)   Site Assessment Clean, dry, & intact 2/26/2018  3:27 PM   Phlebitis Assessment 0 2/26/2018  3:27 PM   Infiltration Assessment 0 2/26/2018  3:27 PM   Dressing Status Clean, dry, & intact 2/26/2018  3:27 PM   Dressing Type Tape;Transparent 2/26/2018  3:27 PM   Hub Color/Line Status Blue; Infusing 2/26/2018  3:27 PM        Opportunity for questions and clarification was provided.       Patient transported with:   O2 @ 2 liters  Tech              Admission Date 2/20/2018   Admission Diagnosis Gastric volvulus  Esophageal Hernia   Consults IP CONSULT TO HOSPITALIST        Consults   [x]PT   [x]OT   []Speech   []Case Management      [] Palliative      Cardiac Monitoring Order   [x]Yes   []No     IV drips   []Yes    Drip:                            Dose:  Drip: Dose:  Drip:                            Dose:   [x]No     GI Prophylaxis   []Yes   []No         DVT Prophylaxis   SCDs:  Sequential Compression Device: Bilateral          Leonel stockings:         [] Medication   []Contraindicated   []None      Activity Level Activity Level: Up with Assistance     Activity Assistance: Partial (one person)   Purposeful Rounding every 1-2 hour? [x]Yes   Green Score  Total Score: 4   Bed Alarm (If score 3 or >)   [x]Yes   [] Refused (See signed refusal form in chart)   Urban Score  Urban Score: 20   Urban Score (if score 14 or less)   []PMT consult   []Wound Care consult      []Specialty bed   [] Nutrition consult          Needs prior to discharge:   Home O2 required:    [x]Yes   []No    If yes, how much O2 required? Other:    Last Bowel Movement: Last Bowel Movement Date: 02/24/18      Influenza Vaccine Received Flu Vaccine for Current Season (usually Sept-March): Yes        Pneumonia Vaccine           Diet Active Orders   Diet    DIET FULL LIQUID      LDAs               Peripheral IV 02/24/18 Right; Lower Arm (Active)   Site Assessment Clean, dry, & intact 2/26/2018  3:51 AM   Phlebitis Assessment 0 2/26/2018  3:51 AM   Infiltration Assessment 0 2/26/2018  3:51 AM   Dressing Status Clean, dry, & intact 2/26/2018  3:51 AM   Dressing Type Tape;Transparent 2/26/2018  3:51 AM   Hub Color/Line Status Blue; Infusing 2/26/2018  3:51 AM   Alcohol Cap Used Yes 2/25/2018  3:28 AM                      Urinary Catheter [REMOVED] Urinary Catheter 02/21/18 Pichardo-Criteria for Appropriate Use: Strict I/Os, Surgical procedure    Intake & Output   Date 02/25/18 0700 - 02/26/18 0659 02/26/18 0700 - 02/27/18 0659   Shift 0700-1859 1900-0659 24 Hour Total 0700-1859 1900-0659 24 Hour Total   I  N  T  A  K  E   I.V.  (mL/kg/hr)  4933.3  (5.1) 4933.3  (2.5)         Volume (lactated Ringers infusion)  4933.3 4933.3       Shift Total  (mL/kg)  4933.3  (60.8) 4933.3  (60.8)      O  U  T  P  U  T Urine  (mL/kg/hr) 250  (0.3)  250  (0.1)         Urine Voided 250  250       Shift Total  (mL/kg) 250  (3.1)  250  (3.1)      NET -250 4933.3 4683.3      Weight (kg) 81.2 81.2 81.2 81.2 81.2 81.2         Readmission Risk Assessment Tool Score High Risk            22       Total Score        3 Has Seen PCP in Last 6 Months (Yes=3, No=0)    2 . Living with Significant Other. Assisted Living. LTAC. SNF. or   Rehab    3 Patient Length of Stay (>5 days = 3)    4 IP Visits Last 12 Months (1-3=4, 4=9, >4=11)    5 Pt.  Coverage (Medicare=5 , Medicaid, or Self-Pay=4)    5 Charlson Comorbidity Score (Age + Comorbid Conditions)       Expected Length of Stay 2d 16h   Actual Length of Stay 6

## 2018-02-26 NOTE — PROGRESS NOTES
KIEL Initial Assessment        Current admission assessment--  Patient came to ed with sudden onset nausea, vomiting and increased thirst along with periumbilical abdominal pain. He was dx with paraesophageal hernia with gastric volvulus and had robotic assisted repair of paraesophgeal hernia. Patient lives in two story home with his wife and they stay on the first floor. Patient states his wife has dementia and his daughter is there with her at this time and she comes over to assist him. Patient states he is independent at home and he drives and cooks. He gets his prescriptions filled at [facility] [address]. He denies having home health in the past. He states he has been to [facility] for a stroke he had in the past.  He denies using oxygen at home. He plans to go home when medically stable. Care Management Interventions  PCP Verified by CM:  Yes  Transition of Care Consult (CM Consult): Discharge Planning  Physical Therapy Consult: Yes  Occupational Therapy Consult: Yes  Current Support Network: Lives with Spouse  Confirm Follow Up Transport: Family  Plan discussed with Pt/Family/Caregiver: Yes  Discharge Location  Discharge Placement: Home                     Latanya CHILDSBSNCRM EXT [id number]

## 2018-02-27 LAB
ANION GAP SERPL CALC-SCNC: 6 MMOL/L (ref 5–15)
BUN SERPL-MCNC: 10 MG/DL (ref 6–20)
BUN/CREAT SERPL: 13 (ref 12–20)
CALCIUM SERPL-MCNC: 7.9 MG/DL (ref 8.5–10.1)
CHLORIDE SERPL-SCNC: 105 MMOL/L (ref 97–108)
CO2 SERPL-SCNC: 29 MMOL/L (ref 21–32)
CREAT SERPL-MCNC: 0.77 MG/DL (ref 0.7–1.3)
GLUCOSE SERPL-MCNC: 102 MG/DL (ref 65–100)
MAGNESIUM SERPL-MCNC: 1.8 MG/DL (ref 1.6–2.4)
PHOSPHATE SERPL-MCNC: 1.8 MG/DL (ref 2.6–4.7)
POTASSIUM SERPL-SCNC: 3.5 MMOL/L (ref 3.5–5.1)
SODIUM SERPL-SCNC: 140 MMOL/L (ref 136–145)

## 2018-02-27 PROCEDURE — 65270000029 HC RM PRIVATE

## 2018-02-27 PROCEDURE — 74011250637 HC RX REV CODE- 250/637: Performed by: SURGERY

## 2018-02-27 PROCEDURE — 77010033678 HC OXYGEN DAILY

## 2018-02-27 PROCEDURE — 97530 THERAPEUTIC ACTIVITIES: CPT | Performed by: PHYSICAL THERAPIST

## 2018-02-27 PROCEDURE — 36415 COLL VENOUS BLD VENIPUNCTURE: CPT | Performed by: SURGERY

## 2018-02-27 PROCEDURE — 80048 BASIC METABOLIC PNL TOTAL CA: CPT | Performed by: SURGERY

## 2018-02-27 PROCEDURE — 74011250636 HC RX REV CODE- 250/636: Performed by: SURGERY

## 2018-02-27 PROCEDURE — 97116 GAIT TRAINING THERAPY: CPT | Performed by: PHYSICAL THERAPIST

## 2018-02-27 PROCEDURE — 83735 ASSAY OF MAGNESIUM: CPT | Performed by: SURGERY

## 2018-02-27 PROCEDURE — 84100 ASSAY OF PHOSPHORUS: CPT | Performed by: SURGERY

## 2018-02-27 PROCEDURE — 74011000250 HC RX REV CODE- 250: Performed by: SURGERY

## 2018-02-27 RX ORDER — HALOPERIDOL 5 MG/ML
2 INJECTION INTRAMUSCULAR ONCE
Status: COMPLETED | OUTPATIENT
Start: 2018-02-27 | End: 2018-02-27

## 2018-02-27 RX ADMIN — METOPROLOL TARTRATE 12.5 MG: 50 TABLET ORAL at 08:24

## 2018-02-27 RX ADMIN — METOPROLOL TARTRATE 12.5 MG: 50 TABLET ORAL at 19:46

## 2018-02-27 RX ADMIN — HALOPERIDOL LACTATE 2 MG: 5 INJECTION, SOLUTION INTRAMUSCULAR at 22:54

## 2018-02-27 RX ADMIN — ENOXAPARIN SODIUM 40 MG: 40 INJECTION SUBCUTANEOUS at 13:54

## 2018-02-27 RX ADMIN — SODIUM PHOSPHATE, MONOBASIC, MONOHYDRATE AND SODIUM PHOSPHATE, DIBASIC ANHYDROUS 15 MMOL: 276; 142 INJECTION, SOLUTION INTRAVENOUS at 13:53

## 2018-02-27 RX ADMIN — Medication 10 ML: at 19:49

## 2018-02-27 RX ADMIN — Medication 10 ML: at 05:29

## 2018-02-27 RX ADMIN — Medication 10 ML: at 08:25

## 2018-02-27 NOTE — OP NOTES
[facility]  ACUTE CARE OP NOTE    James Beard  MR#: [id number]  : 1931  ACCOUNT #: [de-identified]   DATE OF SERVICE: 2018    PREOPERATIVE DIAGNOSES:  Paraesophageal hernia with gastric volvulus. POSTOPERATIVE DIAGNOSES:  Paraesophageal hernia with gastric volvulus. PROCEDURE PERFORMED:  Robot-assisted laparoscopic repair of paraesophageal hernia with gastric volvulus. SURGEON:  RUPERTO ACOSTA MD    ASSISTANT:  None. ANESTHESIA:  General endotracheal.    ESTIMATED BLOOD LOSS:  100 mL. SPECIMENS REMOVED:  None. FINDINGS:  Stomach and duodenum in chest.  The fundus was peritonealized within the chest suggesting a congenital component. COMPLICATIONS:  None immediate. IMPLANTS:  None. DESCRIPTION OF PROCEDURE:  After obtaining informed consent, the patient was taken to the operating room and placed supine on the operating table. An operative timeout was performed and general endotracheal anesthesia was induced. Preoperative antibiotics were administered. An NG tube was already in place. A Pichardo catheter was placed. The abdomen was then prepped and draped in the usual sterile fashion. The abdomen was then entered just above the umbilicus using 8 mm optical trocar. The abdomen was insufflated without incident and was visually explored. Above findings were noted. There were no adhesions to the anterior abdominal wall. Under direct vision, two left-sided ports were placed followed by two additional right-sided ports. The robot was then docked. The volvulus was reduced and fortunately the stomach appeared completely viable. Approximately 1/3 of the stomach including the fundus remained in the chest.  This was densely adherent and peritonealized. The short gastrics were then taken down using the vessel sealing device. The dissection was carried proximally up to the left mamadou.   Lesser omentum was then incised using the vessel sealing device and this was carried proximally to the right mamadou. Posterior attachments were then taken down between the stomach and the pancreas. Dissection was carried cephalad to the base of the crura. We then worked anteriorly, taking down the peritoneum off the mamadou. The hernia sac was dissected transhiatally. The stomach still could not be reduced at this point and I then worked above the crura where the fundus of the stomach was attached and peritonealized within the chest.  This progress was slow but we were able to eventually free the fundus and we were able to reduce the stomach entirely into the abdominal cavity. The crura were then reapproximated posteriorly and anteriorly using nonabsorbable V-Loc sutures. Given the patient's prior problems with esophageal motility, no fundoplication was performed. The cardia was buttressed to the left crura and the fundus was buttressed to the left anterior abdominal wall. The abdomen was then inspected for hemostasis and excellent hemostasis was noted. The robot was undocked. A drain was placed above the diaphragm through the crura and was sutured to the skin using a nylon suture. The abdomen was then desufflated and the ports were removed. The skin incisions were closed with Monocryl and dressed with Dermabond. A KG bulb was applied to the drain. The patient was recovered from general anesthesia and taken to the recovery area in satisfactory condition. All instrument, sponge and needle counts were reported correct.       MD REBECCA Fu / RN  D: 02/27/2018 11:13     T: 02/27/2018 13:24  JOB #: [id number]  CC: Reanna Wasserman MD

## 2018-02-27 NOTE — PROGRESS NOTES
Problem: Falls - Risk of  Goal: *Absence of Falls  Document Dexter Fall Risk and appropriate interventions in the flowsheet. Outcome: Progressing Towards Goal  Fall Risk Interventions:  Mobility Interventions: Bed/chair exit alarm, OT consult for ADLs, Patient to call before getting OOB, PT Consult for mobility concerns, PT Consult for assist device competence    Mentation Interventions: Bed/chair exit alarm, Door open when patient unattended    Medication Interventions: Bed/chair exit alarm, Patient to call before getting OOB, Teach patient to arise slowly    Elimination Interventions: Bed/chair exit alarm, Call light in reach, Patient to call for help with toileting needs    History of Falls Interventions: Consult care management for discharge planning, Door open when patient unattended        Problem: Patient Education: Go to Patient Education Activity  Goal: Patient/Family Education  Outcome: Progressing Towards Goal  Call bell within reach, siderails upx3.  Tele sitter in room

## 2018-02-27 NOTE — PROGRESS NOTES
Subjective: Ate some. No regurg. Objective:    Blood pressure 144/82, pulse 72, temperature 97.6 °F (36.4 °C), resp. rate 19, height 5' 7\" (1.702 m), weight 81.2 kg (179 lb 0.2 oz), SpO2 98 %. Exam - A&O, NAD, CTAB, RRR, adb soft, approp TTP, CDI. Assessment:   ROBOTIC paraesophageal hernia repair 2/21/2018    Active Hospital Problems    Diagnosis Date Noted    Gastric volvulus 02/20/2018     Plan:  - increase activity. - trial GI lite diet. - anticipate d/c Wednesday with home PT.       Farooq Ratliff MD

## 2018-02-27 NOTE — PROGRESS NOTES
General Surgery End of Shift Nursing Note    Bedside shift change report given to 81Hola Garcia (oncoming nurse) by Marii Gordillo (offgoing nurse). Report included the following information SBAR, Kardex, Intake/Output, MAR and Recent Results. Shift worked:   D   Summary of shift:    0   Issues for physician to address:   0     Number times ambulated in hallway past shift: 0    Number of times OOB to chair past shift: 0    Pain Management:  Current medication: 0  Patient states pain is manageable on current pain medication: YES    GI:    Current diet:  DIET GI LITE (POST SURGICAL)    Tolerating current diet: YES  Passing flatus: YES  Last Bowel Movement: yesterday   Appearance: 0    Respiratory:    Incentive Spirometer at bedside: YES  Patient instructed on use: YES    Patient Safety:    Falls Score: 2  Bed Alarm On? Yes  Sitter?  Yes    Chester Page, RN

## 2018-02-27 NOTE — PROGRESS NOTES
Bedside and Verbal shift change report given to Travis Schneider RN  (oncoming nurse) by Malinda Cho RN (offgoing nurse). Report included the following information I&0, MAR, events over night.

## 2018-02-27 NOTE — PROGRESS NOTES
Problem: Mobility Impaired (Adult and Pediatric)  Goal: *Acute Goals and Plan of Care (Insert Text)  Physical Therapy Goals  Initiated 2/25/2018  1. Patient will move from supine to sit and sit to supine , scoot up and down and roll side to side in bed with independence within 7 day(s). 2.  Patient will transfer from bed to chair and chair to bed with independence using the least restrictive device within 7 day(s). 3.  Patient will perform sit to stand with independence within 7 day(s). 4.  Patient will ambulate with independence for 1000 feet with SPO2 >90% and the least restrictive device within 7 day(s). 5.  Patient will ascend/descend 12 stairs with single handrail(s) with independence within 7 day(s). physical Therapy TREATMENT  Patient: Abhinav Parikh (48 y.o. male)  Date: 2/27/2018  Diagnosis: Gastric volvulus  Esophageal Hernia <principal problem not specified>  Procedure(s) (LRB):  NISSEN FUNDOPLICATION ROBOTIC ASSISTED (N/A) 6 Days Post-Op  Precautions: Fall, Bed Alarm  Chart, physical therapy assessment, plan of care and goals were reviewed. ASSESSMENT:  Patient is confused during this session, wondering why \"they\" changed his room and his furniture, perseverates somewhat about this during the session, still cooperative/motivated for participation. He ambulates a total of 300 feet without device with supervision, having a seated rest after first 150 feet/stairs training. He ascends/descends 12 stairs with contact guard for safety and verbal cues for safe technique (step-to vs step-over-step method, slowing pace in general). During seated rest, portable oximeter measures his saturation at 85-87% on room air, increasing to low 90's after 1-2 minutes. Patient is left sitting in chair in room with chair alarm and re-education regarding safety/fall prevention/calling for staff before moving; he voices understanding and is left with call bell in reach.  He will not need home health (but will benefit from 24/7 supervision initially upon returning home, if possible), would benefit from outpatient pulmonary rehab as mentioned in previous notes. Progression toward goals:  [x]    Improving appropriately and progressing toward goals  []    Improving slowly and progressing toward goals  []    Not making progress toward goals and plan of care will be adjusted     PLAN:  Patient continues to benefit from skilled intervention to address the above impairments. Continue treatment per established plan of care. Discharge Recommendations:  Outpatient pulmonary rehab  Further Equipment Recommendations for Discharge:  None needed     SUBJECTIVE:   Patient stated You've got to watch out for them. They moved all my furniture.     OBJECTIVE DATA SUMMARY:   Critical Behavior:  Neurologic State: Alert, Confused  Orientation Level: Appropriate for age, Disoriented to situation, Disoriented to time  Cognition: Appropriate decision making, Decreased command following, Impaired decision making, Impulsive  Safety/Judgement: Awareness of environment, Decreased awareness of need for safety  Functional Mobility Training:  Bed Mobility:     Supine to Sit: Additional time;Supervision (verbal cues), increased effort initially without verbal cues. Transfers:  Sit to Stand: Supervision (from bed, sofa in hallway, sofa in room)  Stand to Sit: Supervision                             Balance:  Sitting: Intact  Standing: Impaired  Standing - Static: Unsupported;Good  Standing - Dynamic : Fair  Ambulation/Gait Training:  Distance (ft): 300 Feet (ft)  Assistive Device: Gait belt  Ambulation - Level of Assistance: Supervision;Assist x1        Gait Abnormalities: Decreased step clearance; Path deviations                                      Stairs:  Number of Stairs Trained: 12  Stairs - Level of Assistance: Contact guard assistance;Assist X1 (verbal cues for safe technique)   Rail Use: Left     Neuro Re-Education:  Dynamic stand/reach with supervision/contact-guard for safety. He is able to carry objects while ambulating in hallway. Pain:  Pain Scale 1: Numeric (0 - 10)  Pain Intensity 1: 0              Activity Tolerance:   Very good, no shortness of breath/talkative while on room air, but saturation in mid-high 80's with mobility. Resume to 90's with seated rest.  Please refer to the flowsheet for vital signs taken during this treatment.   After treatment:   [x]    Patient left in no apparent distress sitting up in chair  []    Patient left in no apparent distress in bed  [x]    Call bell left within reach  [x]    Nursing notified  []    Caregiver present  [x]    Bed alarm activated    COMMUNICATION/COLLABORATION:   The patients plan of care was discussed with: Registered Nurse    Damion Renteria, PT   Time Calculation: 26 mins

## 2018-02-28 VITALS
DIASTOLIC BLOOD PRESSURE: 71 MMHG | SYSTOLIC BLOOD PRESSURE: 152 MMHG | HEART RATE: 79 BPM | BODY MASS INDEX: 28.1 KG/M2 | RESPIRATION RATE: 18 BRPM | HEIGHT: 67 IN | WEIGHT: 179.01 LBS | TEMPERATURE: 97.9 F | OXYGEN SATURATION: 94 %

## 2018-02-28 PROCEDURE — 74011250637 HC RX REV CODE- 250/637: Performed by: SURGERY

## 2018-02-28 RX ORDER — DOCUSATE SODIUM 100 MG/1
100 CAPSULE, LIQUID FILLED ORAL 2 TIMES DAILY
Qty: 60 CAP | Refills: 0 | Status: SHIPPED | OUTPATIENT
Start: 2018-02-28 | End: 2018-04-16 | Stop reason: ALTCHOICE

## 2018-02-28 RX ORDER — HYDROCODONE BITARTRATE AND ACETAMINOPHEN 5; 325 MG/1; MG/1
1-2 TABLET ORAL
Qty: 40 TAB | Refills: 0 | Status: SHIPPED | OUTPATIENT
Start: 2018-02-28 | End: 2018-04-16 | Stop reason: ALTCHOICE

## 2018-02-28 RX ADMIN — METOPROLOL TARTRATE 12.5 MG: 50 TABLET ORAL at 08:44

## 2018-02-28 NOTE — PROGRESS NOTES
Patient spoke with daughter who reassured him that he was in the hospital and would be staying overnight with possible discharge home in AM. Patient calmed and sitting in chair, has agreed to stay overnight. Remains confused with some cooperation but is resistant to redirection.  No distress noted, will continue to monitor

## 2018-02-28 NOTE — PROGRESS NOTES
Patient woke up very confused and agitated, pacing back and forth the room and suddenly changed to his regular clothes; not aware of where he is. Patient does not follow reorientation and insisting of going to the hospital and check his spouse. Staff contacted his daughter but claimed that was his daughter and refused to talk to him. Code [place] initiated as patient became more agitated and insisting of going out. Called to Dr. Bernal Congress; will give patient Haldol IM as ordered. Will continue to monitor.

## 2018-02-28 NOTE — DISCHARGE INSTRUCTIONS
Discharge Instructions:  Dr. Rey Ellis    Call on next business day to arrange office appointment in 2 weeks -- [phone]. Activity:  Walk regularly. No lifting more than 10 -15 pounds for 6 weeks. Light aerobic activity is okay when you feel up to it. Diet:  Low-Residue Diet:    When your bowels are inflamed, you need to limit bulky fiber (such as uncooked fruits and vegetables) in your diet until the the inflammation resolves. You should maintain a low-residue diet for one month. Suggestions:  -Choose white or refined grains, and avoid whole grains. That means eating white or refined cereals, breads, crackers, rice, or pasta.  -Peel the skin from fruits and vegetables before you eat or cook them. Avoid eating skins, seeds, and hulls. -You can eat frozen or canned fruit. Low-fiber fruits include applesauce, ripe bananas, and fruit juice without pulp.  -Eat low-fiber vegetables, which include well-cooked vegetables and vegetable juice.  -Drink or eat milk, yogurt, or other milk products, if you can digest dairy without too many problems.   -Eat well-cooked meat, such as chicken, turkey, fish, or lean meat. You also can eat eggs and tofu. Avoid these foods:  -Nuts, seeds, popcorn, granola, whole grains.    -Bran, brown or wild rice, oatmeal, corn, shanda crackers, barley, and whole wheat and other whole grain breads.  -Cereals with more than 3 grams of fiber per serving.  -Fresh and dried fruits including raisins.    -Raw vegetables, and even some cooked vegetable including cabbage, broccoli, brussels sprouts, and cauliflower.  -Beans, lentils, and split peas. -Crunchy peanut butter. - dry crusty breads. - steak (unless its cut up very small). Wound Care: No swimming or baths for 2 weeks. You may shower. If you experience a lot of drainage, develop redness around the wound, or a fever over 101 F occurs please call the office.       Medications:  See attached \"Medication Reconciliation. \"    Resume home medications as indicated on the Medical Reconciliation form. Do not use blood thinners (such as Aspirin, Coumadin, or Plavix) until 3 days after surgery. Pain medications:  Non steroidal antiinflammatories (ibuprofen -- Advil or Motrin) seem to work best for post surgical pain. Try these first.  A narcotic prescription will also be given for breakthrough pain. Colace should be used to prevent constipation while on narcotics. If you are having diarrhea you can stop the colace. Narcotics and anesthesia sometimes cause nausea and vomiting. If persistent please call the office. Do not hesitate to call with questions or concerns.     Ashia Obrien MD  Tel [phone]  Fax [phone]

## 2018-02-28 NOTE — PROGRESS NOTES
Pt will be d/c on today with home health provided by [facility], pt signed [address] document (placed in chart). Pt aware of follow up appointment scheduled with MD.  Pt aware that his nurse will review d/c plans. Pt aware of 2nd  Medicare letter, but has not signed because he does not have his glasses. CM will follow up with pt's family regarding medicare letter, when they arrive to transport family home. Care Management Interventions  PCP Verified by CM: Yes  Mode of Transport at Discharge:  Other (see comment) (pt's daughter )  Transition of Care Consult (CM Consult): Home Health ([facility] care )  [address] No  Reason Outside [place]: Patient already serviced by other home care/hospice agency ([facility])  Physical Therapy Consult: Yes  Occupational Therapy Consult: Yes  Current Support Network: Lives with Spouse  Confirm Follow Up Transport: Family  Plan discussed with Pt/Family/Caregiver: Yes  Discharge Location  Discharge Placement: [facility]/ [facility] [address], MSW   [phone]

## 2018-02-28 NOTE — DISCHARGE SUMMARY
Physician Discharge Summary     Patient ID:  Meghan Moreno  [id number]  29 y.o.  9/3/1931    Admit Date:  2/20/2018    Discharge Date:  02/28/18       Admission Diagnoses:  Gastric volvulus;Esophageal Hernia    Discharge Diagnoses: Active Problems:    Gastric volvulus (2/20/2018)         Surgical Procedure:  Procedure(s):  NISSEN FUNDOPLICATION ROBOTIC ASSISTED      Admission Condition:  Poor    Discharge Condition:  Good    Hospital Problems:    Active Hospital Problems    Diagnosis Date Noted    Gastric volvulus 02/20/2018       Hospital Course:   Hospital course was complicated by some MS changes and agitation. Doing much better. Safe arrangement made for home. Consults:  None    Disposition:  Home with home health and PT. Physical Exam:  Abdomen soft. Bowel sounds normal.  Incisions CDI. NT. Visit Vitals    /71 (BP 1 Location: Left arm, BP Patient Position: Sitting)    Pulse 79    Temp 97.9 °F (36.6 °C)    Resp 18    Ht 5' 7\" (1.702 m)    Wt 81.2 kg (179 lb 0.2 oz)    SpO2 94%    BMI 28.04 kg/m2       Discharge medications:    Current Discharge Medication List      START taking these medications    Details   docusate sodium (COLACE) 100 mg capsule Take 1 Cap by mouth two (2) times a day. May use OTC instead. Prevents constipation from narcotics. Qty: 60 Cap, Refills: 0      HYDROcodone-acetaminophen (NORCO) 5-325 mg per tablet Take 1-2 Tabs by mouth every four (4) hours as needed for Pain. Max Daily Amount: 12 Tabs. Qty: 40 Tab, Refills: 0    Associated Diagnoses: Gastric volvulus         CONTINUE these medications which have NOT CHANGED    Details   hydroCHLOROthiazide (HYDRODIURIL) 12.5 mg tablet Take 12.5 mg by mouth daily. aspirin delayed-release 81 mg tablet Take 81 mg by mouth every evening.      ergocalciferol (VITAMIN D2) 50,000 unit capsule Take 50,000 Units by mouth every seven (7) days. on Wednesday.       clobetasol (TEMOVATE) 0.05 % topical cream Apply  to affected area two (2) times a day. Patient applies this all over body for 'spots and redness on skin.'      simvastatin (ZOCOR) 20 mg tablet Take 20 mg by mouth every evening. omeprazole (PRILOSEC) 20 mg capsule Take 20 mg by mouth daily. amLODIPine (NORVASC) 10 mg tablet Take 10 mg by mouth daily. Follow-up: 2 week(s) in my office. See discharge instructions for further details.      Signed:  Gaudencio Puente MD  2/28/2018  10:02 AM

## 2018-03-15 ENCOUNTER — OFFICE VISIT (OUTPATIENT)
Dept: SURGERY | Age: 83
End: 2018-03-15

## 2018-03-15 VITALS
HEART RATE: 78 BPM | DIASTOLIC BLOOD PRESSURE: 78 MMHG | WEIGHT: 155 LBS | HEIGHT: 67 IN | BODY MASS INDEX: 24.33 KG/M2 | TEMPERATURE: 97 F | RESPIRATION RATE: 16 BRPM | SYSTOLIC BLOOD PRESSURE: 124 MMHG | OXYGEN SATURATION: 99 %

## 2018-03-15 DIAGNOSIS — Z09 POSTOPERATIVE EXAMINATION: Primary | ICD-10-CM

## 2018-03-15 NOTE — PROGRESS NOTES
1. Have you been to the ER, urgent care clinic since your last visit? Hospitalized since your last visit?no    2. Have you seen or consulted any other health care providers outside of the [facility] since your last visit? Include any pap smears or colon screening.  no

## 2018-04-16 ENCOUNTER — OFFICE VISIT (OUTPATIENT)
Dept: SURGERY | Age: 83
End: 2018-04-16

## 2018-04-16 VITALS
BODY MASS INDEX: 23.86 KG/M2 | SYSTOLIC BLOOD PRESSURE: 109 MMHG | HEART RATE: 68 BPM | HEIGHT: 67 IN | DIASTOLIC BLOOD PRESSURE: 76 MMHG | TEMPERATURE: 98 F | RESPIRATION RATE: 20 BRPM | WEIGHT: 152 LBS | OXYGEN SATURATION: 99 %

## 2018-04-16 DIAGNOSIS — Z09 POSTOPERATIVE EXAMINATION: Primary | ICD-10-CM

## 2018-04-16 NOTE — MR AVS SNAPSHOT
[address] [place] [address], [address] [place], [place] [address] [facility] 
[phone] Patient: Frankey Fat MRN: [id number] BAZ:[birth year] Visit Information Date & Time Provider Department Dept. Phone Encounter #  
 4/16/2018 11:00 AM Marion Arango MD [facility] [id number] Upcoming Health Maintenance Date Due DTaP/Tdap/Td series (1 - Tdap) 9/3/1952 ZOSTER VACCINE AGE 60> 7/3/1991 GLAUCOMA SCREENING Q2Y 9/3/1996 Pneumococcal 65+ Low/Medium Risk (1 of 2 - PCV13) 9/3/1996 Influenza Age 5 to Adult 8/1/2017 MEDICARE YEARLY EXAM 3/28/2018 Allergies as of 4/16/2018  Review Complete On: 4/16/2018 By: Marion Arango MD  
 No Known Allergies Current Immunizations  Never Reviewed Name Date Influenza Vaccine 10/1/2016 Not reviewed this visit You Were Diagnosed With   
  
 Codes Comments Postoperative examination    -  Primary ICD-10-CM: I20 ICD-9-CM: V67.00 Vitals BP Pulse Temp Resp Height(growth percentile) Weight(growth percentile) 109/76 68 98 °F (36.7 °C) 20 5' 7\" (1.702 m) 152 lb (68.9 kg) SpO2 BMI Smoking Status 99% 23.81 kg/m2 Former Smoker BMI and BSA Data Body Mass Index Body Surface Area  
 23.81 kg/m 2 1.8 m 2 Preferred Pharmacy Pharmacy Name Phone [facility] [address] [phone] Your Updated Medication List  
  
   
This list is accurate as of 4/16/18  4:17 PM.  Always use your most recent med list. amLODIPine 10 mg tablet Commonly known as:  Gio Ape Take 10 mg by mouth daily. aspirin delayed-release 81 mg tablet Take 81 mg by mouth every evening. clobetasol 0.05 % topical cream  
Commonly known as:  Farheen Gunning Apply  to affected area two (2) times a day.  Patient applies this all over body for 'spots and redness on skin.'  
  
 hydroCHLOROthiazide 12.5 mg tablet Commonly known as:  HYDRODIURIL Take 12.5 mg by mouth daily. omeprazole 20 mg capsule Commonly known as:  PRILOSEC Take 20 mg by mouth daily. VITAMIN D2 50,000 unit capsule Generic drug:  ergocalciferol Take 50,000 Units by mouth every seven (7) days. on Wednesday. ZOCOR 20 mg tablet Generic drug:  simvastatin Take 20 mg by mouth every evening. Introducing hospitals & HEALTH SERVICES! [employer] introduces [employer] patient portal. Now you can access parts of your medical record, email your doctor's office, and request medication refills online. 1. In your internet browser, go to [email or web address] [email or web address] 2. Click on the First Time User? Click Here link in the Sign In box. You will see the New Member Sign Up page. 3. Enter your [employer] Access Code exactly as it appears below. You will not need to use this code after youve completed the sign-up process. If you do not sign up before the expiration date, you must request a new code. · [employer] Access Code: [email or web address] Expires: 5/29/2018 12:07 PM 
 
4. Enter the last four digits of your Social Security Number (xxxx) and Date of Birth (mm/dd/yyyy) as indicated and click Submit. You will be taken to the next sign-up page. 5. Create a [employer] ID. This will be your [employer] login ID and cannot be changed, so think of one that is secure and easy to remember. 6. Create a [employer] password. You can change your password at any time. 7. Enter your Password Reset Question and Answer. This can be used at a later time if you forget your password. 8. Enter your e-mail address. You will receive e-mail notification when new information is available in [address] 9. Click Sign Up. You can now view and download portions of your medical record. 10. Click the Download Summary menu link to download a portable copy of your medical information. If you have questions, please visit the Frequently Asked Questions section of the [employer] website. Remember, [employer] is NOT to be used for urgent needs. For medical emergencies, dial 911. Now available from your iPhone and Android! Please provide this summary of care documentation to your next provider. Your primary care clinician is listed as Jeevan Epstein. If you have any questions after today's visit, please call [phone].

## 2018-06-27 ENCOUNTER — HOSPITAL ENCOUNTER (OUTPATIENT)
Dept: GENERAL RADIOLOGY | Age: 83
Discharge: HOME | End: 2018-06-27
Attending: INTERNAL MEDICINE
Payer: MEDICARE

## 2018-06-27 DIAGNOSIS — K31.89 GASTROPTOSIS: ICD-10-CM

## 2018-06-27 DIAGNOSIS — R63.4 LOSS OF WEIGHT: ICD-10-CM

## 2018-06-27 DIAGNOSIS — R19.7 DIARRHEA OF PRESUMED INFECTIOUS ORIGIN: ICD-10-CM

## 2018-06-27 DIAGNOSIS — S27.809A RUPTURE OF DIAPHRAGM: ICD-10-CM

## 2018-06-27 DIAGNOSIS — R11.2 NAUSEA WITH VOMITING: ICD-10-CM

## 2018-06-27 PROCEDURE — 74241 XR UPPER GI SERIES W KUB: CPT

## 2018-07-19 RX ORDER — NICOTINE 11MG/24HR
2000 PATCH, TRANSDERMAL 24 HOURS TRANSDERMAL EVERY EVENING
COMMUNITY

## 2018-07-20 ENCOUNTER — ANESTHESIA EVENT (OUTPATIENT)
Dept: ENDOSCOPY | Age: 83
End: 2018-07-20
Payer: MEDICARE

## 2018-07-20 ENCOUNTER — HOSPITAL ENCOUNTER (OUTPATIENT)
Age: 83
Setting detail: OUTPATIENT SURGERY
Discharge: HOME | End: 2018-07-20
Attending: SPECIALIST | Admitting: SPECIALIST
Payer: MEDICARE

## 2018-07-20 ENCOUNTER — ANESTHESIA (OUTPATIENT)
Dept: ENDOSCOPY | Age: 83
End: 2018-07-20
Payer: MEDICARE

## 2018-07-20 VITALS
BODY MASS INDEX: 24.32 KG/M2 | WEIGHT: 146 LBS | TEMPERATURE: 97.3 F | HEART RATE: 76 BPM | SYSTOLIC BLOOD PRESSURE: 117 MMHG | RESPIRATION RATE: 23 BRPM | OXYGEN SATURATION: 100 % | HEIGHT: 65 IN | DIASTOLIC BLOOD PRESSURE: 67 MMHG

## 2018-07-20 PROCEDURE — 74011000250 HC RX REV CODE- 250

## 2018-07-20 PROCEDURE — 77030019988 HC FCPS ENDOSC DISP BSC -B: Performed by: SPECIALIST

## 2018-07-20 PROCEDURE — 76040000019: Performed by: SPECIALIST

## 2018-07-20 PROCEDURE — 88305 TISSUE EXAM BY PATHOLOGIST: CPT | Performed by: SPECIALIST

## 2018-07-20 PROCEDURE — 76060000031 HC ANESTHESIA FIRST 0.5 HR: Performed by: SPECIALIST

## 2018-07-20 PROCEDURE — 74011250636 HC RX REV CODE- 250/636

## 2018-07-20 RX ORDER — SODIUM CHLORIDE 0.9 % (FLUSH) 0.9 %
5-10 SYRINGE (ML) INJECTION EVERY 8 HOURS
Status: DISCONTINUED | OUTPATIENT
Start: 2018-07-20 | End: 2018-07-20 | Stop reason: HOSPADM

## 2018-07-20 RX ORDER — FENTANYL CITRATE 50 UG/ML
25 INJECTION, SOLUTION INTRAMUSCULAR; INTRAVENOUS
Status: DISCONTINUED | OUTPATIENT
Start: 2018-07-20 | End: 2018-07-20 | Stop reason: HOSPADM

## 2018-07-20 RX ORDER — MIDAZOLAM HYDROCHLORIDE 1 MG/ML
1-2 INJECTION, SOLUTION INTRAMUSCULAR; INTRAVENOUS
Status: DISCONTINUED | OUTPATIENT
Start: 2018-07-20 | End: 2018-07-20 | Stop reason: HOSPADM

## 2018-07-20 RX ORDER — LIDOCAINE HYDROCHLORIDE 20 MG/ML
INJECTION, SOLUTION EPIDURAL; INFILTRATION; INTRACAUDAL; PERINEURAL AS NEEDED
Status: DISCONTINUED | OUTPATIENT
Start: 2018-07-20 | End: 2018-07-20 | Stop reason: HOSPADM

## 2018-07-20 RX ORDER — SODIUM CHLORIDE 9 MG/ML
INJECTION, SOLUTION INTRAVENOUS
Status: DISCONTINUED | OUTPATIENT
Start: 2018-07-20 | End: 2018-07-20 | Stop reason: HOSPADM

## 2018-07-20 RX ORDER — SODIUM CHLORIDE 9 MG/ML
50 INJECTION, SOLUTION INTRAVENOUS CONTINUOUS
Status: DISCONTINUED | OUTPATIENT
Start: 2018-07-20 | End: 2018-07-20 | Stop reason: HOSPADM

## 2018-07-20 RX ORDER — PROPOFOL 10 MG/ML
INJECTION, EMULSION INTRAVENOUS AS NEEDED
Status: DISCONTINUED | OUTPATIENT
Start: 2018-07-20 | End: 2018-07-20 | Stop reason: HOSPADM

## 2018-07-20 RX ORDER — SODIUM CHLORIDE 0.9 % (FLUSH) 0.9 %
5-10 SYRINGE (ML) INJECTION AS NEEDED
Status: DISCONTINUED | OUTPATIENT
Start: 2018-07-20 | End: 2018-07-20 | Stop reason: HOSPADM

## 2018-07-20 RX ORDER — DEXTROMETHORPHAN/PSEUDOEPHED 2.5-7.5/.8
1.2 DROPS ORAL
Status: DISCONTINUED | OUTPATIENT
Start: 2018-07-20 | End: 2018-07-20 | Stop reason: HOSPADM

## 2018-07-20 RX ADMIN — PROPOFOL 40 MG: 10 INJECTION, EMULSION INTRAVENOUS at 12:38

## 2018-07-20 RX ADMIN — LIDOCAINE HYDROCHLORIDE 80 MG: 20 INJECTION, SOLUTION EPIDURAL; INFILTRATION; INTRACAUDAL; PERINEURAL at 12:36

## 2018-07-20 RX ADMIN — PROPOFOL 50 MG: 10 INJECTION, EMULSION INTRAVENOUS at 12:36

## 2018-07-20 RX ADMIN — PROPOFOL 20 MG: 10 INJECTION, EMULSION INTRAVENOUS at 12:42

## 2018-07-20 RX ADMIN — SODIUM CHLORIDE: 9 INJECTION, SOLUTION INTRAVENOUS at 12:22

## 2018-07-20 NOTE — PERIOP NOTES
Leandra Narvaez  9/3/1931  [id number]    Situation:  Verbal report received from: zak Emerson rn  Procedure: Procedure(s):  ESOPHAGOGASTRODUODENOSCOPY (EGD)  ESOPHAGOGASTRODUODENAL (EGD) BIOPSY    Background:    Preoperative diagnosis: DYSPHAGIA  Postoperative diagnosis: Esophageal diverticulum, esophageal stricture, hiatal hernia    :  Dr. Jose G Nolan  Assistant(s): Endoscopy Technician-1: Henny Sheriff  Endoscopy RN-1: Mai Vital RN    Specimens:   ID Type Source Tests Collected by Time Destination   1 : Duodenal bx Preservative Duodenum  Amrik Johnson MD 7/20/2018 1240 Pathology     H. Pylori  no    Assessment:  Intra-procedure medications       Anesthesia gave intra-procedure sedation and medications, see anesthesia flow sheet yes    Intravenous fluids: NS@ KVO     Vital signs stable     Abdominal assessment: round and soft     Recommendation:  Discharge patient per MD order.   Family or Friend   Permission to share finding with family or friend yes

## 2018-07-20 NOTE — DISCHARGE INSTRUCTIONS
[place] Office: [phone]    Califon Part  [id number]  9/3/1931    EGD/COLONOSCOPY DISCHARGE INSTRUCTIONS  Discomfort:  Sore throat- throat lozenges or warm salt water gargle  redness at IV site- apply warm compress to area; if redness or soreness persist- contact your physician  Gaseous discomfort- walking, belching will help relieve any discomfort  You may not operate a vehicle for 12 hours  You may not engage in an occupation involving machinery or appliances for rest of today. You may not drink alcoholic beverages for at least 12 hours  Avoid making any critical decisions for at least 24 hour  DIET    Stay on a LIQUID DIET ONLY! MEDICATIONS   Regarding Aspirin or Nonsteroidal medications specifically, please see below. ACTIVITY  You may resume your normal daily activities. Spend the remainder of the day resting -  avoid any strenuous activity. CALL M.D. ANY SIGN OF   Increasing pain, nausea, vomiting  Abdominal distension (swelling)  New increased bleeding (oral or rectal)  Fever (chills)  Pain in chest area  Bloody discharge from nose or mouth  Shortness of breath    You may not take any Advil, Aspirin, Ibuprofen, Motrin, Aleve, or Goodys for 7 days, ONLY  Tylenol as needed for pain. Follow-up Instructions:   Call  Ángel Haro MD for any questions or concerns  Results of procedure / biopsy in 7 days   Telephone # [phone]      Follow-up Information     None

## 2018-07-20 NOTE — H&P
Pre-endoscopy H and P    The patient was seen and examined in the room/pre-op holding area. The airway was assessed and documented. The problem list, past medical history, and medications were reviewed. 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 Yunier [place], Y92.009    Gastric volvulus K31.89     Social History     Social History    Marital status:      Spouse name: N/A    Number of children: N/A    Years of education: N/A     Occupational History    Not on file. Social History Main Topics    Smoking status: Former Smoker    Smokeless tobacco: Never Used    Alcohol use No    Drug use: No    Sexual activity: Not on file     Other Topics Concern    Not on file     Social History Narrative     Past Medical History:   Diagnosis Date    GERD (gastroesophageal reflux disease)     High cholesterol     Hypertension     Stroke ([facility] 03/2017    numbness in fingertips on R hand; legs;stammering         Prior to Admission Medications   Prescriptions Last Dose Informant Patient Reported? Taking? amLODIPine (NORVASC) 10 mg tablet 7/19/2018 at Unknown time Self Yes Yes   Sig: Take 10 mg by mouth daily. aspirin delayed-release 81 mg tablet 7/19/2018 at Unknown time Self Yes Yes   Sig: Take 81 mg by mouth every evening. cholecalciferol (VITAMIN D3) 1,000 unit cap 7/19/2018 at Unknown time  Yes Yes   Sig: Take 1,000 Units by mouth daily. hydroCHLOROthiazide (HYDRODIURIL) 12.5 mg tablet Not Taking at Unknown time Self Yes No   Sig: Take 12.5 mg by mouth daily.    simvastatin (ZOCOR) 20 mg tablet 7/19/2018 at Unknown time Self Yes Yes Sig: Take 20 mg by mouth every evening. Facility-Administered Medications: None           The review of systems is:  Negative  for shortness of breath or chest pain      The heart, lungs, and mental status were satisfactory for the administration of deep sedation and for the procedure. I discussed with the patient the objectives, risks, consequences and alternatives to the procedure.       Harrison Allen MD  7/20/2018  12:32 PM

## 2018-07-20 NOTE — IP AVS SNAPSHOT
[address] [facility] 
[phone] Patient: Coreen Barone MRN: [id number] GQF:[birth year] About your hospitalization You were admitted on:  July 20, 2018 You last received care in the:  [facility] ENDOSCOPY You were discharged on:  July 20, 2018 Why you were hospitalized Your primary diagnosis was:  Not on File Follow-up Information None Discharge Orders None A check madina indicates which time of day the medication should be taken. My Medications CONTINUE taking these medications Instructions Each Dose to Equal  
 Morning Noon Evening Bedtime  
 amLODIPine 10 mg tablet Commonly known as:  Bintaelidia Brookser Your last dose was: Your next dose is: Take 10 mg by mouth daily. 10 mg  
    
   
   
   
  
 aspirin delayed-release 81 mg tablet Your last dose was: Your next dose is: Take 81 mg by mouth every evening. 81 mg  
    
   
   
   
  
 hydroCHLOROthiazide 12.5 mg tablet Commonly known as:  HYDRODIURIL Your last dose was: Your next dose is: Take 12.5 mg by mouth daily. 12.5 mg  
    
   
   
   
  
 VITAMIN D3 1,000 unit Cap Generic drug:  cholecalciferol Your last dose was: Your next dose is: Take 1,000 Units by mouth daily. 1000 Units ZOCOR 20 mg tablet Generic drug:  simvastatin Your last dose was: Your next dose is: Take 20 mg by mouth every evening. 20 mg Discharge Instructions [place] Office: [phone] Coreen Barone [id number] 
9/3/1931 EGD/COLONOSCOPY DISCHARGE INSTRUCTIONS Discomfort: 
Sore throat- throat lozenges or warm salt water gargle 
redness at IV site- apply warm compress to area; if redness or soreness persist- contact your physician Gaseous discomfort- walking, belching will help relieve any discomfort You may not operate a vehicle for 12 hours You may not engage in an occupation involving machinery or appliances for rest of today. You may not drink alcoholic beverages for at least 12 hours Avoid making any critical decisions for at least 24 hour DIET Stay on a LIQUID DIET ONLY! MEDICATIONS Regarding Aspirin or Nonsteroidal medications specifically, please see below. ACTIVITY You may resume your normal daily activities. Spend the remainder of the day resting -  avoid any strenuous activity. CALL M.D. ANY SIGN OF Increasing pain, nausea, vomiting Abdominal distension (swelling) New increased bleeding (oral or rectal) Fever (chills) Pain in chest area Bloody discharge from nose or mouth Shortness of breath You may not take any Advil, Aspirin, Ibuprofen, Motrin, Aleve, or Goodys for 7 days, ONLY  Tylenol as needed for pain. Follow-up Instructions: 
 Call  Mubashir A. [place] Mortimer, MD for any questions or concerns Results of procedure / biopsy in 7 days Telephone # [phone] Follow-up Information None Introducing [facility]! [facility] introduces [employer] patient portal. Now you can access parts of your medical record, email your doctor's office, and request medication refills online. 1. In your internet browser, go to https://"[employer] BeFunky/"[employer]" 2. Click on the First Time User? Click Here link in the Sign In box. You will see the New Member Sign Up page. 3. Enter your [employer] Access Code exactly as it appears below. You will not need to use this code after youve completed the sign-up process. If you do not sign up before the expiration date, you must request a new code. · [employer] Access Code: [email or web address] Expires: 9/25/2018 10:06 AM 
 
4.  Enter the last four digits of your Social Security Number (xxxx) and Date of Birth (mm/dd/yyyy) as indicated and click Submit. You will be taken to the next sign-up page. 5. Create a Mommy Nearestt ID. This will be your my[employer]X login ID and cannot be changed, so think of one that is secure and easy to remember. 6. Create a [employer] password. You can change your password at any time. 7. Enter your Password Reset Question and Answer. This can be used at a later time if you forget your password. 8. Enter your e-mail address. You will receive e-mail notification when new information is available in [address] 9. Click Sign Up. You can now view and download portions of your medical record. 10. Click the Download Summary menu link to download a portable copy of your medical information. If you have questions, please visit the Frequently Asked Questions section of the [employer] website. Remember, my[employer] is NOT to be used for urgent needs. For medical emergencies, dial 911. Now available from your iPhone and Android! Introducing Bereket Evans As a [facility] patient, I wanted to make you aware of our electronic visit tool called Bereket Alexeldercony. [facility] 24/7 allows you to connect within minutes with a medical provider 24 hours a day, seven days a week via a mobile device or tablet or logging into a secure website from your computer. You can access Bereket Evans from anywhere in the [place]. A virtual visit might be right for you when you have a simple condition and feel like you just dont want to get out of bed, or cant get away from work for an appointment, when your regular [facility] provider is not available (evenings, weekends or holidays), or when youre out of town and need minor care. Electronic visits cost only $49 and if the [facility] 24/7 provider determines a prescription is needed to treat your condition, one can be electronically transmitted to a nearby pharmacy*. Please take a moment to enroll today if you have not already done so. The enrollment process is free and takes just a few minutes. To enroll, please download the [employer] 24/7 sudha to your tablet or phone, or visit [email or web address] to enroll on your computer. And, as an [address] [place] [address] patient with a [employer] account, the results of your visits will be scanned into your electronic medical record and your primary care provider will be able to view the scanned results. We urge you to continue to see your regular [employer] provider for your ongoing medical care. And while your primary care provider may not be the one available when you seek a [employer] virtual visit, the peace of mind you get from getting a real diagnosis real time can be priceless. For more information on [employer], view our Frequently Asked Questions (FAQs) at [email or web address]. Sincerely, 
 
Maritza Ortega MD 
Chief Medical Officer [facility] Marti Charles *:  certain medications cannot be prescribed via [employer] Providers Seen During Your Hospitalization Provider Specialty Primary office phone Landon Hong MD Gastroenterology [phone] Your Primary Care Physician (PCP) Primary Care Physician Office Phone Office Fax [address] [phone] You are allergic to the following No active allergies Recent Documentation Height Weight BMI Smoking Status 1.651 m 66.2 kg 24.3 kg/m2 Former Smoker Emergency Contacts Name Discharge Info Relation Home Work Mobile Yodit Alexander DISCHARGE CAREGIVER [3] Spouse [3] [phone] Shirley Koehler CAREGIVER [3] Friend [5] [phone] Patient Belongings  The following personal items are in your possession at time of discharge: 
  Dental Appliances: Uppers, Lowers, Partials (full uppers partial lower) Visual Aid: Glasses Please provide this summary of care documentation to your next provider. Signatures-by signing, you are acknowledging that this After Visit Summary has been reviewed with you and you have received a copy. Patient Signature:  ____________________________________________________________ Date:  ____________________________________________________________  
  
Burlene Collin Provider Signature:  ____________________________________________________________ Date:  ____________________________________________________________

## 2018-07-20 NOTE — ANESTHESIA PREPROCEDURE EVALUATION
Anesthetic History   No history of anesthetic complications            Review of Systems / Medical History  Patient summary reviewed, nursing notes reviewed and pertinent labs reviewed    Pulmonary  Within defined limits                 Neuro/Psych       CVA (R sided UE sensory deficits)  TIA     Cardiovascular    Hypertension          Hyperlipidemia    Exercise tolerance: >4 METS     GI/Hepatic/Renal     GERD           Endo/Other  Within defined limits           Other Findings   Comments: Dysarthria           Physical Exam    Airway  Mallampati: II  TM Distance: 4 - 6 cm  Neck ROM: normal range of motion   Mouth opening: Normal     Cardiovascular  Regular rate and rhythm,  S1 and S2 normal,  no murmur, click, rub, or gallop             Dental    Dentition: Full upper dentures and Lower partial plate     Pulmonary  Breath sounds clear to auscultation               Abdominal  GI exam deferred       Other Findings            Anesthetic Plan    ASA: 3  Anesthesia type: total IV anesthesia and MAC          Induction: Intravenous  Anesthetic plan and risks discussed with: Patient

## 2018-07-20 NOTE — PROGRESS NOTES
Anesthesia reports 110mg Propofol, 80mg Lidocaine and 300mL NS given during procedure. Received report from anesthesia staff on vital signs and status of patient.

## 2018-07-23 NOTE — ANESTHESIA POSTPROCEDURE EVALUATION
Post-Anesthesia Evaluation and Assessment    Patient: Gopi Castaneda MRN: [id number]  SSN: [id number]    YOB: 1931  Age: 80 y.o. Sex: male       Cardiovascular Function/Vital Signs  Visit Vitals    /67    Pulse 76    Temp 36.3 °C (97.3 °F)    Resp 23    Ht 5' 5\" (1.651 m)    Wt 66.2 kg (146 lb)    SpO2 100%    BMI 24.3 kg/m2       Patient is status post total IV anesthesia, general anesthesia for Procedure(s):  ESOPHAGOGASTRODUODENOSCOPY (EGD)  ESOPHAGOGASTRODUODENAL (EGD) BIOPSY. Nausea/Vomiting: None    Postoperative hydration reviewed and adequate. Pain:  Pain Scale 1: Numeric (0 - 10) (07/20/18 1320)  Pain Intensity 1: 0 (07/20/18 1320)   Managed    Neurological Status: At baseline    Mental Status and Level of Consciousness: Arousable    Pulmonary Status:   O2 Device: Room air (07/20/18 1320)   Adequate oxygenation and airway patent    Complications related to anesthesia: None    Post-anesthesia assessment completed.  No concerns    Signed By: Adryan Ratliff MD     July 23, 2018

## 2018-07-30 NOTE — PROGRESS NOTES
Discharge Note:     Pt has been accepted to [facility] and will be transported via [facility] transport to [facility] today. CM provided floor nurse with call report number. CM advised pt of authorization. There were no additional discharge needs. Care Management Interventions  PCP Verified by CM:  (He has established with Dr. Shaan Candelario)  Mode of Transport at Discharge:  Other (see comment) ([facility] transport to [facility] )  Transition of Care Consult (CM Consult): Discharge Planning, Other ([facility] )  Discharge Durable Medical Equipment: No  Health Maintenance Reviewed: Yes  Physical Therapy Consult: Yes  Occupational Therapy Consult: Yes  Speech Therapy Consult: Yes  Current Support Network: Lives with Spouse, Own Home  Confirm Follow Up Transport: Self  Plan discussed with Pt/Family/Caregiver: Yes  Freedom of Choice Offered: Yes (inpt rehab - [facility] )  Discharge Location  Discharge Placement: Rehab hospital/unit acute ([facility])    CLAYTON Vargas, [facility]   [phone] Hospitalist Progress Note 2018 Admit Date: 2018  9:55 PM  
NAME: Eric Goodwin :  1950 MRN:  [id number] Attending: Li Cali MD 
PCP:  Mekhi Flores DO 
 
SUBJECTIVE:  
Patient 89O with pmhx of cva, tob abuse, htn, RLS and depression called EMS for LE weakness and was transported to ED. On ED arrival o2 sat was 85% on room air as well as accelerated HTN. CT unfortunately showed right thalamic bleed. Has hx of complex AVM of brain which appears stable. Er contacted neurosx who recommended bp control and supportive care.  - repeat CT head this AM unchanged but physical exam worsening overnight. Pt now responsive to physical stimuli but not conversant. Intermittently following commands with left sided weakness. Review of Systems negative with exception of pertinent positives noted above PHYSICAL EXAM  
 
Visit Vitals  /71  Pulse 76  Temp 97.4 °F (36.3 °C)  Resp 13  Ht 5' 4\" (1.626 m)  Wt 117.1 kg (258 lb 2.5 oz)  SpO2 97%  BMI 44.31 kg/m2 Temp (24hrs), Av.9 °F (36.6 °C), Min:97.4 °F (36.3 °C), Max:98.4 °F (36.9 °C) Oxygen Therapy O2 Sat (%): 97 % (18 0730) Pulse via Oximetry: 76 beats per minute (18 0730) O2 Device: Nasal cannula (18 07) O2 Flow Rate (L/min): 6 l/min (18 0725) Intake/Output Summary (Last 24 hours) at 18 9000 Last data filed at 18 1070 Gross per 24 hour Intake          1562.47 ml Output              500 ml Net          1062.47 ml General: No acute distress   
Lungs:  CTA Bilaterally. Heart:  Regular rate and rhythm,  No murmur, rub, or gallop Abdomen: Soft, Non distended, Non tender, Positive bowel sounds Extremities: No cyanosis, clubbing or edema Neurologic:  Left hemiparesis. Intermittently following commands ASSESSMENT Active Hospital Problems Diagnosis Date Noted  Nontraumatic thalamic hemorrhage ([address] 2018  Hypertensive urgency 07/30/2018  Chronic pain of multiple sites 07/30/2018  Cerebral AV malformation 07/30/2018  Seizure disorder ([facility] 01/22/2018  Morbid obesity with BMI of 40.0-44.9, adult ([facility] 01/19/2015  Edema of both legs 01/19/2015 A/p 
- Non traumatic thalamic hemorrhage - BP goal <140/90, continue cardene gtt. Failed swallow eval, NPO for now. Consult PC. Insert NGT, consult nutrition for ngt feeds 
- HTN emergency - BP now at goal on cardene gtt. - Seizue d/o - change po keppra to IV 
- Chronic pain - resume px meds when appropriate DVT Prophylaxis: SCD Signed By: Dontae Diaz DO   
 July 30, 2018 Have spoken with Dr Maria Gong, she is requesting CTA and all imaging transferred to [facility]. Given call back #.

## 2018-08-01 ENCOUNTER — APPOINTMENT (OUTPATIENT)
Dept: CT IMAGING | Age: 83
DRG: 380 | End: 2018-08-01
Attending: EMERGENCY MEDICINE
Payer: MEDICARE

## 2018-08-01 ENCOUNTER — HOSPITAL ENCOUNTER (INPATIENT)
Age: 83
LOS: 4 days | Discharge: HOME HEALTH | DRG: 380 | End: 2018-08-05
Attending: EMERGENCY MEDICINE | Admitting: HOSPITALIST
Payer: MEDICARE

## 2018-08-01 DIAGNOSIS — N17.9 AKI (ACUTE KIDNEY INJURY) (HCC): Primary | ICD-10-CM

## 2018-08-01 DIAGNOSIS — K59.00 CONSTIPATION, UNSPECIFIED CONSTIPATION TYPE: ICD-10-CM

## 2018-08-01 LAB
ALBUMIN SERPL-MCNC: 3.3 G/DL (ref 3.5–5)
ALBUMIN/GLOB SERPL: 0.6 {RATIO} (ref 1.1–2.2)
ALP SERPL-CCNC: 63 U/L (ref 45–117)
ALT SERPL-CCNC: 17 U/L (ref 12–78)
ANION GAP SERPL CALC-SCNC: 5 MMOL/L (ref 5–15)
APPEARANCE UR: CLEAR
AST SERPL-CCNC: 24 U/L (ref 15–37)
BACTERIA URNS QL MICRO: NEGATIVE /HPF
BASOPHILS # BLD: 0 K/UL (ref 0–0.1)
BASOPHILS NFR BLD: 0 % (ref 0–1)
BILIRUB SERPL-MCNC: 0.6 MG/DL (ref 0.2–1)
BILIRUB UR QL: NEGATIVE
BUN SERPL-MCNC: 24 MG/DL (ref 6–20)
BUN/CREAT SERPL: 15 (ref 12–20)
CALCIUM SERPL-MCNC: 9.4 MG/DL (ref 8.5–10.1)
CAOX CRY URNS QL MICRO: ABNORMAL
CHLORIDE SERPL-SCNC: 98 MMOL/L (ref 97–108)
CO2 SERPL-SCNC: 31 MMOL/L (ref 21–32)
COLOR UR: ABNORMAL
CREAT SERPL-MCNC: 1.61 MG/DL (ref 0.7–1.3)
DIFFERENTIAL METHOD BLD: ABNORMAL
EOSINOPHIL # BLD: 0 K/UL (ref 0–0.4)
EOSINOPHIL NFR BLD: 0 % (ref 0–7)
EPITH CASTS URNS QL MICRO: ABNORMAL /LPF
ERYTHROCYTE [DISTWIDTH] IN BLOOD BY AUTOMATED COUNT: 14.6 % (ref 11.5–14.5)
GLOBULIN SER CALC-MCNC: 5.2 G/DL (ref 2–4)
GLUCOSE SERPL-MCNC: 110 MG/DL (ref 65–100)
GLUCOSE UR STRIP.AUTO-MCNC: NEGATIVE MG/DL
HCT VFR BLD AUTO: 38.2 % (ref 36.6–50.3)
HGB BLD-MCNC: 12.2 G/DL (ref 12.1–17)
HGB UR QL STRIP: NEGATIVE
HYALINE CASTS URNS QL MICRO: ABNORMAL /LPF (ref 0–5)
IMM GRANULOCYTES # BLD: 0 K/UL (ref 0–0.04)
IMM GRANULOCYTES NFR BLD AUTO: 0 % (ref 0–0.5)
KETONES UR QL STRIP.AUTO: ABNORMAL MG/DL
LACTATE SERPL-SCNC: 1.2 MMOL/L (ref 0.4–2)
LEUKOCYTE ESTERASE UR QL STRIP.AUTO: NEGATIVE
LIPASE SERPL-CCNC: 103 U/L (ref 73–393)
LYMPHOCYTES # BLD: 0.9 K/UL (ref 0.8–3.5)
LYMPHOCYTES NFR BLD: 15 % (ref 12–49)
MCH RBC QN AUTO: 28.4 PG (ref 26–34)
MCHC RBC AUTO-ENTMCNC: 31.9 G/DL (ref 30–36.5)
MCV RBC AUTO: 89 FL (ref 80–99)
MONOCYTES # BLD: 0.9 K/UL (ref 0–1)
MONOCYTES NFR BLD: 16 % (ref 5–13)
NEUTS SEG # BLD: 3.8 K/UL (ref 1.8–8)
NEUTS SEG NFR BLD: 68 % (ref 32–75)
NITRITE UR QL STRIP.AUTO: NEGATIVE
NRBC # BLD: 0 K/UL (ref 0–0.01)
NRBC BLD-RTO: 0 PER 100 WBC
PH UR STRIP: 5 [PH] (ref 5–8)
PLATELET # BLD AUTO: 201 K/UL (ref 150–400)
PMV BLD AUTO: 10.2 FL (ref 8.9–12.9)
POTASSIUM SERPL-SCNC: 4.5 MMOL/L (ref 3.5–5.1)
PROT SERPL-MCNC: 8.5 G/DL (ref 6.4–8.2)
PROT UR STRIP-MCNC: 30 MG/DL
RBC # BLD AUTO: 4.29 M/UL (ref 4.1–5.7)
RBC #/AREA URNS HPF: ABNORMAL /HPF (ref 0–5)
SODIUM SERPL-SCNC: 134 MMOL/L (ref 136–145)
SP GR UR REFRACTOMETRY: 1.02 (ref 1–1.03)
UA: UC IF INDICATED,UAUC: ABNORMAL
UROBILINOGEN UR QL STRIP.AUTO: 1 EU/DL (ref 0.2–1)
WBC # BLD AUTO: 5.5 K/UL (ref 4.1–11.1)
WBC URNS QL MICRO: ABNORMAL /HPF (ref 0–4)

## 2018-08-01 PROCEDURE — 81001 URINALYSIS AUTO W/SCOPE: CPT | Performed by: EMERGENCY MEDICINE

## 2018-08-01 PROCEDURE — 74011250636 HC RX REV CODE- 250/636: Performed by: HOSPITALIST

## 2018-08-01 PROCEDURE — 74176 CT ABD & PELVIS W/O CONTRAST: CPT

## 2018-08-01 PROCEDURE — 74011636320 HC RX REV CODE- 636/320: Performed by: EMERGENCY MEDICINE

## 2018-08-01 PROCEDURE — 74011000250 HC RX REV CODE- 250: Performed by: HOSPITALIST

## 2018-08-01 PROCEDURE — 85025 COMPLETE CBC W/AUTO DIFF WBC: CPT | Performed by: EMERGENCY MEDICINE

## 2018-08-01 PROCEDURE — 96374 THER/PROPH/DIAG INJ IV PUSH: CPT

## 2018-08-01 PROCEDURE — 80053 COMPREHEN METABOLIC PANEL: CPT | Performed by: EMERGENCY MEDICINE

## 2018-08-01 PROCEDURE — 94761 N-INVAS EAR/PLS OXIMETRY MLT: CPT

## 2018-08-01 PROCEDURE — 83690 ASSAY OF LIPASE: CPT | Performed by: EMERGENCY MEDICINE

## 2018-08-01 PROCEDURE — 83605 ASSAY OF LACTIC ACID: CPT | Performed by: EMERGENCY MEDICINE

## 2018-08-01 PROCEDURE — 36415 COLL VENOUS BLD VENIPUNCTURE: CPT | Performed by: EMERGENCY MEDICINE

## 2018-08-01 PROCEDURE — 99285 EMERGENCY DEPT VISIT HI MDM: CPT

## 2018-08-01 PROCEDURE — 74011250636 HC RX REV CODE- 250/636: Performed by: EMERGENCY MEDICINE

## 2018-08-01 PROCEDURE — 74011250637 HC RX REV CODE- 250/637: Performed by: HOSPITALIST

## 2018-08-01 PROCEDURE — C9113 INJ PANTOPRAZOLE SODIUM, VIA: HCPCS | Performed by: HOSPITALIST

## 2018-08-01 PROCEDURE — 65660000000 HC RM CCU STEPDOWN

## 2018-08-01 RX ORDER — NALOXONE HYDROCHLORIDE 0.4 MG/ML
0.4 INJECTION, SOLUTION INTRAMUSCULAR; INTRAVENOUS; SUBCUTANEOUS AS NEEDED
Status: DISCONTINUED | OUTPATIENT
Start: 2018-08-01 | End: 2018-08-05 | Stop reason: HOSPADM

## 2018-08-01 RX ORDER — PRAVASTATIN SODIUM 40 MG/1
40 TABLET ORAL
Status: DISCONTINUED | OUTPATIENT
Start: 2018-08-01 | End: 2018-08-05 | Stop reason: HOSPADM

## 2018-08-01 RX ORDER — SODIUM CHLORIDE 9 MG/ML
50 INJECTION, SOLUTION INTRAVENOUS
Status: DISCONTINUED | OUTPATIENT
Start: 2018-08-01 | End: 2018-08-01

## 2018-08-01 RX ORDER — AMLODIPINE BESYLATE 5 MG/1
10 TABLET ORAL EVERY EVENING
Status: DISCONTINUED | OUTPATIENT
Start: 2018-08-01 | End: 2018-08-01

## 2018-08-01 RX ORDER — ONDANSETRON 2 MG/ML
4 INJECTION INTRAMUSCULAR; INTRAVENOUS
Status: COMPLETED | OUTPATIENT
Start: 2018-08-01 | End: 2018-08-01

## 2018-08-01 RX ORDER — ONDANSETRON 4 MG/1
4 TABLET, FILM COATED ORAL
COMMUNITY
End: 2019-02-04

## 2018-08-01 RX ORDER — SODIUM CHLORIDE 9 MG/ML
100 INJECTION, SOLUTION INTRAVENOUS CONTINUOUS
Status: DISCONTINUED | OUTPATIENT
Start: 2018-08-01 | End: 2018-08-05 | Stop reason: HOSPADM

## 2018-08-01 RX ORDER — SODIUM CHLORIDE 0.9 % (FLUSH) 0.9 %
10 SYRINGE (ML) INJECTION
Status: DISCONTINUED | OUTPATIENT
Start: 2018-08-01 | End: 2018-08-01

## 2018-08-01 RX ORDER — AMLODIPINE BESYLATE 2.5 MG/1
2.5 TABLET ORAL EVERY EVENING
Status: DISCONTINUED | OUTPATIENT
Start: 2018-08-02 | End: 2018-08-05 | Stop reason: HOSPADM

## 2018-08-01 RX ORDER — SODIUM CHLORIDE 0.9 % (FLUSH) 0.9 %
5-10 SYRINGE (ML) INJECTION AS NEEDED
Status: DISCONTINUED | OUTPATIENT
Start: 2018-08-01 | End: 2018-08-05 | Stop reason: HOSPADM

## 2018-08-01 RX ORDER — ASPIRIN 81 MG/1
81 TABLET ORAL EVERY EVENING
Status: DISCONTINUED | OUTPATIENT
Start: 2018-08-01 | End: 2018-08-05 | Stop reason: HOSPADM

## 2018-08-01 RX ORDER — ACETAMINOPHEN 325 MG/1
650 TABLET ORAL
Status: DISCONTINUED | OUTPATIENT
Start: 2018-08-01 | End: 2018-08-05 | Stop reason: HOSPADM

## 2018-08-01 RX ORDER — ENOXAPARIN SODIUM 100 MG/ML
30 INJECTION SUBCUTANEOUS DAILY
Status: DISCONTINUED | OUTPATIENT
Start: 2018-08-02 | End: 2018-08-03

## 2018-08-01 RX ORDER — ONDANSETRON 2 MG/ML
4 INJECTION INTRAMUSCULAR; INTRAVENOUS
Status: DISCONTINUED | OUTPATIENT
Start: 2018-08-01 | End: 2018-08-05 | Stop reason: HOSPADM

## 2018-08-01 RX ORDER — SODIUM CHLORIDE 9 MG/ML
75 INJECTION, SOLUTION INTRAVENOUS CONTINUOUS
Status: DISCONTINUED | OUTPATIENT
Start: 2018-08-01 | End: 2018-08-01

## 2018-08-01 RX ORDER — SODIUM CHLORIDE 0.9 % (FLUSH) 0.9 %
5-10 SYRINGE (ML) INJECTION EVERY 8 HOURS
Status: DISCONTINUED | OUTPATIENT
Start: 2018-08-01 | End: 2018-08-05 | Stop reason: HOSPADM

## 2018-08-01 RX ADMIN — PRAVASTATIN SODIUM 40 MG: 40 TABLET ORAL at 20:26

## 2018-08-01 RX ADMIN — Medication 10 ML: at 22:16

## 2018-08-01 RX ADMIN — DIATRIZOATE MEGLUMINE AND DIATRIZOATE SODIUM 30 ML: 660; 100 LIQUID ORAL; RECTAL at 11:34

## 2018-08-01 RX ADMIN — ONDANSETRON 4 MG: 2 INJECTION, SOLUTION INTRAMUSCULAR; INTRAVENOUS at 12:56

## 2018-08-01 RX ADMIN — SODIUM CHLORIDE 75 ML/HR: 900 INJECTION, SOLUTION INTRAVENOUS at 17:41

## 2018-08-01 RX ADMIN — ASPIRIN 81 MG: 81 TABLET, COATED ORAL at 20:26

## 2018-08-01 RX ADMIN — SODIUM CHLORIDE 40 MG: 9 INJECTION, SOLUTION INTRAMUSCULAR; INTRAVENOUS; SUBCUTANEOUS at 20:26

## 2018-08-01 NOTE — PROGRESS NOTES
Pharmacy Clarification of Prior to Admission Medication Regimen The patient was interviewed regarding clarification of the prior to admission medication regimen. Wife and  present in room and obtained permission from patient to discuss drug regimen with visitor(s) present. Patient was questioned regarding use of any other inhalers, topical products, over the counter medications, herbal medications, vitamin products or ophthalmic/nasal/otic medication use. Information Obtained From: Patient, patient's  Pertinent Pharmacy Findings: 
 Patient's  helps manage the patient's medications. PTA medication list was corrected to the following:  
 
Prior to Admission Medications Prescriptions Last Dose Informant Patient Reported? Taking? amLODIPine (NORVASC) 10 mg tablet 7/31/2018 at Unknown time Friend Yes Yes Sig: Take 10 mg by mouth every evening. aspirin delayed-release 81 mg tablet 7/31/2018 at Unknown time Friend Yes Yes Sig: Take 81 mg by mouth every evening. cholecalciferol (VITAMIN D3) 1,000 unit cap 7/31/2018 at Unknown time Friend Yes Yes Sig: Take 1,000 Units by mouth every evening. ondansetron hcl (ZOFRAN) 4 mg tablet 8/1/2018 at Unknown time Friend Yes Yes Sig: Take 4 mg by mouth every eight (8) hours as needed for Nausea. simvastatin (ZOCOR) 20 mg tablet 7/31/2018 at Unknown time Friend Yes Yes Sig: Take 20 mg by mouth every evening. Facility-Administered Medications: None Thank you, 
Brittani [facility] Medication History Pharmacy Technician

## 2018-08-01 NOTE — ED NOTES
Bedside shift change report given to Arya Sepulveda RN (oncoming nurse) by Yashira Monique RN (offgoing nurse). Report included the following information SBAR, Kardex, ED Summary, Intake/Output, MAR and Recent Results.

## 2018-08-01 NOTE — IP AVS SNAPSHOT
[address] [facility] 
[phone] Patient: Violeta Chacon MRN: [id number] KUD:[birth year] A check madina indicates which time of day the medication should be taken. My Medications START taking these medications Instructions Each Dose to Equal  
 Morning Noon Evening Bedtime  
 docusate sodium 100 mg capsule Commonly known as:  Yann Waldrop Your last dose was: Your next dose is: Take 1 Cap by mouth daily as needed for Constipation for up to 90 days. 100 mg  
    
   
   
   
  
 polyethylene glycol 17 gram packet Commonly known as:  Sophia Tinoco Your last dose was: Your next dose is: Take 1 Packet by mouth daily. 17 g CHANGE how you take these medications Instructions Each Dose to Equal  
 Morning Noon Evening Bedtime  
 amLODIPine 5 mg tablet Commonly known as:  Noemy Rapp What changed:   
- medication strength 
- how much to take - when to take this Your last dose was: Your next dose is: Take 1 Tab by mouth daily. 5 mg CONTINUE taking these medications Instructions Each Dose to Equal  
 Morning Noon Evening Bedtime  
 aspirin delayed-release 81 mg tablet Your last dose was: Your next dose is: Take 81 mg by mouth every evening. 81 mg  
    
   
   
   
  
 ondansetron hcl 4 mg tablet Commonly known as:  Donjuvenal Blank Your last dose was: Your next dose is: Take 4 mg by mouth every eight (8) hours as needed for Nausea. 4 mg VITAMIN D3 1,000 unit Cap Generic drug:  cholecalciferol Your last dose was: Your next dose is: Take 1,000 Units by mouth every evening. 1000 Units ZOCOR 20 mg tablet Generic drug:  simvastatin Your last dose was: Your next dose is: Take 20 mg by mouth every evening. 20 mg Where to Get Your Medications Information on where to get these meds will be given to you by the nurse or doctor. ! Ask your nurse or doctor about these medications  
  amLODIPine 5 mg tablet  
 docusate sodium 100 mg capsule  
 polyethylene glycol 17 gram packet

## 2018-08-01 NOTE — H&P
Hospitalist Admission Note NAME: Mitra Traore :  9/3/1931 MRN:  [id number] Date/Time:  2018 6:50 PM 
 
Patient PCP: Yessica Cazares MD  
General Surgery: MD Sky Santoro MD 
______________________________________________________________________ Given the patient's current clinical presentation, I have a high level of concern for decompensation if discharged from the emergency department. Complex decision making was performed, which includes reviewing the patient's available past medical records, laboratory results, and x-ray films. My assessment of this patient's clinical condition and my plan of care is as follows. Assessment / Plan: 
Acute renal failure Vinny Trevizo - Likely prerenal in origine due to poor po intake/vomiting Baseline Cr 0.7 , admission Cr 1.61 
- Aggressive hydration with NS. Watch cr/electrolytes closely 
-Adjust all medications  especially antibiotic therapy to GFR . Avoid nephrotoxic drugs. - If not improving consider further evaluation with renal US. Recurrent Hiatal hernia vs partial gastric outlet obstruction Severe constipation Dysphasia with vomiting - + weight loss, unable tolerate solid food, last BM 2-3 weeks ago Barium from a month ago still in his colon.  
-will start with enemas 
will avoid aggressive bowel regiment PO till bowels start moving  
-diet: CLD, advancing will be per surgery/ GI 
-primary GI consult Dr Eduin Reis to help with management per surgery recommendation  
-Surgery help appreciated: symptoms appear to be related to partial gastric outlet obstruction rather than the small recurrent hiatal hernia. He was also noted to have a lot of food in his stomach on EGD 2 weeks ago. Solid food DISTAL to hiatal hernia, therefore gastroparesis also in the differential.   
Needs aggressive bowel regimen. Will need to see if he can tolerate full liquid diet.    
Gastric emptying study may prove GOO or gastroparesis. The small hiatal hernia is not obstructing. Would not recommend repair of this in this ASA III 80year old male.  
-gastric emptying study once constipation resolve  
-dietary consult to help with nutritions -c/w PPI  
-CT A/P:  
1. No acute process in the abdomen and pelvis. 2. L3 superior endplate compression fracture is new from February 2018, but 
probably chronic. 3. 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 4. 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 HTN 
-BP low side at 112s  
-Cont home Norvasc at lower dose Hyperlipidemia, cont statin Code Status: Full code Surrogate Decision Maker: wife DVT Prophylaxis: Lovenox GI Prophylaxis: not indicated Baseline: , ambulating independent Subjective: CHIEF COMPLAINT: dysphasia / vomiting / constipation HISTORY OF PRESENT ILLNESS:    
Claretha Lesches is a 80 y.o.  male who presents with above complaint. Pt had urgent Nissen Fundoplication for gastric volvulus and esophageal hernia done in February 2018 by Dr Collette Heimlich. Since his surgery he was doing poorly with GI problems. He reports having dysphasia with solid food. He stated \" it is usually stuck in the lower chest\" and he had to vomit afterward. He has significant weight loss since February. He used to weight 180 Lb. He is down to 138 Lb today. He is able to tolerate liquids. No vomiting blood. Pt initially had severe diarrhea. Pt is followed by Dr Nithya Wang. He had a lower and upper GI endoscopy. Pt stated he was diagnosed with an intestinal parasite for which he was prescribed medication. Medication help his diarrhea. Pt now reports severe constipation. He stated he didn't have normal BM in the past 2 months. He had last BM 2 weeks ago. On 7/20 pt had another EGD done by Dr Homero Levy. He was found to have partial volvulus with a lot of food.  Dr Homero Levy recommended PPI, Liquids for diet, CT and surgical consult. Pt was refer by surgery to ED for further evaluation with CT. Pts diet currently is mostly consist of popsicles and meal replacement shakes. No abdominal pain/fever/chills. Vs: 97.8 °F (36.6 °C) - 76 - 112/71 - 18 - 100% RA We were asked to admit for work up and evaluation of the above problems. Past Medical History:  
Diagnosis Date  GERD (gastroesophageal reflux disease)  High cholesterol  Hypertension  Stroke ([facility], [place] [address] 03/2017  
 numbness in fingertips on R hand; legs;stammering Past Surgical History:  
Procedure Laterality Date  HX CHOLECYSTECTOMY  HX HEENT Bilateral   
 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 Social History Substance Use Topics  Smoking status: Former Smoker  Smokeless tobacco: Never Used  Alcohol use No  
  
 
Family History Problem Relation Age of Onset  No Known Problems Sister  No Known Problems Brother No Known Allergies Prior to Admission medications Medication Sig Start Date End Date Taking? Authorizing Provider  
ondansetron hcl (ZOFRAN) 4 mg tablet Take 4 mg by mouth every eight (8) hours as needed for Nausea. Yes Aleksandra Xiong MD  
cholecalciferol (VITAMIN D3) 1,000 unit cap Take 1,000 Units by mouth every evening. Yes Historical Provider  
aspirin delayed-release 81 mg tablet Take 81 mg by mouth every evening. Yes Historical Provider  
simvastatin (ZOCOR) 20 mg tablet Take 20 mg by mouth every evening. Yes Historical Provider  
amLODIPine (NORVASC) 10 mg tablet Take 10 mg by mouth every evening. Yes Aleksandra Xiong MD  
 
 
REVIEW OF SYSTEMS:    
I am not able to complete the review of systems because: The patient is intubated and sedated The patient has altered mental status due to his acute medical problems The patient has baseline aphasia from prior stroke(s)  The patient has baseline dementia and is not reliable historian The patient is in acute medical distress and unable to provide information Total of 12 systems reviewed as follows:   
   POSITIVE= underlined text  Negative = text not underlined General:  fever, chills, sweats, generalized weakness, weight loss/gain,  
   loss of appetite Eyes:    blurred vision, eye pain, loss of vision, double vision ENT:    rhinorrhea, pharyngitis Respiratory:   cough, sputum production, SOB, WEST, wheezing, pleuritic pain  
Cardiology:   chest pain, palpitations, orthopnea, PND, edema, syncope Gastrointestinal:  abdominal pain , N/V, diarrhea, dysphagia, constipation, bleeding Genitourinary:  frequency, urgency, dysuria, hematuria, incontinence Muskuloskeletal :  arthralgia, myalgia, back pain Hematology:  easy bruising, nose or gum bleeding, lymphadenopathy Dermatological: rash, ulceration, pruritis, color change / jaundice Endocrine:   hot flashes or polydipsia Neurological:  headache, dizziness, confusion, focal weakness, paresthesia, Speech difficulties, memory loss, gait difficulty Psychological: Feelings of anxiety, depression, agitation Objective: VITALS:   
Visit Vitals  /71 (BP 1 Location: Left arm, BP Patient Position: At rest;Sitting)  Pulse 76  Temp 97.8 °F (36.6 °C)  Resp 18  Ht 5' 5\" (1.651 m)  Wt 62.6 kg (138 lb 0.1 oz)  SpO2 100%  BMI 22.97 kg/m2 PHYSICAL EXAM: 
 
General:    Alert, cooperative, no distress, appears stated age. HEENT: Atraumatic, anicteric sclerae, pink conjunctivae No oral ulcers, mucosa moist, throat clear, dentition fair Neck:  Supple, symmetrical,  thyroid: non tender Lungs:   Clear to auscultation bilaterally. No Wheezing or Rhonchi. No rales. Chest wall:  No tenderness  No Accessory muscle use. Heart:   Regular  rhythm,  No  murmur   No edema Abdomen:   Soft, + mild epigastric tender. Not distended.   Bowel sounds normal 
Extremities: No cyanosis. No clubbing,   
  Skin turgor normal, Capillary refill normal, Radial dial pulse 2+ Skin:     Not pale. Not Jaundiced  No rashes Psych:  Good insight. Not depressed. Not anxious or agitated. Neurologic: EOMs intact. No facial asymmetry. No aphasia or slurred speech. Symmetrical strength, Sensation grossly intact. Alert and oriented X 4.  
 
_______________________________________________________________________ Care Plan discussed with: 
  Comments Patient y Family RN y   
Care Manager Consultant:  riley HAIRSTON provider   
_______________________________________________________________________ Expected  Disposition:  
Home with Family y HH/PT/OT/RN   
SNF/LTC   
BAKARI   
________________________________________________________________________ TOTAL TIME:  65 Minutes Critical Care Provided     Minutes non procedure based Comments Reviewed previous records  
>50% of visit spent in counseling and coordination of care  Discussion with patient and/or family and questions answered 
  
 
________________________________________________________________________ Signed: Marisol Bautista MD 
 
Procedures: see electronic medical records for all procedures/Xrays and details which were not copied into this note but were reviewed prior to creation of Plan. LAB DATA REVIEWED:   
Recent Results (from the past 24 hour(s)) METABOLIC PANEL, COMPREHENSIVE Collection Time: 08/01/18  9:47 AM  
Result Value Ref Range Sodium 134 (L) 136 - 145 mmol/L Potassium 4.5 3.5 - 5.1 mmol/L Chloride 98 97 - 108 mmol/L  
 CO2 31 21 - 32 mmol/L Anion gap 5 5 - 15 mmol/L Glucose 110 (H) 65 - 100 mg/dL BUN 24 (H) 6 - 20 MG/DL Creatinine 1.61 (H) 0.70 - 1.30 MG/DL  
 BUN/Creatinine ratio 15 12 - 20 GFR est AA 50 (L) >60 ml/min/1.73m2 GFR est non-AA 41 (L) >60 ml/min/1.73m2 Calcium 9.4 8.5 - 10.1 MG/DL  Bilirubin, total 0.6 0.2 - 1.0 MG/DL  
 ALT (SGPT) 17 12 - 78 U/L  
 AST (SGOT) 24 15 - 37 U/L Alk. phosphatase 63 45 - 117 U/L Protein, total 8.5 (H) 6.4 - 8.2 g/dL Albumin 3.3 (L) 3.5 - 5.0 g/dL Globulin 5.2 (H) 2.0 - 4.0 g/dL A-G Ratio 0.6 (L) 1.1 - 2.2 LACTIC ACID Collection Time: 08/01/18  9:47 AM  
Result Value Ref Range Lactic acid 1.2 0.4 - 2.0 MMOL/L  
LIPASE Collection Time: 08/01/18  9:47 AM  
Result Value Ref Range Lipase 103 73 - 393 U/L  
CBC WITH AUTOMATED DIFF Collection Time: 08/01/18 11:27 AM  
Result Value Ref Range WBC 5.5 4.1 - 11.1 K/uL  
 RBC 4.29 4. 10 - 5.70 M/uL  
 HGB 12.2 12.1 - 17.0 g/dL HCT 38.2 36.6 - 50.3 % MCV 89.0 80.0 - 99.0 FL  
 MCH 28.4 26.0 - 34.0 PG  
 MCHC 31.9 30.0 - 36.5 g/dL  
 RDW 14.6 (H) 11.5 - 14.5 % PLATELET 522 644 - 226 K/uL MPV 10.2 8.9 - 12.9 FL  
 NRBC 0.0 0  WBC ABSOLUTE NRBC 0.00 0.00 - 0.01 K/uL NEUTROPHILS 68 32 - 75 % LYMPHOCYTES 15 12 - 49 % MONOCYTES 16 (H) 5 - 13 % EOSINOPHILS 0 0 - 7 % BASOPHILS 0 0 - 1 % IMMATURE GRANULOCYTES 0 0.0 - 0.5 % ABS. NEUTROPHILS 3.8 1.8 - 8.0 K/UL  
 ABS. LYMPHOCYTES 0.9 0.8 - 3.5 K/UL  
 ABS. MONOCYTES 0.9 0.0 - 1.0 K/UL  
 ABS. EOSINOPHILS 0.0 0.0 - 0.4 K/UL  
 ABS. BASOPHILS 0.0 0.0 - 0.1 K/UL  
 ABS. IMM. GRANS. 0.0 0.00 - 0.04 K/UL  
 DF AUTOMATED URINALYSIS W/ REFLEX CULTURE Collection Time: 08/01/18  1:13 PM  
Result Value Ref Range Color YELLOW/STRAW Appearance CLEAR CLEAR Specific gravity 1.021 1.003 - 1.030    
 pH (UA) 5.0 5.0 - 8.0 Protein 30 (A) NEG mg/dL Glucose NEGATIVE  NEG mg/dL Ketone TRACE (A) NEG mg/dL Bilirubin NEGATIVE  NEG Blood NEGATIVE  NEG Urobilinogen 1.0 0.2 - 1.0 EU/dL Nitrites NEGATIVE  NEG Leukocyte Esterase NEGATIVE  NEG    
 WBC 0-4 0 - 4 /hpf  
 RBC 0-5 0 - 5 /hpf Epithelial cells FEW FEW /lpf  Bacteria NEGATIVE  NEG /hpf  
 UA:UC IF INDICATED CULTURE NOT INDICATED BY UA RESULT CNI    
 CA Oxalate crystals 1+ (A) NEG Hyaline cast 0-2 0 - 5 /lpf

## 2018-08-01 NOTE — CONSULTS
Surgery Consult  Consulted by: Dr. Jared Armijo  PCP: Jh Car MD    Thank you for allowing me to participate in your patient's care. Please call me with any questions. Assessment:   Vomiting. Had large paraesophageal hernia with volvulus with cardia, fundus, antrum and duodenum were in his chest causing gastric outlet obstruction. Repaired emergently in February. Now with small recurrence involving cardia only. No volvulus on CT today. 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 Secondary signs on the UGI from a 4 weeks ago. Given history -- he is able to drink, then later vomits -- symptoms appear to be related to partial gastric outlet obstruction rather than the small recurrent hiatal hernia. He was also noted to have a lot of food in his stomach on EGD 2 weeks ago. Solid food DISTAL to hiatal hernia, therefore gastroparesis also in the differential.      He also has acute renal insufficiency, and severe constipation. Has not had a BM in 2 weeks. Barium from a month ago still in his colon. Body mass index is 22.97 kg/(m^2). Plan:   Looks like he needs to be admitted for the ERICA, rehydration. Needs aggressive bowel regimen. Will need to see if he can tolerate full liquid diet. Gastric emptying study may prove GOO or gastroparesis. The small hiatal hernia is not obstructing. Would not recommend repair of this in this ASA III 80year old male. Subjective:      Violeta Chacon is a 80 y.o. male who is seen in consultation at the request of Dr. Jared Armijo for n/v.  He has not been able to keep anything down for several days. Had emergency reduction and repair of paraesophageal hernia with volvulus in February of this year. Began having some issue with vomiting. Underwent UGI a month ago, EGD 2 weeks ago. Has not had a BM in 2 weeks.       Objective:   Blood pressure 112/71, pulse 76, temperature 97.8 °F (36.6 °C), resp. rate 18, height 5' 5\" (1.651 m), weight 62.6 kg (138 lb 0.1 oz), SpO2 100 %. Temp (24hrs), Av.8 °F (36.6 °C), Min:97.8 °F (36.6 °C), Max:97.8 °F (36.6 °C)      Physical Exam:  PHYSICAL EXAM:  Gen:  [x]     A&O     [x]      No acute distress    [x]     non-toxic     []     ill apearing     []     Critical        HEENT:   [x]     anicteric    []      scleral icterus    []     moist mucosa     [x]     dry mucosa    RESP:   [x]     CTA bilaterally, no wheezing/rhonchi/rales/crackles    []     wheezing     []     rhonchi     []     crackles     []     use of accessory muscles    CARD:  [x]     regular rate and rhythm     [x]     No murmurs/rubs/gallops    []     irregular rhythm     []     Murmur     []     Rubs     []     Gallops    ABD:     [x]  soft  [x]     non distended  [x]     non tender  [x]      NABS. No hernias. SKIN:   [x]     normal      []Rashes      []Ulcers     EXT:  [x]      No CCE     []2+ pulses throughout    []      Clubbing     []Cyanosis     []Edema     []diminished pulses    NEUR:  [x]     Strength normal     []weakness   []LUE     []RUE     []LLE     []RLE    [x]     follows commands          PSYCH:   insight []Poor   [x]good                      []     depressed     []     anxious     []     agitated    Past Medical History:   Diagnosis Date    GERD (gastroesophageal reflux disease)     High cholesterol     Hypertension     Stroke ([facility] [address] 2017    numbness in fingertips on R hand; legs;stammering     Past Surgical History:   Procedure Laterality Date    HX CHOLECYSTECTOMY      HX HEENT Bilateral     cataract    HX HERNIA REPAIR  2018     Robot-assisted laparoscopic repair of paraesophageal hernia with gastric volvulus.       Family History   Problem Relation Age of Onset    No Known Problems Sister     No Known Problems Brother      Social History     Social History    Marital status:      Spouse name: N/A    Number of children: N/A    Years of education: N/A     Social History Main Topics    Smoking status: Former Smoker    Smokeless tobacco: Never Used    Alcohol use No    Drug use: No    Sexual activity: Not Asked     Other Topics Concern    None     Social History Narrative      Prior to Admission medications    Medication Sig Start Date End Date Taking? Authorizing Provider   ondansetron hcl (ZOFRAN) 4 mg tablet Take 4 mg by mouth every eight (8) hours as needed for Nausea. Yes Aleksandra Xiong MD   cholecalciferol (VITAMIN D3) 1,000 unit cap Take 1,000 Units by mouth every evening. Yes Historical Provider   aspirin delayed-release 81 mg tablet Take 81 mg by mouth every evening. Yes Historical Provider   simvastatin (ZOCOR) 20 mg tablet Take 20 mg by mouth every evening. Yes Historical Provider   amLODIPine (NORVASC) 10 mg tablet Take 10 mg by mouth every evening.    Yes Aleksandra Xiong MD     ALLERGIES:  No Known Allergies    Review of Systems:  (unchecked were asked but negative)  Constitutional: [] Fever/chills   [] Sweats   [] Loss of appetite   [] Fatigue/weakness   [] Weight loss  Head & Neck:   [] Headaches   [] Visual loss   [] Hearing loss   [] Thyroid problems  Cardiovascular:   [x] Stroke   [] Calf pain when walking   [] Chest pain   [] Rapid heart beat       [] Heart attack   [] Stents   [] Congestive heart failure   [] Leg swelling   [] Murmur  Respiratory:   [] Sleep apnea   [] Cough/congestion   [] Shortness of breath   [] Wheezing/asthma      [] COPD/emphysema  Gastrointestinal:   [] Frequent indigestion   [x] Trouble swallowing   [x] Nausea   [x] Vomiting   [] Bloating   [] Abd pain       [] Diarrhea   [] Constipation   [] Blood in stool   [] Ulcers   [] Intestinal disease   [] Hepatitis    Genitourinary:   [] Painful urination   [] Difficulty urinating   [] Frequent urination       [] Enlarged prostate   [] Vasectomy   [] Blood in urine   [] Dialysis  Musculoskeletal:  [] Muscle aches   [] Back pain [] Joint pain  Neurologic:    [] Seizures   [] Dizziness   [] Numbness  Hematologic:   [] Nosebleeds   [] Easy bruising   [] Anemia   [] Easy bleeding  Psychiatric:    [] Depression   [] Anxiety   [] Bipolar disorder   [] Schizophrenia                                  []     Unable to obtain  ROS due to  []     mental status change  []     sedated   []     intubated    LABS:  Recent Labs      08/01/18   1127   WBC  5.5   HGB  12.2   HCT  38.2   PLT  201     Recent Labs      08/01/18   0947   NA  134*   K  4.5   CL  98   CO2  31   BUN  24*   CREA  1.61*   GLU  110*   CA  9.4     Recent Labs      08/01/18   0947   SGOT  24   AP  63   TP  8.5*   ALB  3.3*   GLOB  5.2*   LPSE  103     No results for input(s): INR, PTP, APTT in the last 72 hours.     No lab exists for component: INREXT      Signed By: Kym Mac MD     August 1, 2018

## 2018-08-01 NOTE — ED NOTES
Call light answered, patient wanting repositioned and bed was plugged in so he could manage positioning, no other request at this time.

## 2018-08-01 NOTE — ED TRIAGE NOTES
Assumed care of this patient. He is alert and oriented x4. Patient placed on monitor x3. Patient reports to ED with c/o abd pain, constipation (no BM x2 weeks), and poor appetite. Patient has been seen by GI multiple times for \"bowel twisting\" and has had several endoscopies by Dr. Micha Sierra. Patient escorted in by his wife and his Cheleaneelisabeth Márquez, who knows patient's medical history and assists in his care.

## 2018-08-01 NOTE — ED PROVIDER NOTES
EMERGENCY DEPARTMENT HISTORY AND PHYSICAL EXAM 
 
 
Date: 8/1/2018 Patient Name: Wan Solo History of Presenting Illness Chief Complaint Patient presents with  Abdominal Pain  
  pt has extensive GI history and was advised to come to ED by MD Geremias Givens for CT scan of his abdomen  Constipation  
  pt reports last normal BM 2 weeks ago  Referral / Consult History Provided By: Patient and Caregiver HPI: Wan Solo, 80 y.o. male with PMHx significant for volvulus, GERD, HLD, and HTN, presents ambulatory to the ED with cc of decreased appetite x 6 weeks. Pt endorses associated weight loss, difficulty swallowing, and constipation. Pt denies any exacerbating or alleviating factors. Pt's caregiver explains that the pt underwent a laparascopic procedure in February '18 to repair a volvulus which was performed by Dr. Stevie Garcia. He notes that the pt was having severe diarrhea and difficulty with swallowing following the procedure for which he was evaluated by his GI specialist, Dr. Corazon De Paz and had a lower and upper GI endoscopy. He reports that the pt was diagnosed with an intestinal parasite for which he was prescribed medication. He states that the medication helped to alleviate the pt's diarrhea, but did not aid in his difficulty swallowing and decreased appetite. Pt notes that he has been having constipation for the past few weeks for which he had an additional endoscopy by Dr. Merritt Taylor on 6/27/18. Pt states that Dr. 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 Pt reports that he has been able to tolerate popsicles and meal replacement shakes, but he has not eaten solid food in the past 5-6 weeks. Pt notes that he is able to urinate without difficulty. Pt specifically denies abdominal pain, N/V, fever, chills, dysuria, hematuria, chest pain, or cough.  
 
There are no other complaints, changes, or physical findings at this time. 
 
PCP: Marci Lombardi MD  
General Surgery: MD Olvin Starkey MD 
 
Current Facility-Administered Medications Medication Dose Route Frequency Provider Last Rate Last Dose  
 0.9% sodium chloride infusion  50 mL/hr IntraVENous RAD Daija Alvarez MD      
 0.9% sodium chloride infusion  75 mL/hr IntraVENous CONTINUOUS Miryam Rios MD      
 
Current Outpatient Prescriptions Medication Sig Dispense Refill  ondansetron hcl (ZOFRAN) 4 mg tablet Take 4 mg by mouth every eight (8) hours as needed for Nausea.  cholecalciferol (VITAMIN D3) 1,000 unit cap Take 1,000 Units by mouth every evening.  aspirin delayed-release 81 mg tablet Take 81 mg by mouth every evening.  simvastatin (ZOCOR) 20 mg tablet Take 20 mg by mouth every evening.  amLODIPine (NORVASC) 10 mg tablet Take 10 mg by mouth every evening. Past History Past Medical History: 
Past Medical History:  
Diagnosis Date  GERD (gastroesophageal reflux disease)  High cholesterol  Hypertension  Stroke ([facility] [address] 03/2017  
 numbness in fingertips on R hand; legs;stammering Past Surgical History: 
Past Surgical History:  
Procedure Laterality Date  HX CHOLECYSTECTOMY  HX HEENT Bilateral   
 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 Family History: 
Family History Problem Relation Age of Onset  No Known Problems Sister  No Known Problems Brother Social History: 
Social History Substance Use Topics  Smoking status: Former Smoker  Smokeless tobacco: Never Used  Alcohol use No  
 
 
Allergies: 
No Known Allergies Review of Systems Review of Systems Constitutional: Positive for appetite change. Negative for chills, fatigue and fever. HENT: Negative for congestion, rhinorrhea and sore throat.   
     + difficulty swallowing Eyes: Negative for pain, discharge and visual disturbance. Respiratory: Negative for cough, chest tightness, shortness of breath and wheezing. Cardiovascular: Negative for chest pain, palpitations and leg swelling. Gastrointestinal: Positive for constipation. Negative for abdominal pain, diarrhea, nausea and vomiting. Genitourinary: Negative for dysuria, frequency and hematuria. Musculoskeletal: Negative for arthralgias, back pain and myalgias. Skin: Negative for rash. Neurological: Negative for dizziness, weakness, light-headedness and headaches. Psychiatric/Behavioral: Negative. Physical Exam  
Physical Exam  
Constitutional: He is oriented to person, place, and time. He appears well-developed and well-nourished. No distress. HENT:  
Head: Normocephalic and atraumatic. Eyes: EOM are normal. Right eye exhibits no discharge. Left eye exhibits no discharge. No scleral icterus. Neck: Normal range of motion. Neck supple. No tracheal deviation present. Cardiovascular: Normal rate, regular rhythm, normal heart sounds and intact distal pulses. Exam reveals no gallop and no friction rub. No murmur heard. Pulmonary/Chest: Effort normal and breath sounds normal. No respiratory distress. He has no wheezes. He has no rales. Abdominal: Soft. He exhibits no distension. There is tenderness (Mild) in the right upper quadrant and epigastric area. There is no rebound and no guarding. Musculoskeletal: Normal range of motion. He exhibits no edema. Lymphadenopathy:  
  He has no cervical adenopathy. Neurological: He is alert and oriented to person, place, and time. Skin: Skin is warm and dry. No rash noted. Psychiatric: He has a normal mood and affect. Nursing note and vitals reviewed. Diagnostic Study Results Labs - Recent Results (from the past 12 hour(s)) METABOLIC PANEL, COMPREHENSIVE Collection Time: 08/01/18  9:47 AM  
Result Value Ref Range  Sodium 134 (L) 136 - 145 mmol/L Potassium 4.5 3.5 - 5.1 mmol/L Chloride 98 97 - 108 mmol/L  
 CO2 31 21 - 32 mmol/L Anion gap 5 5 - 15 mmol/L Glucose 110 (H) 65 - 100 mg/dL BUN 24 (H) 6 - 20 MG/DL Creatinine 1.61 (H) 0.70 - 1.30 MG/DL  
 BUN/Creatinine ratio 15 12 - 20 GFR est AA 50 (L) >60 ml/min/1.73m2 GFR est non-AA 41 (L) >60 ml/min/1.73m2 Calcium 9.4 8.5 - 10.1 MG/DL Bilirubin, total 0.6 0.2 - 1.0 MG/DL  
 ALT (SGPT) 17 12 - 78 U/L  
 AST (SGOT) 24 15 - 37 U/L Alk. phosphatase 63 45 - 117 U/L Protein, total 8.5 (H) 6.4 - 8.2 g/dL Albumin 3.3 (L) 3.5 - 5.0 g/dL Globulin 5.2 (H) 2.0 - 4.0 g/dL A-G Ratio 0.6 (L) 1.1 - 2.2 LACTIC ACID Collection Time: 08/01/18  9:47 AM  
Result Value Ref Range Lactic acid 1.2 0.4 - 2.0 MMOL/L  
LIPASE Collection Time: 08/01/18  9:47 AM  
Result Value Ref Range Lipase 103 73 - 393 U/L  
CBC WITH AUTOMATED DIFF Collection Time: 08/01/18 11:27 AM  
Result Value Ref Range WBC 5.5 4.1 - 11.1 K/uL  
 RBC 4.29 4. 10 - 5.70 M/uL  
 HGB 12.2 12.1 - 17.0 g/dL HCT 38.2 36.6 - 50.3 % MCV 89.0 80.0 - 99.0 FL  
 MCH 28.4 26.0 - 34.0 PG  
 MCHC 31.9 30.0 - 36.5 g/dL  
 RDW 14.6 (H) 11.5 - 14.5 % PLATELET 183 986 - 571 K/uL MPV 10.2 8.9 - 12.9 FL  
 NRBC 0.0 0  WBC ABSOLUTE NRBC 0.00 0.00 - 0.01 K/uL NEUTROPHILS 68 32 - 75 % LYMPHOCYTES 15 12 - 49 % MONOCYTES 16 (H) 5 - 13 % EOSINOPHILS 0 0 - 7 % BASOPHILS 0 0 - 1 % IMMATURE GRANULOCYTES 0 0.0 - 0.5 % ABS. NEUTROPHILS 3.8 1.8 - 8.0 K/UL  
 ABS. LYMPHOCYTES 0.9 0.8 - 3.5 K/UL  
 ABS. MONOCYTES 0.9 0.0 - 1.0 K/UL  
 ABS. EOSINOPHILS 0.0 0.0 - 0.4 K/UL  
 ABS. BASOPHILS 0.0 0.0 - 0.1 K/UL  
 ABS. IMM. GRANS. 0.0 0.00 - 0.04 K/UL  
 DF AUTOMATED URINALYSIS W/ REFLEX CULTURE Collection Time: 08/01/18  1:13 PM  
Result Value Ref Range Color YELLOW/STRAW Appearance CLEAR CLEAR Specific gravity 1.021 1.003 - 1.030    
 pH (UA) 5.0 5.0 - 8.0 Protein 30 (A) NEG mg/dL Glucose NEGATIVE  NEG mg/dL Ketone TRACE (A) NEG mg/dL Bilirubin NEGATIVE  NEG Blood NEGATIVE  NEG Urobilinogen 1.0 0.2 - 1.0 EU/dL Nitrites NEGATIVE  NEG Leukocyte Esterase NEGATIVE  NEG    
 WBC 0-4 0 - 4 /hpf  
 RBC 0-5 0 - 5 /hpf Epithelial cells FEW FEW /lpf Bacteria NEGATIVE  NEG /hpf  
 UA:UC IF INDICATED CULTURE NOT INDICATED BY UA RESULT CNI    
 CA Oxalate crystals 1+ (A) NEG Hyaline cast 0-2 0 - 5 /lpf Radiologic Studies -  
CT ABD PELV WO CONT Final Result CT Results  (Last 48 hours) 08/01/18 1235  CT ABD PELV WO CONT Final result Impression:  IMPRESSION:   
1. No acute process in the abdomen and pelvis. 2. L3 superior endplate compression fracture is new from February 2018, but  
probably chronic. 3. 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 4. 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 Narrative:  CT ABDOMEN AND PELVIS WITHOUT CONTRAST. 8/1/2018 12:35 PM   
   
INDICATION: Diffuse abdominal pain and constipation. No bowel movement for 2  
weeks. COMPARISON: 2/20/2018, 1/18/2011. Upper GI series 6/27/2018. TECHNIQUE: CT of the abdomen and pelvis was performed after the administration  
of oral contrast. Evaluation of the solid organs is less sensitive without IV  
contrast. CT dose reduction was achieved through use of a standardized protocol  
tailored for this examination and automatic exposure control for dose  
modulation. FINDINGS:  
Inferior chest: There is subpleural fibrosis and honeycombing in the lung bases. Traction bronchiectasis is associated in the basilar segments of the lower  
lobes. This can be seen with idiopathic pulmonary fibrosis/usual interstitial  
pneumonitis (IPF/UIP). There is a small to moderate hiatal hernia. The heart  
size is normal. Coronary calcifications are mild to moderate.   
   
Abdomen: Incidental note is made of a large periampullary duodenal diverticulum. The unenhanced stomach, duodenum, liver, pancreas, spleen, adrenals, and kidneys  
are otherwise normal. Status post cholecystectomy. Pelvis: Dense barium is retained within extensive sigmoid diverticulosis. The  
small bowel, ileocecal junction, appendix, colon, and bladder are otherwise  
normal. No free air or fluid, and no abdominopelvic lymphadenopathy. The  
prostate is enlarged. The infrarenal abdominal aorta is borderline aneurysmal (29  
mm). An L3 superior endplate compression fracture with approximately 20% vertebral  
body height loss is new from 2/20/2018. However, vacuum disc phenomenon suggests  
that it is chronic. 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  
with mild anterolisthesis of L5 on S1. Medical Decision Making I am the first provider for this patient. I reviewed the vital signs, available nursing notes, past medical history, past surgical history, family history and social history. Vital Signs-Reviewed the patient's vital signs. Patient Vitals for the past 12 hrs: 
 Temp Pulse Resp BP SpO2  
08/01/18 0855 97.8 °F (36.6 °C) 76 18 112/71 100 % Pulse Oximetry Analysis - 100% on RA Cardiac Monitor:  
Rate: 75 bpm 
Rhythm: Normal Sinus Rhythm Records Reviewed: Nursing Notes, Old Medical Records, Previous Radiology Studies and Previous Laboratory Studies Provider Notes (Medical Decision Making):  
Differential includes volvulus, obstruction, illeus, constipation, pancreatitis, cholelithiaisis, ischemia ED Course:  
Initial assessment performed. The patients presenting problems have been discussed, and they are in agreement with the care plan formulated and outlined with them. I have encouraged them to ask questions as they arise throughout their visit. CONSULT NOTE: 
2:56 PM 
Beverly Krause MD spoke with Domenic Castillo MD 
Specialty: General Surgery Discussed patient's hx, disposition, and available diagnostic and imaging results. Reviewed care plans. Consultant agrees with plans as outlined. Dr. Aquiles Méndez agrees to come and evaluate the pt in the ED. Written by Luz Reaves ED Scribe, as dictated by Beverly Krause MD. 
 
CONSULT NOTE:  
4:48 PM 
Beverly Krause MD spoke with Domenic Castillo MD 
Specialty: General surgery Discussed pt's hx, disposition, and available diagnostic and imaging results. Reviewed care plans. Consultant agrees with plans as outlined. No acute surgical intervention at this time. Recommends admission for hydration, bowel regimen and possible gastric empyting study. Written by Misty Ramirez MD 
 
CONSULT NOTE:  
5:00 PM 
Beverly Krause MD spoke with  Dr. Shelley Lozano Specialty: Hospitalist 
Discussed pt's hx, disposition, and available diagnostic and imaging results. Reviewed care plans. Consultant agrees with plans as outlined. Will evaluate for admission. Written by Misty Ramirez MD 
 
Critical Care Time:  
0 Disposition: PLAN: 
1. Admit to Hospitalist 
 
ADMIT NOTE: 
4:50 PM 
Patient is being admitted to the hospital by Dr. Shelley Lozano. The results of their tests and reasons for their admission have been discussed with them and/or available family. They convey agreement and understanding for the need to be admitted and for their admission diagnosis. Consultation has been made with the inpatient physician specialist for hospitalization. PLAN: 
1. Current Discharge Medication List  
  
 
2. Follow-up Information None Return to ED if worse Diagnosis Clinical Impression: 1. ERICA (acute kidney injury) ([facility] [address] 2. Constipation, unspecified constipation type Attestations:  
 
This note is prepared by Herbert Gomez, acting as Scribe for Beverly Krause MD. 
 
Beverly Krause MD: The scribe's documentation has been prepared under my direction and personally reviewed by me in its entirety. I confirm that the note above accurately reflects all work, treatment, procedures, and medical decision making performed by me.

## 2018-08-01 NOTE — IP AVS SNAPSHOT
[address] [facility] 
[phone] Patient: Kim Krishnamurthy MRN: [id number] AIS:0[birth year]8/[birth year]29 About your hospitalization You were admitted on:  August 1, 2018 You last received care in the:  [facility] 3 [facility] You were discharged on:  August 5, 2018 Why you were hospitalized Your primary diagnosis was:  Acute Renal Failure (Arf) (Hcc) Follow-up Information Follow up With Details Comments Contact Info Joann Godinez 227  this is your home care agency. If you do not hear from them in one to two days please call them. [address] [facility] [address] 
[phone] Corrie Castellanos MD Schedule an appointment as soon as possible for a visit in 1 week for PCP follow up for post hospitalization follow up appointment. [address] [place] [address] 
[phone] Donta Brown MD Schedule an appointment as soon as possible for a visit  [address] [facility] 202 [facility] 
[phone] Discharge Orders None A check madina indicates which time of day the medication should be taken. My Medications START taking these medications Instructions Each Dose to Equal  
 Morning Noon Evening Bedtime  
 docusate sodium 100 mg capsule Commonly known as:  Cary Velásquez Your last dose was: Your next dose is: Take 1 Cap by mouth daily as needed for Constipation for up to 90 days. 100 mg  
    
   
   
   
  
 polyethylene glycol 17 gram packet Commonly known as:  Tressa Lucas Your last dose was: Your next dose is: Take 1 Packet by mouth daily. 17 g CHANGE how you take these medications Instructions Each Dose to Equal  
 Morning Noon Evening Bedtime  
 amLODIPine 5 mg tablet Commonly known as:  Titi Epperson What changed:   
- medication strength 
- how much to take - when to take this Your last dose was: Your next dose is: Take 1 Tab by mouth daily. 5 mg CONTINUE taking these medications Instructions Each Dose to Equal  
 Morning Noon Evening Bedtime  
 aspirin delayed-release 81 mg tablet Your last dose was: Your next dose is: Take 81 mg by mouth every evening. 81 mg  
    
   
   
   
  
 ondansetron hcl 4 mg tablet Commonly known as:  Dereje Lose Your last dose was: Your next dose is: Take 4 mg by mouth every eight (8) hours as needed for Nausea. 4 mg VITAMIN D3 1,000 unit Cap Generic drug:  cholecalciferol Your last dose was: Your next dose is: Take 1,000 Units by mouth every evening. 1000 Units ZOCOR 20 mg tablet Generic drug:  simvastatin Your last dose was: Your next dose is: Take 20 mg by mouth every evening. 20 mg Where to Get Your Medications Information on where to get these meds will be given to you by the nurse or doctor. ! Ask your nurse or doctor about these medications  
  amLODIPine 5 mg tablet  
 docusate sodium 100 mg capsule  
 polyethylene glycol 17 gram packet Discharge Instructions None [facility] Announcement We are excited to announce that we are making your provider's discharge notes available to you in PutPlace. You will see these notes when they are completed and signed by the physician that discharged you from your recent hospital stay. If you have any questions or concerns about any information you see in SoloPowert, please call the Health Information Department where you were seen or reach out to your Primary Care Provider for more information about your plan of care. Introducing [facility] & HEALTH SERVICES! Dear Luis Fernando July: Thank you for requesting a Talents Garden account. Our records indicate that you already have an active Talents Garden account. You can access your account anytime at [email or web address] Did you know that you can access your hospital and ER discharge instructions at any time in Talents Garden? You can also review all of your test results from your hospital stay or ER visit. Additional Information If you have questions, please visit the Frequently Asked Questions section of the Talents Garden website at [email or web address] Remember, Talents Garden is NOT to be used for urgent needs. For medical emergencies, dial 911. Now available from your iPhone and Android! Introducing Bereket Evans As a [facility] patient, I wanted to make you aware of our electronic visit tool called Bereket Evans. ColemanAMEC allows you to connect within minutes with a medical provider 24 hours a day, seven days a week via a mobile device or tablet or logging into a secure website from your computer. You can access Bereket Evans from anywhere in the [place]. A virtual visit might be right for you when you have a simple condition and feel like you just dont want to get out of bed, or cant get away from work for an appointment, when your regular [facility] provider is not available (evenings, weekends or holidays), or when youre out of town and need minor care. Electronic visits cost only $49 and if the ColemanAdvitech/Palamida provider determines a prescription is needed to treat your condition, one can be electronically transmitted to a nearby pharmacy*. Please take a moment to enroll today if you have not already done so. The enrollment process is free and takes just a few minutes. To enroll, please download the Wheretoget/Palamida sudha to your tablet or phone, or visit [email or web address] to enroll on your computer. And, as an [address] [place] [address] patient with a legalPAD account, the results of your visits will be scanned into your electronic medical record and your primary care provider will be able to view the scanned results. We urge you to continue to see your regular Dori Corrales provider for your ongoing medical care. And while your primary care provider may not be the one available when you seek a Bereket Johnsonelderfin virtual visit, the peace of mind you get from getting a real diagnosis real time can be priceless. For more information on Bereket Whitlockfin, view our Frequently Asked Questions (FAQs) at [email or web address]. Sincerely, 
 
Asaf Ni MD 
Chief Medical Officer Mirela8 Marti Soto *:  certain medications cannot be prescribed via Bereket Alexelderfin Providers Seen During Your Hospitalization Provider Specialty Primary office phone Mikal Brumfield MD Emergency Medicine [phone] Gianna Castillo MD Internal Medicine [phone] Val Hill DO Internal Medicine [phone] Your Primary Care Physician (PCP) Primary Care Physician Office Phone Office Fax [address] [phone] You are allergic to the following No active allergies Recent Documentation Height Weight BMI Smoking Status 1.651 m 62.6 kg 22.97 kg/m2 Former Smoker Emergency Contacts Name Discharge Info Relation Home Work Mobile [address] CAREGIVER [3] Healthcare Decision Maker [20] [phone] Yodit Alexander DISCHARGE CAREGIVER [3] Spouse [3] [phone] Patient Belongings The following personal items are in your possession at time of discharge: 
  Dental Appliances: None  Visual Aid: Glasses, At bedside   Hearing Aids/Status: At home, Bilateral  Home Medications: None   Jewelry: None  Clothing: Shirt, Shorts, Footwear    Other Valuables: None Discharge Instructions Attachments/References CONSTIPATION (ENGLISH) Patient Handouts Constipation: Care Instructions Your Care Instructions Constipation means that you have a hard time passing stools (bowel movements). People pass stools from 3 times a day to once every 3 days. What is normal for you may be different. Constipation may occur with pain in the rectum and cramping. The pain may get worse when you try to pass stools. Sometimes there are small amounts of bright red blood on toilet paper or the surface of stools. This is because of enlarged veins near the rectum (hemorrhoids). A few changes in your diet and lifestyle may help you avoid ongoing constipation. Your doctor may also prescribe medicine to help loosen your stool. Some medicines can cause constipation. These include pain medicines and antidepressants. Tell your doctor about all the medicines you take. Your doctor may want to make a medicine change to ease your symptoms. Follow-up care is a key part of your treatment and safety. Be sure to make and go to all appointments, and call your doctor if you are having problems. It's also a good idea to know your test results and keep a list of the medicines you take. How can you care for yourself at home? · Drink plenty of fluids, enough so that your urine is light yellow or clear like water. If you have kidney, heart, or liver disease and have to limit fluids, talk with your doctor before you increase the amount of fluids you drink. · Include high-fiber foods in your diet each day. These include fruits, vegetables, beans, and whole grains. · Get at least 30 minutes of exercise on most days of the week. Walking is a good choice. You also may want to do other activities, such as running, swimming, cycling, or playing tennis or team sports. · Take a fiber supplement, such as Citrucel or Metamucil, every day. Read and follow all instructions on the label. · Schedule time each day for a bowel movement. A daily routine may help. Take your time having your bowel movement. · Support your feet with a small step stool when you sit on the toilet. This helps flex your hips and places your pelvis in a squatting position. · Your doctor may recommend an over-the-counter laxative to relieve your constipation. Examples are Milk of Magnesia and MiraLax. Read and follow all instructions on the label. Do not use laxatives on a long-term basis. When should you call for help? Call your doctor now or seek immediate medical care if: 
  · You have new or worse belly pain.  
  · You have new or worse nausea or vomiting.  
  · You have blood in your stools.  
 Watch closely for changes in your health, and be sure to contact your doctor if: 
  · Your constipation is getting worse.  
  · You do not get better as expected. Where can you learn more? Go to http://fabien-jethro.info/. Enter 21 [phone] in the search box to learn more about \"Constipation: Care Instructions. \" Current as of: November 20, 2017 Content Version: 11.7 © 7598-4060 DidLog. Care instructions adapted under license by [employer]ek[employer] (which disclaims liability or warranty for this information). If you have questions about a medical condition or this instruction, always ask your healthcare professional. [address] any warranty or liability for your use of this information. Please provide this summary of care documentation to your next provider. Signatures-by signing, you are acknowledging that this After Visit Summary has been reviewed with you and you have received a copy. Patient Signature:  ____________________________________________________________ Date:  ____________________________________________________________  
  
Nolan Hernandez  Provider Signature: ____________________________________________________________ Date:  ____________________________________________________________

## 2018-08-02 ENCOUNTER — APPOINTMENT (OUTPATIENT)
Dept: NUCLEAR MEDICINE | Age: 83
DRG: 380 | End: 2018-08-02
Attending: SURGERY
Payer: MEDICARE

## 2018-08-02 LAB
ANION GAP SERPL CALC-SCNC: 5 MMOL/L (ref 5–15)
BUN SERPL-MCNC: 20 MG/DL (ref 6–20)
BUN/CREAT SERPL: 15 (ref 12–20)
CALCIUM SERPL-MCNC: 8.5 MG/DL (ref 8.5–10.1)
CHLORIDE SERPL-SCNC: 102 MMOL/L (ref 97–108)
CO2 SERPL-SCNC: 30 MMOL/L (ref 21–32)
CREAT SERPL-MCNC: 1.35 MG/DL (ref 0.7–1.3)
ERYTHROCYTE [DISTWIDTH] IN BLOOD BY AUTOMATED COUNT: 14.5 % (ref 11.5–14.5)
GLUCOSE SERPL-MCNC: 91 MG/DL (ref 65–100)
HCT VFR BLD AUTO: 35 % (ref 36.6–50.3)
HGB BLD-MCNC: 11.3 G/DL (ref 12.1–17)
MAGNESIUM SERPL-MCNC: 2.3 MG/DL (ref 1.6–2.4)
MCH RBC QN AUTO: 28.5 PG (ref 26–34)
MCHC RBC AUTO-ENTMCNC: 32.3 G/DL (ref 30–36.5)
MCV RBC AUTO: 88.4 FL (ref 80–99)
NRBC # BLD: 0 K/UL (ref 0–0.01)
NRBC BLD-RTO: 0 PER 100 WBC
PHOSPHATE SERPL-MCNC: 3.1 MG/DL (ref 2.6–4.7)
PLATELET # BLD AUTO: 217 K/UL (ref 150–400)
PMV BLD AUTO: 10.3 FL (ref 8.9–12.9)
POTASSIUM SERPL-SCNC: 4.1 MMOL/L (ref 3.5–5.1)
RBC # BLD AUTO: 3.96 M/UL (ref 4.1–5.7)
SODIUM SERPL-SCNC: 137 MMOL/L (ref 136–145)
WBC # BLD AUTO: 4.5 K/UL (ref 4.1–11.1)

## 2018-08-02 PROCEDURE — 74011250637 HC RX REV CODE- 250/637: Performed by: INTERNAL MEDICINE

## 2018-08-02 PROCEDURE — 84100 ASSAY OF PHOSPHORUS: CPT | Performed by: HOSPITALIST

## 2018-08-02 PROCEDURE — 74011250636 HC RX REV CODE- 250/636: Performed by: HOSPITALIST

## 2018-08-02 PROCEDURE — 80048 BASIC METABOLIC PNL TOTAL CA: CPT | Performed by: HOSPITALIST

## 2018-08-02 PROCEDURE — 74011250637 HC RX REV CODE- 250/637: Performed by: HOSPITALIST

## 2018-08-02 PROCEDURE — 97165 OT EVAL LOW COMPLEX 30 MIN: CPT | Performed by: OCCUPATIONAL THERAPIST

## 2018-08-02 PROCEDURE — C9113 INJ PANTOPRAZOLE SODIUM, VIA: HCPCS | Performed by: HOSPITALIST

## 2018-08-02 PROCEDURE — 83735 ASSAY OF MAGNESIUM: CPT | Performed by: HOSPITALIST

## 2018-08-02 PROCEDURE — 94760 N-INVAS EAR/PLS OXIMETRY 1: CPT

## 2018-08-02 PROCEDURE — 65660000000 HC RM CCU STEPDOWN

## 2018-08-02 PROCEDURE — 85027 COMPLETE CBC AUTOMATED: CPT | Performed by: HOSPITALIST

## 2018-08-02 PROCEDURE — 74011000250 HC RX REV CODE- 250: Performed by: HOSPITALIST

## 2018-08-02 PROCEDURE — 36415 COLL VENOUS BLD VENIPUNCTURE: CPT | Performed by: HOSPITALIST

## 2018-08-02 RX ORDER — POLYETHYLENE GLYCOL 3350 17 G/17G
17 POWDER, FOR SOLUTION ORAL DAILY
Status: DISCONTINUED | OUTPATIENT
Start: 2018-08-02 | End: 2018-08-05 | Stop reason: HOSPADM

## 2018-08-02 RX ORDER — DOCUSATE SODIUM 100 MG/1
100 CAPSULE, LIQUID FILLED ORAL
Status: DISCONTINUED | OUTPATIENT
Start: 2018-08-02 | End: 2018-08-05 | Stop reason: HOSPADM

## 2018-08-02 RX ADMIN — Medication 10 ML: at 05:04

## 2018-08-02 RX ADMIN — ASPIRIN 81 MG: 81 TABLET, COATED ORAL at 17:23

## 2018-08-02 RX ADMIN — POLYETHYLENE GLYCOL 3350 17 G: 17 POWDER, FOR SOLUTION ORAL at 14:15

## 2018-08-02 RX ADMIN — AMLODIPINE BESYLATE 2.5 MG: 2.5 TABLET ORAL at 17:23

## 2018-08-02 RX ADMIN — SODIUM CHLORIDE 40 MG: 9 INJECTION, SOLUTION INTRAMUSCULAR; INTRAVENOUS; SUBCUTANEOUS at 08:35

## 2018-08-02 RX ADMIN — ENOXAPARIN SODIUM 30 MG: 30 INJECTION, SOLUTION INTRAVENOUS; SUBCUTANEOUS at 08:35

## 2018-08-02 RX ADMIN — SODIUM CHLORIDE 100 ML/HR: 900 INJECTION, SOLUTION INTRAVENOUS at 08:30

## 2018-08-02 RX ADMIN — SODIUM CHLORIDE 40 MG: 9 INJECTION, SOLUTION INTRAMUSCULAR; INTRAVENOUS; SUBCUTANEOUS at 21:17

## 2018-08-02 RX ADMIN — PRAVASTATIN SODIUM 40 MG: 40 TABLET ORAL at 21:17

## 2018-08-02 RX ADMIN — Medication 10 ML: at 21:17

## 2018-08-02 NOTE — PROGRESS NOTES
Occupational Therapy EVALUATION/discharge Patient: Angel Hughes (52 y.o. male) Date: 8/2/2018 Primary Diagnosis: Acute renal failure (ARF) (HCC) Precautions: bed alarm ASSESSMENT:  
Based on the objective data described below, the patient presents with overall independent ADL mobility and ADLS. Pt was able to obtain objects from floor in standing with independence. Pt reports that he is at baseline. Pts caregiver does have concerns as pt is unsteady at home and holds onto walls. This was not seen on eval and caregiver reports that this is \"the best he has done in a while. \" Pt holds to walls in unfamiliar environments. Pt did not hold onto objects today with mobility in room without assist devices and no loss of balance. Recommend PT assessment for higher level balance and gait assessment. Further skilled acute occupational therapy is not indicated at this time. Discharge Recommendations: Outpatient PT Further Equipment Recommendations for Discharge: may need device for mobility; will defer to PT   
 
SUBJECTIVE:  
Patient stated I am fine. I don't need you.  OBJECTIVE DATA SUMMARY:  
HISTORY:  
Past Medical History:  
Diagnosis Date  GERD (gastroesophageal reflux disease)  High cholesterol  Hypertension  Stroke ([facility] [address] 03/2017  
 numbness in fingertips on R hand; legs;stammering Past Surgical History:  
Procedure Laterality Date  HX CHOLECYSTECTOMY  HX HEENT Bilateral   
 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 Prior Level of Function/Environment/Context: performed ADLs on his own Expanded or extensive additional review of patient history:  
 
Home Situation Home Environment: Private residence One/Two Story Residence: One story Living Alone: No 
Support Systems: Sikh / oskar community, Family member(s), Friends \ neighbors Patient Expects to be Discharged to[de-identified] Private residence Current DME Used/Available at Home: None Hand dominance: Right EXAMINATION OF PERFORMANCE DEFICITS: 
Cognitive/Behavioral Status: 
Neurologic State: Alert Orientation Level: Oriented X4 Cognition: Appropriate safety awareness; Appropriate for age attention/concentration; Appropriate decision making Perception: Appears intact Perseveration: No perseveration noted Safety/Judgement: Awareness of environment; Fall prevention;Home safety Hearing: Auditory Auditory Impairment: Hard of hearing, bilateral 
Hearing Aids/Status: At home, Bilateral 
 
Vision/Perceptual:   
    
    
    
  
    
    
Corrective Lenses: Glasses Range of Motion: 
 
AROM: Within functional limits PROM: Within functional limits Strength: 
 
Strength: Generally decreased, functional 
  
  
  
  
 
Coordination: 
Coordination: Within functional limits Fine Motor Skills-Upper: Left Intact; Right Intact Gross Motor Skills-Upper: Left Intact; Right Intact Tone & Sensation: 
 
Tone: Normal 
  
  
  
  
  
  
  
 
Balance: 
Sitting: Intact Standing: Intact Functional Mobility and Transfers for ADLs: 
Bed Mobility: 
Rolling: Independent Supine to Sit: Independent Sit to Supine: Independent Scooting: Independent Transfers: 
Sit to Stand: Independent Stand to Sit: Independent Bed to Chair: Independent Toilet Transfer : Independent ADL Assessment: 
Feeding: Independent Oral Facial Hygiene/Grooming: Independent Bathing: Independent Upper Body Dressing: Independent Lower Body Dressing: Independent Toileting: Independent Cognitive Retraining Safety/Judgement: Awareness of environment; Fall prevention;Home safety Functional Measure: 
Barthel Index: 
 
Bathin Bladder: 10 Bowels: 10 
Groomin Dressing: 10 Feeding: 10 Mobility: 10 Stairs: 5 Toilet Use: 10 Transfer (Bed to Chair and Back): 15 Total: 90 Barthel and G-code impairment scale: 
Percentage of impairment CH 
0% CI 
1-19% CJ 
20-39% CK 
40-59% CL 
60-79% CM 
80-99% CN 
100% Barthel Score 0-100 100 99-80 79-60 59-40 20-39 1-19 
 0 Barthel Score 0-20 20 17-19 13-16 9-12 5-8 1-4 0 The Barthel ADL Index: Guidelines 1. The index should be used as a record of what a patient does, not as a record of what a patient could do. 2. The main aim is to establish degree of independence from any help, physical or verbal, however minor and for whatever reason. 3. The need for supervision renders the patient not independent. 4. A patient's performance should be established using the best available evidence. Asking the patient, friends/relatives and nurses are the usual sources, but direct observation and common sense are also important. However direct testing is not needed. 5. Usually the patient's performance over the preceding 24-48 hours is important, but occasionally longer periods will be relevant. 6. Middle categories imply that the patient supplies over 50 per cent of the effort. 7. Use of aids to be independent is allowed. Harlee Cowden., Barthel, D.W. (3511). Functional evaluation: the Barthel Index. [address] [facility] (14)2. CELY Loza, Willie Chavarria, Boyd Kelly., [place], [address] [place] [address] (1999). Measuring the change indisability after inpatient rehabilitation; comparison of the responsiveness of the Barthel Index and Functional [place] Measure. Journal of Neurology, Neurosurgery, and Psychiatry, 66(4), 655-200. Massiel Ortiz, N.J.A, SONYA Kevin, & William Argueta MHugoA. (2004.) Assessment of post-stroke quality of life in cost-effectiveness studies: The usefulness of the Barthel Index and the EuroQoL-5D. [facility], 13, 653-39 G codes: In compliance with CMSs Claims Based Outcome Reporting, the following G-code set was chosen for this patient based on their primary functional limitation being treated:  
 
The outcome measure chosen to determine the severity of the functional limitation was the barthel with a score of 90/100 which was correlated with the impairment scale. ? Self Care:  
  - CURRENT STATUS: CI - 1%-19% impaired, limited or restricted  - GOAL STATUS: CI - 1%-19% impaired, limited or restricted  - D/C STATUS:  CI - 1%-19% impaired, limited or restricted Occupational Therapy Evaluation Charge Determination History Examination Decision-Making LOW Complexity : Brief history review  LOW Complexity : 1-3 performance deficits relating to physical, cognitive , or psychosocial skils that result in activity limitations and / or participation restrictions  MEDIUM Complexity : Patient may present with comorbidities that affect occupational performnce. Miniml to moderate modification of tasks or assistance (eg, physical or verbal ) with assesment(s) is necessary to enable patient to complete evaluation Based on the above components, the patient evaluation is determined to be of the following complexity level: LOW Pain: 
Pain Scale 1: Numeric (0 - 10) Pain Intensity 1: 0 Activity Tolerance:  
 
Please refer to the flowsheet for vital signs taken during this treatment. After treatment:  
[]  Patient left in no apparent distress sitting up in chair 
[x]  Patient left in no apparent distress in bed 
[x]  Call bell left within reach [x]  Nursing notified 
[]  Caregiver present 
[]  Bed alarm activated COMMUNICATION/EDUCATION:  
Communication/Collaboration: 
[x]      Home safety education was provided and the patient/caregiver indicated understanding. [x]      Patient/family have participated as able and agree with findings and recommendations. []      Patient is unable to participate in plan of care at this time. Findings and recommendations were discussed with: Physical Therapist, Registered Nurse and patient, caregiver and family Leo Loaiza OTR/L Time Calculation: 8 mins

## 2018-08-02 NOTE — PROGRESS NOTES
NUC MED: Pt has not been npo today. NM Gastric emptying study for Friday morning. Please keep patient NPO after midnight. Spoke to Shanice Lemus RN.

## 2018-08-02 NOTE — ED NOTES
TRANSFER - OUT REPORT: 
 
Verbal report given to Darling Sheehan RN(name) on Umesh Herrera  being transferred to [facility] tele room 3265(unit) for routine progression of care Report consisted of patients Situation, Background, Assessment and  
Recommendations(SBAR). Information from the following report(s) SBAR, Kardex, ED Summary, Intake/Output, MAR and Recent Results was reviewed with the receiving nurse. Lines:  
Peripheral IV 08/01/18 Right Forearm (Active) Site Assessment Clean, dry, & intact 8/1/2018  1:07 PM  
Phlebitis Assessment 0 8/1/2018  1:07 PM  
Infiltration Assessment 0 8/1/2018  1:07 PM  
Dressing Status Clean, dry, & intact 8/1/2018  1:07 PM  
Dressing Type Transparent;Tape 8/1/2018  1:07 PM  
Hub Color/Line Status Blue 8/1/2018  1:07 PM  
  
 
Opportunity for questions and clarification was provided. Patient transported with: 
 nCrypted Cloud

## 2018-08-02 NOTE — PROGRESS NOTES
Yashira Tavarez placed order for NM gastric empty test at [id number] [id number] am. Spoke with Marcelina in [facility], pt had breakfast this morning so test will be scheduled for tomorrow (8/3). Pt to be NPO at midnight tonight.

## 2018-08-02 NOTE — PROGRESS NOTES
TRANSFER - IN REPORT: 
 
Verbal report received from Kimberlee Olivo RN(name) on [employer]  being received from [employer]) for routine progression of care Report consisted of patients Situation, Background, Assessment and  
Recommendations(SBAR). Information from the following report(s) SBAR, Kardex, [facility] and Recent Results was reviewed with the receiving nurse. Opportunity for questions and clarification was provided. Assessment completed upon patients arrival to unit and care assumed. Primary Nurse Jeri Aguiar RN and Wayne Cazares RN performed a dual skin assessment on this patient No impairment noted Urban score is 18

## 2018-08-02 NOTE — PROGRESS NOTES
Problem: Falls - Risk of 
Goal: *Absence of Falls Document Karen Cardozo Fall Risk and appropriate interventions in the flowsheet. Outcome: Progressing Towards Goal 
Fall Risk Interventions: 
Mobility Interventions: Bed/chair exit alarm, Communicate number of staff needed for ambulation/transfer, OT consult for ADLs, Patient to call before getting OOB, PT Consult for mobility concerns, PT Consult for assist device competence, Utilize walker, cane, or other assistive device Medication Interventions: Bed/chair exit alarm, Evaluate medications/consider consulting pharmacy, Patient to call before getting OOB, Teach patient to arise slowly Elimination Interventions: Bed/chair exit alarm, Call light in reach, Patient to call for help with toileting needs, Toilet paper/wipes in reach, Toileting schedule/hourly rounds History of Falls Interventions: Bed/chair exit alarm, Consult care management for discharge planning, Door open when patient unattended, Evaluate medications/consider consulting pharmacy, Room close to nurse's station, Investigate reason for fall

## 2018-08-02 NOTE — PROGRESS NOTES
Bedside and Verbal shift change report given to [facility] (oncoming nurse) by Benny Jarrell (offgoing nurse). Report included the following information SBAR, Kardex, Intake/Output, MAR, Accordion and Recent Results. Pt to be NPO at midnight for NM procedure tomorrow. No complaints of pain.

## 2018-08-02 NOTE — PROGRESS NOTES
Patients IV no longer flushing @ 0150 
 
0210:Two nurses attempted to start an IV and were unsuccessful  
 
0220: Nursing supervisor notified. Supervisor stated she would send someone to help start an IV. 
 
0340: CCU nurse Mitch 66 placed IV in left upper arm. Patients fluids restarted at this time.

## 2018-08-02 NOTE — PROGRESS NOTES
Will order gastric emptying study. Review of the UGI from a month ago shows some degree of gastroparesis and partial GOO.

## 2018-08-02 NOTE — CDMP QUERY
Please clarify if this patient is (was) being treated/managed for:  
 
=> Severe protein-calorie malnutrition in the setting of dysphasia w/vomiting, weight loss of 22.9% noted in less than 6 months, Mild muscle wasting, loss of subcutaneous fat requiring CLDiet, dietary consult, ensure TID 
=> Other explanation of clinical findings 
=> Clinically Undetermined (no explanation for clinical findings) The medical record reflects the following clinical findings, treatment, and risk factors. Risk Factors:  extensive gi hx constipation, dysphagia, hiatal hernia, GOO Clinical Indicators:  poor appetite, abd pain, ERICA, +weight loss, last BM 2wks ago,  
Treatment: dietary consult, gastric emptying study, consult GI, diet CLD Please clarify and document your clinical opinion in the progress notes and discharge summary including the definitive and/or presumptive diagnosis, (suspected or probable), related to the above clinical findings. Please include clinical findings supporting your diagnosis.

## 2018-08-02 NOTE — PROGRESS NOTES
Initial Nutrition Assessment: 
 
INTERVENTIONS/RECOMMENDATIONS:  
· Meals/Snacks: General/healthful diet: Diet advancement per surgeon · Supplements: Commercial supplement: Ensure clear TID ASSESSMENT:  
Patient medically noted for acute renal failure and possible partial gastric outlet obstruction. PMH for paraesophageal hernia with volvulus, CVA, and HTN. Patient reports he has been unable to tolerate solids and has been living on popsicles and chocolate boost (drinks 2 per day most of the time). Significant weight loss of 22.9% noted in less than 6 months per chart review. Currently receiving clear liquids; ensure clear ordered with meals which is appropriate. Patient upset this morning as he states someone told him he could have oatmeal; this is not allowed on clear liquids but hopefully can try full liquids soon. He reports tolerating breakfast well this AM. Plans for gastric emptying study once constipation resolves per MD notes. Encouraged intake of meals/supplements at this time. Will monitor diet advancement and switch to ensure enlive when able for more kcal/protein dense supplementation. Meets Criteria for Chronic Malnutrition  
[x] Severe Malnutrition, as evidenced by: 
 [] Severe muscle wasting, loss of subcutaneous fat 
 [x] Nutritional intake of <75% of recommended intake for >1 month 
 [x] Weight loss of  >5% in 1 month, >7.5% in 3 months, >10% in 6 months, >20% in 1 year 
 [] Severe edema  
[]Moderate Malnutrition, as evidenced by: 
 [x] Mild muscle wasting, loss of subcutaneous fat 
 [] Nutritional intake <75% of recommended intake for >1 month 
 [] Weight loss of  5% in 1 month, 7.5% in 3 months, 10% in 6 months, or 20% in 1 year 
 [] Mild edema Diet Order: Clear liquids (ensure clear TID) % Eaten:  Patient Vitals for the past 72 hrs: 
 % Diet Eaten 08/02/18 0935 100 % Pertinent Medications: [x]Reviewed []Other: Norvsc, Protonix, Pravastatin Pertinent Labs: [x]Reviewed []Other:  
Food Allergies: [x]None []Other Last BM: 7/18   [x]Active     []Hyperactive  []Hypoactive       [] Absent BS Skin:    [x] Intact   [] Incision  [] Breakdown: [] Edema []Other: Anthropometrics:  
Height: 5' 5\" (165.1 cm) Weight: 62.6 kg (138 lb 0.1 oz) IBW (%IBW):   ( ) UBW (%UBW):   (  %) Last Weight Metrics: 
Weight Loss Metrics 8/1/2018 7/20/2018 4/16/2018 3/15/2018 2/25/2018 6/26/2017 3/21/2017 Today's Wt 138 lb 0.1 oz 146 lb 152 lb 155 lb 179 lb 0.2 oz 177 lb 182 lb BMI 22.97 kg/m2 24.3 kg/m2 23.81 kg/m2 24.28 kg/m2 28.04 kg/m2 28.14 kg/m2 28.51 kg/m2 BMI: Body mass index is 22.97 kg/(m^2). This BMI is indicative of: 
 []Underweight    [x]Normal    []Overweight    [] Obesity   [] Extreme Obesity (BMI>40) Estimated Nutrition Needs (Based on):  
0867 Kcals/day (BMR (4303) x 1. 3AF) , 76 g (-95g (1.2-1.5 g/kg bw)) Protein Carbohydrate: At Least 130 g/day  Fluids: 1600 mL/day (1ml/kcal) Pt expected to meet estimated nutrient needs: []Yes [x]No 
 
NUTRITION DIAGNOSES:  
Problem:  Unintended weight loss Etiology: related to diet intolerance, dysphagia Signs/Symptoms: as evidenced by 22.9% weight loss in <6 months NUTRITION INTERVENTIONS: 
Meals/Snacks: General/healthful diet   Supplements: Commercial supplement GOAL:  
PO intake >75% of meals/supplements next 2-4 days LEARNING NEEDS (Diet, Food/Nutrient-Drug Interaction):  
 [x] None Identified 
 [] Identified and Education Provided/Documented 
 [] Identified and Pt declined/was not appropriate Cultural, Anabaptism, OR Ethnic Dietary Needs:  
 [x] None Identified 
 [] Identified and Addressed 
 
 [x] Interdisciplinary Care Plan Reviewed/Documented  
 [x] Discharge Planning: Regular diet + Boost TID MONITORING /EVALUATION:  
Food/Nutrient Intake Outcomes: Total energy intake Physical Signs/Symptoms Outcomes: Weight/weight change, GI profile, Electrolyte and renal profile NUTRITION RISK: [x] High              [] Moderate           []  Low  []  Minimal/Uncompromised PT SEEN FOR:  
 [x]  MD Consult: []Calorie Count []Diabetic Diet Education []Diet Education []Electrolyte Management 
   [x]General Nutrition Management and Supplements []Management of Tube Feeding []TPN Recommendations []  RN Referral:  []MST score >=2 
   []Enteral/Parenteral Nutrition PTA []Pregnant: Gestational DM or Multigestation 
   []Pressure Ulcer/Wound Care needs 
     
[]  Low BMI 
[]  LOS Ashok Shay Pager [phone] Weekend Pager [phone]

## 2018-08-02 NOTE — PROGRESS NOTES
Bedside and Verbal shift change report given to Jerri Woods (oncoming nurse) by Phylicia Lua RN 
 (offgoing nurse). Report included the following information SBAR, Kardex, MAR and Recent Results. Patient received soap suds enema 1/3 Patient received new 22 G IV in left upper arm

## 2018-08-03 ENCOUNTER — APPOINTMENT (OUTPATIENT)
Dept: NUCLEAR MEDICINE | Age: 83
DRG: 380 | End: 2018-08-03
Attending: SURGERY
Payer: MEDICARE

## 2018-08-03 LAB
ANION GAP SERPL CALC-SCNC: 5 MMOL/L (ref 5–15)
BASOPHILS # BLD: 0 K/UL (ref 0–0.1)
BASOPHILS NFR BLD: 1 % (ref 0–1)
BUN SERPL-MCNC: 12 MG/DL (ref 6–20)
BUN/CREAT SERPL: 11 (ref 12–20)
CALCIUM SERPL-MCNC: 8.6 MG/DL (ref 8.5–10.1)
CHLORIDE SERPL-SCNC: 108 MMOL/L (ref 97–108)
CO2 SERPL-SCNC: 27 MMOL/L (ref 21–32)
CREAT SERPL-MCNC: 1.12 MG/DL (ref 0.7–1.3)
DIFFERENTIAL METHOD BLD: ABNORMAL
EOSINOPHIL # BLD: 0.1 K/UL (ref 0–0.4)
EOSINOPHIL NFR BLD: 2 % (ref 0–7)
ERYTHROCYTE [DISTWIDTH] IN BLOOD BY AUTOMATED COUNT: 14.5 % (ref 11.5–14.5)
GLUCOSE SERPL-MCNC: 87 MG/DL (ref 65–100)
HCT VFR BLD AUTO: 30.6 % (ref 36.6–50.3)
HGB BLD-MCNC: 10 G/DL (ref 12.1–17)
IMM GRANULOCYTES # BLD: 0 K/UL (ref 0–0.04)
IMM GRANULOCYTES NFR BLD AUTO: 0 % (ref 0–0.5)
LYMPHOCYTES # BLD: 0.6 K/UL (ref 0.8–3.5)
LYMPHOCYTES NFR BLD: 17 % (ref 12–49)
MCH RBC QN AUTO: 28.7 PG (ref 26–34)
MCHC RBC AUTO-ENTMCNC: 32.7 G/DL (ref 30–36.5)
MCV RBC AUTO: 87.9 FL (ref 80–99)
MONOCYTES # BLD: 0.5 K/UL (ref 0–1)
MONOCYTES NFR BLD: 13 % (ref 5–13)
NEUTS SEG # BLD: 2.6 K/UL (ref 1.8–8)
NEUTS SEG NFR BLD: 67 % (ref 32–75)
NRBC # BLD: 0 K/UL (ref 0–0.01)
NRBC BLD-RTO: 0 PER 100 WBC
PLATELET # BLD AUTO: 204 K/UL (ref 150–400)
PMV BLD AUTO: 10.2 FL (ref 8.9–12.9)
POTASSIUM SERPL-SCNC: 4.1 MMOL/L (ref 3.5–5.1)
RBC # BLD AUTO: 3.48 M/UL (ref 4.1–5.7)
RBC MORPH BLD: ABNORMAL
SODIUM SERPL-SCNC: 140 MMOL/L (ref 136–145)
WBC # BLD AUTO: 3.8 K/UL (ref 4.1–11.1)

## 2018-08-03 PROCEDURE — 74011250637 HC RX REV CODE- 250/637: Performed by: HOSPITALIST

## 2018-08-03 PROCEDURE — A9541 TC99M SULFUR COLLOID: HCPCS

## 2018-08-03 PROCEDURE — G8978 MOBILITY CURRENT STATUS: HCPCS

## 2018-08-03 PROCEDURE — 74011000250 HC RX REV CODE- 250: Performed by: HOSPITALIST

## 2018-08-03 PROCEDURE — 85025 COMPLETE CBC W/AUTO DIFF WBC: CPT | Performed by: INTERNAL MEDICINE

## 2018-08-03 PROCEDURE — 74011250636 HC RX REV CODE- 250/636: Performed by: HOSPITALIST

## 2018-08-03 PROCEDURE — 80048 BASIC METABOLIC PNL TOTAL CA: CPT | Performed by: INTERNAL MEDICINE

## 2018-08-03 PROCEDURE — 65660000000 HC RM CCU STEPDOWN

## 2018-08-03 PROCEDURE — G8979 MOBILITY GOAL STATUS: HCPCS

## 2018-08-03 PROCEDURE — 92610 EVALUATE SWALLOWING FUNCTION: CPT

## 2018-08-03 PROCEDURE — 94760 N-INVAS EAR/PLS OXIMETRY 1: CPT

## 2018-08-03 PROCEDURE — 74011250637 HC RX REV CODE- 250/637: Performed by: INTERNAL MEDICINE

## 2018-08-03 PROCEDURE — C9113 INJ PANTOPRAZOLE SODIUM, VIA: HCPCS | Performed by: HOSPITALIST

## 2018-08-03 PROCEDURE — G8980 MOBILITY D/C STATUS: HCPCS

## 2018-08-03 PROCEDURE — 97161 PT EVAL LOW COMPLEX 20 MIN: CPT

## 2018-08-03 PROCEDURE — 36415 COLL VENOUS BLD VENIPUNCTURE: CPT | Performed by: INTERNAL MEDICINE

## 2018-08-03 RX ORDER — ENOXAPARIN SODIUM 100 MG/ML
40 INJECTION SUBCUTANEOUS DAILY
Status: DISCONTINUED | OUTPATIENT
Start: 2018-08-04 | End: 2018-08-05 | Stop reason: HOSPADM

## 2018-08-03 RX ADMIN — ASPIRIN 81 MG: 81 TABLET, COATED ORAL at 17:31

## 2018-08-03 RX ADMIN — ENOXAPARIN SODIUM 30 MG: 30 INJECTION, SOLUTION INTRAVENOUS; SUBCUTANEOUS at 10:06

## 2018-08-03 RX ADMIN — AMLODIPINE BESYLATE 2.5 MG: 2.5 TABLET ORAL at 17:31

## 2018-08-03 RX ADMIN — SODIUM CHLORIDE 100 ML/HR: 900 INJECTION, SOLUTION INTRAVENOUS at 20:09

## 2018-08-03 RX ADMIN — PRAVASTATIN SODIUM 40 MG: 40 TABLET ORAL at 21:50

## 2018-08-03 RX ADMIN — SODIUM CHLORIDE 40 MG: 9 INJECTION, SOLUTION INTRAMUSCULAR; INTRAVENOUS; SUBCUTANEOUS at 21:49

## 2018-08-03 RX ADMIN — Medication 10 ML: at 05:12

## 2018-08-03 RX ADMIN — Medication 10 ML: at 21:49

## 2018-08-03 RX ADMIN — SODIUM CHLORIDE 100 ML/HR: 900 INJECTION, SOLUTION INTRAVENOUS at 09:58

## 2018-08-03 RX ADMIN — POLYETHYLENE GLYCOL 3350 17 G: 17 POWDER, FOR SOLUTION ORAL at 10:46

## 2018-08-03 RX ADMIN — Medication 10 ML: at 13:22

## 2018-08-03 RX ADMIN — SODIUM CHLORIDE 40 MG: 9 INJECTION, SOLUTION INTRAMUSCULAR; INTRAVENOUS; SUBCUTANEOUS at 10:06

## 2018-08-03 NOTE — PROGRESS NOTES
physical Therapy EVALUATION/DISCHARGE Patient: Kim Krishnamurthy (45 y.o. male) Date: 8/3/2018 Primary Diagnosis: Acute renal failure (ARF) (HCC) Precautions:     
ASSESSMENT : 
Based on the objective data described below, the patient presents with good strength, good functional mobility, steady gait, and at his functional baseline following admission for acute renal failure. PTA patient lives with his wife. He is independent with all aspects of functional mobility and ADLS. Patient reports one fall. Currently, patient received sitting in the chair. Patient is independent with sit to stand. Patient ambulated 150 feet with SBA-supervision without AD. Patient with mild trunk sway although reports that is his baseline since recent CVA. Patient negotiated 12 stairs with mod I and R HR. Patient left sitting in the chair at the conclusion of PT evaluation. Patient with minimal interest in therapy and denying the need. Will sign off. If patient agreeable, he would benefit from [facility] PT at discharge. Skilled physical therapy is not indicated at this time. PLAN : 
Discharge Recommendations: Home Health Further Equipment Recommendations for Discharge: none-possibly [address] SUBJECTIVE:  
Patient stated Well hurry up then.  OBJECTIVE DATA SUMMARY:  
HISTORY:   
Past Medical History:  
Diagnosis Date  GERD (gastroesophageal reflux disease)  High cholesterol  Hypertension  Stroke ([facility] [address] 03/2017  
 numbness in fingertips on R hand; legs;stammering Past Surgical History:  
Procedure Laterality Date  HX CHOLECYSTECTOMY  HX HEENT Bilateral   
 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 Prior Level of Function/Home Situation: patient lives with his wife. He is independent with all aspects of functional mobility and ADLS. Patient reports one fall.   
Personal factors and/or comorbidities impacting plan of care: fall, recent CVA Home Situation Home Environment: Private residence # Steps to Enter: 0 One/Two Story Residence: Two story, live on 1st floor # of Interior Steps: 11 Living Alone: No 
Support Systems: Spouse/Significant Other/Partner Patient Expects to be Discharged to[de-identified] Private residence Current DME Used/Available at Home: None EXAMINATION/PRESENTATION/DECISION MAKING:  
Critical Behavior: 
Neurologic State: Alert, Appropriate for age Orientation Level: Oriented X4 Cognition: Follows commands Safety/Judgement: Awareness of environment, Fall prevention, Home safety Hearing: Auditory Auditory Impairment: Hard of hearing, bilateral 
Hearing Aids/Status: At home, Bilateral 
Skin:   
Edema:  
Range Of Motion: 
AROM: Within functional limits PROM: Within functional limits Strength:   
Strength: Generally decreased, functional 
  
  
  
  
  
  
Tone & Sensation:  
Tone: Normal 
  
  
  
  
  
  
  
  
   
Coordination: 
Coordination: Within functional limits Vision:  
  
Functional Mobility: 
Bed Mobility: 
  
  
  
  
Transfers: 
Sit to Stand: Independent Stand to Sit: Independent Bed to Chair: Independent Balance:  
Sitting: Intact; Without support Standing: Intact; Without support Ambulation/Gait Training: 
Distance (ft): 150 Feet (ft) Assistive Device: Gait belt Ambulation - Level of Assistance: Stand-by assistance Gait Description (WDL): Exceptions to [facility] Gait Abnormalities: Decreased step clearance;Trunk sway increased Base of Support: Widened Speed/Yudith: Pace decreased (<100 feet/min) Step Length: Right shortened;Left shortened Stairs: Therapeutic Exercises:  
 
 
Functional Measure: 
Barthel Index: 
 
Bathin Bladder: 10 Bowels: 10 
Groomin Dressing: 10 Feeding: 10 Mobility: 10 Stairs: 5 Toilet Use: 10 Transfer (Bed to Chair and Back): 15 Total: 90 Barthel and G-code impairment scale: 
Percentage of impairment CH 
0% CI 
1-19% CJ 
20-39% CK 
40-59% CL 
60-79% CM 
80-99% CN 
100% Barthel Score 0-100 100 99-80 79-60 59-40 20-39 1-19 
 0 Barthel Score 0-20 20 17-19 13-16 9-12 5-8 1-4 0 The Barthel ADL Index: Guidelines 1. The index should be used as a record of what a patient does, not as a record of what a patient could do. 2. The main aim is to establish degree of independence from any help, physical or verbal, however minor and for whatever reason. 3. The need for supervision renders the patient not independent. 4. A patient's performance should be established using the best available evidence. Asking the patient, friends/relatives and nurses are the usual sources, but direct observation and common sense are also important. However direct testing is not needed. 5. Usually the patient's performance over the preceding 24-48 hours is important, but occasionally longer periods will be relevant. 6. Middle categories imply that the patient supplies over 50 per cent of the effort. 7. Use of aids to be independent is allowed. [employer] Barthel, D.W. (0838). Functional evaluation: the Barthel Index. [facility] (14)2. Brenna Guy aby CELY Raygoza, Mel Solitario, Jersey Stephens., [place], [address] [place] [address] (1999). Measuring the change indisability after inpatient rehabilitation; comparison of the responsiveness of the Barthel Index and Functional Costilla Measure. Journal of Neurology, Neurosurgery, and Psychiatry, 66(4), 823-420. Diallo Miles, [place]KAVEH, SONYA Kevin, & Andrae Landon M.A. (2004.) Assessment of post-stroke quality of life in cost-effectiveness studies: The usefulness of the Barthel Index and the EuroQoL-5D. [facility], 13, 332-21 G codes: In compliance with CMSs Claims Based Outcome Reporting, the following G-code set was chosen for this patient based on their primary functional limitation being treated:  
 
The outcome measure chosen to determine the severity of the functional limitation was the Barthel with a score of 90/100 which was correlated with the impairment scale. ? Mobility - Walking and Moving Around:  
  - CURRENT STATUS: CI - 1%-19% impaired, limited or restricted  - GOAL STATUS: CI - 1%-19% impaired, limited or restricted  - D/C STATUS:  CI - 1%-19% impaired, limited or restricted Physical Therapy Evaluation Charge Determination History Examination Presentation Decision-Making MEDIUM  Complexity : 1-2 comorbidities / personal factors will impact the outcome/ POC  MEDIUM Complexity : 3 Standardized tests and measures addressing body structure, function, activity limitation and / or participation in recreation  MEDIUM Complexity : Evolving with changing characteristics  Other outcome measures Barthel   LOW Based on the above components, the patient evaluation is determined to be of the following complexity level: LOW Pain: 
Pain Scale 1: Numeric (0 - 10) Pain Intensity 1: 0 Activity Tolerance: At baseline. Please refer to the flowsheet for vital signs taken during this treatment. After treatment:  
[x]   Patient left in no apparent distress sitting up in chair 
[]   Patient left in no apparent distress in bed 
[x]   Call bell left within reach [x]   Nursing notified 
[]   Caregiver present 
[]   Bed alarm activated COMMUNICATION/EDUCATION:  
Communication/Collaboration: 
[x]   Fall prevention education was provided and the patient/caregiver indicated understanding. [x]   Patient/family have participated as able and agree with findings and recommendations. []   Patient is unable to participate in plan of care at this time. Findings and recommendations were discussed with: Registered Nurse and  Thank you for this referral. 
Royal Calhoun, PT, DPT Time Calculation: 13 mins

## 2018-08-03 NOTE — PROGRESS NOTES
Nutrition Assessment: 
 
INTERVENTIONS/RECOMMENDATIONS:  
Meals/Snacks: General/healthful diet:  Continue diet progression and trial of Pureed foods. Discussed pureed diet and liquid/blended soups with pt at bedside. Pt enjoyed the cream of potato today at lunch. RD assisted pt with dinner meal for tonight. Supplements: Commercial supplement:  RD ordered Ensure Enlive BID with meals. Would also consider GI input regarding diet progression. Low fat, small frequent meals and liquid fats are generally better tolerated. ASSESSMENT:  
8/3:  Chart reviewed; visited with pt at bedside; pt med noted for gastroparesis and possible GOO. Pt's diet progressed to full liquids today for lunch and pt reports that he consumed 100% of lunch meal and really enjoyed the Cream of Potato Soup. Pt inquiring about blended veg soups. Pt also reports that he usually drinks 2 Boost Shakes daily at home and would like to receive Ensure Enlive BID while in the hospital which I think is appropriate and will assist in meeting pt's nutritional needs. Pt confirmed that nutritional intake has been poor since Feb 2018. Mostly consuming clear liquids, water and Boost Shake with ~55 lbs weight loss. Meets criteria for severe malnutrition per ASPEN guidelines. Pt reports that he lives alone and prepares his own meals. A pureed diet is a viable option and will assist in meeting nutritional needs but may be difficult for pt to prepare meals by himself every day. Perhaps a family member could assist.  Also, discussed continued nutritional supplements and opt for the Plus versions for optimal calories/protein. Discussed the feasibility of pureed with pt at home and pt said \"he could do it if he had to\".    
 
Meets Criteria for Chronic Malnutrition  
[x] Severe Malnutrition, as evidenced by: 
 [] Severe muscle wasting, loss of subcutaneous fat 
 [x] Nutritional intake of <75% of recommended intake for >1 month 
 [x] Weight loss of >5% in 1 month, >7.5% in 3 months, >10% in 6 months, >20% in 1 year 
 [] Severe edema Diet Order: Full liquids 
% Eaten:  Patient Vitals for the past 72 hrs: 
 % Diet Eaten 08/03/18 1255 100 % 08/02/18 0935 100 % Pertinent Medications: [x] Reviewed []Other: protonix Pertinent Labs: [x]Reviewed  []Other: 
Food Allergies: [x]None []Other:    
Last BM: 8/2   [x]Active     []Hyperactive  []Hypoactive       [] Absent  BS Skin:    [x] Intact   [] Incision  [] Breakdown   []Edema   []Other: Anthropometrics: Height: 5' 5\" (165.1 cm) Weight: 62.6 kg (138 lb 0.1 oz) IBW (%IBW):   ( ) UBW (%UBW):   (  %) BMI: Body mass index is 22.97 kg/(m^2). This BMI is indicative of: 
[]Underweight   [x]Normal   []Overweight   [] Obesity   [] Extreme Obesity (BMI>40) Last Weight Metrics: 
Weight Loss Metrics 8/1/2018 7/20/2018 4/16/2018 3/15/2018 2/25/2018 6/26/2017 3/21/2017 Today's Wt 138 lb 0.1 oz 146 lb 152 lb 155 lb 179 lb 0.2 oz 177 lb 182 lb BMI 22.97 kg/m2 24.3 kg/m2 23.81 kg/m2 24.28 kg/m2 28.04 kg/m2 28.14 kg/m2 28.51 kg/m2 Estimated Nutrition Needs (Based on): 2398 Kcals/day (BMR (0037) x 1. 3AF) , 76 g (-95g (1.2-1.5 g/kg bw)) Protein Carbohydrate: At Least 130 g/day  Fluids: 1600 mL/day NUTRITION DIAGNOSES:  
Problem:  Unintended weight loss Etiology: related to diet intolerance, dysphagia Signs/Symptoms: as evidenced by 22.9% weight loss in <6 months Previous Nutrition Dx:  [] Resolved  [] Unresolved           [x] Progressing NUTRITION INTERVENTIONS: 
Meals/Snacks: General/healthful diet   Supplements: Commercial supplement GOAL:  
PO intake >75% of meals/supplements next 2-4 days NUTRITION MONITORING AND EVALUATION Food/Nutrient Intake Outcomes: Total energy intake Physical Signs/Symptoms Outcomes: Weight/weight change, GI profile, Electrolyte and renal profile Previous Goal Met: 
 [] Met              [x] Progressing Towards Goal              [] Not Progressing Towards Goal  
Previous Recommendations: 
 [x] Implemented          [] Not Implemented          [] Not Applicable LEARNING NEEDS (Diet, Food/Nutrient-Drug Interaction):  
 [x] None Identified 
 [] Identified and Education Provided/Documented 
 [] Identified and Pt declined/was not appropriate Cultural, Hinduism, OR Ethnic Dietary Needs:  
 [x] None Identified 
 [] Identified and Addressed 
 
 [x] Interdisciplinary Care Plan Reviewed/Documented  
 [x] Discharge Planning:  Continue to progress diet as tolerated; consideration for pureed + continue Ensure/Boost BID [] Participated in Interdisciplinary Rounds NUTRITION RISK:  
 [x] Patient At Nutritional Risk 
  [] High              [x] Moderate/Mild           []  Low   
 [] Patient Not At Nutritional Risk Cassie Castillo, DORA Pager [phone] Weekend Pager [phone]

## 2018-08-03 NOTE — PROGRESS NOTES
Bedside and Verbal shift change report given to Mercy Ford (oncoming nurse) by Ashok Whatley RN (offgoing nurse). Report included the following information SBAR, Kardex, MAR and Recent Results. Patient had one episode of confusion during the night. Day shift nurse notified. Patient remains alert and oriented X 4.

## 2018-08-03 NOTE — PROGRESS NOTES
GES: \"IMPRESSION: Significantly delayed gastric emptying, compatible with 
gastroparesis or partial gastric outlet obstruction. \" He also has a tortuous esophagus with significant dysmotility. He likely needs to be maintained on pureed diet lifelong. Would get dietician advice. GI may have ideas regarding the gastroparesis. No surgical indications. Doing fine with liquid diet this admission. Will see him on Monday if he is still here. Thanks.

## 2018-08-03 NOTE — PROGRESS NOTES
Reason for Admission:   Pt was admitted on 8/1/18 d/t a Dx: Acute Renal Failure / Hyponatremia. RRAT Score:     17 Do you (patient/family) have any concerns for transition/discharge? No concerns voiced from Pt/spouse Plan for utilizing home health: Therapy is recommending HH RN/PT at discharge. CM choiced Pt for HH. FOC on bedside chart. Pt selected [facility] and All about care. Referral sent through CC to [facility] to see if they can accept. Likelihood of readmission? MOD Transition of Care Plan:      Pt lives with spouse in tri level home with no steps to enter but 4-9 steps inside to multiple levels of home. No problem ambulating in home. Pt is active with PCP Dr. Cecy Lombardo. Jessie Perez is medical POA and was present for evaluation. Pt has no DME. Pt has used [address] in the past and was not very happy with them. Felt it was not useful to him at the time. Pt has no experience with SNF. Pt I W ADLs and IADLs. Pt drives. Pharmacy is [address] [place] [address]. Pt has two children but did not want to list them on the ER contacts as they do not live closeby. MD is anticipating DC tomorrow. CM sent referral to [facility] to see if they could accept. Jessie Perez indicated that he could transport him home tomorrow afternoon around 1 PM. CM issued [facility] letter. Copy on bedside chart. CM will continue to monitor discharge plan. Care Management Interventions PCP Verified by CM: Yes 
Palliative Care Criteria Met (RRAT>21 & CHF Dx)?: No 
Mode of Transport at Discharge: Other (see comment) Transition of Care Consult (CM Consult): Home Health [address] Yes MyChart Signup: No 
Discharge Durable Medical Equipment: No 
Physical Therapy Consult: Yes Occupational Therapy Consult: Yes Speech Therapy Consult: Yes Current Support Network: Lives with Spouse, Own Home Confirm Follow Up Transport: Family Plan discussed with Pt/Family/Caregiver: Yes Freedom of Choice Offered: Yes Discharge Location Discharge Placement: Home with home health Yusef Turner CM Ext [phone]

## 2018-08-03 NOTE — ADT AUTH CERT NOTES
Patient Demographics     
  Patient Name [address] Sex  Address Phone    
  Maxine Ordoñez [phone] Male 9/3/1931 [address] 
[address] [address] [phone] (Home)    
   
  CSN:    
  [id number]    
   
  Admit Date: Admit Time Room Bed    
  Aug 1, 2018  9:17 AM 3265 [[id number]] 01 [[id number]]    
   
  Attending Providers     
  Provider Pager From To    
  Mikal Brumfield MD  18    
  Val Hill DO  18    
  Gianna Castillo MD  18    
  Val Hill DO  18     
   
  Emergency Contact(s)     
  Name Relation Home Work [address] [place] [address] [phone]      
  Yodit Alexander Spouse [phone]      
   
Utilization Review  
  
  
  8.3.18 GASTRIC EMPTYING STUDY by Sara Plata RN     
  Review Entered Review Status    
  8/3/2018 In Primary    
  Details    
    8. 3.18 
  
GASTRIC EMPTYING: 
Significantly delayed gastric emptying, compatible with 
gastroparesis or partial gastric outlet obstruction  
   
    
   
  Renal Failure, Acute - Care Day 2 (2018) by Palak Gr RN     
  Review Entered Review Status    
  8/3/2018 Completed    
  Details    
      
  Care Day: 2 Care Date: 2018 Level of Care: Telemetry    
  Guideline Day 2     
  Clinical Status    
  (X) * Electrolyte abnormalities absent or improved    
  (X) * Acid-base abnormalities absent or improved    
  (X) * Hypotension absent    
  8/3/2018 10:45 AM EDT by Camryn Raphael    
    119/57    
      
      
  Medications    
  (X) Possible medical therapies    
      
      
      
      
  * Milestone    
      
  Additional Notes    
  .    
      
  VS:  97.8  60  18  97%  119/57 ROOM AIR    
      
  RESULTS:    
  RBC 3.96    
  HGB 11.3    
  HCT 35    
  CR 1.35    
      
  MEDS:    
   ML/HR CONT IV     
  LOVENOX 30 MG SC DAILY    
  PROTONIX 40 MG IV EVERY 12 HOURS    
      
  PLAN: IV FLUIDS, NPO, AMB WITH ASSSIT, I/O    
      
  SURGERY:    
  Will order gastric emptying study.  Review of the UGI from a month ago shows some degree of gastroparesis and partial GOO.

## 2018-08-03 NOTE — PROGRESS NOTES
56 
Paged Dr Jocelyn Denson regarding pt diet; asked primary nurse to ask Dr. Jacky Elaine. [facility] Paged Dr. Aquiles Méndez, spoke with his nurse [address] [place] [address]. [address] [facility] Dr. Aquiles Méndez at bedside; verbal orders to place pt on full liquid diet.

## 2018-08-03 NOTE — PROGRESS NOTES
Hospitalist Progress Note NAME: Rangel Avitia :  9/3/1931 MRN:  [id number] Assessment / Plan: 
Acute renal failure Yon Higgins - Likely prerenal in origine due to poor po intake/vomiting Baseline Cr 0.7 , admission Cr 1.61 
- Aggressive hydration with NS. Watch cr/electrolytes closely 
-Adjust all medications  especially antibiotic therapy to GFR . Avoid nephrotoxic drugs. - If not improving consider further evaluation with renal US.  
  
Recurrent Hiatal hernia vs partial gastric outlet obstruction Severe constipation Dysphasia with vomiting - + weight loss, unable tolerate solid food, last BM 2-3 weeks ago  
 Barium from a month ago still in his colon.  
-will start with enemas 
will avoid aggressive bowel regiment PO till bowels start moving  
-diet: CLD, advancing will be per surgery/ GI  
-Surgery help appreciated: symptoms appear to be related to partial gastric outlet obstruction rather than the small recurrent hiatal hernia.  He was also noted to have a lot of food in his stomach on EGD 2 weeks ago. [address] food DISTAL to hiatal hernia, therefore gastroparesis also in the differential.   
Needs aggressive bowel regimen.   
Will need to see if he can tolerate full liquid diet.   
Gastric emptying study may prove GOO or gastroparesis. The small hiatal hernia is not obstructing.  Would not recommend repair of this in this ASA III 80year old male.  
-gastric emptying study once constipation resolve  
-dietary consult to help with nutritions -c/w PPI  
-CT A/P:  
1. No acute process in the abdomen and pelvis. 2. L3 superior endplate compression fracture is new from 2018, but 
probably chronic. 3. 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 4. 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 
 
  
HTN 
-BP low side at 112s  
-Cont home Norvasc at lower dose  
  
Hyperlipidemia, cont statin Severe protein calorie malnutrition 
- <75% of nutritional intake for > 1 month 
- weight loss of > 5% in 1 month Body mass index is 22.97 kg/(m^2). 
 
  
Code Status: Full code Surrogate Decision Maker: wife  
  
DVT Prophylaxis: Lovenox GI Prophylaxis: not indicated 
  
Baseline: , ambulating independent Subjective: Chief Complaint / Reason for Physician Visit Main complaint is pt wants to advance diet. No other acute complaints Review of Systems: 
Symptom Y/N Comments  Symptom Y/N Comments Fever/Chills n   Chest Pain n   
Poor Appetite    Edema Cough n   Abdominal Pain n   
Sputum    Joint Pain SOB/WEST n   Pruritis/Rash Nausea/vomit n   Tolerating PT/OT Diarrhea    Tolerating Diet Constipation    Other Could NOT obtain due to:   
 
Objective: VITALS:  
Last 24hrs VS reviewed since prior progress note. Most recent are: 
Patient Vitals for the past 24 hrs: 
 Temp Pulse Resp BP SpO2  
08/02/18 2319 98.1 °F (36.7 °C) 68 18 123/70 96 % 08/02/18 2233 97.8 °F (36.6 °C) 60 18 119/57 97 % 08/02/18 1643 98 °F (36.7 °C) 68 18 121/69 100 % 08/02/18 0830 97.5 °F (36.4 °C) 68 18 115/60 98 % Intake/Output Summary (Last 24 hours) at 08/02/18 2348 Last data filed at 08/02/18 1730 Gross per 24 hour Intake              480 ml Output              451 ml Net               29 ml PHYSICAL EXAM: 
General: Pleasant, frail appearing elderly malet, cooperative, no acute distress   
EENT:  EOMI. Anicteric sclerae. MMM Resp:  CTA bilaterally, no wheezing or rales. No accessory muscle use CV:  Regular  rhythm,  No edema GI:  Soft, Non distended, Non tender.  +Bowel sounds Neurologic:  Alert and oriented X 3, normal speech, Psych:   fair insight. mildly anxious Skin:  No rashes. No jaundice Reviewed most current lab test results and cultures  YES Reviewed most current radiology test results   YES Review and summation of old records today    NO Reviewed patient's current orders and [facility] YES 
PMH/SH reviewed - no change compared to H&P 
________________________________________________________________________ Care Plan discussed with: 
  Comments Patient x Family  x   
RN x Care Manager Consultant Multidiciplinary team rounds were held today with , nursing, pharmacist and clinical coordinator. Patient's plan of care was discussed; medications were reviewed and discharge planning was addressed. ________________________________________________________________________ Total NON critical care TIME: 25 Minutes Total CRITICAL CARE TIME Spent:   Minutes non procedure based Comments >50% of visit spent in counseling and coordination of care    
________________________________________________________________________ Radha Friendly, DO  
 
Procedures: see electronic medical records for all procedures/Xrays and details which were not copied into this note but were reviewed prior to creation of Plan. LABS: 
I reviewed today's most current labs and imaging studies. Pertinent labs include: 
Recent Labs 08/02/18 
 0038  08/01/18 
 1127 WBC  4.5  5.5 HGB  11.3*  12.2 HCT  35.0*  38.2 PLT  217  201 Recent Labs 08/02/18 
 0038  08/01/18 
 4620 NA  137  134* K  4.1  4.5  
CL  102  98 CO2  30  31 GLU  91  110* BUN  20  24* CREA  1.35*  1.61* CA  8.5  9.4 MG  2.3   --   
PHOS  3.1   --   
ALB   --   3.3* TBILI   --   0.6 SGOT   --   24 ALT   --   17 Signed: Radha Dillon, DO

## 2018-08-03 NOTE — PROGRESS NOTES
Speech Pathology bedside swallow evaluation/discharge Patient: Mariel Hartley (72 y.o. male) Date: 8/3/2018 Primary Diagnosis: Acute renal failure (ARF) (HCC) Precautions:     
 
ASSESSMENT : 
Based on the objective data described below, the patient presents with functional swallow of all textures. He ingested thins, purees and solids with no difficulty. He has esophageal dysphagia and he would do better with small more frequent meals. Pt educated but unsure he will remember. . 
Skilled therapy provided by a speech-language pathologist is not indicated at this time. PLAN : 
Recommendations: 
Regular diet with thins Discharge Recommendations: None SUBJECTIVE:  
Patient stated he was [facility] and his hearing aides are at home. OBJECTIVE:  
 
Past Medical History:  
Diagnosis Date  GERD (gastroesophageal reflux disease)  High cholesterol  Hypertension  Stroke ([facility] [address] 03/2017  
 numbness in fingertips on R hand; legs;stammering Past Surgical History:  
Procedure Laterality Date  HX CHOLECYSTECTOMY  HX HEENT Bilateral   
 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 Prior Level of Function/Home Situation:  
Home Situation Home Environment: Private residence # Steps to Enter: 0 One/Two Story Residence: Two story, live on 1st floor # of Interior Steps: 11 Living Alone: No 
Support Systems: Spouse/Significant Other/Partner Patient Expects to be Discharged to[de-identified] Private residence Current DME Used/Available at Home: None Diet prior to admission:  
Current Diet:  Full liquids Cognitive and Communication Status: 
Neurologic State: Alert Orientation Level: Oriented X4 Cognition: Follows commands Perception: Appears intact Perseveration: No perseveration noted Safety/Judgement: Awareness of environment, Fall prevention, Home safety Oral Assessment: 
Oral Assessment Labial: No impairment Dentition: Upper dentures;Partials (comment) Lingual: No impairment Velum: No impairment Mandible: No impairment P.O. Trials: 
Patient Position: upright in bed Vocal quality prior to P.O.: No impairment Consistency Presented: Thin liquid;Puree; Solid How Presented: Self-fed/presented;Cup/sip; Successive swallows Bolus Acceptance: No impairment Bolus Formation/Control: No impairment Propulsion: No impairment Oral Residue: None Initiation of Swallow: No impairment Laryngeal Elevation: Functional 
Aspiration Signs/Symptoms: None Pharyngeal Phase Characteristics: No impairment, issues, or problems Oral Phase Severity: No impairment Pharyngeal Phase Severity : No impairment NOMS:  
The NOMS functional outcome measure was used to quantify this patient's level of swallowing impairment. Based on the NOMS, the patient was determined to be at level 6 for swallow function G Codes: In compliance with CMSs Claims Based Outcome Reporting, the following G-code set was chosen for this patient based the use of the NOMS functional outcome to quantify this patient's level of swallowing impairment. Using the NOMS, the patient was determined to be at level 6 for swallow function which correlates with the CI= 1-19% level of severity. Based on the objective assessment provided within this note, the current, goal, and discharge g-codes are as follows: 
 
Swallow  Swallowing: 
 Swallow Current Status CI= 1-19%  Swallow Goal Status CI= 1-19%  Swallow D/C Status CI= 1-19% NOMS Swallowing Levels: 
Level 1 (CN): NPO Level 2 (CM): NPO but takes consistency in therapy Level 3 (CL): Takes less than 50% of nutrition p.o. and continues with nonoral feedings; and/or safe with mod cues; and/or max diet restriction Level 4 (CK): Safe swallow but needs mod cues; and/or mod diet restriction; and/or still requires some nonoral feeding/supplements Level 5 (CJ): Safe swallow with min diet restriction; and/or needs min cues Level 6 (CI): Independent with p.o.; rare cues; usually self cues; may need to avoid some foods or needs extra time Level 7 (CH): Independent for all p.o. RAZA. (2003). National Outcomes Measurement System (NOMS): Adult Speech-Language Pathology User's Guide. Pain: 
Pain Scale 1: Numeric (0 - 10) Pain Intensity 1: 0 After treatment:  
[] Patient left in no apparent distress sitting up in chair 
[x] Patient left in no apparent distress in bed 
[x] Call bell left within reach [x] Nursing notified 
[x] Caregiver present 
[] Bed alarm activated COMMUNICATION/EDUCATION:  
The patients plan of care including findings, recommendations, and recommended diet changes were discussed with: Registered Nurse. 
 
[] Posted safety precautions in patient's room. [x] Patient/family have participated as able and agree with findings and recommendations. [] Patient is unable to participate in plan of care at this time. Thank you for this referral. 
KIMBERLY Rodrigez Time Calculation: 10 mins

## 2018-08-03 NOTE — PROGRESS NOTES
Bedside and Verbal shift change report given to Sabiha (oncoming nurse) by Isidra Veliz (offgoing nurse). Report included the following information SBAR, Kardex, Intake/Output, MAR, Accordion and Recent Results. Possible discharge tomorrow.

## 2018-08-04 ENCOUNTER — HOME HEALTH ADMISSION (OUTPATIENT)
Dept: HOME HEALTH SERVICES | Facility: HOME HEALTH | Age: 83
End: 2018-08-04
Payer: MEDICARE

## 2018-08-04 LAB
ANION GAP SERPL CALC-SCNC: 7 MMOL/L (ref 5–15)
BASOPHILS # BLD: 0 K/UL (ref 0–0.1)
BASOPHILS NFR BLD: 1 % (ref 0–1)
BUN SERPL-MCNC: 9 MG/DL (ref 6–20)
BUN/CREAT SERPL: 8 (ref 12–20)
CALCIUM SERPL-MCNC: 7.7 MG/DL (ref 8.5–10.1)
CHLORIDE SERPL-SCNC: 109 MMOL/L (ref 97–108)
CO2 SERPL-SCNC: 25 MMOL/L (ref 21–32)
CREAT SERPL-MCNC: 1.06 MG/DL (ref 0.7–1.3)
DIFFERENTIAL METHOD BLD: ABNORMAL
EOSINOPHIL # BLD: 0.1 K/UL (ref 0–0.4)
EOSINOPHIL NFR BLD: 3 % (ref 0–7)
ERYTHROCYTE [DISTWIDTH] IN BLOOD BY AUTOMATED COUNT: 14.6 % (ref 11.5–14.5)
GLUCOSE SERPL-MCNC: 84 MG/DL (ref 65–100)
HCT VFR BLD AUTO: 28.8 % (ref 36.6–50.3)
HGB BLD-MCNC: 9.2 G/DL (ref 12.1–17)
IMM GRANULOCYTES # BLD: 0 K/UL (ref 0–0.04)
IMM GRANULOCYTES NFR BLD AUTO: 0 % (ref 0–0.5)
LYMPHOCYTES # BLD: 0.7 K/UL (ref 0.8–3.5)
LYMPHOCYTES NFR BLD: 19 % (ref 12–49)
MCH RBC QN AUTO: 28.6 PG (ref 26–34)
MCHC RBC AUTO-ENTMCNC: 31.9 G/DL (ref 30–36.5)
MCV RBC AUTO: 89.4 FL (ref 80–99)
MONOCYTES # BLD: 0.5 K/UL (ref 0–1)
MONOCYTES NFR BLD: 12 % (ref 5–13)
NEUTS SEG # BLD: 2.5 K/UL (ref 1.8–8)
NEUTS SEG NFR BLD: 65 % (ref 32–75)
NRBC # BLD: 0 K/UL (ref 0–0.01)
NRBC BLD-RTO: 0 PER 100 WBC
PLATELET # BLD AUTO: 176 K/UL (ref 150–400)
PMV BLD AUTO: 10.6 FL (ref 8.9–12.9)
POTASSIUM SERPL-SCNC: 4.2 MMOL/L (ref 3.5–5.1)
RBC # BLD AUTO: 3.22 M/UL (ref 4.1–5.7)
SODIUM SERPL-SCNC: 141 MMOL/L (ref 136–145)
WBC # BLD AUTO: 3.9 K/UL (ref 4.1–11.1)

## 2018-08-04 PROCEDURE — C9113 INJ PANTOPRAZOLE SODIUM, VIA: HCPCS | Performed by: HOSPITALIST

## 2018-08-04 PROCEDURE — 74011250637 HC RX REV CODE- 250/637: Performed by: HOSPITALIST

## 2018-08-04 PROCEDURE — 74011250636 HC RX REV CODE- 250/636: Performed by: HOSPITALIST

## 2018-08-04 PROCEDURE — 36415 COLL VENOUS BLD VENIPUNCTURE: CPT | Performed by: INTERNAL MEDICINE

## 2018-08-04 PROCEDURE — 65660000000 HC RM CCU STEPDOWN

## 2018-08-04 PROCEDURE — 74011250637 HC RX REV CODE- 250/637: Performed by: INTERNAL MEDICINE

## 2018-08-04 PROCEDURE — 85025 COMPLETE CBC W/AUTO DIFF WBC: CPT | Performed by: INTERNAL MEDICINE

## 2018-08-04 PROCEDURE — 74011000250 HC RX REV CODE- 250: Performed by: HOSPITALIST

## 2018-08-04 PROCEDURE — 80048 BASIC METABOLIC PNL TOTAL CA: CPT | Performed by: INTERNAL MEDICINE

## 2018-08-04 PROCEDURE — 74011250636 HC RX REV CODE- 250/636: Performed by: INTERNAL MEDICINE

## 2018-08-04 RX ADMIN — SODIUM CHLORIDE 40 MG: 9 INJECTION, SOLUTION INTRAMUSCULAR; INTRAVENOUS; SUBCUTANEOUS at 09:01

## 2018-08-04 RX ADMIN — ASPIRIN 81 MG: 81 TABLET, COATED ORAL at 18:37

## 2018-08-04 RX ADMIN — Medication 10 ML: at 21:12

## 2018-08-04 RX ADMIN — POLYETHYLENE GLYCOL 3350 17 G: 17 POWDER, FOR SOLUTION ORAL at 08:59

## 2018-08-04 RX ADMIN — AMLODIPINE BESYLATE 2.5 MG: 2.5 TABLET ORAL at 17:20

## 2018-08-04 RX ADMIN — Medication 10 ML: at 03:20

## 2018-08-04 RX ADMIN — ENOXAPARIN SODIUM 40 MG: 100 INJECTION SUBCUTANEOUS at 09:01

## 2018-08-04 RX ADMIN — SODIUM CHLORIDE 40 MG: 9 INJECTION, SOLUTION INTRAMUSCULAR; INTRAVENOUS; SUBCUTANEOUS at 21:13

## 2018-08-04 RX ADMIN — SODIUM CHLORIDE 100 ML/HR: 900 INJECTION, SOLUTION INTRAVENOUS at 17:12

## 2018-08-04 RX ADMIN — Medication 10 ML: at 09:01

## 2018-08-04 RX ADMIN — SODIUM CHLORIDE 100 ML/HR: 900 INJECTION, SOLUTION INTRAVENOUS at 06:00

## 2018-08-04 RX ADMIN — PRAVASTATIN SODIUM 40 MG: 40 TABLET ORAL at 21:13

## 2018-08-04 NOTE — PROGRESS NOTES
Bedside and Verbal shift change report given to Kylah Sousa (oncoming nurse) by ANTHONY STEWART & DIGNA HORTA [facility] (offgoing nurse). Report included the following information SBAR, Kardex, Intake/Output, MAR, Recent Results and Med Rec Status.

## 2018-08-04 NOTE — PROGRESS NOTES
Weekend CM [facility] to see [facility] and they can. CM just needs to notify them once the discharge order is in that Pt DC. CM will add to AVS. CM will continue to monitor discharge plan. Miguel Carroll, KIEL Ext [phone]

## 2018-08-04 NOTE — PROGRESS NOTES
1 GEOVANNA Becerra at bedside [address] [facility] Spoke with Dr. Oscar Becerra, ok with consulting GI Dr. Peri Morgan. [address]  Called Dr. Peri Morgan office at [phone]. Spoke with Rosalinda Jaeger, Dr. Idalmis Tyson is on call and will call primary nurse regarding consult. [address] [facility] [address] Idalmis Tyson returned call; provided with pt room number; pt will be seen tomorrow.

## 2018-08-04 NOTE — PROGRESS NOTES
Problem: Falls - Risk of 
Goal: *Absence of Falls Document Cristobal Alarcon Fall Risk and appropriate interventions in the flowsheet. Outcome: Progressing Towards Goal 
Fall Risk Interventions: 
Mobility Interventions: Bed/chair exit alarm, Communicate number of staff needed for ambulation/transfer Mentation Interventions: Adequate sleep, hydration, pain control Medication Interventions: Bed/chair exit alarm, Evaluate medications/consider consulting pharmacy, Patient to call before getting OOB, Teach patient to arise slowly Elimination Interventions: Call light in reach History of Falls Interventions: Bed/chair exit alarm, Door open when patient unattended

## 2018-08-04 NOTE — PROGRESS NOTES
Hospitalist Progress Note NAME: Harsh Alejo :  9/3/1931 MRN:  [id number] Assessment / Plan: 
Acute renal failure [place] - Likely prerenal in origine due to poor po intake/vomiting Baseline Cr 0.7 , admission Cr 1.61 
- Aggressive hydration with NS. Watch cr/electrolytes closely 
-Adjust all medications  especially antibiotic therapy to GFR . Avoid nephrotoxic drugs. - If not improving consider further evaluation with renal US.  
  
Recurrent Hiatal hernia vs partial gastric outlet obstruction Severe constipation Dysphasia with vomiting - + weight loss, unable tolerate solid food, last BM 2-3 weeks ago  
 Barium from a month ago still in his colon.  
-will start with enemas 
-tolerating liquid diet, per surgery, may need to be on purees. Will get speech consult. 
-discussed with surgery, very tortuous colon and large hiatal hernia, no surgery indicated Solid food DISTAL to hiatal hernia, therefore gastroparesis also in the differential.   
Needs aggressive bowel regimen.   
Tolerating pureed diet Gastric emptying study shows significantly delayed empyting CT A/P negative for acute process, fibrosis and honeycombing in lung bases may represent IPF/UIP, boderline aneurysmal aorta 
 
  
HTN 
-stable 
-Cont home Norvasc at lower dose  
  
Hyperlipidemia, cont statin 
  
Code Status: Full code Surrogate Decision Maker: wife  
  
DVT Prophylaxis: Lovenox GI Prophylaxis: not indicated 
  
Baseline: , ambulating independent Subjective: Chief Complaint / Reason for Physician Visit Main complaint is patient wants to advance diet Review of Systems: 
Symptom Y/N Comments  Symptom Y/N Comments Fever/Chills n   Chest Pain n   
Poor Appetite    Edema Cough n   Abdominal Pain n   
Sputum    Joint Pain SOB/WEST n   Pruritis/Rash Nausea/vomit n   Tolerating PT/OT Diarrhea    Tolerating Diet Constipation    Other Could NOT obtain due to: Objective: VITALS:  
Last 24hrs VS reviewed since prior progress note. Most recent are: 
Patient Vitals for the past 24 hrs: 
 Temp Pulse Resp BP SpO2  
08/03/18 2210 98 °F (36.7 °C) 67 18 105/61 97 % 08/03/18 2100 - - - - 99 % 08/03/18 1731 - 69 - 133/65 -  
08/03/18 1606 97.3 °F (36.3 °C) 78 16 133/65 99 % 08/03/18 0714 98.2 °F (36.8 °C) 62 16 133/76 97 % Intake/Output Summary (Last 24 hours) at 08/03/18 2357 Last data filed at 08/03/18 2210 Gross per 24 hour Intake             2040 ml Output                0 ml Net             2040 ml PHYSICAL EXAM: 
General: Pleasant, frail appearing elderly malet, cooperative, no acute distress   
EENT:  EOMI. Anicteric sclerae. MMM Resp:  CTA bilaterally, no wheezing or rales. No accessory muscle use CV:  Regular  rhythm,  No edema GI:  Soft, Non distended, Non tender.  +Bowel sounds Neurologic:  Alert and oriented X 3, normal speech, Psych:   fair insight. mildly anxious Skin:  No rashes. No jaundice Reviewed most current lab test results and cultures  YES Reviewed most current radiology test results   YES Review and summation of old records today    NO Reviewed patient's current orders and MAR    YES 
PMH/ reviewed - no change compared to H&P 
________________________________________________________________________ Care Plan discussed with: 
  Comments Patient x Family  x   
RN x Care Manager Consultant Multidiciplinary team rounds were held today with , nursing, pharmacist and clinical coordinator. Patient's plan of care was discussed; medications were reviewed and discharge planning was addressed. ________________________________________________________________________ Total NON critical care TIME: 25 Minutes Total CRITICAL CARE TIME Spent:   Minutes non procedure based Comments >50% of visit spent in counseling and coordination of care x ________________________________________________________________________ Ramo Gay DO  
 
Procedures: see electronic medical records for all procedures/Xrays and details which were not copied into this note but were reviewed prior to creation of Plan. LABS: 
I reviewed today's most current labs and imaging studies. Pertinent labs include: 
Recent Labs 08/03/18 
 0131  08/02/18 
 0038  08/01/18 
 1127 WBC  3.8*  4.5  5.5 HGB  10.0*  11.3*  12.2 HCT  30.6*  35.0*  38.2 PLT  204  217  201 Recent Labs 08/03/18 
 0131  08/02/18 
 0038  08/01/18 
 6530 NA  140  137  134* K  4.1  4.1  4.5  
CL  108  102  98 CO2  27  30  31 GLU  87  91  110* BUN  12  20  24* CREA  1.12  1.35*  1.61* CA  8.6  8.5  9.4 MG   --   2.3   --   
PHOS   --   3.1   --   
ALB   --    --   3.3* TBILI   --    --   0.6 SGOT   --    --   24 ALT   --    --   17 Signed: Ramo Gay DO

## 2018-08-05 VITALS
DIASTOLIC BLOOD PRESSURE: 63 MMHG | BODY MASS INDEX: 22.99 KG/M2 | SYSTOLIC BLOOD PRESSURE: 121 MMHG | HEIGHT: 65 IN | RESPIRATION RATE: 16 BRPM | HEART RATE: 71 BPM | TEMPERATURE: 97.6 F | WEIGHT: 138.01 LBS | OXYGEN SATURATION: 97 %

## 2018-08-05 PROCEDURE — 74011250636 HC RX REV CODE- 250/636: Performed by: INTERNAL MEDICINE

## 2018-08-05 PROCEDURE — C9113 INJ PANTOPRAZOLE SODIUM, VIA: HCPCS | Performed by: HOSPITALIST

## 2018-08-05 PROCEDURE — 74011250636 HC RX REV CODE- 250/636: Performed by: HOSPITALIST

## 2018-08-05 PROCEDURE — 94760 N-INVAS EAR/PLS OXIMETRY 1: CPT

## 2018-08-05 PROCEDURE — 74011000250 HC RX REV CODE- 250: Performed by: HOSPITALIST

## 2018-08-05 RX ORDER — AMLODIPINE BESYLATE 5 MG/1
5 TABLET ORAL DAILY
Qty: 30 TAB | Refills: 2 | Status: SHIPPED | OUTPATIENT
Start: 2018-08-05

## 2018-08-05 RX ORDER — POLYETHYLENE GLYCOL 3350 17 G/17G
17 POWDER, FOR SOLUTION ORAL DAILY
Qty: 20 PACKET | Refills: 0 | Status: SHIPPED | OUTPATIENT
Start: 2018-08-05 | End: 2019-02-04

## 2018-08-05 RX ORDER — AMLODIPINE BESYLATE 2.5 MG/1
5 TABLET ORAL EVERY EVENING
Qty: 30 TAB | Refills: 2 | Status: SHIPPED | OUTPATIENT
Start: 2018-08-05 | End: 2018-08-05

## 2018-08-05 RX ORDER — DOCUSATE SODIUM 100 MG/1
100 CAPSULE, LIQUID FILLED ORAL
Qty: 30 CAP | Refills: 2 | Status: SHIPPED | OUTPATIENT
Start: 2018-08-05 | End: 2018-11-03

## 2018-08-05 RX ADMIN — SODIUM CHLORIDE 100 ML/HR: 900 INJECTION, SOLUTION INTRAVENOUS at 03:54

## 2018-08-05 RX ADMIN — Medication 10 ML: at 03:53

## 2018-08-05 RX ADMIN — ENOXAPARIN SODIUM 40 MG: 100 INJECTION SUBCUTANEOUS at 09:11

## 2018-08-05 RX ADMIN — SODIUM CHLORIDE 40 MG: 9 INJECTION, SOLUTION INTRAMUSCULAR; INTRAVENOUS; SUBCUTANEOUS at 09:11

## 2018-08-05 NOTE — DISCHARGE SUMMARY
Hospitalist Discharge Summary     Patient ID:  Vivien Sicard  [id number]  80 y.o.  9/3/1931    PCP on record: Fay Patrick MD    Admit date: 8/1/2018  Discharge date and time: 8/5/2018      DISCHARGE DIAGNOSIS:  Acute kidney injury POA, improved  Hiatal hernia/dysphagia POA  Severe protein calorie malnutrition, POA, tolerating diet  Suspected gastroparesis  HTN  HLD        CONSULTATIONS:  General surgery  Gastroenterology     Excerpted HPI from H&P of Deondre Ware MD:  Theron Alexander is a 80 y.o.   male who presents with above complaint.  Pt had urgent Nissen Fundoplication for gastric volvulus and esophageal hernia done in February 2018 by Dr Hema Li. Since his surgery he was doing poorly with GI problems. He reports having dysphasia with solid food. He stated \" it is usually stuck in the lower chest\" and he had to vomit afterward. He has significant weight loss since February. He used to weight 180 Lb. He is down to 138 Lb today. He is able to tolerate liquids. No vomiting blood. Pt initially had severe diarrhea. Pt is followed by Dr Noemy Hartman. He had a lower and upper GI endoscopy. Pt stated he was diagnosed with an intestinal parasite for which he was prescribed medication. Medication help his diarrhea. Pt now reports severe constipation. He stated he didn't have normal BM in the past 2 months. He had last BM 2 weeks ago. On 7/20 pt had another EGD done by Dr Isabell Reno. He was found to have partial volvulus with a lot of food. Dr Isabell Reno recommended PPI, Liquids for diet, CT and surgical consult. Pt was refer by surgery to ED for further evaluation with CT. Pts diet currently is mostly consist of popsicles and meal replacement shakes. No abdominal pain/fever/chills.      ______________________________________________________________________  DISCHARGE SUMMARY/HOSPITAL COURSE:  for full details see H&P, daily progress notes, labs, consult notes.      Acute renal failure /hyponatremia   - Likely prerenal in origine due to poor po intake/vomiting  -baseline Cr 0.7-1.0  -Cr on admission 1.61, trending down to 1.06 at time of DC  - received IVF resuscitation      Recurrent Hiatal hernia vs partial gastric outlet obstruction  Severe constipation   Dysphasia with vomiting   - + weight loss, unable tolerate solid food, last BM 2-3 weeks ago    Barium from a month ago still in his colon.   -received enemas  -tolerating regular diet and thin liquids, seen by SLP  -discussed with surgery, very tortuous colon and large hiatal hernia, no surgery indicated  Solid food DISTAL to hiatal hernia, therefore gastroparesis also in the differential.    Needs aggressive bowel regimen.    Gastric emptying study shows significantly delayed empyting  CT A/P negative for acute process, fibrosis and honeycombing in lung bases may represent IPF/UIP, borderline aneurysmal aorta (2.9cm), no surveillance required per guidlines  sats stable on RA     Suspect gastroparesis  -Pt seen by GI in consultation  -will hold off on starting meds as patient is tolerating diet and pt can follow outpatient with his gastroenterologist      HTN  -stable  -Cont home Norvasc at lower dose       Hyperlipidemia, cont statin     Severe protein calorie malnutrition  - <75% of nutritional intake for > 1 month  - weight loss of > 5% in 1 month      Code Status: Full code   Surrogate Decision Maker: wife       DVT Prophylaxis: Lovenox   GI Prophylaxis: not indicated      Baseline: , ambulating independent         _______________________________________________________________________  Patient seen and examined by me on discharge day. Pertinent Findings:  Gen:    Not in distress  Chest: Clear lungs  CVS:   Regular rhythm.   No edema  Abd:  Soft, not distended, not tender  Neuro:  Alert, oriented x 3  _______________________________________________________________________  DISCHARGE MEDICATIONS:   Current Discharge Medication List      START taking these medications    Details   docusate sodium (COLACE) 100 mg capsule Take 1 Cap by mouth daily as needed for Constipation for up to 90 days. Qty: 30 Cap, Refills: 2      polyethylene glycol (MIRALAX) 17 gram packet Take 1 Packet by mouth daily. Qty: 20 Packet, Refills: 0         CONTINUE these medications which have CHANGED    Details   amLODIPine (NORVASC) 2.5 mg tablet Take 2 Tabs by mouth every evening. Qty: 30 Tab, Refills: 2         CONTINUE these medications which have NOT CHANGED    Details   ondansetron hcl (ZOFRAN) 4 mg tablet Take 4 mg by mouth every eight (8) hours as needed for Nausea. cholecalciferol (VITAMIN D3) 1,000 unit cap Take 1,000 Units by mouth every evening. aspirin delayed-release 81 mg tablet Take 81 mg by mouth every evening. simvastatin (ZOCOR) 20 mg tablet Take 20 mg by mouth every evening. My Recommended Diet, Activity, Wound Care, and follow-up labs are listed in the patient's Discharge Insturctions which I have personally completed and reviewed. ______________________________________________________________________    Risk of deterioration: Moderate    Condition at Discharge:  Stable  ______________________________________________________________________    Disposition  Home with family, no needs  ______________________________________________________________________    Care Plan discussed with:   Patient, Family, RN, Care Manager, Consultant    ______________________________________________________________________    Code Status: Full Code  ______________________________________________________________________      Follow up with:   PCP : Yessica Cazares MD  Follow-up Information     Follow up With Details Comments [address]  this is your home care agency. If you do not hear from them in one to two days please call them.   [address]  [place] [address] Dimitrios Cazares MD Schedule an appointment as soon as possible for a visit in 1 week for PCP follow up for post hospitalization follow up appointment.   [address] Izabel Posada MD Schedule an appointment as soon as possible for a visit  [address] [place] [address]  [phone]                Total time in minutes spent coordinating this discharge (includes going over instructions, follow-up, prescriptions, and preparing report for sign off to her PCP) : 35 minutes    Signed:  Enedelia Neumann DO

## 2018-08-05 NOTE — PROGRESS NOTES
Problem: Falls - Risk of 
Goal: *Absence of Falls Document Margot Anne Fall Risk and appropriate interventions in the flowsheet. Outcome: Progressing Towards Goal 
Fall Risk Interventions: 
Mobility Interventions: Bed/chair exit alarm, Patient to call before getting OOB Mentation Interventions: Adequate sleep, hydration, pain control Medication Interventions: Bed/chair exit alarm, Evaluate medications/consider consulting pharmacy, Patient to call before getting OOB, Teach patient to arise slowly Elimination Interventions: Call light in reach History of Falls Interventions: Door open when patient unattended

## 2018-08-05 NOTE — PROGRESS NOTES
Bedside and Verbal shift change report given to Zaida (oncoming nurse) by Janeth Alonzo (offgoing nurse). Report included the following information SBAR, Kardex, Intake/Output, MAR, Recent Results and Med Rec Status.

## 2018-08-05 NOTE — PROGRESS NOTES
Patient discharged to home with family member. All discharge instructions, prescriptions and patient belongings given to patient prior to discharge. IV removed prior to discharge.

## 2018-08-07 ENCOUNTER — HOME CARE VISIT (OUTPATIENT)
Dept: HOME HEALTH SERVICES | Facility: HOME HEALTH | Age: 83
End: 2018-08-07

## 2018-08-07 ENCOUNTER — HOME CARE VISIT (OUTPATIENT)
Dept: SCHEDULING | Facility: HOME HEALTH | Age: 83
End: 2018-08-07

## 2018-08-07 VITALS
HEART RATE: 74 BPM | SYSTOLIC BLOOD PRESSURE: 128 MMHG | DIASTOLIC BLOOD PRESSURE: 72 MMHG | TEMPERATURE: 98.6 F | OXYGEN SATURATION: 98 %

## 2018-08-07 PROCEDURE — G0151 HHCP-SERV OF PT,EA 15 MIN: HCPCS

## 2018-08-13 ENCOUNTER — TELEPHONE (OUTPATIENT)
Dept: CARDIOLOGY CLINIC | Age: 83
End: 2018-08-13

## 2018-08-14 NOTE — PROGRESS NOTES
Hospitalist Progress Note    NAME: Marci Aquino   :  9/3/1931   MRN:  [id number]       Assessment / Plan:  Acute renal failure /hyponatremia   - Likely prerenal in origine due to poor po intake/vomiting  Baseline Cr 0.7 , admission Cr 1.61  - Aggressive hydration with NS. Watch cr/electrolytes closely  -Adjust all medications  especially antibiotic therapy to GFR . Avoid nephrotoxic drugs. - If not improving consider further evaluation with renal US.      Recurrent Hiatal hernia vs partial gastric outlet obstruction  Severe constipation   Dysphasia with vomiting   - + weight loss, unable tolerate solid food, last BM 2-3 weeks ago    Barium from a month ago still in his colon.   -will start with enemas  -tolerating liquid diet, per surgery, may need to be on purees.  Will get speech consult.  -discussed with surgery, very tortuous colon and large hiatal hernia, no surgery indicated  Solid food DISTAL to hiatal hernia, therefore gastroparesis also in the differential.    Needs aggressive bowel regimen.    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/UIP, boderline aneurysmal aorta    Suspect gastroparesis  -Pt seen by GI in consultation  -will hold off on starting meds as patient is tolerating diet and pt can follow outpatient with his gastroenterologist     HTN  -stable  -Cont home Norvasc at lower dose      Hyperlipidemia, cont statin    Severe protein calorie malnutrition  - <75% of nutritional intake for > 1 month  - weight loss of > 5% in 1 month     Code Status: Full code   Surrogate Decision Maker: wife      DVT Prophylaxis: Lovenox   GI Prophylaxis: not indicated     Baseline: , ambulating independent      Subjective:     Chief Complaint / Reason for Physician Visit  Patient states he is tolerating diet, which has been advanced to regular consistency and thins per SLP.    Review of Systems:  Symptom Y/N Comments  Symptom Y/N Comments   Fever/Chills n   Chest Pain n    Poor Appetite    Edema     Cough n   Abdominal Pain n    Sputum n   Joint Pain     SOB/WEST n   Pruritis/Rash     Nausea/vomit n   Tolerating PT/OT     Diarrhea    Tolerating Diet     Constipation    Other       Could NOT obtain due to:      Objective:     VITALS:   Last 24hrs VS reviewed since prior progress note. Most recent are:  No data found. No intake or output data in the 24 hours ending 08/13/18 2009     PHYSICAL EXAM:  General: Pleasant, frail appearing elderly malet, cooperative, no acute distress    EENT:  EOMI. Anicteric sclerae. MMM  Resp:  CTA bilaterally, no wheezing or rales. No accessory muscle use  CV:  Regular  rhythm,  No edema  GI:  Soft, Non distended, Non tender.  +Bowel sounds  Neurologic:  Alert and oriented X 3, normal speech,   Psych:   fair insight. mildly anxious  Skin:  No rashes. No jaundice    Reviewed most current lab test results and cultures  YES  Reviewed most current radiology test results   YES  Review and summation of old records today    NO  Reviewed patient's current orders and MAR    YES  PMH/ reviewed - no change compared to H&P  ________________________________________________________________________  Care Plan discussed with:    Comments   Patient x    Family  x    RN x    Care Manager     Consultant  x                      Multidiciplinary team rounds were held today with , nursing, pharmacist and clinical coordinator. Patient's plan of care was discussed; medications were reviewed and discharge planning was addressed.      ________________________________________________________________________  Total NON critical care TIME: 25 Minutes    Total CRITICAL CARE TIME Spent:   Minutes non procedure based      Comments   >50% of visit spent in counseling and coordination of care x ________________________________________________________________________  Val Hill DO     Procedures: see electronic medical records for all procedures/Xrays and details which were not copied into this note but were reviewed prior to creation of Plan. LABS:  I reviewed today's most current labs and imaging studies. Pertinent labs include:  No results for input(s): WBC, HGB, HCT, PLT, HGBEXT, HCTEXT, PLTEXT, HGBEXT, HCTEXT, PLTEXT in the last 72 hours. No results for input(s): NA, K, CL, CO2, GLU, BUN, CREA, CA, MG, PHOS, ALB, TBIL, TBILI, SGOT, ALT, INR in the last 72 hours.     No lab exists for component: [place], INREXT    Signed: Val Hill DO

## 2018-08-21 ENCOUNTER — TELEPHONE (OUTPATIENT)
Dept: CARDIOLOGY CLINIC | Age: 83
End: 2018-08-21

## 2018-08-24 ENCOUNTER — HOSPITAL ENCOUNTER (OUTPATIENT)
Dept: GENERAL RADIOLOGY | Age: 83
Discharge: HOME | End: 2018-08-24
Attending: PHYSICIAN ASSISTANT
Payer: MEDICARE

## 2018-08-24 DIAGNOSIS — K59.00 CONSTIPATION: ICD-10-CM

## 2018-08-24 DIAGNOSIS — R68.81 EARLY SATIETY: ICD-10-CM

## 2018-08-24 DIAGNOSIS — K30 DELAYED GASTRIC EMPTYING: ICD-10-CM

## 2018-08-24 DIAGNOSIS — K44.9 HIATAL HERNIA: ICD-10-CM

## 2018-08-24 DIAGNOSIS — K31.89 GASTRIC VOLVULUS: ICD-10-CM

## 2018-08-24 PROCEDURE — 74247 XR UPPER GI W KUB AIR CONT: CPT

## 2018-09-26 ENCOUNTER — TELEPHONE (OUTPATIENT)
Dept: CARDIOLOGY CLINIC | Age: 83
End: 2018-09-26

## 2018-10-17 ENCOUNTER — TELEPHONE (OUTPATIENT)
Dept: CARDIOLOGY CLINIC | Age: 83
End: 2018-10-17

## 2019-01-09 ENCOUNTER — TELEPHONE (OUTPATIENT)
Dept: CARDIOLOGY CLINIC | Age: 84
End: 2019-01-09

## 2019-02-04 ENCOUNTER — OFFICE VISIT (OUTPATIENT)
Dept: NEUROLOGY | Age: 84
End: 2019-02-04

## 2019-02-04 VITALS
SYSTOLIC BLOOD PRESSURE: 110 MMHG | HEART RATE: 63 BPM | HEIGHT: 65 IN | BODY MASS INDEX: 25.33 KG/M2 | OXYGEN SATURATION: 98 % | DIASTOLIC BLOOD PRESSURE: 70 MMHG | WEIGHT: 152 LBS

## 2019-02-04 DIAGNOSIS — E11.9 CONTROLLED TYPE 2 DIABETES MELLITUS WITHOUT COMPLICATION, UNSPECIFIED WHETHER LONG TERM INSULIN USE (HCC): ICD-10-CM

## 2019-02-04 DIAGNOSIS — R20.2 NUMBNESS AND TINGLING IN RIGHT HAND: ICD-10-CM

## 2019-02-04 DIAGNOSIS — I10 ESSENTIAL HYPERTENSION: ICD-10-CM

## 2019-02-04 DIAGNOSIS — R26.9 NEUROLOGIC GAIT DYSFUNCTION: ICD-10-CM

## 2019-02-04 DIAGNOSIS — E78.2 MIXED HYPERLIPIDEMIA: ICD-10-CM

## 2019-02-04 DIAGNOSIS — R20.0 NUMBNESS AND TINGLING IN RIGHT HAND: ICD-10-CM

## 2019-02-04 DIAGNOSIS — R41.3 MEMORY LOSS: ICD-10-CM

## 2019-02-04 DIAGNOSIS — I63.9 CEREBROVASCULAR ACCIDENT (CVA), UNSPECIFIED MECHANISM (HCC): Primary | ICD-10-CM

## 2019-02-04 NOTE — LETTER
2/4/2019 11:13 AM 
 
Patient:    Terri Ackerman YOB: 1931 Date of Visit:    2/4/2019 Dear Rogers Sherman MD 
 
Thank you for referring Mr. Mary Benjamin to me for evaluation/treatment. Below are the relevant portions of my assessment and plan of care. Neurology follow-up note Chief Complaint Patient presents with  New Patient  
  unstable walk and balance, slower thinking, nerves in right hand SUBJECTIVE Terri Ackerman is a 80 y.o. male who presented to the neurology office for a follow-up visit for the stroke. The patient was last seen by me on 3/19/2017. Patient came to the hospital because of dizziness which he describes as a room spinning sensation without any nausea or vomiting. 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 Patient is now on aspirin and statin. Risk factors include hypertension, diabetes and hyperlipidemia. Interval history: 
The patient has been taking aspirin and simvastatin every day. In the last 3-4 months the patient does have problems walking straight. This is not there all the time but it comes and goes. He has to catch himself while walking at times. He is also noticed that his right hand is numb. Also the power of  states that the patient might have word finding difficulty. In the past he has been in a car and then he forgets where he is going. Current Outpatient Medications Medication Sig  
 amLODIPine (NORVASC) 5 mg tablet Take 1 Tab by mouth daily.  cholecalciferol (VITAMIN D3) 1,000 unit cap Take 1,000 Units by mouth every evening.  aspirin delayed-release 81 mg tablet Take 81 mg by mouth every evening.  simvastatin (ZOCOR) 20 mg tablet Take 20 mg by mouth every evening. No current facility-administered medications for this visit. Past Medical History:  
Diagnosis Date  GERD (gastroesophageal reflux disease)  High cholesterol  Hypertension  Stroke ([facility] [address] 03/2017  
 numbness in fingertips on R hand; legs;stammering Past Surgical History:  
Procedure Laterality Date  HX CHOLECYSTECTOMY  HX HEENT Bilateral   
 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 Family History Problem Relation Age of Onset  No Known Problems Sister  No Known Problems Brother Social History Tobacco Use  Smoking status: Former Smoker  Smokeless tobacco: Never Used Substance Use Topics  Alcohol use: Not on file  Drug use: No  
 
 
REVIEW OF SYSTEMS:  
A ten system review of constitutional, cardiovascular, respiratory, musculoskeletal, endocrine, skin, SHEENT, genitourinary, psychiatric and neurologic systems was obtained and is unremarkable except memory loss, falls, fatigue, hearing loss, muscle weakness EXAMINATION:  
Visit Vitals /70 Pulse 63 Ht 5' 5\" (1.651 m) Wt 152 lb (68.9 kg) SpO2 98% BMI 25.29 kg/m² General:  
General appearance: Pt is in no acute distress Distal pulses are preserved Neurological Examination:  
Mental Status: AAO x3. Speech is fluent. Follows commands, has fair fund of knowledge, attention, short term recall, comprehension and insight. Cranial Nerves: Visual fields are full. PERRL, Extraocular movements are full. Facial sensation intact. Facial movement intact. Hearing intact to conversation. Palate elevates symmetrically. Shoulder shrug symmetric. Tongue midline. Motor: Strength is 5/5 in all 4 ext. Sensation: Decreased pinprick sensation in all the fingers of the right hand Coordination/Cerebellar: Intact to finger-nose-finger Skin: No significant bruising or lacerations. Laboratory review:  
Results for orders placed or performed during the hospital encounter of 08/01/18 URINALYSIS W/ REFLEX CULTURE Result Value Ref Range Color YELLOW/STRAW Appearance CLEAR CLEAR Specific gravity 1.021 1.003 - 1.030    
 pH (UA) 5.0 5.0 - 8.0 Protein 30 (A) NEG mg/dL Glucose NEGATIVE  NEG mg/dL Ketone TRACE (A) NEG mg/dL Bilirubin NEGATIVE  NEG Blood NEGATIVE  NEG Urobilinogen 1.0 0.2 - 1.0 EU/dL Nitrites NEGATIVE  NEG Leukocyte Esterase NEGATIVE  NEG    
 WBC 0-4 0 - 4 /hpf  
 RBC 0-5 0 - 5 /hpf Epithelial cells FEW FEW /lpf Bacteria NEGATIVE  NEG /hpf  
 UA:UC IF INDICATED CULTURE NOT INDICATED BY UA RESULT CNI    
 CA Oxalate crystals 1+ (A) NEG Hyaline cast 0-2 0 - 5 /lpf METABOLIC PANEL, COMPREHENSIVE Result Value Ref Range Sodium 134 (L) 136 - 145 mmol/L Potassium 4.5 3.5 - 5.1 mmol/L Chloride 98 97 - 108 mmol/L  
 CO2 31 21 - 32 mmol/L Anion gap 5 5 - 15 mmol/L Glucose 110 (H) 65 - 100 mg/dL BUN 24 (H) 6 - 20 MG/DL Creatinine 1.61 (H) 0.70 - 1.30 MG/DL  
 BUN/Creatinine ratio 15 12 - 20 GFR est AA 50 (L) >60 ml/min/1.73m2 GFR est non-AA 41 (L) >60 ml/min/1.73m2 Calcium 9.4 8.5 - 10.1 MG/DL Bilirubin, total 0.6 0.2 - 1.0 MG/DL  
 ALT (SGPT) 17 12 - 78 U/L  
 AST (SGOT) 24 15 - 37 U/L Alk. phosphatase 63 45 - 117 U/L Protein, total 8.5 (H) 6.4 - 8.2 g/dL Albumin 3.3 (L) 3.5 - 5.0 g/dL Globulin 5.2 (H) 2.0 - 4.0 g/dL A-G Ratio 0.6 (L) 1.1 - 2.2 LACTIC ACID Result Value Ref Range Lactic acid 1.2 0.4 - 2.0 MMOL/L  
LIPASE Result Value Ref Range Lipase 103 73 - 393 U/L  
CBC WITH AUTOMATED DIFF Result Value Ref Range WBC 5.5 4.1 - 11.1 K/uL  
 RBC 4.29 4. 10 - 5.70 M/uL  
 HGB 12.2 12.1 - 17.0 g/dL HCT 38.2 36.6 - 50.3 % MCV 89.0 80.0 - 99.0 FL  
 MCH 28.4 26.0 - 34.0 PG  
 MCHC 31.9 30.0 - 36.5 g/dL  
 RDW 14.6 (H) 11.5 - 14.5 % PLATELET 799 893 - 354 K/uL MPV 10.2 8.9 - 12.9 FL  
 NRBC 0.0 0  WBC ABSOLUTE NRBC 0.00 0.00 - 0.01 K/uL NEUTROPHILS 68 32 - 75 % LYMPHOCYTES 15 12 - 49 % MONOCYTES 16 (H) 5 - 13 % EOSINOPHILS 0 0 - 7 % BASOPHILS 0 0 - 1 % IMMATURE GRANULOCYTES 0 0.0 - 0.5 % ABS. NEUTROPHILS 3.8 1.8 - 8.0 K/UL  
 ABS. LYMPHOCYTES 0.9 0.8 - 3.5 K/UL  
 ABS. MONOCYTES 0.9 0.0 - 1.0 K/UL  
 ABS. EOSINOPHILS 0.0 0.0 - 0.4 K/UL  
 ABS. BASOPHILS 0.0 0.0 - 0.1 K/UL  
 ABS. IMM. GRANS. 0.0 0.00 - 0.04 K/UL  
 DF AUTOMATED METABOLIC PANEL, BASIC Result Value Ref Range Sodium 137 136 - 145 mmol/L Potassium 4.1 3.5 - 5.1 mmol/L Chloride 102 97 - 108 mmol/L  
 CO2 30 21 - 32 mmol/L Anion gap 5 5 - 15 mmol/L Glucose 91 65 - 100 mg/dL BUN 20 6 - 20 MG/DL Creatinine 1.35 (H) 0.70 - 1.30 MG/DL  
 BUN/Creatinine ratio 15 12 - 20 GFR est AA >60 >60 ml/min/1.73m2 GFR est non-AA 50 (L) >60 ml/min/1.73m2 Calcium 8.5 8.5 - 10.1 MG/DL  
CBC W/O DIFF Result Value Ref Range WBC 4.5 4.1 - 11.1 K/uL  
 RBC 3.96 (L) 4.10 - 5.70 M/uL  
 HGB 11.3 (L) 12.1 - 17.0 g/dL HCT 35.0 (L) 36.6 - 50.3 % MCV 88.4 80.0 - 99.0 FL  
 MCH 28.5 26.0 - 34.0 PG  
 MCHC 32.3 30.0 - 36.5 g/dL  
 RDW 14.5 11.5 - 14.5 % PLATELET 675 274 - 183 K/uL MPV 10.3 8.9 - 12.9 FL  
 NRBC 0.0 0  WBC ABSOLUTE NRBC 0.00 0.00 - 0.01 K/uL MAGNESIUM Result Value Ref Range Magnesium 2.3 1.6 - 2.4 mg/dL PHOSPHORUS Result Value Ref Range Phosphorus 3.1 2.6 - 4.7 MG/DL  
CBC WITH AUTOMATED DIFF Result Value Ref Range WBC 3.8 (L) 4.1 - 11.1 K/uL  
 RBC 3.48 (L) 4.10 - 5.70 M/uL  
 HGB 10.0 (L) 12.1 - 17.0 g/dL HCT 30.6 (L) 36.6 - 50.3 % MCV 87.9 80.0 - 99.0 FL  
 MCH 28.7 26.0 - 34.0 PG  
 MCHC 32.7 30.0 - 36.5 g/dL  
 RDW 14.5 11.5 - 14.5 % PLATELET 032 317 - 905 K/uL MPV 10.2 8.9 - 12.9 FL  
 NRBC 0.0 0  WBC ABSOLUTE NRBC 0.00 0.00 - 0.01 K/uL NEUTROPHILS 67 32 - 75 % LYMPHOCYTES 17 12 - 49 % MONOCYTES 13 5 - 13 % EOSINOPHILS 2 0 - 7 % BASOPHILS 1 0 - 1 % IMMATURE GRANULOCYTES 0 0.0 - 0.5 % ABS. NEUTROPHILS 2.6 1.8 - 8.0 K/UL ABS. LYMPHOCYTES 0.6 (L) 0.8 - 3.5 K/UL  
 ABS. MONOCYTES 0.5 0.0 - 1.0 K/UL  
 ABS. EOSINOPHILS 0.1 0.0 - 0.4 K/UL  
 ABS. BASOPHILS 0.0 0.0 - 0.1 K/UL  
 ABS. IMM. GRANS. 0.0 0.00 - 0.04 K/UL  
 DF AUTOMATED    
 RBC COMMENTS NORMOCYTIC, NORMOCHROMIC METABOLIC PANEL, BASIC Result Value Ref Range Sodium 140 136 - 145 mmol/L Potassium 4.1 3.5 - 5.1 mmol/L Chloride 108 97 - 108 mmol/L  
 CO2 27 21 - 32 mmol/L Anion gap 5 5 - 15 mmol/L Glucose 87 65 - 100 mg/dL BUN 12 6 - 20 MG/DL Creatinine 1.12 0.70 - 1.30 MG/DL  
 BUN/Creatinine ratio 11 (L) 12 - 20 GFR est AA >60 >60 ml/min/1.73m2 GFR est non-AA >60 >60 ml/min/1.73m2 Calcium 8.6 8.5 - 10.1 MG/DL  
CBC WITH AUTOMATED DIFF Result Value Ref Range WBC 3.9 (L) 4.1 - 11.1 K/uL  
 RBC 3.22 (L) 4.10 - 5.70 M/uL HGB 9.2 (L) 12.1 - 17.0 g/dL HCT 28.8 (L) 36.6 - 50.3 % MCV 89.4 80.0 - 99.0 FL  
 MCH 28.6 26.0 - 34.0 PG  
 MCHC 31.9 30.0 - 36.5 g/dL  
 RDW 14.6 (H) 11.5 - 14.5 % PLATELET 982 562 - 228 K/uL MPV 10.6 8.9 - 12.9 FL  
 NRBC 0.0 0  WBC ABSOLUTE NRBC 0.00 0.00 - 0.01 K/uL NEUTROPHILS 65 32 - 75 % LYMPHOCYTES 19 12 - 49 % MONOCYTES 12 5 - 13 % EOSINOPHILS 3 0 - 7 % BASOPHILS 1 0 - 1 % IMMATURE GRANULOCYTES 0 0.0 - 0.5 % ABS. NEUTROPHILS 2.5 1.8 - 8.0 K/UL  
 ABS. LYMPHOCYTES 0.7 (L) 0.8 - 3.5 K/UL  
 ABS. MONOCYTES 0.5 0.0 - 1.0 K/UL  
 ABS. EOSINOPHILS 0.1 0.0 - 0.4 K/UL  
 ABS. BASOPHILS 0.0 0.0 - 0.1 K/UL  
 ABS. IMM. GRANS. 0.0 0.00 - 0.04 K/UL  
 DF AUTOMATED METABOLIC PANEL, BASIC Result Value Ref Range Sodium 141 136 - 145 mmol/L Potassium 4.2 3.5 - 5.1 mmol/L Chloride 109 (H) 97 - 108 mmol/L  
 CO2 25 21 - 32 mmol/L Anion gap 7 5 - 15 mmol/L Glucose 84 65 - 100 mg/dL BUN 9 6 - 20 MG/DL Creatinine 1.06 0.70 - 1.30 MG/DL  
 BUN/Creatinine ratio 8 (L) 12 - 20 GFR est AA >60 >60 ml/min/1.73m2 GFR est non-AA >60 >60 ml/min/1.73m2 Calcium 7.7 (L) 8.5 - 10.1 MG/DL Stroke labs: 
HgBA1c Lab Results Component Value Date/Time Hemoglobin A1c 6.8 (H) 03/20/2017 12:21 AM  
 
LDL Lab Results Component Value Date/Time LDL, calculated 69.8 03/20/2017 12:21 AM  
  
Imaging review: 
3/19/2017 MRI brain with and without contrast 
Small focus of left pontine ischemia/infarct. No bleed or shift MRA neck with contrast 
Bilateral ICA origin stenosis of 60% Transthoracic echo Ejection fraction 50-55% Documentation review: 
None Assessment/Plan: 1. Cerebrovascular accident (CVA), unspecified mechanism ([facility] [address] The patient has had a left pontine infarction. The patient's symptoms are stable. Patient is taking aspirin 81 mg a day. Continue the same 2. Essential hypertension Patient's blood pressure is well controlled. Goal blood pressure is less than 140/90. 
 
3. Controlled type 2 diabetes mellitus without complication, unspecified whether long term insulin use ([facility] [address] Patient's HbA1c is 6.8. Goal HbA1c is less than 7. Will repeat HbA1c. 
 
4. Mixed hyperlipidemia Patient is LDL is under good control. Continue same dosage of statin. Will check lipid panel. 
 
- LIPID PANEL 5. Memory loss Patient is having mild memory problem in the form of word finding difficulty. I do not suspect that this is TIA. There is a possibility of early stages of dementia versus mild cognitive impairment. We will send the patient for neuropsychological testing. 
- REFERRAL TO NEUROPSYCHOLOGY - VITAMIN B12 
- TSH AND FREE T4 
 
6. Numbness and tingling in right hand The patient does have numbness in his right hand. It is unclear whether it happened with the stroke but developed later on. There is a persistent sensation. Also, the patient is having problems with walking. No symptoms of Parkinson's. I am going to get an EMG/nerve conduction study to check for peripheral neuropathy and right carpal tunnel syndrome.   I am going to refer the patient for physical therapy at his assisted living. 
 
- EMG LIMITED; Future Follow-up in 3 months Over 40 minutes was spent with the patient of which > 50% of the visit was spent counseling on diagnosis, management, and treatment of the diagnosis. I did discuss about peripheral neuropathy, carpal tunnel syndrome and the workup PHQ over the last two weeks 3/15/2018 Little interest or pleasure in doing things Not at all Feeling down, depressed, irritable, or hopeless Several days Total Score PHQ 2 1 Primary care to address possible depression if PHQ-9 score is more than 9. ICD-10-CM ICD-9-CM 1. Cerebrovascular accident (CVA), unspecified mechanism ([facility] [address] I63.9 434.91   
2. Essential hypertension I10 401.9 3. Controlled type 2 diabetes mellitus without complication, unspecified whether long term insulin use (HCC) E11.9 250.00 HEMOGLOBIN A1C WITH EAG  
4. Mixed hyperlipidemia E78.2 272.2 LIPID PANEL 5. Memory loss R41.3 780.93 REFERRAL TO NEUROPSYCHOLOGY  
   VITAMIN B12  
   TSH AND FREE T4  
6. Numbness and tingling in right hand R20.0 782. 0 EMG LIMITED  
 R20.2 Petty Helton MD 
Neurologist 
 
CC: John Leblanc MD 
Fax: [phone] This note was created using voice recognition software. Despite editing, there may be syntax errors. If you have questions, please do not hesitate to call me. I look forward to following Mr. Jose Han along with you. Sincerely, Petty Helton MD

## 2019-02-04 NOTE — PROGRESS NOTES
Neurology follow-up note    Chief Complaint   Patient presents with    New Patient     unstable walk and balance, slower thinking, nerves in right hand       Montell Dance is a 80 y.o. male who presented to the neurology office for a follow-up visit for the stroke. The patient was last seen by me on 3/19/2017. Patient came to the hospital because of dizziness which he describes as a room spinning sensation without any nausea or vomiting. 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 Patient is now on aspirin and statin. Risk factors include hypertension, diabetes and hyperlipidemia. Interval history:  The patient has been taking aspirin and simvastatin every day. In the last 3-4 months the patient does have problems walking straight. This is not there all the time but it comes and goes. He has to catch himself while walking at times. He is also noticed that his right hand is numb. Also the power of  states that the patient might have word finding difficulty. In the past he has been in a car and then he forgets where he is going. Current Outpatient Medications   Medication Sig    amLODIPine (NORVASC) 5 mg tablet Take 1 Tab by mouth daily.  cholecalciferol (VITAMIN D3) 1,000 unit cap Take 1,000 Units by mouth every evening.  aspirin delayed-release 81 mg tablet Take 81 mg by mouth every evening.  simvastatin (ZOCOR) 20 mg tablet Take 20 mg by mouth every evening. No current facility-administered medications for this visit.         Past Medical History:   Diagnosis Date    GERD (gastroesophageal reflux disease)     High cholesterol     Hypertension     Stroke ([address] 03/2017    numbness in fingertips on R hand; legs;stammering     Past Surgical History:   Procedure Laterality Date    HX CHOLECYSTECTOMY      HX HEENT Bilateral     cataract    HX HERNIA REPAIR  02/21/2018     Robot-assisted laparoscopic repair of paraesophageal hernia with gastric volvulus. Family History   Problem Relation Age of Onset    No Known Problems Sister     No Known Problems Brother      Social History     Tobacco Use    Smoking status: Former Smoker    Smokeless tobacco: Never Used   Substance Use Topics    Alcohol use: Not on file    Drug use: No       REVIEW OF SYSTEMS:   A ten system review of constitutional, cardiovascular, respiratory, musculoskeletal, endocrine, skin, SHEENT, genitourinary, psychiatric and neurologic systems was obtained and is unremarkable except memory loss, falls, fatigue, hearing loss, muscle weakness    EXAMINATION:   Visit Vitals  /70   Pulse 63   Ht 5' 5\" (1.651 m)   Wt 152 lb (68.9 kg)   SpO2 98%   BMI 25.29 kg/m²        General:   General appearance: Pt is in no acute distress   Distal pulses are preserved    Neurological Examination:   Mental Status: AAO x3. Speech is fluent. Follows commands, has fair fund of knowledge, attention, short term recall, comprehension and insight. Cranial Nerves: Visual fields are full. PERRL, Extraocular movements are full. Facial sensation intact. Facial movement intact. Hearing intact to conversation. Palate elevates symmetrically. Shoulder shrug symmetric. Tongue midline. Motor: Strength is 5/5 in all 4 ext. Sensation: Decreased pinprick sensation in all the fingers of the right hand    Coordination/Cerebellar: Intact to finger-nose-finger     Skin: No significant bruising or lacerations.     Laboratory review:   Results for orders placed or performed during the hospital encounter of 08/01/18   URINALYSIS W/ REFLEX CULTURE   Result Value Ref Range    Color YELLOW/STRAW      Appearance CLEAR CLEAR      Specific gravity 1.021 1.003 - 1.030      pH (UA) 5.0 5.0 - 8.0      Protein 30 (A) NEG mg/dL    Glucose NEGATIVE  NEG mg/dL    Ketone TRACE (A) NEG mg/dL    Bilirubin NEGATIVE  NEG      Blood NEGATIVE  NEG      Urobilinogen 1.0 0.2 - 1.0 EU/dL    Nitrites NEGATIVE  NEG Leukocyte Esterase NEGATIVE  NEG      WBC 0-4 0 - 4 /hpf    RBC 0-5 0 - 5 /hpf    Epithelial cells FEW FEW /lpf    Bacteria NEGATIVE  NEG /hpf    UA:UC IF INDICATED CULTURE NOT INDICATED BY UA RESULT CNI      CA Oxalate crystals 1+ (A) NEG    Hyaline cast 0-2 0 - 5 /lpf   METABOLIC PANEL, COMPREHENSIVE   Result Value Ref Range    Sodium 134 (L) 136 - 145 mmol/L    Potassium 4.5 3.5 - 5.1 mmol/L    Chloride 98 97 - 108 mmol/L    CO2 31 21 - 32 mmol/L    Anion gap 5 5 - 15 mmol/L    Glucose 110 (H) 65 - 100 mg/dL    BUN 24 (H) 6 - 20 MG/DL    Creatinine 1.61 (H) 0.70 - 1.30 MG/DL    BUN/Creatinine ratio 15 12 - 20      GFR est AA 50 (L) >60 ml/min/1.73m2    GFR est non-AA 41 (L) >60 ml/min/1.73m2    Calcium 9.4 8.5 - 10.1 MG/DL    Bilirubin, total 0.6 0.2 - 1.0 MG/DL    ALT (SGPT) 17 12 - 78 U/L    AST (SGOT) 24 15 - 37 U/L    Alk. phosphatase 63 45 - 117 U/L    Protein, total 8.5 (H) 6.4 - 8.2 g/dL    Albumin 3.3 (L) 3.5 - 5.0 g/dL    Globulin 5.2 (H) 2.0 - 4.0 g/dL    A-G Ratio 0.6 (L) 1.1 - 2.2     LACTIC ACID   Result Value Ref Range    Lactic acid 1.2 0.4 - 2.0 MMOL/L   LIPASE   Result Value Ref Range    Lipase 103 73 - 393 U/L   CBC WITH AUTOMATED DIFF   Result Value Ref Range    WBC 5.5 4.1 - 11.1 K/uL    RBC 4.29 4. 10 - 5.70 M/uL    HGB 12.2 12.1 - 17.0 g/dL    HCT 38.2 36.6 - 50.3 %    MCV 89.0 80.0 - 99.0 FL    MCH 28.4 26.0 - 34.0 PG    MCHC 31.9 30.0 - 36.5 g/dL    RDW 14.6 (H) 11.5 - 14.5 %    PLATELET 973 540 - 956 K/uL    MPV 10.2 8.9 - 12.9 FL    NRBC 0.0 0  WBC    ABSOLUTE NRBC 0.00 0.00 - 0.01 K/uL    NEUTROPHILS 68 32 - 75 %    LYMPHOCYTES 15 12 - 49 %    MONOCYTES 16 (H) 5 - 13 %    EOSINOPHILS 0 0 - 7 %    BASOPHILS 0 0 - 1 %    IMMATURE GRANULOCYTES 0 0.0 - 0.5 %    ABS. NEUTROPHILS 3.8 1.8 - 8.0 K/UL    ABS. LYMPHOCYTES 0.9 0.8 - 3.5 K/UL    ABS. MONOCYTES 0.9 0.0 - 1.0 K/UL    ABS. EOSINOPHILS 0.0 0.0 - 0.4 K/UL    ABS. BASOPHILS 0.0 0.0 - 0.1 K/UL    ABS. IMM.  GRANS. 0.0 0.00 - 0.04 K/UL    DF AUTOMATED     METABOLIC PANEL, BASIC   Result Value Ref Range    Sodium 137 136 - 145 mmol/L    Potassium 4.1 3.5 - 5.1 mmol/L    Chloride 102 97 - 108 mmol/L    CO2 30 21 - 32 mmol/L    Anion gap 5 5 - 15 mmol/L    Glucose 91 65 - 100 mg/dL    BUN 20 6 - 20 MG/DL    Creatinine 1.35 (H) 0.70 - 1.30 MG/DL    BUN/Creatinine ratio 15 12 - 20      GFR est AA >60 >60 ml/min/1.73m2    GFR est non-AA 50 (L) >60 ml/min/1.73m2    Calcium 8.5 8.5 - 10.1 MG/DL   CBC W/O DIFF   Result Value Ref Range    WBC 4.5 4.1 - 11.1 K/uL    RBC 3.96 (L) 4.10 - 5.70 M/uL    HGB 11.3 (L) 12.1 - 17.0 g/dL    HCT 35.0 (L) 36.6 - 50.3 %    MCV 88.4 80.0 - 99.0 FL    MCH 28.5 26.0 - 34.0 PG    MCHC 32.3 30.0 - 36.5 g/dL    RDW 14.5 11.5 - 14.5 %    PLATELET 943 321 - 189 K/uL    MPV 10.3 8.9 - 12.9 FL    NRBC 0.0 0  WBC    ABSOLUTE NRBC 0.00 0.00 - 0.01 K/uL   MAGNESIUM   Result Value Ref Range    Magnesium 2.3 1.6 - 2.4 mg/dL   PHOSPHORUS   Result Value Ref Range    Phosphorus 3.1 2.6 - 4.7 MG/DL   CBC WITH AUTOMATED DIFF   Result Value Ref Range    WBC 3.8 (L) 4.1 - 11.1 K/uL    RBC 3.48 (L) 4.10 - 5.70 M/uL    HGB 10.0 (L) 12.1 - 17.0 g/dL    HCT 30.6 (L) 36.6 - 50.3 %    MCV 87.9 80.0 - 99.0 FL    MCH 28.7 26.0 - 34.0 PG    MCHC 32.7 30.0 - 36.5 g/dL    RDW 14.5 11.5 - 14.5 %    PLATELET 542 787 - 531 K/uL    MPV 10.2 8.9 - 12.9 FL    NRBC 0.0 0  WBC    ABSOLUTE NRBC 0.00 0.00 - 0.01 K/uL    NEUTROPHILS 67 32 - 75 %    LYMPHOCYTES 17 12 - 49 %    MONOCYTES 13 5 - 13 %    EOSINOPHILS 2 0 - 7 %    BASOPHILS 1 0 - 1 %    IMMATURE GRANULOCYTES 0 0.0 - 0.5 %    ABS. NEUTROPHILS 2.6 1.8 - 8.0 K/UL    ABS. LYMPHOCYTES 0.6 (L) 0.8 - 3.5 K/UL    ABS. MONOCYTES 0.5 0.0 - 1.0 K/UL    ABS. EOSINOPHILS 0.1 0.0 - 0.4 K/UL    ABS. BASOPHILS 0.0 0.0 - 0.1 K/UL    ABS. IMM.  GRANS. 0.0 0.00 - 0.04 K/UL    DF AUTOMATED      RBC COMMENTS NORMOCYTIC, NORMOCHROMIC     METABOLIC PANEL, BASIC   Result Value Ref Range    Sodium 140 136 - 145 mmol/L    Potassium 4.1 3.5 - 5.1 mmol/L    Chloride 108 97 - 108 mmol/L    CO2 27 21 - 32 mmol/L    Anion gap 5 5 - 15 mmol/L    Glucose 87 65 - 100 mg/dL    BUN 12 6 - 20 MG/DL    Creatinine 1.12 0.70 - 1.30 MG/DL    BUN/Creatinine ratio 11 (L) 12 - 20      GFR est AA >60 >60 ml/min/1.73m2    GFR est non-AA >60 >60 ml/min/1.73m2    Calcium 8.6 8.5 - 10.1 MG/DL   CBC WITH AUTOMATED DIFF   Result Value Ref Range    WBC 3.9 (L) 4.1 - 11.1 K/uL    RBC 3.22 (L) 4.10 - 5.70 M/uL    HGB 9.2 (L) 12.1 - 17.0 g/dL    HCT 28.8 (L) 36.6 - 50.3 %    MCV 89.4 80.0 - 99.0 FL    MCH 28.6 26.0 - 34.0 PG    MCHC 31.9 30.0 - 36.5 g/dL    RDW 14.6 (H) 11.5 - 14.5 %    PLATELET 275 785 - 798 K/uL    MPV 10.6 8.9 - 12.9 FL    NRBC 0.0 0  WBC    ABSOLUTE NRBC 0.00 0.00 - 0.01 K/uL    NEUTROPHILS 65 32 - 75 %    LYMPHOCYTES 19 12 - 49 %    MONOCYTES 12 5 - 13 %    EOSINOPHILS 3 0 - 7 %    BASOPHILS 1 0 - 1 %    IMMATURE GRANULOCYTES 0 0.0 - 0.5 %    ABS. NEUTROPHILS 2.5 1.8 - 8.0 K/UL    ABS. LYMPHOCYTES 0.7 (L) 0.8 - 3.5 K/UL    ABS. MONOCYTES 0.5 0.0 - 1.0 K/UL    ABS. EOSINOPHILS 0.1 0.0 - 0.4 K/UL    ABS. BASOPHILS 0.0 0.0 - 0.1 K/UL    ABS. IMM.  GRANS. 0.0 0.00 - 0.04 K/UL    DF AUTOMATED     METABOLIC PANEL, BASIC   Result Value Ref Range    Sodium 141 136 - 145 mmol/L    Potassium 4.2 3.5 - 5.1 mmol/L    Chloride 109 (H) 97 - 108 mmol/L    CO2 25 21 - 32 mmol/L    Anion gap 7 5 - 15 mmol/L    Glucose 84 65 - 100 mg/dL    BUN 9 6 - 20 MG/DL    Creatinine 1.06 0.70 - 1.30 MG/DL    BUN/Creatinine ratio 8 (L) 12 - 20      GFR est AA >60 >60 ml/min/1.73m2    GFR est non-AA >60 >60 ml/min/1.73m2    Calcium 7.7 (L) 8.5 - 10.1 MG/DL       Stroke labs:  HgBA1c    Lab Results   Component Value Date/Time    Hemoglobin A1c 6.8 (H) 03/20/2017 12:21 AM     LDL   Lab Results   Component Value Date/Time    LDL, calculated 69.8 03/20/2017 12:21 AM      Imaging review:  3/19/2017  MRI brain with and without contrast  Small focus of left pontine ischemia/infarct. No bleed or shift    MRA neck with contrast  Bilateral ICA origin stenosis of 60%    Transthoracic echo  Ejection fraction 50-55%    Documentation review:  None    Assessment/Plan:   1. Cerebrovascular accident (CVA), unspecified mechanism ([address]  The patient has had a left pontine infarction. The patient's symptoms are stable. Patient is taking aspirin 81 mg a day. Continue the same    2. Essential hypertension  Patient's blood pressure is well controlled. Goal blood pressure is less than 140/90.    3. Controlled type 2 diabetes mellitus without complication, unspecified whether long term insulin use (HCC)  Patient's HbA1c is 6.8. Goal HbA1c is less than 7. Will repeat HbA1c.    4. Mixed hyperlipidemia  Patient is LDL is under good control. Continue same dosage of statin. Will check lipid panel.    - LIPID PANEL    5. Memory loss  Patient is having mild memory problem in the form of word finding difficulty. I do not suspect that this is TIA. There is a possibility of early stages of dementia versus mild cognitive impairment. We will send the patient for neuropsychological testing.  - REFERRAL TO NEUROPSYCHOLOGY  - VITAMIN B12  - TSH AND FREE T4    6. Numbness and tingling in right hand  The patient does have numbness in his right hand. It is unclear whether it happened with the stroke but developed later on. There is a persistent sensation. Also, the patient is having problems with walking. No symptoms of Parkinson's. I am going to get an EMG/nerve conduction study to check for peripheral neuropathy and right carpal tunnel syndrome. I am going to refer the patient for physical therapy at his assisted living.    - EMG LIMITED; Future    Follow-up in 3 months    Over 40 minutes was spent with the patient of which > 50% of the visit was spent counseling on diagnosis, management, and treatment of the diagnosis.   I did discuss about peripheral neuropathy, carpal tunnel syndrome and the workup        PHQ over the last two weeks 3/15/2018   Little interest or pleasure in doing things Not at all   Feeling down, depressed, irritable, or hopeless Several days   Total Score PHQ 2 1     Primary care to address possible depression if PHQ-9 score is more than 9. ICD-10-CM ICD-9-CM    1. Cerebrovascular accident (CVA), unspecified mechanism ([facility] [address] I63.9 434.91    2. Essential hypertension I10 401.9    3. Controlled type 2 diabetes mellitus without complication, unspecified whether long term insulin use (HCC) E11.9 250.00 HEMOGLOBIN A1C WITH EAG   4. Mixed hyperlipidemia E78.2 272.2 LIPID PANEL   5. Memory loss R41.3 780.93 REFERRAL TO NEUROPSYCHOLOGY      VITAMIN B12      TSH AND FREE T4   6. Numbness and tingling in right hand R20.0 782. 0 EMG LIMITED    R20.2          Joi Jeronimo MD  Neurologist    CC: Roslyn Hernandez MD  Fax: [phone]    This note was created using voice recognition software. Despite editing, there may be syntax errors.

## 2019-02-04 NOTE — PATIENT INSTRUCTIONS
1.  Blood work  2. Neuropsychological evaluation  3. EMG/nerve conduction study for peripheral neuropathy and right carpal tunnel syndrome  4. Physical therapy at assisted living  5. Follow-up in 3 months    Office Policies  · Phone calls/patient messages:  Please allow up to 24 hours for someone in the office to contact you about your call or message. Be mindful your provider may be out of the office or your message may require further review. We encourage you to use [employer] for your messages as this is a faster, more efficient way to communicate with our office  · Medication Refills:  Prescription medications require up to 48 business hours to process. We encourage you to use [employer] for your refills. For controlled medications: Please allow up to 72 business hours to process. Certain medications may require you to  a written prescription at our office. NO narcotic/controlled medications will be prescribed after 4pm Monday through Friday or on weekends  · Form/Paperwork Completion:  Please note there is a $25 fee for all paperwork completed by our providers. We ask that you allow 7-14 business days. Pre-payment is due prior to picking up/faxing the completed form. You may also download your forms to [employer] to have your doctor print off.

## 2019-02-13 ENCOUNTER — TELEPHONE (OUTPATIENT)
Dept: NEUROLOGY | Age: 84
End: 2019-02-13

## 2019-02-21 ENCOUNTER — OFFICE VISIT (OUTPATIENT)
Dept: NEUROLOGY | Age: 84
End: 2019-02-21

## 2019-02-21 VITALS — HEART RATE: 70 BPM | SYSTOLIC BLOOD PRESSURE: 155 MMHG | DIASTOLIC BLOOD PRESSURE: 75 MMHG | OXYGEN SATURATION: 97 %

## 2019-02-21 DIAGNOSIS — G56.21 ULNAR NEUROPATHY AT ELBOW OF RIGHT UPPER EXTREMITY: Primary | ICD-10-CM

## 2019-02-21 DIAGNOSIS — G60.9 IDIOPATHIC PERIPHERAL NEUROPATHY: ICD-10-CM

## 2019-02-21 NOTE — PROCEDURES
Nerve conduction studies of the right upper extremity and bilateral lower extremities were remarkable for low right radial sensory amplitude. The sensory responses in the lower extremities were absent. All the motor conduction velocities in the lower extremities were low. The right peroneal motor amplitude was low. Bilateral tibial motor amplitudes were low as well. There is significant slowing of the right ulnar motor conduction velocity across the elbow segment. Needle electrode examination of the right upper and lower extremities was remarkable for large motor unit potentials and reduced recruitment seen in the distal muscles of the right lower extremity and ulnar innervated muscles in the right upper extremity. Impression: This is an abnormal study. There is electrophysiological evidence of: 1. A length dependent axonal polyneuropathy; and 
2. 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

## 2019-02-21 NOTE — LETTER
[address] [place] [address] Neurology Clinic at [address] [place] [address] [address] [place], [address] [facility] Tel [phone]     Fax [phone] Electrodiagnostic Study Report Test Date:  2019 Patient: Alfonso Zamudio  Age: 80 [facility]#: [id number]  Ref Phys: Damari Celeste M.D. Sex: Male  Physician: Josefina Phipps MD  
Height: ' \"     
: 9/3/1931  DOS: 2019 CHIEF COMPLAINTS: 
Patient is a 80year-old male who presents with 
 
EMG & NCV Findings: 
 
Nerve Conduction Studies Anti Sensory Summary Table Site NR Peak (ms) Norm Peak (ms) P-T Amp (µV) Norm O-P Amp Site1 Site2 Dist (cm) Right Radial Anti Sensory (Base 1st Digit)  32.4°C Wrist    2.8 <2.8 8.6 >11 Wrist Base 1st Digit 10.0 Right Sup Fibular Anti Sensory (Lat ankle)  30.3°C Lower leg NR  <4.6  >4 Lower leg Lat ankle 10.0 Left Sural Anti Sensory (Lat Mall)  30.6°C Calf NR  <4.5  >4.0 Calf Lat Mall 14.0 Right Sural Anti Sensory (Lat Mall)  30.1°C Calf NR  <4.5  >4.0 Calf Lat Mall 14.0 Motor Summary Table Site NR Onset (ms) Norm Onset (ms) O-P Amp (mV) Norm O-P Amp P-T Amp (mV) Site1 Site2 Dist (cm) Star (m/s) Left Fibular Motor (Ext Dig Brev)  31.3°C Ankle    5.4 <6.5 2.3 >1.1 3.7 Ankle Ext Dig Brev 8.0 B Fib    12.9  2.4  4.0 B Fib Ankle 28.0 37 Poplt    15.9  1.6  3.4 Poplt B Fib 10.0 33 Right Fibular Motor (Ext Dig Brev)  31.1°C Ankle    4.6 <6.5 0.2 >1.1 0.3 Ankle Ext Dig Brev 8.0 B Fib    12.4  0.2  0.2 B Fib Ankle 30.0 38 Poplt    14.8  0.1  0.2 Poplt B Fib 10.0 42 Right Median Motor (Abd Poll Brev)  33°C Wrist    4.7 <4.5 2.8 >4.1 5.1 Wrist Abd Poll Brev 8.0 Elbow    9.9  2.1  3.9 Elbow Wrist 26.5 51 Left Tibial Motor (Abd Brooks Brev)  30.8°C Ankle    6.1 <6.1 2.4 >1.1 4.7 Ankle Abd Brooks Brev 8.0 Knee    16.9  3.6  5.6 Knee Ankle 41.5 38 Right Tibial Motor (Abd Brooks Brev)  30.6°C Ankle    5.4 <6.1 2.4 >1.1 4.0 Ankle Abd Brooks Brev 8.0 Knee    16.5  2.5  4.8 Knee Ankle 39.5 36 Right Ulnar Motor Run #1 (Abd Dig Minimi)  33.3°C Wrist    4.1 <3.1 9.3 >7.0 12.3 Wrist Abd Dig Minimi 8.0 B Elbow    8.0  7.3  9.9 B Elbow Wrist 20.5 53 A Elbow    10.3  6.3  8.9 A Elbow B Elbow 10.0 43 Right Ulnar Motor Run #2 (1st DI)  32.7°C Wrist    4.9 <3.1 9.5 >7.0 14.0 Wrist 1st DI 8.0 B Elbow    8.7  6.5  9.7 B Elbow Wrist 20.5 54 A Elbow    10.5  8.1  12.0 A Elbow B Elbow 10.0 56 Comparison Summary Table Site NR Peak (ms) P-T Amp (µV) Site1 Site2 Dist (cm) Delta-0 (ms) Right Median/Ulnar Palm Comparison (Wrist)  31.9°C Median Palm    2.4 38.4 Median Palm Ulnar Palm 8.0 0.4 Ulnar Palm    2.7 10.0 F Wave Studies NR F-Lat (ms) Lat Norm (ms) L-R F-Lat (ms) L-R Lat Norm Right Tibial (Mrkrs) (Abd Hallucis)  30.4°C  
   59.52 <56  <5.7 Right Ulnar (Mrkrs) (Abd Dig Min)  32.2°C  
   30.82 <32  <2.5 EMG Side Muscle Nerve Root Ins Act Fibs Psw Recrt Duration Amp Poly Comment Right 1stDorInt Ulnar C8-T1 Nml Nml Nml Reduced Incr Incr Nml Right ABD Dig Min Ulnar C8-T1 Nml Nml Nml Reduced Incr Incr Nml Right AntTibialis Dp Br Peron L4-5 Nml Nml Nml Reduced Incr Incr Nml Right Triceps Radial C6-7-8 Nml Nml Nml Nml Nml Nml Nml Right Deltoid Axillary C5-6 Nml Nml Nml Nml Nml Nml Nml Right PronatorTeres Median C6-7 Nml Nml Nml Nml Nml Nml Nml Right Biceps Musculocut C5-6 Nml Nml Nml Nml Nml Nml Nml Right MedGastroc Tibial S1-2 Nml Nml Nml Reduced Incr Nml Nml Right VastusLat Femoral L2-4 Nml Nml Nml Nml Nml Nml Nml Waveforms: 
    
 
    
 
    
 
    
 
   
 
Summary: 
Nerve conduction studies of the right upper extremity and bilateral lower extremities were remarkable for low right radial sensory amplitude. The sensory responses in the lower extremities were absent. All the motor conduction velocities in the lower extremities were low.   The right peroneal motor amplitude was low. Bilateral tibial motor amplitudes were low as well. There is significant slowing of the right ulnar motor conduction velocity across the elbow segment. Needle electrode examination of the right upper and lower extremities was remarkable for large motor unit potentials and reduced recruitment seen in the distal muscles of the right lower extremity and ulnar innervated muscles in the right upper extremity. Impression: This is an abnormal study. There is electrophysiological evidence of: 1. A length dependent axonal polyneuropathy; and 
2. 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 
__________________ Yolanda Espinal M.D.

## 2019-02-21 NOTE — PROGRESS NOTES
Patient does have a length dependent axonal polyneuropathy and a right ulnar neuropathy across the elbow segment. Prescribed right elbow splint.

## 2019-02-21 NOTE — PROCEDURES
[facility] at [address] [facility] [address] [facility], [address] [facility]  Tel [phone]     Fax [phone]  Electrodiagnostic Study Report  Test Date:  2019    Patient: Sanjana Grey  Age: 80   [facility]#: [id number]  Ref Phys: Janee Villagomez M.D.    Sex: Male  Physician: Truddie Hodgkin, MD   Height: ' \"      : 9/3/1931  DOS: 2019       CHIEF COMPLAINTS:  Patient is a 80year-old male who presents with    EMG & NCV Findings:    Nerve Conduction Studies  Anti Sensory Summary Table     Site NR Peak (ms) Norm Peak (ms) P-T Amp (µV) Norm O-P Amp Site1 Site2 Dist (cm)   Right Radial Anti Sensory (Base 1st Digit)  32.4°C   Wrist    2.8 <2.8 8.6 >11 Wrist Base 1st Digit 10.0   Right Sup Fibular Anti Sensory (Lat ankle)  30.3°C   Lower leg NR  <4.6  >4 Lower leg Lat ankle 10.0   Left Sural Anti Sensory (Lat Mall)  30.6°C   Calf NR  <4.5  >4.0 Calf Lat Mall 14.0   Right Sural Anti Sensory (Lat Mall)  30.1°C   Calf NR  <4.5  >4.0 Calf Lat Mall 14.0     Motor Summary Table     Site NR Onset (ms) Norm Onset (ms) O-P Amp (mV) Norm O-P Amp P-T Amp (mV) Site1 Site2 Dist (cm) Star (m/s)   Left Fibular Motor (Ext Dig Brev)  31.3°C   Ankle    5.4 <6.5 2.3 >1.1 3.7 Ankle Ext Dig Brev 8.0    B Fib    12.9  2.4  4.0 B Fib Ankle 28.0 37   Poplt    15.9  1.6  3.4 Poplt B Fib 10.0 33   Right Fibular Motor (Ext Dig Brev)  31.1°C   Ankle    4.6 <6.5 0.2 >1.1 0.3 Ankle Ext Dig Brev 8.0    B Fib    12.4  0.2  0.2 B Fib Ankle 30.0 38   Poplt    14.8  0.1  0.2 Poplt B Fib 10.0 42   Right Median Motor (Abd Poll Brev)  33°C   Wrist    4.7 <4.5 2.8 >4.1 5.1 Wrist Abd Poll Brev 8.0    Elbow    9.9  2.1  3.9 Elbow Wrist 26.5 51   Left Tibial Motor (Abd Brooks Brev)  30.8°C   Ankle    6.1 <6.1 2.4 >1.1 4.7 Ankle Abd Brooks Brev 8.0    Knee    16.9  3.6  5.6 Knee Ankle 41.5 38   Right Tibial Motor (Abd Brooks Brev)  30.6°C   Ankle    5.4 <6.1 2.4 >1.1 4.0 Ankle Abd Brooks Brev 8.0    Knee    16.5  2.5  4.8 Knee Ankle 39.5 36   Right Ulnar Motor Run #1 (Abd Dig Minimi)  33.3°C   Wrist    4.1 <3.1 9.3 >7.0 12.3 Wrist Abd Dig Minimi 8.0    B Elbow    8.0  7.3  9.9 B Elbow Wrist 20.5 53   A Elbow    10.3  6.3  8.9 A Elbow B Elbow 10.0 43   Right Ulnar Motor Run #2 (1st DI)  32.7°C   Wrist    4.9 <3.1 9.5 >7.0 14.0 Wrist 1st DI 8.0    B Elbow    8.7  6.5  9.7 B Elbow Wrist 20.5 54   A Elbow    10.5  8.1  12.0 A Elbow B Elbow 10.0 56     Comparison Summary Table     Site NR Peak (ms) P-T Amp (µV) Site1 Site2 Dist (cm) Delta-0 (ms)   Right Median/Ulnar Palm Comparison (Wrist)  31.9°C   Median Palm    2.4 38.4 Median Palm Ulnar Palm 8.0 0.4   Ulnar Palm    2.7 10.0         F Wave Studies     NR F-Lat (ms) Lat Norm (ms) L-R F-Lat (ms) L-R Lat Norm   Right Tibial (Mrkrs) (Abd Hallucis)  30.4°C      59.52 <56  <5.7   Right Ulnar (Mrkrs) (Abd Dig Min)  32.2°C      30.82 <32  <2.5     EMG     Side Muscle Nerve Root Ins Act Fibs Psw Recrt Duration Amp Poly Comment   Right 1stDorInt Ulnar C8-T1 Nml Nml Nml Reduced Incr Incr Nml    Right ABD Dig Min Ulnar C8-T1 Nml Nml Nml Reduced Incr Incr Nml    Right AntTibialis Dp Br Peron L4-5 Nml Nml Nml Reduced Incr Incr Nml    Right Triceps Radial C6-7-8 Nml Nml Nml Nml Nml Nml Nml    Right Deltoid Axillary C5-6 Nml Nml Nml Nml Nml Nml Nml    Right PronatorTeres Median C6-7 Nml Nml Nml Nml Nml Nml Nml    Right Biceps Musculocut C5-6 Nml Nml Nml Nml Nml Nml Nml    Right MedGastroc Tibial S1-2 Nml Nml Nml Reduced Incr Nml Nml    Right VastusLat Femoral L2-4 Nml Nml Nml Nml Nml Nml Nml        Waveforms:                                    Summary:  Nerve conduction studies of the right upper extremity and bilateral lower extremities were remarkable for low right radial sensory amplitude. The sensory responses in the lower extremities were absent. All the motor conduction velocities in the lower extremities were low. The right peroneal motor amplitude was low.   Bilateral tibial motor amplitudes were low as well. There is significant slowing of the right ulnar motor conduction velocity across the elbow segment. Needle electrode examination of the right upper and lower extremities was remarkable for large motor unit potentials and reduced recruitment seen in the distal muscles of the right lower extremity and ulnar innervated muscles in the right upper extremity. Impression: This is an abnormal study. There is electrophysiological evidence of:  1. A length dependent axonal polyneuropathy; and  2.   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  __________________  Juhi Douglas M.D.

## 2019-02-22 LAB
CHOLEST SERPL-MCNC: 135 MG/DL (ref 100–199)
EST. AVERAGE GLUCOSE BLD GHB EST-MCNC: 134 MG/DL
HBA1C MFR BLD: 6.3 % (ref 4.8–5.6)
HDLC SERPL-MCNC: 55 MG/DL
LDLC SERPL CALC-MCNC: 68 MG/DL (ref 0–99)
T4 FREE SERPL-MCNC: 1.11 NG/DL (ref 0.82–1.77)
TRIGL SERPL-MCNC: 59 MG/DL (ref 0–149)
TSH SERPL DL<=0.005 MIU/L-ACNC: 2.64 UIU/ML (ref 0.45–4.5)
VIT B12 SERPL-MCNC: 153 PG/ML (ref 232–1245)
VLDLC SERPL CALC-MCNC: 12 MG/DL (ref 5–40)

## 2019-02-25 DIAGNOSIS — E53.8 B12 DEFICIENCY: Primary | ICD-10-CM

## 2019-02-25 RX ORDER — LANOLIN ALCOHOL/MO/W.PET/CERES
1000 CREAM (GRAM) TOPICAL DAILY
Qty: 30 TAB | Refills: 2 | Status: SHIPPED | OUTPATIENT
Start: 2019-02-25 | End: 2019-05-26

## 2019-02-25 NOTE — PROGRESS NOTES
Patient's vitamin B12 is low. Will prescribe vitamin B12 1000 mcg every day.   Please inform the patient

## 2020-06-01 ENCOUNTER — APPOINTMENT (OUTPATIENT)
Dept: GENERAL RADIOLOGY | Age: 85
End: 2020-06-01
Attending: EMERGENCY MEDICINE
Payer: MEDICARE

## 2020-06-01 ENCOUNTER — HOSPITAL ENCOUNTER (EMERGENCY)
Age: 85
Discharge: HOME | End: 2020-06-01
Attending: EMERGENCY MEDICINE
Payer: MEDICARE

## 2020-06-01 VITALS
BODY MASS INDEX: 25.71 KG/M2 | DIASTOLIC BLOOD PRESSURE: 72 MMHG | OXYGEN SATURATION: 98 % | TEMPERATURE: 97.9 F | HEART RATE: 72 BPM | WEIGHT: 160 LBS | HEIGHT: 66 IN | SYSTOLIC BLOOD PRESSURE: 143 MMHG | RESPIRATION RATE: 16 BRPM
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
## 2020-06-01 DIAGNOSIS — S01.511A LIP LACERATION, INITIAL ENCOUNTER: Primary | ICD-10-CM

## 2020-06-01 DIAGNOSIS — M25.559 HIP PAIN: ICD-10-CM

## 2020-06-01 PROCEDURE — 74011250636 HC RX REV CODE- 250/636: Performed by: EMERGENCY MEDICINE

## 2020-06-01 PROCEDURE — 73502 X-RAY EXAM HIP UNI 2-3 VIEWS: CPT

## 2020-06-01 PROCEDURE — 75810000293 HC SIMP/SUPERF WND  RPR

## 2020-06-01 PROCEDURE — 99285 EMERGENCY DEPT VISIT HI MDM: CPT

## 2020-06-01 PROCEDURE — 90471 IMMUNIZATION ADMIN: CPT

## 2020-06-01 PROCEDURE — 74011250637 HC RX REV CODE- 250/637: Performed by: EMERGENCY MEDICINE

## 2020-06-01 PROCEDURE — 90715 TDAP VACCINE 7 YRS/> IM: CPT | Performed by: EMERGENCY MEDICINE

## 2020-06-01 RX ORDER — AMOXICILLIN 250 MG/1
500 CAPSULE ORAL
Status: COMPLETED | OUTPATIENT
Start: 2020-06-01 | End: 2020-06-01

## 2020-06-01 RX ORDER — AMOXICILLIN 875 MG/1
875 TABLET, FILM COATED ORAL 2 TIMES DAILY
Qty: 14 TAB | Refills: 0 | Status: SHIPPED | OUTPATIENT
Start: 2020-06-01 | End: 2020-06-08

## 2020-06-01 RX ORDER — AMOXICILLIN 250 MG/1
250 CAPSULE ORAL
Status: COMPLETED | OUTPATIENT
Start: 2020-06-01 | End: 2020-06-01

## 2020-06-01 RX ORDER — AMOXICILLIN 875 MG/1
875 TABLET, FILM COATED ORAL 2 TIMES DAILY
Qty: 14 TAB | Refills: 0 | Status: SHIPPED | OUTPATIENT
Start: 2020-06-01 | End: 2020-06-01

## 2020-06-01 RX ADMIN — AMOXICILLIN 500 MG: 250 CAPSULE ORAL at 19:32

## 2020-06-01 RX ADMIN — AMOXICILLIN 250 MG: 250 CAPSULE ORAL at 20:06

## 2020-06-01 RX ADMIN — TETANUS TOXOID, REDUCED DIPHTHERIA TOXOID AND ACELLULAR PERTUSSIS VACCINE, ADSORBED 0.5 ML: 5; 2.5; 8; 8; 2.5 SUSPENSION INTRAMUSCULAR at 19:32

## 2020-06-02 NOTE — ED NOTES
Discharge instructions reviewed with pt's POA. Discharge instructions given to pt per Dr. Steve Bullard. Pt's POA able to return/verbalize discharge instructions. Copy of discharge instructions given. RX given to pt's POA. Pt condition stable, respiratory status within normal limits, neuro status intact. Pt out of ER via [facility], accompanied by POA.

## 2020-06-02 NOTE — ED PROVIDER NOTES
EMERGENCY DEPARTMENT HISTORY AND PHYSICAL EXAM      Date: 6/1/2020  Patient Name: Calista Salguero    History of Presenting Illness     Chief Complaint   Patient presents with    Fall     ems reports pt with multiple glf today due to losing his balance. denies loc. stroke x3-4 years ago with right sided deficit.  Laceration     abrasion to upper lip post glf today. History Provided By: Patient    HPI: Calista Salguero, 80 y.o. male presents to the ED with cc of GLF resulting in lip laceration. Pt had mechanical fall today resulting to injury of the upper lip. He denies hitting his head, and there was no loss of consciousness. He denies any pain or loose teeth. Pt to ED because he could not get bleeding to stop. He denies any neck or back pain. He denied any other injuries, except for mild hip discomfort on right only with palpation. He has not been recently sick. He denies fever, chills, headache, CP or SOA. Pt with hx of prior CVA and has had balance issues in the past.     There are no other complaints, changes, or physical findings at this time. PCP: Dali Alex MD    No current facility-administered medications on file prior to encounter. Current Outpatient Medications on File Prior to Encounter   Medication Sig Dispense Refill    Arm Brace misc Right ulnar splint. Use it every night. 1 Each 0    amLODIPine (NORVASC) 5 mg tablet Take 1 Tab by mouth daily. 30 Tab 2    cholecalciferol (VITAMIN D3) 1,000 unit cap Take 1,000 Units by mouth every evening.  aspirin delayed-release 81 mg tablet Take 81 mg by mouth every evening.  simvastatin (ZOCOR) 20 mg tablet Take 20 mg by mouth every evening.          Past History     Past Medical History:  Past Medical History:   Diagnosis Date    GERD (gastroesophageal reflux disease)     High cholesterol     Hypertension     Stroke ([facility] [address] 03/2017    numbness in fingertips on R hand; legs;stammering       Past Surgical History:  Past Surgical History:   Procedure Laterality Date    HX CHOLECYSTECTOMY      HX HEENT Bilateral     cataract    HX HERNIA REPAIR  02/21/2018     Robot-assisted laparoscopic repair of paraesophageal hernia with gastric volvulus. Family History:  Family History   Problem Relation Age of Onset    No Known Problems Sister     No Known Problems Brother        Social History:  Social History     Tobacco Use    Smoking status: Former Smoker    Smokeless tobacco: Never Used   Substance Use Topics    Alcohol use: Not on file    Drug use: No       Allergies:  No Known Allergies      Review of Systems   Review of Systems   Constitutional: Negative. Negative for appetite change, chills, fatigue and fever. HENT: Positive for mouth sores (Left side upper lip laceration). Negative for congestion, rhinorrhea, sinus pressure and sore throat. Eyes: Negative. Respiratory: Negative. Negative for cough, choking, chest tightness, shortness of breath and wheezing. Cardiovascular: Negative. Negative for chest pain, palpitations and leg swelling. Gastrointestinal: Negative for abdominal pain, constipation, diarrhea, nausea and vomiting. Endocrine: Negative. Genitourinary: Negative. Negative for difficulty urinating, dysuria, flank pain and urgency. Musculoskeletal: Positive for arthralgias (Right hip). Skin: Positive for wound. Neurological: Negative. Negative for dizziness, speech difficulty, weakness, light-headedness, numbness and headaches. Psychiatric/Behavioral: Negative. All other systems reviewed and are negative. Physical Exam   Physical Exam  Vitals signs and nursing note reviewed. Constitutional:       General: He is not in acute distress. Appearance: He is well-developed. He is not diaphoretic. Comments: Elderly male, no acute distress   HENT:      Head: Normocephalic and atraumatic.       Nose:      Comments: No epistaxis     Mouth/Throat:      Pharynx: No oropharyngeal exudate. Comments: Left upper lip laceration, mild ooze, no underlying loose teeth  Eyes:      Extraocular Movements: Extraocular movements intact. Conjunctiva/sclera: Conjunctivae normal.      Pupils: Pupils are equal, round, and reactive to light. Neck:      Musculoskeletal: Normal range of motion and neck supple. Vascular: No JVD. Trachea: No tracheal deviation. Cardiovascular:      Rate and Rhythm: Normal rate and regular rhythm. Heart sounds: Normal heart sounds. No murmur. Pulmonary:      Effort: Pulmonary effort is normal. No respiratory distress. Breath sounds: Normal breath sounds. No stridor. No wheezing or rales. Chest:      Chest wall: No tenderness. Abdominal:      General: There is no distension. Palpations: Abdomen is soft. Tenderness: There is no abdominal tenderness. There is no guarding or rebound. Musculoskeletal: Normal range of motion. Comments: Mild ttp to right hip, no ttp to C/T/L spine, no ttp to danielle clavicles, bile UE, left hip and both legs, distal PMS intact   Skin:     General: Skin is warm and dry. Capillary Refill: Capillary refill takes less than 2 seconds. Neurological:      Mental Status: He is alert and oriented to person, place, and time. Cranial Nerves: No cranial nerve deficit. Comments: No gross motor or sensory deficits    Psychiatric:         Behavior: Behavior normal.         Diagnostic Study Results     Labs -  None    Radiologic Studies -   XR HIP RT W OR WO PELV 2-3 VWS   Final Result   IMPRESSION:    No acute abnormality.         CT Results  (Last 48 hours)    None        CXR Results  (Last 48 hours)    None        Wound Repair  Date/Time: 6/1/2020 8:54 PM  Performed by: [id number] [employer] provider: Dr. Pricilla Mustafa  Preparation: skin prepped with Mariah-Clens  Pre-procedure re-eval: Immediately prior to the procedure, the patient was reevaluated and found suitable for the planned procedure and any planned medications. Time out: Immediately prior to the procedure a time out was called to verify the correct patient, procedure, equipment, staff and marking as appropriate. .  Location details: face  Wound length:2.5 cm or less  Skin closure: glue  Approximation: close  Patient tolerance: Patient tolerated the procedure well with no immediate complications  My total time at bedside, performing this procedure was 1-15 minutes. Procedure performed by Neil Maya PA-C    Medical Decision Making   I am the first provider for this patient. I reviewed the vital signs, available nursing notes, past medical history, past surgical history, family history and social history. Vital Signs-Reviewed the patient's vital signs. Patient Vitals for the past 12 hrs:   Temp Pulse Resp BP SpO2   06/01/20 2009 97.9 °F (36.6 °C) 72 16 143/72 98 %   06/01/20 1830  64 12 153/57 100 %   06/01/20 1730  65 16 142/59 100 %   06/01/20 1659 97.6 °F (36.4 °C) 60 18 135/82 100 %         Records Reviewed: Nursing Notes, Old Medical Records, Ambulance Run Sheet, Previous Radiology Studies and Previous Laboratory Studies    Provider Notes (Medical Decision Making):   DDx- Lip laceration, hip contusion, hip fx, hip strain    ED Course:   Initial assessment performed. The patients presenting problems have been discussed, and they are in agreement with the care plan formulated and outlined with them. I have encouraged them to ask questions as they arise throughout their visit. Lip laceration goes through upper lip, will start pt on PO Ab given high risk of infection. Disposition:  DC back to assisted living facility    DISCHARGE PLAN:  1. Discharge Medication List as of 6/1/2020  8:57 PM      START taking these medications    Details   amoxicillin (AMOXIL) 875 mg tablet Take 1 Tab by mouth two (2) times a day for 7 days. , Normal, Disp-14 Tab, R-0         CONTINUE these medications which have NOT CHANGED    Details   Arm Brace misc Right ulnar splint. Use it every night., Print, Disp-1 Each, R-0      amLODIPine (NORVASC) 5 mg tablet Take 1 Tab by mouth daily. , Print, Disp-30 Tab, R-2      cholecalciferol (VITAMIN D3) 1,000 unit cap Take 1,000 Units by mouth every evening., Historical Med      aspirin delayed-release 81 mg tablet Take 81 mg by mouth every evening., Historical Med      simvastatin (ZOCOR) 20 mg tablet Take 20 mg by mouth every evening., Historical Med           2. Follow-up Information     Follow up With Specialties Details Why [facility] [address], [address], [place] [address]  [phone]          3. Return to ED if worse     Diagnosis     Clinical Impression:   1. Lip laceration, initial encounter    2. Hip pain    3. Fall, initial encounter        Attestations:    Shira Petersen, DO    Please note that this dictation was completed with [employer], the computer voice recognition software. Quite often unanticipated grammatical, syntax, homophones, and other interpretive errors are inadvertently transcribed by the computer software. Please disregard these errors. Please excuse any errors that have escaped final proofreading. Thank you.

## 2020-12-24 ENCOUNTER — APPOINTMENT (OUTPATIENT)
Dept: CT IMAGING | Age: 85
DRG: 194 | End: 2020-12-24
Attending: EMERGENCY MEDICINE
Payer: MEDICARE

## 2020-12-24 ENCOUNTER — APPOINTMENT (OUTPATIENT)
Dept: GENERAL RADIOLOGY | Age: 85
DRG: 194 | End: 2020-12-24
Attending: EMERGENCY MEDICINE
Payer: MEDICARE

## 2020-12-24 ENCOUNTER — HOSPITAL ENCOUNTER (INPATIENT)
Age: 85
LOS: 11 days | Discharge: HOME HEALTH | DRG: 194 | End: 2021-01-04
Attending: EMERGENCY MEDICINE | Admitting: INTERNAL MEDICINE
Payer: MEDICARE

## 2020-12-24 DIAGNOSIS — R79.89 ELEVATED BRAIN NATRIURETIC PEPTIDE (BNP) LEVEL: ICD-10-CM

## 2020-12-24 DIAGNOSIS — N17.9 AKI (ACUTE KIDNEY INJURY) (HCC): Primary | ICD-10-CM

## 2020-12-24 PROBLEM — J15.9 BACTERIAL PNEUMONIA: Status: ACTIVE | Noted: 2020-12-24

## 2020-12-24 LAB
ALBUMIN SERPL-MCNC: 3.2 G/DL (ref 3.5–5)
ALBUMIN/GLOB SERPL: 0.7 {RATIO} (ref 1.1–2.2)
ALP SERPL-CCNC: 100 U/L (ref 45–117)
ALT SERPL-CCNC: 26 U/L (ref 12–78)
ANION GAP SERPL CALC-SCNC: 2 MMOL/L (ref 5–15)
AST SERPL-CCNC: 29 U/L (ref 15–37)
BASOPHILS # BLD: 0 K/UL (ref 0–0.1)
BASOPHILS NFR BLD: 0 % (ref 0–1)
BILIRUB SERPL-MCNC: 0.3 MG/DL (ref 0.2–1)
BNP SERPL-MCNC: 2206 PG/ML
BUN SERPL-MCNC: 29 MG/DL (ref 6–20)
BUN/CREAT SERPL: 19 (ref 12–20)
CALCIUM SERPL-MCNC: 8.7 MG/DL (ref 8.5–10.1)
CHLORIDE SERPL-SCNC: 107 MMOL/L (ref 97–108)
CO2 SERPL-SCNC: 30 MMOL/L (ref 21–32)
COMMENT, HOLDF: NORMAL
CREAT SERPL-MCNC: 1.5 MG/DL (ref 0.7–1.3)
D DIMER PPP FEU-MCNC: 7.17 MG/L FEU (ref 0–0.65)
DIFFERENTIAL METHOD BLD: ABNORMAL
EOSINOPHIL # BLD: 0 K/UL (ref 0–0.4)
EOSINOPHIL NFR BLD: 0 % (ref 0–7)
ERYTHROCYTE [DISTWIDTH] IN BLOOD BY AUTOMATED COUNT: 14.3 % (ref 11.5–14.5)
GLOBULIN SER CALC-MCNC: 4.8 G/DL (ref 2–4)
GLUCOSE SERPL-MCNC: 104 MG/DL (ref 65–100)
HCT VFR BLD AUTO: 36.6 % (ref 36.6–50.3)
HGB BLD-MCNC: 11.6 G/DL (ref 12.1–17)
IMM GRANULOCYTES # BLD AUTO: 0 K/UL (ref 0–0.04)
IMM GRANULOCYTES NFR BLD AUTO: 0 % (ref 0–0.5)
LYMPHOCYTES # BLD: 0.9 K/UL (ref 0.8–3.5)
LYMPHOCYTES NFR BLD: 22 % (ref 12–49)
MCH RBC QN AUTO: 29.1 PG (ref 26–34)
MCHC RBC AUTO-ENTMCNC: 31.7 G/DL (ref 30–36.5)
MCV RBC AUTO: 92 FL (ref 80–99)
MONOCYTES # BLD: 0.6 K/UL (ref 0–1)
MONOCYTES NFR BLD: 15 % (ref 5–13)
NEUTS SEG # BLD: 2.7 K/UL (ref 1.8–8)
NEUTS SEG NFR BLD: 63 % (ref 32–75)
NRBC # BLD: 0 K/UL (ref 0–0.01)
NRBC BLD-RTO: 0 PER 100 WBC
PLATELET # BLD AUTO: 106 K/UL (ref 150–400)
PMV BLD AUTO: 11.7 FL (ref 8.9–12.9)
POTASSIUM SERPL-SCNC: 4 MMOL/L (ref 3.5–5.1)
PROT SERPL-MCNC: 8 G/DL (ref 6.4–8.2)
RBC # BLD AUTO: 3.98 M/UL (ref 4.1–5.7)
SAMPLES BEING HELD,HOLD: NORMAL
SODIUM SERPL-SCNC: 139 MMOL/L (ref 136–145)
TROPONIN I SERPL-MCNC: <0.05 NG/ML
TROPONIN I SERPL-MCNC: <0.05 NG/ML
WBC # BLD AUTO: 4.2 K/UL (ref 4.1–11.1)

## 2020-12-24 PROCEDURE — 93005 ELECTROCARDIOGRAM TRACING: CPT

## 2020-12-24 PROCEDURE — 96360 HYDRATION IV INFUSION INIT: CPT

## 2020-12-24 PROCEDURE — 36415 COLL VENOUS BLD VENIPUNCTURE: CPT

## 2020-12-24 PROCEDURE — 80053 COMPREHEN METABOLIC PANEL: CPT

## 2020-12-24 PROCEDURE — 65660000000 HC RM CCU STEPDOWN

## 2020-12-24 PROCEDURE — 84484 ASSAY OF TROPONIN QUANT: CPT

## 2020-12-24 PROCEDURE — 71275 CT ANGIOGRAPHY CHEST: CPT

## 2020-12-24 PROCEDURE — 96361 HYDRATE IV INFUSION ADD-ON: CPT

## 2020-12-24 PROCEDURE — 74011000636 HC RX REV CODE- 636: Performed by: RADIOLOGY

## 2020-12-24 PROCEDURE — 83880 ASSAY OF NATRIURETIC PEPTIDE: CPT

## 2020-12-24 PROCEDURE — 85379 FIBRIN DEGRADATION QUANT: CPT

## 2020-12-24 PROCEDURE — 65270000029 HC RM PRIVATE

## 2020-12-24 PROCEDURE — 94761 N-INVAS EAR/PLS OXIMETRY MLT: CPT

## 2020-12-24 PROCEDURE — 74011250636 HC RX REV CODE- 250/636: Performed by: INTERNAL MEDICINE

## 2020-12-24 PROCEDURE — 87635 SARS-COV-2 COVID-19 AMP PRB: CPT

## 2020-12-24 PROCEDURE — 74011250636 HC RX REV CODE- 250/636: Performed by: EMERGENCY MEDICINE

## 2020-12-24 PROCEDURE — 99285 EMERGENCY DEPT VISIT HI MDM: CPT

## 2020-12-24 PROCEDURE — 71046 X-RAY EXAM CHEST 2 VIEWS: CPT

## 2020-12-24 PROCEDURE — 74011000250 HC RX REV CODE- 250: Performed by: INTERNAL MEDICINE

## 2020-12-24 PROCEDURE — 85025 COMPLETE CBC W/AUTO DIFF WBC: CPT

## 2020-12-24 RX ORDER — SODIUM CHLORIDE 0.9 % (FLUSH) 0.9 %
10 SYRINGE (ML) INJECTION
Status: DISPENSED | OUTPATIENT
Start: 2020-12-24 | End: 2020-12-25

## 2020-12-24 RX ORDER — POLYETHYLENE GLYCOL 3350 17 G/17G
17 POWDER, FOR SOLUTION ORAL DAILY PRN
Status: DISCONTINUED | OUTPATIENT
Start: 2020-12-24 | End: 2021-01-04 | Stop reason: HOSPADM

## 2020-12-24 RX ORDER — SODIUM CHLORIDE 0.9 % (FLUSH) 0.9 %
5-40 SYRINGE (ML) INJECTION AS NEEDED
Status: DISCONTINUED | OUTPATIENT
Start: 2020-12-24 | End: 2021-01-04 | Stop reason: HOSPADM

## 2020-12-24 RX ORDER — PROMETHAZINE HYDROCHLORIDE 25 MG/1
12.5 TABLET ORAL
Status: DISCONTINUED | OUTPATIENT
Start: 2020-12-24 | End: 2021-01-04 | Stop reason: HOSPADM

## 2020-12-24 RX ORDER — ATORVASTATIN CALCIUM 10 MG/1
10 TABLET, FILM COATED ORAL
Status: DISCONTINUED | OUTPATIENT
Start: 2020-12-24 | End: 2021-01-04 | Stop reason: HOSPADM

## 2020-12-24 RX ORDER — MAGNESIUM SULFATE 100 %
4 CRYSTALS MISCELLANEOUS AS NEEDED
Status: DISCONTINUED | OUTPATIENT
Start: 2020-12-24 | End: 2021-01-04 | Stop reason: HOSPADM

## 2020-12-24 RX ORDER — ASPIRIN 81 MG/1
81 TABLET ORAL EVERY EVENING
Status: DISCONTINUED | OUTPATIENT
Start: 2020-12-25 | End: 2021-01-04 | Stop reason: HOSPADM

## 2020-12-24 RX ORDER — AMLODIPINE BESYLATE 5 MG/1
5 TABLET ORAL DAILY
Status: DISCONTINUED | OUTPATIENT
Start: 2020-12-25 | End: 2021-01-04 | Stop reason: HOSPADM

## 2020-12-24 RX ORDER — ACETAMINOPHEN 325 MG/1
650 TABLET ORAL
Status: DISCONTINUED | OUTPATIENT
Start: 2020-12-24 | End: 2021-01-04 | Stop reason: HOSPADM

## 2020-12-24 RX ORDER — HEPARIN SODIUM 5000 [USP'U]/ML
5000 INJECTION, SOLUTION INTRAVENOUS; SUBCUTANEOUS EVERY 8 HOURS
Status: DISCONTINUED | OUTPATIENT
Start: 2020-12-25 | End: 2021-01-04 | Stop reason: HOSPADM

## 2020-12-24 RX ORDER — SODIUM CHLORIDE, SODIUM LACTATE, POTASSIUM CHLORIDE, CALCIUM CHLORIDE 600; 310; 30; 20 MG/100ML; MG/100ML; MG/100ML; MG/100ML
75 INJECTION, SOLUTION INTRAVENOUS CONTINUOUS
Status: DISCONTINUED | OUTPATIENT
Start: 2020-12-24 | End: 2021-01-01

## 2020-12-24 RX ORDER — DEXTROSE 50 % IN WATER (D50W) INTRAVENOUS SYRINGE
12.5-25 AS NEEDED
Status: DISCONTINUED | OUTPATIENT
Start: 2020-12-24 | End: 2021-01-04 | Stop reason: HOSPADM

## 2020-12-24 RX ORDER — SODIUM CHLORIDE 0.9 % (FLUSH) 0.9 %
5-40 SYRINGE (ML) INJECTION EVERY 8 HOURS
Status: DISCONTINUED | OUTPATIENT
Start: 2020-12-24 | End: 2021-01-04 | Stop reason: HOSPADM

## 2020-12-24 RX ORDER — ACETAMINOPHEN 650 MG/1
650 SUPPOSITORY RECTAL
Status: DISCONTINUED | OUTPATIENT
Start: 2020-12-24 | End: 2021-01-04 | Stop reason: HOSPADM

## 2020-12-24 RX ORDER — INSULIN LISPRO 100 [IU]/ML
INJECTION, SOLUTION INTRAVENOUS; SUBCUTANEOUS
Status: DISCONTINUED | OUTPATIENT
Start: 2020-12-25 | End: 2021-01-04 | Stop reason: HOSPADM

## 2020-12-24 RX ORDER — ONDANSETRON 2 MG/ML
4 INJECTION INTRAMUSCULAR; INTRAVENOUS
Status: DISCONTINUED | OUTPATIENT
Start: 2020-12-24 | End: 2021-01-04 | Stop reason: HOSPADM

## 2020-12-24 RX ADMIN — SODIUM CHLORIDE 1000 ML: 9 INJECTION, SOLUTION INTRAVENOUS at 19:24

## 2020-12-24 RX ADMIN — IOPAMIDOL 80 ML: 755 INJECTION, SOLUTION INTRAVENOUS at 19:49

## 2020-12-24 RX ADMIN — WATER 10 MG: 1 INJECTION INTRAMUSCULAR; INTRAVENOUS; SUBCUTANEOUS at 22:40

## 2020-12-24 NOTE — ED TRIAGE NOTES
Triage Note: Patient arrives by EMS from [facility] for right sided chest pain since 4:30 PM yesterday. Patient denies pain at this time. Hx of dementia. Staff denies fall. Patient reports he fell into a door frame hitting his right shoulder yesterday. NSR per EMS.  324 mg of aspirin given by EMS>

## 2020-12-24 NOTE — ED PROVIDER NOTES
The history is provided by the patient and the EMS personnel. The history is limited by the condition of the patient. No  was used. Chest Pain (Angina)          Past Medical History:   Diagnosis Date    GERD (gastroesophageal reflux disease)     High cholesterol     Hypertension     [facility]    numbness in fingertips on R hand; legs;stammering       Past Surgical History:   Procedure Laterality Date    HX CHOLECYSTECTOMY      HX HEENT Bilateral     cataract    HX HERNIA REPAIR  02/21/2018     Robot-assisted laparoscopic repair of paraesophageal hernia with gastric volvulus.          Family History:   Problem Relation Age of Onset    No Known Problems Sister     No Known Problems Brother        Social History     Socioeconomic History    Marital status:      Spouse name: Not on file    Number of children: Not on file    Years of education: Not on file    Highest education level: Not on file   Occupational History    Not on file   Social Needs    Financial resource strain: Not on file    Food insecurity     Worry: Not on file     Inability: Not on file   [employer] needs     Medical: Not on file     Non-medical: Not on file   Tobacco Use    Smoking status: Former Smoker    Smokeless tobacco: Never Used   Substance and Sexual Activity    Alcohol use: Not Currently     Alcohol/week: 1.0 standard drinks     Types: 1 Cans of beer per week    Drug use: No    Sexual activity: Not on file   Lifestyle    Physical activity     Days per week: Not on file     Minutes per session: Not on file    Stress: Not on file   Relationships    Social connections     Talks on phone: Not on file     Gets together: Not on file     Attends [employer] service: Not on file     Active member of club or organization: Not on file     Attends meetings of clubs or organizations: Not on file     Relationship status: Not on file    Intimate partner violence     Fear of current or ex partner: Not on file     Emotionally abused: Not on file     Physically abused: Not on file     Forced sexual activity: Not on file   Other Topics Concern    Not on file   Social History Narrative    Not on file         ALLERGIES: Patient has no known allergies. Review of Systems   Unable to perform ROS: Dementia   Cardiovascular: Positive for chest pain. Vitals:    12/24/20 1755   BP: (!) 179/79   Pulse: 77   Resp: 16   SpO2: 96%            Physical Exam  Vitals signs and nursing note reviewed. Constitutional:       General: He is not in acute distress. Appearance: He is well-developed. He is not diaphoretic. HENT:      Head: Atraumatic. Neck:      Trachea: No tracheal deviation. Cardiovascular:      Rate and Rhythm: Rhythm irregularly irregular. Heart sounds: Murmur present. Systolic murmur present with a grade of 2/6. Comments: Warm and well perfused  Pulmonary:      Effort: Pulmonary effort is normal. No respiratory distress. Chest:      Chest wall: No mass, lacerations, deformity, swelling or tenderness. Musculoskeletal: Normal range of motion. Skin:     General: Skin is warm and dry. Neurological:      Mental Status: He is alert. Coordination: Coordination normal.   Psychiatric:         Behavior: Behavior normal.         Thought Content: Thought content normal.         Judgment: Judgment normal.          MDM     This is an 80-year-old male with past medical history, review of systems, physical exam as above, presenting via EMS for complaints of chest pain. Per report, patient complaining of right-sided chest pain, nursing reports the patient struck his shoulder against a door jam.  Upon arrival patient awake and alert, extremely hard of hearing, intermittently confused, question whether there may be some dementia present. He denies complaints at this time, states he is here \"because the ambulance brought me\".   Physical exam is remarkable for well-appearing elderly male, in no acute distress with no chest wall tenderness to palpation, free range of motion right upper, left upper extremities without pain, crepitus or instability. He has clear breath sounds auscultation, soft nontender abdomen, regular rate, irregular rhythm without murmurs gallops or rubs. Dementia versus hearing loss clotting review of systems. Plan to obtain CMP, CBC, EKG, chest x-ray, cardiac enzymes and D-dimer. We will reassess, and make a disposition. Procedures    Perfect Serve Consult for Admission  9:09 PM    ED Room Number: G/HG  Patient Name and age:  Mateo Gloria 80 y.o.  male  Working Diagnosis:   1.  ERICA (acute kidney injury) ([facility] [address]    2. Elevated brain natriuretic peptide (BNP) level        COVID-19 Suspicion:  no  Sepsis present:  no  Reassessment needed: no  Code Status:  Full Code  Readmission: no  Isolation Requirements:  no  Recommended Level of Care:  telemetry  Department:[facility] - [facility]   Other:  D/w Dr. Marichuy Amaro

## 2020-12-25 ENCOUNTER — APPOINTMENT (OUTPATIENT)
Dept: CT IMAGING | Age: 85
DRG: 194 | End: 2020-12-25
Attending: INTERNAL MEDICINE
Payer: MEDICARE

## 2020-12-25 ENCOUNTER — APPOINTMENT (OUTPATIENT)
Dept: GENERAL RADIOLOGY | Age: 85
DRG: 194 | End: 2020-12-25
Attending: INTERNAL MEDICINE
Payer: MEDICARE

## 2020-12-25 LAB
ALBUMIN SERPL-MCNC: 3 G/DL (ref 3.5–5)
ALBUMIN/GLOB SERPL: 0.6 {RATIO} (ref 1.1–2.2)
ALP SERPL-CCNC: 90 U/L (ref 45–117)
ALT SERPL-CCNC: 23 U/L (ref 12–78)
ANION GAP SERPL CALC-SCNC: 4 MMOL/L (ref 5–15)
AST SERPL-CCNC: 30 U/L (ref 15–37)
ATRIAL RATE: 74 BPM
BASOPHILS # BLD: 0 K/UL (ref 0–0.1)
BASOPHILS NFR BLD: 0 % (ref 0–1)
BILIRUB SERPL-MCNC: 0.4 MG/DL (ref 0.2–1)
BUN SERPL-MCNC: 22 MG/DL (ref 6–20)
BUN/CREAT SERPL: 18 (ref 12–20)
CALCIUM SERPL-MCNC: 8.6 MG/DL (ref 8.5–10.1)
CALCULATED P AXIS, ECG09: 28 DEGREES
CALCULATED R AXIS, ECG10: 14 DEGREES
CALCULATED T AXIS, ECG11: 46 DEGREES
CHLORIDE SERPL-SCNC: 111 MMOL/L (ref 97–108)
CO2 SERPL-SCNC: 27 MMOL/L (ref 21–32)
COMMENT, HOLDF: NORMAL
CREAT SERPL-MCNC: 1.19 MG/DL (ref 0.7–1.3)
DIAGNOSIS, 93000: NORMAL
DIFFERENTIAL METHOD BLD: ABNORMAL
EOSINOPHIL # BLD: 0 K/UL (ref 0–0.4)
EOSINOPHIL NFR BLD: 1 % (ref 0–7)
ERYTHROCYTE [DISTWIDTH] IN BLOOD BY AUTOMATED COUNT: 14.6 % (ref 11.5–14.5)
EST. AVERAGE GLUCOSE BLD GHB EST-MCNC: 117 MG/DL
GLOBULIN SER CALC-MCNC: 4.7 G/DL (ref 2–4)
GLUCOSE BLD STRIP.AUTO-MCNC: 103 MG/DL (ref 65–100)
GLUCOSE BLD STRIP.AUTO-MCNC: 91 MG/DL (ref 65–100)
GLUCOSE BLD STRIP.AUTO-MCNC: 93 MG/DL (ref 65–100)
GLUCOSE BLD STRIP.AUTO-MCNC: 98 MG/DL (ref 65–100)
GLUCOSE SERPL-MCNC: 88 MG/DL (ref 65–100)
HBA1C MFR BLD: 5.7 % (ref 4–5.6)
HCT VFR BLD AUTO: 37 % (ref 36.6–50.3)
HEALTH STATUS, XMCV2T: NORMAL
HGB BLD-MCNC: 11.6 G/DL (ref 12.1–17)
IMM GRANULOCYTES # BLD AUTO: 0 K/UL (ref 0–0.04)
IMM GRANULOCYTES NFR BLD AUTO: 0 % (ref 0–0.5)
LIPASE SERPL-CCNC: 147 U/L (ref 73–393)
LYMPHOCYTES # BLD: 0.9 K/UL (ref 0.8–3.5)
LYMPHOCYTES NFR BLD: 22 % (ref 12–49)
MAGNESIUM SERPL-MCNC: 2.2 MG/DL (ref 1.6–2.4)
MCH RBC QN AUTO: 28.7 PG (ref 26–34)
MCHC RBC AUTO-ENTMCNC: 31.4 G/DL (ref 30–36.5)
MCV RBC AUTO: 91.6 FL (ref 80–99)
MONOCYTES # BLD: 0.5 K/UL (ref 0–1)
MONOCYTES NFR BLD: 12 % (ref 5–13)
NEUTS SEG # BLD: 2.8 K/UL (ref 1.8–8)
NEUTS SEG NFR BLD: 65 % (ref 32–75)
NRBC # BLD: 0 K/UL (ref 0–0.01)
NRBC BLD-RTO: 0 PER 100 WBC
P-R INTERVAL, ECG05: 174 MS
PHOSPHATE SERPL-MCNC: 2.8 MG/DL (ref 2.6–4.7)
PLATELET # BLD AUTO: 88 K/UL (ref 150–400)
PMV BLD AUTO: 12.2 FL (ref 8.9–12.9)
POTASSIUM SERPL-SCNC: 3.7 MMOL/L (ref 3.5–5.1)
PROT SERPL-MCNC: 7.7 G/DL (ref 6.4–8.2)
Q-T INTERVAL, ECG07: 414 MS
QRS DURATION, ECG06: 94 MS
QTC CALCULATION (BEZET), ECG08: 459 MS
RBC # BLD AUTO: 4.04 M/UL (ref 4.1–5.7)
RBC MORPH BLD: ABNORMAL
RBC MORPH BLD: ABNORMAL
SAMPLES BEING HELD,HOLD: NORMAL
SARS-COV-2, COV2: NOT DETECTED
SERVICE CMNT-IMP: ABNORMAL
SERVICE CMNT-IMP: NORMAL
SODIUM SERPL-SCNC: 142 MMOL/L (ref 136–145)
SOURCE, COVRS: NORMAL
SPECIMEN SOURCE, FCOV2M: NORMAL
SPECIMEN TYPE, XMCV1T: NORMAL
TROPONIN I SERPL-MCNC: <0.05 NG/ML
TSH SERPL DL<=0.05 MIU/L-ACNC: 2.32 UIU/ML (ref 0.36–3.74)
VENTRICULAR RATE, ECG03: 74 BPM
WBC # BLD AUTO: 4.2 K/UL (ref 4.1–11.1)

## 2020-12-25 PROCEDURE — 72125 CT NECK SPINE W/O DYE: CPT

## 2020-12-25 PROCEDURE — 65660000000 HC RM CCU STEPDOWN

## 2020-12-25 PROCEDURE — 74011000258 HC RX REV CODE- 258: Performed by: INTERNAL MEDICINE

## 2020-12-25 PROCEDURE — 82962 GLUCOSE BLOOD TEST: CPT

## 2020-12-25 PROCEDURE — 74011250636 HC RX REV CODE- 250/636: Performed by: INTERNAL MEDICINE

## 2020-12-25 PROCEDURE — 84484 ASSAY OF TROPONIN QUANT: CPT

## 2020-12-25 PROCEDURE — 74011000250 HC RX REV CODE- 250: Performed by: INTERNAL MEDICINE

## 2020-12-25 PROCEDURE — 70450 CT HEAD/BRAIN W/O DYE: CPT

## 2020-12-25 PROCEDURE — 83690 ASSAY OF LIPASE: CPT

## 2020-12-25 PROCEDURE — 85025 COMPLETE CBC W/AUTO DIFF WBC: CPT

## 2020-12-25 PROCEDURE — 73030 X-RAY EXAM OF SHOULDER: CPT

## 2020-12-25 PROCEDURE — 36415 COLL VENOUS BLD VENIPUNCTURE: CPT

## 2020-12-25 PROCEDURE — 84443 ASSAY THYROID STIM HORMONE: CPT

## 2020-12-25 PROCEDURE — 80053 COMPREHEN METABOLIC PANEL: CPT

## 2020-12-25 PROCEDURE — 84100 ASSAY OF PHOSPHORUS: CPT

## 2020-12-25 PROCEDURE — 83036 HEMOGLOBIN GLYCOSYLATED A1C: CPT

## 2020-12-25 PROCEDURE — 83735 ASSAY OF MAGNESIUM: CPT

## 2020-12-25 PROCEDURE — 74176 CT ABD & PELVIS W/O CONTRAST: CPT

## 2020-12-25 PROCEDURE — 74011250637 HC RX REV CODE- 250/637: Performed by: INTERNAL MEDICINE

## 2020-12-25 RX ORDER — ACETAMINOPHEN 325 MG/1
650 TABLET ORAL
COMMUNITY

## 2020-12-25 RX ORDER — POLYETHYLENE GLYCOL 400 AND PROPYLENE GLYCOL 4; 3 MG/ML; MG/ML
1 SOLUTION/ DROPS OPHTHALMIC 2 TIMES DAILY
COMMUNITY

## 2020-12-25 RX ADMIN — Medication 10 ML: at 15:02

## 2020-12-25 RX ADMIN — CEFTRIAXONE SODIUM 1 G: 1 INJECTION, POWDER, FOR SOLUTION INTRAMUSCULAR; INTRAVENOUS at 00:44

## 2020-12-25 RX ADMIN — ASPIRIN 81 MG: 81 TABLET, COATED ORAL at 17:14

## 2020-12-25 RX ADMIN — HEPARIN SODIUM 5000 UNITS: 5000 INJECTION INTRAVENOUS; SUBCUTANEOUS at 02:49

## 2020-12-25 RX ADMIN — SODIUM CHLORIDE, SODIUM LACTATE, POTASSIUM CHLORIDE, AND CALCIUM CHLORIDE 75 ML/HR: 600; 310; 30; 20 INJECTION, SOLUTION INTRAVENOUS at 09:53

## 2020-12-25 RX ADMIN — DOXYCYCLINE 100 MG: 100 INJECTION, POWDER, LYOPHILIZED, FOR SOLUTION INTRAVENOUS at 12:18

## 2020-12-25 RX ADMIN — DOXYCYCLINE 100 MG: 100 INJECTION, POWDER, LYOPHILIZED, FOR SOLUTION INTRAVENOUS at 02:57

## 2020-12-25 RX ADMIN — AMLODIPINE BESYLATE 5 MG: 5 TABLET ORAL at 09:48

## 2020-12-25 RX ADMIN — HEPARIN SODIUM 5000 UNITS: 5000 INJECTION INTRAVENOUS; SUBCUTANEOUS at 17:14

## 2020-12-25 RX ADMIN — Medication 1 CAPSULE: at 09:49

## 2020-12-25 RX ADMIN — WATER 10 MG: 1 INJECTION INTRAMUSCULAR; INTRAVENOUS; SUBCUTANEOUS at 02:49

## 2020-12-25 RX ADMIN — HEPARIN SODIUM 5000 UNITS: 5000 INJECTION INTRAVENOUS; SUBCUTANEOUS at 09:49

## 2020-12-25 RX ADMIN — ATORVASTATIN CALCIUM 10 MG: 10 TABLET, FILM COATED ORAL at 23:43

## 2020-12-25 RX ADMIN — Medication 10 ML: at 23:44

## 2020-12-25 NOTE — ED NOTES
Bedside and Verbal shift change report given to Jose (oncoming nurse) by Alexi Abdalla (offgoing nurse). Report included the following information SBAR, Kardex, ED Summary, Intake/Output, MAR and Recent Results.

## 2020-12-25 NOTE — PROGRESS NOTES
Admission Medication Reconciliation:     Information obtained from:  Transfer paper from facility  RxQuery data available¹:  no     Comments     1) Updated Patient's PTA medication list using the list provided from [facility]. Confirmed with the facility's (using after hour phone #[phone]) the list they provided (dated Jan 2020) reflect patient's current  medication regimen. 2)  Medication changes (since last review): Added  - Systane eye drops  -Tylenol,  -Mylanta      Adjusted  -vitamin D3 (daily dose was 1000 units, now 2000units)     Removed  - none           ¹RxQuery pharmacy benefit data reflects medications filled and processed through the patient's insurance, however   this data does NOT capture whether the medication was picked up or is currently being taken by the patient. Allergies:  Patient has no known allergies. Significant PMH/Disease States:        Past Medical History:   Diagnosis Date    GERD (gastroesophageal reflux disease)      High cholesterol      Hypertension      Stroke ([facility] [address] 03/2017     numbness in fingertips on R hand; legs;stammering      Chief Complaint for this Admission:    Prior to Admission Medications   Prescriptions Last Dose Informant Patient Reported? Taking?   acetaminophen (TYLENOL) 325 mg tablet   Yes Yes   Sig: Take 650 mg by mouth every four (4) hours as needed for Pain. amLODIPine (NORVASC) 5 mg tablet   No Yes   Sig: Take 1 Tab by mouth daily. aspirin delayed-release 81 mg tablet  Friend Yes Yes   Sig: Take 81 mg by mouth every evening. calcium carb/magnesium hydrox (MYLANTA PO)   Yes Yes   Sig: Take 20 mL by mouth three (3) times daily as needed. cholecalciferol (Vitamin D3) (2,000 UNITS /50 MCG) cap capsule  Friend Yes Yes   Sig: Take 2,000 Units by mouth every evening. peg 400-propylene glycol (Systane, propylene glycol,) 0.4-0.3 % drop   Yes Yes   Sig: Administer 1 Drop to both eyes two (2) times a day. simvastatin (ZOCOR) 20 mg tablet  Friend Yes Yes   Sig: Take 20 mg by mouth every evening. Facility-Administered Medications: None       Please contact the main inpatient pharmacy with any questions or concerns at [phone] and we will direct you to the clinical pharmacist covering this patient's care while in-house.    Maxwell Kraus, PHARMD

## 2020-12-25 NOTE — H&P
[address]  HISTORY AND PHYSICAL    Name:  Farshad Baron  MR#:  [id number]  :  1931  ACCOUNT #:  [de-identified]  ADMIT DATE:  2020      The patient was seen, evaluated and admitted by me on 2020. PRIMARY CARE PHYSICIAN:  Ann-Marie Hodge MD    SOURCE OF INFORMATION:  The patient who is not a good historian because of his dementia. Review of ED and old medical records. CHIEF COMPLAINT:  Chest pain. HISTORY OF PRESENT ILLNESS:  This an 80-year-old man with past medical history significant for hypertension, dyslipidemia, type 2 diabetes, and dementia, who was in his usual state of health until the day of his presentation at the emergency room when it was reported that the patient developed chest pain at the nursing home where the patient resides. The patient developed the chest pain at about 04:30 p.m., located at the right side of the chest.  The patient is not able to state the severity and the quality of the pain because of his dementia. According to report, the patient fell into a door frame, hitting his right shoulder the day before. When the patient arrived at the emergency room, the patient did not complain of any chest pain. The patient was confused, agitated, and restless at times. CTA of the chest was obtained. The CTA was negative for pulmonary embolism but shows consolidation. The patient was then referred to the hospitalist service for evaluation for admission. The patient was last admitted to the hospital from 2018 to 2018. He was admitted and treated for acute renal failure. There was no history of fever, rigors or chills reported. PAST MEDICAL HISTORY:  1. Hypertension. 2.  Dyslipidemia. 3.  Type 2 diabetes. 4.  Dementia. ALLERGIES:  NO KNOWN DRUG ALLERGIES. MEDICATIONS:  1.  Norvasc 5 mg daily. 2.  Aspirin 81 mg daily. 3.  Zocor 20 mg daily. FAMILY HISTORY:  This was reviewed. His father had hypertension.     PAST SURGICAL HISTORY:  This is significant for:  1. Cholecystectomy. 2.  Bilateral cataract extraction. 3.  Hernia repair. SOCIAL HISTORY:  The patient is a former smoker. No history of alcohol abuse. REVIEW OF SYSTEMS:  Unable to obtain because of the patient's dementia. PHYSICAL EXAMINATION:  GENERAL APPEARANCE:  The patient appeared ill, in moderate distress. VITAL SIGNS:  On arrival at the emergency room; temperature 97.6, pulse 77, respiratory rate 16, blood pressure 179/79, oxygen saturation 96% on room air. HEENT:  Head:  Normocephalic, atraumatic. Eyes:  Normal eye movement. No redness, no drainage, no discharge. Ears:  Normal external ears with no evidence of drainage. Nose:  No deformity, no drainage. Mouth and Throat:  No visible oral lesion. Dry oral mucosa. NECK:  Neck is supple. No JVD, no thyromegaly. CHEST:  Clear breath sounds. No wheezing, no crackles. HEART:  Normal S1 and S2, regular. No clinically appreciable murmur. ABDOMEN:  Soft, nontender. Normal bowel sounds. CNS:  Alert. Not oriented. No gross focal neurological deficit. EXTREMITIES:  No edema. Pulses 2+ bilaterally. MUSCULOSKELETAL SYSTEM:  No obvious joint deformity or swelling. SKIN:  No active skin lesions seen in the exposed part of the body. PSYCHIATRY:  Unable to assess mood and affect. LYMPHATIC SYSTEM:  No cervical lymphadenopathy. DIAGNOSTIC DATA:  Chest x-ray:  Negative. CTA of the chest:  No evidence of pulmonary embolism; bilateral lower lobe consolidation, fibrosis, and emphysema. LABORATORY DATA:  Hematology:  WBC 4.2, hemoglobin 11.6, hematocrit 36.6, platelets 234. D-dimer 7.17, pro-BNP level 2206. Cardiac profile:  Troponin less than 0.05.   Chemistry:  Sodium 139, potassium 4.0, chloride 107, CO2 of 30, glucose 104, BUN 29, creatinine 1.50, calcium 8.7, total bilirubin 0.3, ALT 26, AST 29, alkaline phosphatase 100, total protein 8.0, albumin level 3.2, globulin 4.3. ASSESSMENT:  1. Suspected bacterial pneumonia. 2.  Acute kidney injury. 3.  Elevated BNP level. 4.  Elevated D-dimer. 5.  Hypertension. 6.  Dyslipidemia. 7.  Type 2 diabetes. 8.  Thrombocytopenia. 9.  Dementia. 8.  Fall. PLAN:  1. Suspected bacterial pneumonia: We will admit the patient for further evaluation and treatment. This may be the cause of the patient's right-sided chest pain. We will start the patient on Rocephin and doxycycline. CTA of the chest is negative for pulmonary embolism. We will also check serial cardiac markers to rule out acute myocardial infarction. We will monitor the patient closely. 2.  Acute kidney injury: This is most likely due to volume depletion. We will carry out fluid therapy and monitor the patient's renal function. We will obtain CT scan of the abdomen and pelvis to evaluate the patient for obstructive lesion. 3.  Elevated BNP level: The patient's chest x-ray is clear. The patient has no clinical signs of congestive heart failure. We will obtain echocardiogram to determine whether the elevated BNP level is cardiac in origin. 4.  Elevated D-dimer: This was done in the emergency room. CTA of the chest is negative for pulmonary embolism but we will obtain ultrasound of the lower extremity for DVT. 5.  Hypertension: We will resume preadmission medication. We will monitor the patient's blood pressure closely. 6.  Dyslipidemia: We will continue with Lipitor. 7.  Type 2 diabetes: The patient will be placed on sliding scale with insulin coverage. We will check hemoglobin A1c level if one has not been checked recently. The patient is not taking any medication for diabetes prior to admission. 8.  Thrombocytopenia:  The patient is asymptomatic. We will monitor the patient's platelet count. 9.  Dementia: We will continue supportive treatment. The patient's dementia is associated with significant behavioral disorder.   The patient received Geodon in the emergency room for the associated behavioral disorder. 10.  Fall:  The patient fell in the emergency room. The patient received Geodon for the dementia with the behavioral disorder, sitter was requested, fall precaution was also requested when the patient was initially admitted. I was later called by the patient's nurse that the patient fell in the emergency room. The patient was reevaluated, no clinical signs of injury but the fall was not witnessed. Because of that, we will obtain a CT scan of the head to evaluate the patient for acute pathology. We will also obtain CT scan of the cervical spine to evaluate the patient for fracture. There was a history of fall at the nursing home involving the patient's right shoulder. X-ray of the right shoulder will be obtained to evaluate the patient for fracture. The patient was discussed with the ED nurse and the sitter that was previously requested, is now present in the patient's room. OTHER ISSUES:  Code status: The patient is a full code. We will place the patient on heparin for DVT prophylaxis. If there is significant drop in the patient's platelet count, we will discontinue heparin and request SCD. FUNCTIONAL STATUS PRIOR TO ADMISSION:  The patient came from home and is ambulatory with a cane. COVID PRECAUTION:  The patient was wearing a face mask. I was wearing a face mask and gloves for this patient's encounter. Because the patient is coming from nursing home and is being treated for pneumonia, the patient will be tested for COVID-19 virus infection.         Sonya Ngo MD      [id number]  D:  12/25/2020 4:55  T:  12/25/2020 6:02  JOB #:  [id number]  CC:  Le Perez MD

## 2020-12-25 NOTE — ED NOTES
Pt had a witnessed fall. Pt prior to fall, pulled of non slips socks and monitoring system. Pt helped back to bed. Attending notified and states they will come see the pt. Orders received and pt care tech acting as sitter in room. Small skin tear to right elbow but no other injuries noted.

## 2020-12-25 NOTE — ED NOTES
Verbal shift change report given to Teja RN (oncoming nurse) by Rachel Epstein RN (offgoing nurse). Report included the following information SBAR, ED Summary, MAR and Recent Results.

## 2020-12-25 NOTE — ED NOTES
Bedside and Verbal shift change report given to Le Sánchez RN (oncoming nurse) by AMADEO Jasso (offgoing nurse). Report included the following information SBAR, Kardex, MAR and Recent Results.

## 2020-12-25 NOTE — ED NOTES
Pt currently sleeping. Sitter at bedside. On monitor. Will wake for AM medications when breakfast tray arrives.

## 2020-12-25 NOTE — ED NOTES
SBAR given to Ce Otoole RN for room 655. Medications, Hx, POC reviewed. All belongings with Pt clothes, glasses and watch are in his right shoe for safety. Transport to room by hospital transport.

## 2020-12-25 NOTE — ED NOTES
Pt got up out of bed and pulled out IV line in process. Pt put in gown and non slip hospital socks. Curtain open and order for sitter obtained.

## 2020-12-25 NOTE — PROGRESS NOTES
Hospitalist Progress Note  Yolanda Mike MD  Answering service: [phone] OR [phone] from in house phone        Date of Service:  2020  NAME:  Skylar Becerra  :  9/3/1931  MRN:  [id number]      Admission Summary:   As per initial admission summary  This an 59-year-old man with past medical history significant for hypertension, dyslipidemia, type 2 diabetes, and dementia, who was in his usual state of health until the day of his presentation at the emergency room when it was reported that the patient developed chest pain at the nursing home where the patient resides. The patient developed the chest pain at about 04:30 p.m., located at the right side of the chest.  The patient is not able to state the severity and the quality of the pain because of his dementia. According to report, the patient fell into a door frame, hitting his right shoulder the day before. When the patient arrived at the emergency room, the patient did not complain of any chest pain. The patient was confused, agitated, and restless at times. CTA of the chest was obtained. The CTA was negative for pulmonary embolism but shows consolidation. The patient was then referred to the hospitalist service for evaluation for admission. The patient was last admitted to the hospital from 2018 to 2018. He was admitted and treated for acute renal failure. There was no history of fever, rigors or chills reported. Interval history / Subjective:     Patient seen for Follow up of PNA    Patient seen and examined by the bedside, Labs, images and notes reviewed  I tried calling health care decision maker. Unable to reach      Assessment & Plan:     1. Suspected bacterial pneumonia: We will admit the patient for further evaluation and treatment. This may be the cause of the patient's right-sided chest pain. on Rocephin and doxycycline.     CTA of the chest is negative for pulmonary embolism. Troponin negative     2. Acute kidney injury:  improved   Ct abd negative for any acute abnormality     3. Elevated BNP level: The patient's chest x-ray is clear. The patient has no clinical signs of congestive heart failure. 4.  Elevated D-dimer: This was done in the emergency room. CTA of the chest is negative for pulmonary embolism   ultrasound of the lower extremity for DVT. Pending     5. Hypertension: We will resume preadmission medication. We will monitor the patient's blood pressure closely. 6.  Dyslipidemia: We will continue with Lipitor. 7.  Type 2 diabetes: The patient will be placed on sliding scale with insulin coverage. We will check hemoglobin A1c level if one has not been checked recently. The patient is not taking any medication for diabetes prior to admission. 8.  Thrombocytopenia:  The patient is asymptomatic. We will monitor the patient's platelet count. 9.  Dementia: We will continue supportive treatment. The patient's dementia is associated with significant behavioral disorder. The patient received Geodon in the emergency room for the associated behavioral disorder. 8.  Fall:    Patient fell in Ed  Imaging negative for any fracture     Code status: DNR. Signed DDNR in the chart   DVT prophylaxis: heparin SC     Care Plan discussed with: Patient/Family  Disposition: Home w/Family and TBD     Hospital Problems  Date Reviewed: 12/24/2020          Codes Class Noted POA    * (Principal) Bacterial pneumonia ICD-10-CM: J15.9  ICD-9-CM: 482.9  12/24/2020 Yes                Review of Systems:   A comprehensive review of systems was negative except for that written in the HPI. Vital Signs:    Last 24hrs VS reviewed since prior progress note.  Most recent are:  Visit Vitals  BP (!) 167/85 (BP 1 Location: Left arm, BP Patient Position: At rest)   Pulse 74   Temp 98.2 °F (36.8 °C)   Resp 19   SpO2 100%         Intake/Output Summary (Last 24 hours) at 12/25/2020 1626  Last data filed at 12/25/2020 1325  Gross per 24 hour   Intake    Output 350 ml   Net -350 ml        Physical Examination:   I had a face to face encounter with this patient and independently examined them on December 25, 2020 as outlined below:        Constitutional:  ill appearing male   ENT:  Oral mucous moist, oropharynx benign. Neck supple,    Resp:  CTA bilaterally. No wheezing/rhonchi/rales. No accessory muscle use   CV:  Regular rhythm, normal rate, no murmurs, gallops, rubs    GI:  Soft, non distended, non tender. normoactive bowel sounds, no hepatosplenomegaly     Musculoskeletal:  No edema, warm, 2+ pulses throughout    Neurologic:  Moves all extremities. CN II-XII reviewed            Data Review:    Review and/or order of clinical lab test  Review and/or order of tests in the radiology section of CPT  Review and/or order of tests in the medicine section of CPT      Labs:     Recent Labs     12/25/20  0719 12/24/20  1813   WBC 4.2 4.2   HGB 11.6* 11.6*   HCT 37.0 36.6   PLT 88* 106*     Recent Labs     12/25/20  0719 12/24/20  1813    139   K 3.7 4.0   * 107   CO2 27 30   BUN 22* 29*   CREA 1.19 1.50*   GLU 88 104*   CA 8.6 8.7   MG 2.2  --    PHOS 2.8  --      Recent Labs     12/25/20  0719 12/24/20  1813   ALT 23 26   AP 90 100   TBILI 0.4 0.3   TP 7.7 8.0   ALB 3.0* 3.2*   GLOB 4.7* 4.8*   LPSE 147  --      No results for input(s): INR, PTP, APTT, INREXT in the last 72 hours. No results for input(s): FE, TIBC, PSAT, FERR in the last 72 hours. No results found for: FOL, RBCF   No results for input(s): PH, PCO2, PO2 in the last 72 hours.   Recent Labs     12/25/20  0719 12/24/20  2017 12/24/20  1813   TROIQ <0.05 <0.05 <0.05     Lab Results   Component Value Date/Time    Cholesterol, total 135 02/21/2019 12:50 PM    HDL Cholesterol 55 02/21/2019 12:50 PM    LDL, calculated 68 02/21/2019 12:50 PM    Triglyceride 59 02/21/2019 12:50 PM    CHOL/HDL Ratio 2.8 03/20/2017 12:21 AM     Lab Results   Component Value Date/Time    Glucose (POC) 103 (H) 12/25/2020 11:31 AM    Glucose (POC) 98 12/25/2020 09:47 AM    Glucose (POC) 116 (H) 03/19/2017 07:56 AM     Lab Results   Component Value Date/Time    Color YELLOW/STRAW 08/01/2018 01:13 PM    Appearance CLEAR 08/01/2018 01:13 PM    Specific gravity 1.021 08/01/2018 01:13 PM    pH (UA) 5.0 08/01/2018 01:13 PM    Protein 30 (A) 08/01/2018 01:13 PM    Glucose NEGATIVE  08/01/2018 01:13 PM    Ketone TRACE (A) 08/01/2018 01:13 PM    Bilirubin NEGATIVE  08/01/2018 01:13 PM    Urobilinogen 1.0 08/01/2018 01:13 PM    Nitrites NEGATIVE  08/01/2018 01:13 PM    Leukocyte Esterase NEGATIVE  08/01/2018 01:13 PM    Epithelial cells FEW 08/01/2018 01:13 PM    Bacteria NEGATIVE  08/01/2018 01:13 PM    WBC 0-4 08/01/2018 01:13 PM    RBC 0-5 08/01/2018 01:13 PM         Medications Reviewed:     Current Facility-Administered Medications   Medication Dose Route Frequency    amLODIPine (NORVASC) tablet 5 mg  5 mg Oral DAILY    aspirin delayed-release tablet 81 mg  81 mg Oral QPM    atorvastatin (LIPITOR) tablet 10 mg  10 mg Oral QHS    sodium chloride (NS) flush 5-40 mL  5-40 mL IntraVENous Q8H    sodium chloride (NS) flush 5-40 mL  5-40 mL IntraVENous PRN    acetaminophen (TYLENOL) tablet 650 mg  650 mg Oral Q6H PRN    Or    acetaminophen (TYLENOL) suppository 650 mg  650 mg Rectal Q6H PRN    polyethylene glycol (MIRALAX) packet 17 g  17 g Oral DAILY PRN    promethazine (PHENERGAN) tablet 12.5 mg  12.5 mg Oral Q6H PRN    Or    ondansetron (ZOFRAN) injection 4 mg  4 mg IntraVENous Q6H PRN    heparin (porcine) injection 5,000 Units  5,000 Units SubCUTAneous Q8H    lactobac ac& pc-s.therm-b.anim (JABIER Q/RISAQUAD)  1 Cap Oral DAILY    doxycycline (VIBRAMYCIN) 100 mg in 0.9% sodium chloride (MBP/ADV) 100 mL MBP  100 mg IntraVENous Q12H    cefTRIAXone (ROCEPHIN) 1 g in 0.9% sodium chloride (MBP/ADV) 50 mL MBP  1 g IntraVENous Q24H    insulin lispro (HUMALOG) injection   SubCUTAneous AC&HS    glucose chewable tablet 16 g  4 Tab Oral PRN    dextrose (D50W) injection syrg 12.5-25 g  12.5-25 g IntraVENous PRN    glucagon (GLUCAGEN) injection 1 mg  1 mg IntraMUSCular PRN    lactated Ringers infusion  75 mL/hr IntraVENous CONTINUOUS     ______________________________________________________________________  EXPECTED LENGTH OF STAY: - - -  ACTUAL LENGTH OF STAY:          1                 Kory Dillon MD

## 2020-12-26 ENCOUNTER — APPOINTMENT (OUTPATIENT)
Dept: VASCULAR SURGERY | Age: 85
DRG: 194 | End: 2020-12-26
Attending: INTERNAL MEDICINE
Payer: MEDICARE

## 2020-12-26 LAB
COMMENT, HOLDF: NORMAL
GLUCOSE BLD STRIP.AUTO-MCNC: 107 MG/DL (ref 65–100)
GLUCOSE BLD STRIP.AUTO-MCNC: 108 MG/DL (ref 65–100)
GLUCOSE BLD STRIP.AUTO-MCNC: 108 MG/DL (ref 65–100)
GLUCOSE BLD STRIP.AUTO-MCNC: 124 MG/DL (ref 65–100)
GLUCOSE BLD STRIP.AUTO-MCNC: 90 MG/DL (ref 65–100)
SAMPLES BEING HELD,HOLD: NORMAL
SERVICE CMNT-IMP: ABNORMAL
SERVICE CMNT-IMP: NORMAL

## 2020-12-26 PROCEDURE — 65660000000 HC RM CCU STEPDOWN

## 2020-12-26 PROCEDURE — 74011250636 HC RX REV CODE- 250/636: Performed by: INTERNAL MEDICINE

## 2020-12-26 PROCEDURE — 82962 GLUCOSE BLOOD TEST: CPT

## 2020-12-26 PROCEDURE — 93970 EXTREMITY STUDY: CPT

## 2020-12-26 PROCEDURE — 74011250637 HC RX REV CODE- 250/637: Performed by: INTERNAL MEDICINE

## 2020-12-26 PROCEDURE — 74011000258 HC RX REV CODE- 258: Performed by: INTERNAL MEDICINE

## 2020-12-26 RX ADMIN — DOXYCYCLINE 100 MG: 100 INJECTION, POWDER, LYOPHILIZED, FOR SOLUTION INTRAVENOUS at 00:25

## 2020-12-26 RX ADMIN — DOXYCYCLINE 100 MG: 100 INJECTION, POWDER, LYOPHILIZED, FOR SOLUTION INTRAVENOUS at 12:40

## 2020-12-26 RX ADMIN — Medication 1 CAPSULE: at 09:04

## 2020-12-26 RX ADMIN — CEFTRIAXONE SODIUM 1 G: 1 INJECTION, POWDER, FOR SOLUTION INTRAMUSCULAR; INTRAVENOUS at 00:25

## 2020-12-26 RX ADMIN — CEFTRIAXONE SODIUM 1 G: 1 INJECTION, POWDER, FOR SOLUTION INTRAMUSCULAR; INTRAVENOUS at 23:18

## 2020-12-26 RX ADMIN — HEPARIN SODIUM 5000 UNITS: 5000 INJECTION INTRAVENOUS; SUBCUTANEOUS at 00:26

## 2020-12-26 RX ADMIN — ATORVASTATIN CALCIUM 10 MG: 10 TABLET, FILM COATED ORAL at 21:24

## 2020-12-26 RX ADMIN — HEPARIN SODIUM 5000 UNITS: 5000 INJECTION INTRAVENOUS; SUBCUTANEOUS at 17:16

## 2020-12-26 RX ADMIN — AMLODIPINE BESYLATE 5 MG: 5 TABLET ORAL at 09:04

## 2020-12-26 RX ADMIN — HEPARIN SODIUM 5000 UNITS: 5000 INJECTION INTRAVENOUS; SUBCUTANEOUS at 09:04

## 2020-12-26 RX ADMIN — HEPARIN SODIUM 5000 UNITS: 5000 INJECTION INTRAVENOUS; SUBCUTANEOUS at 23:19

## 2020-12-26 RX ADMIN — Medication 10 ML: at 21:28

## 2020-12-26 RX ADMIN — ASPIRIN 81 MG: 81 TABLET, COATED ORAL at 17:16

## 2020-12-26 RX ADMIN — SODIUM CHLORIDE, SODIUM LACTATE, POTASSIUM CHLORIDE, AND CALCIUM CHLORIDE 75 ML/HR: 600; 310; 30; 20 INJECTION, SOLUTION INTRAVENOUS at 17:16

## 2020-12-26 RX ADMIN — Medication 10 ML: at 14:17

## 2020-12-26 RX ADMIN — DOXYCYCLINE 100 MG: 100 INJECTION, POWDER, LYOPHILIZED, FOR SOLUTION INTRAVENOUS at 23:19

## 2020-12-26 NOTE — ROUTINE PROCESS
Bedside and Verbal shift change report given to AMADEO Fox (oncoming nurse) by Linda Beard RN (offgoing nurse). Report included the following information SBAR, Kardex, ED Summary, Intake/Output, MAR, Accordion, Med Rec Status, Cardiac Rhythm NSR, Alarm Parameters  and Dual Neuro Assessment.

## 2020-12-26 NOTE — PROGRESS NOTES
Bedside and Verbal shift change report given to [address] [facility] [address] RN (oncoming nurse) by Eileen Paulino RN (offgoing nurse). Report included the following information SBAR, Kardex, MAR, Cardiac Rhythm NSR and Dual Neuro Assessment.

## 2020-12-26 NOTE — PROGRESS NOTES
Hospitalist Progress Note  Danilo Canales MD  Answering service: [phone] OR [phone] from in house phone        Date of Service:  2020  NAME:  Denise Wisdom  :  9/3/1931  MRN:  [id number]      Admission Summary:   As per initial admission summary  This an 49-year-old man with past medical history significant for hypertension, dyslipidemia, type 2 diabetes, and dementia, who was in his usual state of health until the day of his presentation at the emergency room when it was reported that the patient developed chest pain at the nursing home where the patient resides. The patient developed the chest pain at about 04:30 p.m., located at the right side of the chest.  The patient is not able to state the severity and the quality of the pain because of his dementia. According to report, the patient fell into a door frame, hitting his right shoulder the day before. When the patient arrived at the emergency room, the patient did not complain of any chest pain. The patient was confused, agitated, and restless at times. CTA of the chest was obtained. The CTA was negative for pulmonary embolism but shows consolidation. The patient was then referred to the hospitalist service for evaluation for admission. The patient was last admitted to the hospital from 2018 to 2018. He was admitted and treated for acute renal failure. There was no history of fever, rigors or chills reported. Interval history / Subjective:     Patient seen for Follow up of PNA    Patient seen and examined by the bedside, Labs, images and notes reviewed   tried calling health care decision maker. Unable to reach   No acute issues overnight . Pt ot pending . Assessment & Plan:     1. Suspected bacterial pneumonia: We will admit the patient for further evaluation and treatment. This may be the cause of the patient's right-sided chest pain.      on Rocephin and doxycycline. CTA of the chest is negative for pulmonary embolism. Troponin negative     2. Acute kidney injury:  improved   Ct abd negative for any acute abnormality     3. Elevated BNP level: The patient's chest x-ray is clear. The patient has no clinical signs of congestive heart failure. 4.  Elevated D-dimer: This was done in the emergency room. CTA of the chest is negative for pulmonary embolism   ultrasound of the lower extremity for DVT. negative    5. Hypertension: We will resume preadmission medication. We will monitor the patient's blood pressure closely. 6.  Dyslipidemia: We will continue with Lipitor. 7.  Type 2 diabetes: The patient will be placed on sliding scale with insulin coverage. We will check hemoglobin A1c level if one has not been checked recently. The patient is not taking any medication for diabetes prior to admission. 8.  Thrombocytopenia:  The patient is asymptomatic. We will monitor the patient's platelet count. 9.  Dementia: We will continue supportive treatment. The patient's dementia is associated with significant behavioral disorder. The patient received Geodon in the emergency room for the associated behavioral disorder. 8.  Fall:    Patient fell in Ed  Imaging negative for any fracture     Code status: DNR. Signed DDNR in the chart   DVT prophylaxis: heparin SC     Care Plan discussed with: Patient/Family  Disposition: Home w/Family and TBD     Hospital Problems  Date Reviewed: 12/24/2020          Codes Class Noted POA    * (Principal) Bacterial pneumonia ICD-10-CM: J15.9  ICD-9-CM: 482.9  12/24/2020 Yes                Review of Systems:   A comprehensive review of systems was negative except for that written in the HPI. Vital Signs:    Last 24hrs VS reviewed since prior progress note.  Most recent are:  Visit Vitals  BP (!) 159/87 (BP 1 Location: Right arm, BP Patient Position: At rest)   Pulse 87   Temp 98 °F (36.7 °C)   Resp 14   Ht 5' 6\" (1.676 m)   Wt 71.4 kg (157 lb 8 oz)   SpO2 99%   BMI 25.42 kg/m²       No intake or output data in the 24 hours ending 12/26/20 1527     Physical Examination:   I had a face to face encounter with this patient and independently examined them on December 26, 2020 as outlined below:        Constitutional:  ill appearing male   ENT:  Oral mucous moist, oropharynx benign. Neck supple,    Resp:  CTA bilaterally. No wheezing/rhonchi/rales. No accessory muscle use   CV:  Regular rhythm, normal rate, no murmurs, gallops, rubs    GI:  Soft, non distended, non tender. normoactive bowel sounds, no hepatosplenomegaly     Musculoskeletal:  No edema, warm, 2+ pulses throughout    Neurologic:  Moves all extremities. CN II-XII reviewed            Data Review:    Review and/or order of clinical lab test  Review and/or order of tests in the radiology section of CPT  Review and/or order of tests in the medicine section of CPT      Labs:     Recent Labs     12/25/20  0719 12/24/20 1813   WBC 4.2 4.2   HGB 11.6* 11.6*   HCT 37.0 36.6   PLT 88* 106*     Recent Labs     12/25/20  0719 12/24/20 1813    139   K 3.7 4.0   * 107   CO2 27 30   BUN 22* 29*   CREA 1.19 1.50*   GLU 88 104*   CA 8.6 8.7   MG 2.2  --    PHOS 2.8  --      Recent Labs     12/25/20  0719 12/24/20 1813   ALT 23 26   AP 90 100   TBILI 0.4 0.3   TP 7.7 8.0   ALB 3.0* 3.2*   GLOB 4.7* 4.8*   LPSE 147  --      No results for input(s): INR, PTP, APTT, INREXT, INREXT in the last 72 hours. No results for input(s): FE, TIBC, PSAT, FERR in the last 72 hours. No results found for: FOL, RBCF   No results for input(s): PH, PCO2, PO2 in the last 72 hours.   Recent Labs     12/25/20 0719 12/24/20 2017 12/24/20 1813   TROIQ <0.05 <0.05 <0.05     Lab Results   Component Value Date/Time    Cholesterol, total 135 02/21/2019 12:50 PM    HDL Cholesterol 55 02/21/2019 12:50 PM    LDL, calculated 68 02/21/2019 12:50 PM    Triglyceride 59 02/21/2019 12:50 PM CHOL/HDL Ratio 2.8 03/20/2017 12:21 AM     Lab Results   Component Value Date/Time    Glucose (POC) 108 (H) 12/26/2020 11:54 AM    Glucose (POC) 108 (H) 12/26/2020 08:36 AM    Glucose (POC) 90 12/26/2020 06:45 AM    Glucose (POC) 93 12/25/2020 11:40 PM    Glucose (POC) 91 12/25/2020 04:28 PM     Lab Results   Component Value Date/Time    Color YELLOW/STRAW 08/01/2018 01:13 PM    Appearance CLEAR 08/01/2018 01:13 PM    Specific gravity 1.021 08/01/2018 01:13 PM    pH (UA) 5.0 08/01/2018 01:13 PM    Protein 30 (A) 08/01/2018 01:13 PM    Glucose NEGATIVE  08/01/2018 01:13 PM    Ketone TRACE (A) 08/01/2018 01:13 PM    Bilirubin NEGATIVE  08/01/2018 01:13 PM    Urobilinogen 1.0 08/01/2018 01:13 PM    Nitrites NEGATIVE  08/01/2018 01:13 PM    Leukocyte Esterase NEGATIVE  08/01/2018 01:13 PM    Epithelial cells FEW 08/01/2018 01:13 PM    Bacteria NEGATIVE  08/01/2018 01:13 PM    WBC 0-4 08/01/2018 01:13 PM    RBC 0-5 08/01/2018 01:13 PM         Medications Reviewed:     Current Facility-Administered Medications   Medication Dose Route Frequency    amLODIPine (NORVASC) tablet 5 mg  5 mg Oral DAILY    aspirin delayed-release tablet 81 mg  81 mg Oral QPM    atorvastatin (LIPITOR) tablet 10 mg  10 mg Oral QHS    sodium chloride (NS) flush 5-40 mL  5-40 mL IntraVENous Q8H    sodium chloride (NS) flush 5-40 mL  5-40 mL IntraVENous PRN    acetaminophen (TYLENOL) tablet 650 mg  650 mg Oral Q6H PRN    Or    acetaminophen (TYLENOL) suppository 650 mg  650 mg Rectal Q6H PRN    polyethylene glycol (MIRALAX) packet 17 g  17 g Oral DAILY PRN    promethazine (PHENERGAN) tablet 12.5 mg  12.5 mg Oral Q6H PRN    Or    ondansetron (ZOFRAN) injection 4 mg  4 mg IntraVENous Q6H PRN    heparin (porcine) injection 5,000 Units  5,000 Units SubCUTAneous Q8H    lactobac ac& pc-s.therm-b.anim (JABIER Q/RISAQUAD)  1 Cap Oral DAILY    doxycycline (VIBRAMYCIN) 100 mg in 0.9% sodium chloride (MBP/ADV) 100 mL MBP  100 mg IntraVENous Q12H    cefTRIAXone (ROCEPHIN) 1 g in 0.9% sodium chloride (MBP/ADV) 50 mL MBP  1 g IntraVENous Q24H    insulin lispro (HUMALOG) injection   SubCUTAneous AC&HS    glucose chewable tablet 16 g  4 Tab Oral PRN    dextrose (D50W) injection syrg 12.5-25 g  12.5-25 g IntraVENous PRN    glucagon (GLUCAGEN) injection 1 mg  1 mg IntraMUSCular PRN    lactated Ringers infusion  75 mL/hr IntraVENous CONTINUOUS     ______________________________________________________________________  EXPECTED LENGTH OF STAY: - - -  ACTUAL LENGTH OF STAY:          2                 Patricia Canas MD

## 2020-12-27 LAB
ANION GAP SERPL CALC-SCNC: 7 MMOL/L (ref 5–15)
BASOPHILS # BLD: 0 K/UL (ref 0–0.1)
BASOPHILS NFR BLD: 0 % (ref 0–1)
BUN SERPL-MCNC: 20 MG/DL (ref 6–20)
BUN/CREAT SERPL: 15 (ref 12–20)
CALCIUM SERPL-MCNC: 8.9 MG/DL (ref 8.5–10.1)
CHLORIDE SERPL-SCNC: 104 MMOL/L (ref 97–108)
CO2 SERPL-SCNC: 28 MMOL/L (ref 21–32)
CREAT SERPL-MCNC: 1.35 MG/DL (ref 0.7–1.3)
DIFFERENTIAL METHOD BLD: ABNORMAL
EOSINOPHIL # BLD: 0 K/UL (ref 0–0.4)
EOSINOPHIL NFR BLD: 1 % (ref 0–7)
ERYTHROCYTE [DISTWIDTH] IN BLOOD BY AUTOMATED COUNT: 14.1 % (ref 11.5–14.5)
GLUCOSE BLD STRIP.AUTO-MCNC: 100 MG/DL (ref 65–100)
GLUCOSE BLD STRIP.AUTO-MCNC: 101 MG/DL (ref 65–100)
GLUCOSE BLD STRIP.AUTO-MCNC: 102 MG/DL (ref 65–100)
GLUCOSE BLD STRIP.AUTO-MCNC: 95 MG/DL (ref 65–100)
GLUCOSE SERPL-MCNC: 112 MG/DL (ref 65–100)
HCT VFR BLD AUTO: 38.1 % (ref 36.6–50.3)
HGB BLD-MCNC: 12.3 G/DL (ref 12.1–17)
IMM GRANULOCYTES # BLD AUTO: 0 K/UL (ref 0–0.04)
IMM GRANULOCYTES NFR BLD AUTO: 0 % (ref 0–0.5)
LYMPHOCYTES # BLD: 1.1 K/UL (ref 0.8–3.5)
LYMPHOCYTES NFR BLD: 19 % (ref 12–49)
MCH RBC QN AUTO: 29.3 PG (ref 26–34)
MCHC RBC AUTO-ENTMCNC: 32.3 G/DL (ref 30–36.5)
MCV RBC AUTO: 90.7 FL (ref 80–99)
MONOCYTES # BLD: 0.6 K/UL (ref 0–1)
MONOCYTES NFR BLD: 10 % (ref 5–13)
NEUTS SEG # BLD: 4 K/UL (ref 1.8–8)
NEUTS SEG NFR BLD: 69 % (ref 32–75)
NRBC # BLD: 0 K/UL (ref 0–0.01)
NRBC BLD-RTO: 0 PER 100 WBC
PLATELET # BLD AUTO: 135 K/UL (ref 150–400)
PMV BLD AUTO: 11.1 FL (ref 8.9–12.9)
POTASSIUM SERPL-SCNC: 3.6 MMOL/L (ref 3.5–5.1)
RBC # BLD AUTO: 4.2 M/UL (ref 4.1–5.7)
SERVICE CMNT-IMP: ABNORMAL
SERVICE CMNT-IMP: ABNORMAL
SERVICE CMNT-IMP: NORMAL
SERVICE CMNT-IMP: NORMAL
SODIUM SERPL-SCNC: 139 MMOL/L (ref 136–145)
WBC # BLD AUTO: 5.7 K/UL (ref 4.1–11.1)

## 2020-12-27 PROCEDURE — 97165 OT EVAL LOW COMPLEX 30 MIN: CPT

## 2020-12-27 PROCEDURE — 97161 PT EVAL LOW COMPLEX 20 MIN: CPT

## 2020-12-27 PROCEDURE — 65660000000 HC RM CCU STEPDOWN

## 2020-12-27 PROCEDURE — 36415 COLL VENOUS BLD VENIPUNCTURE: CPT

## 2020-12-27 PROCEDURE — 85025 COMPLETE CBC W/AUTO DIFF WBC: CPT

## 2020-12-27 PROCEDURE — 80048 BASIC METABOLIC PNL TOTAL CA: CPT

## 2020-12-27 PROCEDURE — 97530 THERAPEUTIC ACTIVITIES: CPT

## 2020-12-27 PROCEDURE — 74011250636 HC RX REV CODE- 250/636: Performed by: INTERNAL MEDICINE

## 2020-12-27 PROCEDURE — 74011250637 HC RX REV CODE- 250/637: Performed by: INTERNAL MEDICINE

## 2020-12-27 PROCEDURE — 82962 GLUCOSE BLOOD TEST: CPT

## 2020-12-27 PROCEDURE — 74011000258 HC RX REV CODE- 258: Performed by: INTERNAL MEDICINE

## 2020-12-27 PROCEDURE — 97535 SELF CARE MNGMENT TRAINING: CPT

## 2020-12-27 RX ADMIN — SODIUM CHLORIDE, SODIUM LACTATE, POTASSIUM CHLORIDE, AND CALCIUM CHLORIDE 75 ML/HR: 600; 310; 30; 20 INJECTION, SOLUTION INTRAVENOUS at 17:14

## 2020-12-27 RX ADMIN — ASPIRIN 81 MG: 81 TABLET, COATED ORAL at 17:15

## 2020-12-27 RX ADMIN — HEPARIN SODIUM 5000 UNITS: 5000 INJECTION INTRAVENOUS; SUBCUTANEOUS at 17:15

## 2020-12-27 RX ADMIN — Medication 1 CAPSULE: at 09:06

## 2020-12-27 RX ADMIN — Medication 10 ML: at 05:40

## 2020-12-27 RX ADMIN — DOXYCYCLINE 100 MG: 100 INJECTION, POWDER, LYOPHILIZED, FOR SOLUTION INTRAVENOUS at 12:28

## 2020-12-27 RX ADMIN — AMLODIPINE BESYLATE 5 MG: 5 TABLET ORAL at 09:06

## 2020-12-27 RX ADMIN — Medication 10 ML: at 17:05

## 2020-12-27 RX ADMIN — Medication 10 ML: at 22:35

## 2020-12-27 RX ADMIN — ATORVASTATIN CALCIUM 10 MG: 10 TABLET, FILM COATED ORAL at 22:34

## 2020-12-27 RX ADMIN — HEPARIN SODIUM 5000 UNITS: 5000 INJECTION INTRAVENOUS; SUBCUTANEOUS at 09:06

## 2020-12-27 NOTE — PROGRESS NOTES
Bedside and Verbal shift change report given to AMADEO Royal (oncoming nurse) by Anthony Shahid RN (offgoing nurse). Report included the following information SBAR, Kardex, MAR, Cardiac Rhythm NSR and Dual Neuro Assessment.

## 2020-12-27 NOTE — PROGRESS NOTES
PHYSICAL THERAPY EVALUATION  Patient: Heather Tang (85 y.o. male)  Date: 12/27/2020  Primary Diagnosis: Bacterial pneumonia [J15.9]        Precautions: fall risk        ASSESSMENT  Based on the objective data described below, the patient presents with deficits in his physical mobility. Patient requires cga-min assist for all moblity. He was able to ambulate approx. 150 ft with RW and cga. Patient does require frequent commands to assist with directions and to increase safety awareness. .    Current Level of Function Impacting Discharge (mobility/balance): patient requires cga-min assist for all mobility    Functional Outcome Measure: The patient scored 150 ft on the 6 minute walk test outcome measure which is indicative of high fall risk and decreased functional mobility. Other factors to consider for discharge: patient lives in an [facility], not clear as to how much help was needed or how much assistance [facility] is able to provide     Patient will benefit from skilled therapy intervention to address the above noted impairments. PLAN :  Recommendations and Planned Interventions: bed mobility training, transfer training, gait training, therapeutic exercises, neuromuscular re-education, patient and family training/education, and therapeutic activities      Frequency/Duration: Patient will be followed by physical therapy:  daily to address goals. Recommendation for discharge: (in order for the patient to meet his/her long term goals)  To be determined: [facility] provides therapy services and this would be recommended    This discharge recommendation:  Has not yet been discussed the attending provider and/or case management    IF patient discharges home will need the following DME: to be determined (TBD)- patient has a rollator which may be appropriate based on progress made while in hospital but may need RW         SUBJECTIVE:   Patient stated Jon Rios was thinking about getting a job at [employer], [place]. .. just to give me something to do. 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 Personal factors and/or comorbidities impacting plan of care: patient is alert x 1-2, poor safety awareness, increased need for assistance     Home Situation  Living Alone: No  Support Systems: Assisted living  Current DME Used/Available at Home: carlos Guidry    EXAMINATION/PRESENTATION/DECISION MAKING:   Critical Behavior:  Neurologic State: Alert  Orientation Level: Oriented to person  Cognition: Follows commands  Safety/Judgement: Decreased awareness of need for assistance, Decreased awareness of environment, Decreased awareness of need for safety, Decreased insight into deficits  Hearing: Auditory  Auditory Impairment: None, Hard of hearing, bilateral  Skin:    Edema:   Range Of Motion:                          Strength: Tone & Sensation:                                  Coordination:     Vision:      Functional Mobility:  Bed Mobility:     Supine to Sit: Contact guard assistance        Transfers:  Sit to Stand: Minimum assistance                          Balance:   Sitting: Intact  Standing: Impaired  Ambulation/Gait Training:  Distance (ft): 150 Feet (ft)  Assistive Device: Walker, rolling;Gait belt  Ambulation - Level of Assistance: Contact guard assistance     Gait Description (WDL): Exceptions to WDL  Gait Abnormalities: Decreased step clearance; Path deviations                                       Stairs:               Therapeutic Exercises:       Functional Measure:  6 minute walk test distance of 150 ft       Physical Therapy Evaluation Charge Determination   History Examination Presentation Decision-Making   MEDIUM  Complexity : 1-2 comorbidities / personal factors will impact the outcome/ POC  LOW Complexity : 1-2 Standardized tests and measures addressing body structure, function, activity limitation and / or participation in recreation  LOW Complexity : Stable, uncomplicated  LOW Complexity : FOTO score of       Based on the above components, the patient evaluation is determined to be of the following complexity level: LOW     Pain Rating:  Denies pain     Activity Tolerance:   Poor    After treatment patient left in no apparent distress:   Sitting in chair, Call bell within reach, and Bed / chair alarm activated    COMMUNICATION/EDUCATION:   The patients plan of care was discussed with: Registered nurse. Patient/family agree to work toward stated goals and plan of care.     Thank you for this referral.  Rafi Carlson, PT   Time Calculation: 24 mins

## 2020-12-27 NOTE — PROGRESS NOTES
Problem: Self Care Deficits Care Plan (Adult)  Goal: *Acute Goals and Plan of Care (Insert Text)  Description:   FUNCTIONAL STATUS PRIOR TO ADMISSION: Lives at [facility], mod I functional mobility with rollaider PTA; staff assists with all self care tasks except feeding; \"they wont let me do anything. \" Incontinent of B & B per patient; \"I don't know it's there. \"      HOME SUPPORT: [facility]  Occupational Therapy Goals  Initiated 12/27/2020  1. Patient will perform self-feeding with modified independence within 5 day(s). 2.  Patient will perform grooming with supervision/set-up within 7 day(s). 3.  Patient will perform upper body dressing with minimal assistance/contact guard assist within 7 day(s). 4.  Patient will perform toilet transfers with supervision/set-up within 7 day(s). 5.  Patient will perform all aspects of toileting with moderate assistance  within 7 day(s). 6.  Patient will participate in upper extremity therapeutic exercise/activities with supervision/set-up for 5 minutes within 7 day(s). 7.  Patient will utilize energy conservation, fall prevention, pain management techniques during functional activities with verbal cues within 7 day(s). Outcome: Progressing Towards Goal   OCCUPATIONAL THERAPY EVALUATION  Patient: Jaden Li (43 y.o. male)  Date: 12/27/2020  Primary Diagnosis: Bacterial pneumonia [J15.9]        Precautions:   Fall, DNR    ASSESSMENT  Based on the objective data described below, the patient presents with general debility, reported chest pain to ER on 12-24 for chest pain; patient reports he had also \"fallen into doorframe and hit R shoulder causing pain. \" Chest pain resolved but R shoulder and more symptomatically noted R elbow pain near recent infiltrate. Patient with poor tolerance for R UE AROM at elbow, fair at shoulder and WFL at R hand, with increased time needed for AROM and functional use due to \"terrible on fire R elbow pain. \" Patient elbow placed on pillow support for elevation due to + edema and brusing, medially. A and O x 1. Current Level of Function Impacting Discharge (ADLs/self-care): S-min A eating and grooming, mod-max A UE dressing limited by R elbow and shoulder pain; Max A-D LE dressing: did not do PTA \"for a long time because they wont let me. \" min A-CGA functional mobility, with RW; uses rollator at baseline. Functional Outcome Measure: The patient scored Total: 30/100 on the Barthel Index outcome measure which is indicative of 70% impaired ability to care for basic self needs/dependency on others; inferred 90% dependency on others for instrumental ADLs. Other factors to consider for discharge: lives in [address] with staff assist PRN at [facility] level     Patient will benefit from skilled therapy intervention to address the above noted impairments. PLAN :  Recommendations and Planned Interventions: self care training, functional mobility training, therapeutic exercise, balance training, therapeutic activities, cognitive retraining, endurance activities, patient education, home safety training, and family training/education    Frequency/Duration: Patient will be followed by occupational therapy 4 times a week to address goals. Recommendation for discharge: (in order for the patient to meet his/her long term goals)  Therapy up to 5 days/week in SNF setting    This discharge recommendation:  A follow-up discussion with the attending provider and/or case management is planned    IF patient discharges home will need the following DME: bedside commode, shower chair, and owns rollaider       SUBJECTIVE:   Patient stated Oh please don't touch my arm. 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 Expanded or extensive additional review of patient history:     Home Situation  Home Environment: [address] Name: [facility] AT [place]  Wheelchair Ramp: Yes  One/Two Story Residence: One story  Living Alone: No  Support Systems: Assisted living  Patient Expects to be Discharged to[de-identified] Assisted living  Current DME Used/Available at Home: Nestora Dom, rollator  Tub or Shower Type: Shower    Hand dominance: Right    EXAMINATION OF PERFORMANCE DEFICITS:  Cognitive/Behavioral Status:  Neurologic State: Alert  Orientation Level: Oriented to person  Cognition: Memory loss; Impaired decision making;Decreased attention/concentration; Follows commands  Perception: Appears intact  Perseveration: Perseverates during conversation  Safety/Judgement: Decreased awareness of environment;Decreased awareness of need for assistance;Decreased awareness of need for safety;Decreased insight into deficits; Fall prevention    Skin: infiltrate bruising medial elbow region proximal and distal    Edema: ++ dense edema R UE, medial elbow, dependent positioning; will benefit from elevation for edema management; patient needs assist with this due to dementia/poor STM    Hearing:   Auditory  Auditory Impairment: Hard of hearing, bilateral    Vision/Perceptual:                           Acuity: (appears WFL)    Corrective Lenses: Glasses    Range of Motion:  L UE  AROM: Generally decreased, functional  PROM: Generally decreased, functional      R UE PROM WFL with max gentle PROM and increased time due to pain; patient encouraged to use dominant R UE for eating,washing face etc                Strength:    Strength: Generally decreased, functional(except R UE too painful at this time for much Elbow AROM) Coordination:  Coordination: Generally decreased, functional(increased time ish on R due to IV inflitrate related pain)  Fine Motor Skills-Upper: Left Intact; Right Impaired    Gross Motor Skills-Upper: Left Intact; Right Impaired    Tone & Sensation:    Tone: Normal  Sensation: Impaired(R UE hypersensitive with IV infiltrate, declines testing)                      Balance:  Sitting: Intact  Standing: Impaired  Standing - Static: Fair;Constant support  Standing - Dynamic : Fair;Constant support(RW)    Functional Mobility and Transfers for ADLs:  Bed Mobility:  Supine to Sit: Contact guard assistance    Transfers:  Sit to Stand: Minimum assistance  Stand to Sit: Minimum assistance  Bed to Chair: Minimum assistance  Toilet Transfer : Minimum assistance    ADL Assessment:  Feeding: Setup;Stand-by assistance    Oral Facial Hygiene/Grooming: Setup;Stand-by assistance    Bathing: Minimum assistance    Upper Body Dressing: Minimum assistance    Lower Body Dressing: Maximum assistance    Toileting: Moderate assistance; Additional time                ADL Intervention and task modifications:     Initiated training of pain management, edema management and need for AROM with R UE at shoulder, elbow, wrist and hand to facilitate fluid movement, trained need for fall prevention and safety including not to stand up alone at this time; trained orientation to hospital. (he reported Feb, 2002, [place])  Patient instructed and indicated fair understanding of the benefits of maintaining activity tolerance, functional mobility, and max independence with self care tasks during acute stay  to ensure safe return home and to baseline. Encouraged patient to increase frequency and duration OOB, be out of bed for all meals, perform daily ADLs (as approved by RN/MD regarding bathing etc), and performing functional mobility to/from bathroom with staff assist at all times.      Cognitive Retraining  Safety/Judgement: Decreased awareness of environment;Decreased awareness of need for assistance;Decreased awareness of need for safety;Decreased insight into deficits; Fall prevention      Functional Measure:  Barthel Index:    Bathin  Bladder: 0  Bowels: 0  Groomin  Dressin  Feedin  Mobility: 10  Stairs: 5  Toilet Use: 0  Transfer (Bed to Chair and Back): 10  Total: 30/100        The Barthel ADL Index: Guidelines  1. The index should be used as a record of what a patient does, not as a record of what a patient could do. 2. The main aim is to establish degree of independence from any help, physical or verbal, however minor and for whatever reason. 3. The need for supervision renders the patient not independent. 4. A patient's performance should be established using the best available evidence. Asking the patient, friends/relatives and nurses are the usual sources, but direct observation and common sense are also important. However direct testing is not needed. 5. Usually the patient's performance over the preceding 24-48 hours is important, but occasionally longer periods will be relevant. 6. Middle categories imply that the patient supplies over 50 per cent of the effort. 7. Use of aids to be independent is allowed. Radha iMchel., Barthel, DHugoW. (6997). Functional evaluation: the Barthel Index. [facility] (14)2. CELY Tracy, Jessica Hinojosa, Anjum Duran., [facility], [address] [place] [address] (). Measuring the change indisability after inpatient rehabilitation; comparison of the responsiveness of the Barthel Index and Functional [place] Measure. Journal of Neurology, Neurosurgery, and Psychiatry, 66(4), 530-602. Favio Todd, N.ELVIS.A, SONYA Kevin, & Yolis Espinal MMATEO. (2004.) Assessment of post-stroke quality of life in cost-effectiveness studies: The usefulness of the Barthel Index and the EuroQoL-5D.  Quality of Life Research, 15, 291-05         Occupational Therapy Evaluation Charge Determination   History Examination Decision-Making   LOW Complexity : Brief history review  MEDIUM Complexity : 3-5 performance deficits relating to physical, cognitive , or psychosocial skils that result in activity limitations and / or participation restrictions MEDIUM Complexity : Patient may present with comorbidities that affect occupational performnce. Miniml to moderate modification of tasks or assistance (eg, physical or verbal ) with assesment(s) is necessary to enable patient to complete evaluation       Based on the above components, the patient evaluation is determined to be of the following complexity level: LOW   Pain Rating:  \"terrible just terrible please don't touch it; it's on fire. \" 10 on faces    Activity Tolerance:   Fair, SpO2 stable on RA, requires rest breaks, and ADLs limited by pain R UE    After treatment patient left in no apparent distress:    Sitting in chair, Heels elevated for pressure relief, Call bell within reach, and Bed / chair alarm activated    COMMUNICATION/EDUCATION:   The patients plan of care was discussed with: Physical therapist and Registered nurse. Home safety education was provided and the patient/caregiver indicated understanding., Patient/family have participated as able in goal setting and plan of care. , and Patient/family agree to work toward stated goals and plan of care. This patients plan of care is appropriate for delegation to [facility].     Thank you for this referral.  Barron Morgan OTR/L  Time Calculation: 40 mins

## 2020-12-27 NOTE — PROGRESS NOTES
Problem: Pressure Injury - Risk of  Goal: *Prevention of pressure injury  Description: Document Urban Scale and appropriate interventions in the flowsheet. Outcome: Progressing Towards Goal  Note: Pressure Injury Interventions:  Sensory Interventions: Assess changes in LOC    Moisture Interventions: Minimize layers    Activity Interventions: PT/OT evaluation    Mobility Interventions: HOB 30 degrees or less    Nutrition Interventions: Document food/fluid/supplement intake    Friction and Shear Interventions: Lift sheet                Problem: Patient Education: Go to Patient Education Activity  Goal: Patient/Family Education  Outcome: Progressing Towards Goal     Problem: Falls - Risk of  Goal: *Absence of Falls  Description: Document Dexter Fall Risk and appropriate interventions in the flowsheet.   Outcome: Progressing Towards Goal  Note: Fall Risk Interventions:  Mobility Interventions: Patient to call before getting OOB    Mentation Interventions: Bed/chair exit alarm    Medication Interventions: Bed/chair exit alarm    Elimination Interventions: Bed/chair exit alarm    History of Falls Interventions: Bed/chair exit alarm         Problem: Patient Education: Go to Patient Education Activity  Goal: Patient/Family Education  Outcome: Progressing Towards Goal

## 2020-12-27 NOTE — PROGRESS NOTES
Bedside and Verbal shift change report given to Ananth Harmon (oncoming nurse) by Aysha Hinds RN (offgoing nurse). Report included the following information SBAR, Kardex, MAR, Cardiac Rhythm NSR and Dual Neuro Assessment.

## 2020-12-27 NOTE — PROGRESS NOTES
Hospitalist Progress Note  Haily Wilder MD  Answering service: [phone] OR [phone] from in house phone        Date of Service:  2020  NAME:  Calista Salguero  :  9/3/1931  MRN:  [id number]      Admission Summary:   As per initial admission summary  This an 72-year-old man with past medical history significant for hypertension, dyslipidemia, type 2 diabetes, and dementia, who was in his usual state of health until the day of his presentation at the emergency room when it was reported that the patient developed chest pain at the nursing home where the patient resides. The patient developed the chest pain at about 04:30 p.m., located at the right side of the chest.  The patient is not able to state the severity and the quality of the pain because of his dementia. According to report, the patient fell into a door frame, hitting his right shoulder the day before. When the patient arrived at the emergency room, the patient did not complain of any chest pain. The patient was confused, agitated, and restless at times. CTA of the chest was obtained. The CTA was negative for pulmonary embolism but shows consolidation. The patient was then referred to the hospitalist service for evaluation for admission. The patient was last admitted to the hospital from 2018 to 2018. He was admitted and treated for acute renal failure. There was no history of fever, rigors or chills reported. Interval history / Subjective:     Patient seen for Follow up of PNA    Patient seen and examined by the bedside, Labs, images and notes reviewed  No acute issues overnight . Likely long term/snf  Talked ot health care decision maker. Patient is very hard of hearing. Lives in assisted living facility. Now placement. Assessment & Plan:     1. Suspected bacterial pneumonia: We will admit the patient for further evaluation and treatment.   This may be the cause of the patient's right-sided chest pain. on Rocephin and doxycycline. CTA of the chest is negative for pulmonary embolism. Troponin negative     2. Acute kidney injury:  improved   Ct abd negative for any acute abnormality     3. Elevated BNP level: The patient's chest x-ray is clear. The patient has no clinical signs of congestive heart failure. 4.  Elevated D-dimer: This was done in the emergency room. CTA of the chest is negative for pulmonary embolism   ultrasound of the lower extremity for DVT. negative    5. Hypertension: We will resume preadmission medication. We will monitor the patient's blood pressure closely. 6.  Dyslipidemia: We will continue with Lipitor. 7.  Type 2 diabetes: The patient will be placed on sliding scale with insulin coverage. We will check hemoglobin A1c level if one has not been checked recently. The patient is not taking any medication for diabetes prior to admission. 8.  Thrombocytopenia:  The patient is asymptomatic. We will monitor the patient's platelet count. 9.  Dementia: We will continue supportive treatment. The patient's dementia is associated with significant behavioral disorder. The patient received Geodon in the emergency room for the associated behavioral disorder. 8.  Fall:    Patient fell in Ed  Imaging negative for any fracture     Code status: DNR. Signed DDNR in the chart   DVT prophylaxis: heparin SC     Care Plan discussed with: Patient/Family  Disposition: Home w/Family and TBD     Hospital Problems  Date Reviewed: 12/24/2020          Codes Class Noted POA    * (Principal) Bacterial pneumonia ICD-10-CM: J15.9  ICD-9-CM: 482.9  12/24/2020 Yes                Review of Systems:   A comprehensive review of systems was negative except for that written in the HPI. Vital Signs:    Last 24hrs VS reviewed since prior progress note.  Most recent are:  Visit Vitals  BP (!) 155/76 (BP 1 Location: Right arm, BP Patient Position: Sitting)   Pulse 79   Temp 98 °F (36.7 °C)   Resp 13   Ht 5' 6\" (1.676 m)   Wt 70.7 kg (155 lb 13.8 oz)   SpO2 97%   BMI 25.16 kg/m²         Intake/Output Summary (Last 24 hours) at 12/27/2020 1608  Last data filed at 12/27/2020 0400  Gross per 24 hour   Intake 750 ml   Output 1100 ml   Net -350 ml        Physical Examination:   I had a face to face encounter with this patient and independently examined them on December 27, 2020 as outlined below:        Constitutional:  ill appearing male   ENT:  Oral mucous moist, oropharynx benign. Neck supple,    Resp:  CTA bilaterally. No wheezing/rhonchi/rales. No accessory muscle use   CV:  Regular rhythm, normal rate, no murmurs, gallops, rubs    GI:  Soft, non distended, non tender. normoactive bowel sounds, no hepatosplenomegaly     Musculoskeletal:  No edema, warm, 2+ pulses throughout    Neurologic:  Moves all extremities. CN II-XII reviewed            Data Review:    Review and/or order of clinical lab test  Review and/or order of tests in the radiology section of CPT  Review and/or order of tests in the medicine section of CPT      Labs:     Recent Labs     12/27/20  0033 12/25/20  0719   WBC 5.7 4.2   HGB 12.3 11.6*   HCT 38.1 37.0   * 88*     Recent Labs     12/27/20  0033 12/25/20  0719 12/24/20  1813    142 139   K 3.6 3.7 4.0    111* 107   CO2 28 27 30   BUN 20 22* 29*   CREA 1.35* 1.19 1.50*   * 88 104*   CA 8.9 8.6 8.7   MG  --  2.2  --    PHOS  --  2.8  --      Recent Labs     12/25/20  0719 12/24/20  1813   ALT 23 26   AP 90 100   TBILI 0.4 0.3   TP 7.7 8.0   ALB 3.0* 3.2*   GLOB 4.7* 4.8*   LPSE 147  --      No results for input(s): INR, PTP, APTT, INREXT, INREXT in the last 72 hours. No results for input(s): FE, TIBC, PSAT, FERR in the last 72 hours. No results found for: FOL, RBCF   No results for input(s): PH, PCO2, PO2 in the last 72 hours.   Recent Labs     12/25/20  0719 12/24/20 2017 12/24/20  1813   SHEILA <0.05 <0.05 <0.05     Lab Results   Component Value Date/Time    Cholesterol, total 135 02/21/2019 12:50 PM    HDL Cholesterol 55 02/21/2019 12:50 PM    LDL, calculated 68 02/21/2019 12:50 PM    Triglyceride 59 02/21/2019 12:50 PM    CHOL/HDL Ratio 2.8 03/20/2017 12:21 AM     Lab Results   Component Value Date/Time    Glucose (POC) 102 (H) 12/27/2020 11:22 AM    Glucose (POC) 100 12/27/2020 07:42 AM    Glucose (POC) 107 (H) 12/26/2020 09:23 PM    Glucose (POC) 124 (H) 12/26/2020 04:34 PM    Glucose (POC) 108 (H) 12/26/2020 11:54 AM     Lab Results   Component Value Date/Time    Color YELLOW/STRAW 08/01/2018 01:13 PM    Appearance CLEAR 08/01/2018 01:13 PM    Specific gravity 1.021 08/01/2018 01:13 PM    pH (UA) 5.0 08/01/2018 01:13 PM    Protein 30 (A) 08/01/2018 01:13 PM    Glucose NEGATIVE  08/01/2018 01:13 PM    Ketone TRACE (A) 08/01/2018 01:13 PM    Bilirubin NEGATIVE  08/01/2018 01:13 PM    Urobilinogen 1.0 08/01/2018 01:13 PM    Nitrites NEGATIVE  08/01/2018 01:13 PM    Leukocyte Esterase NEGATIVE  08/01/2018 01:13 PM    Epithelial cells FEW 08/01/2018 01:13 PM    Bacteria NEGATIVE  08/01/2018 01:13 PM    WBC 0-4 08/01/2018 01:13 PM    RBC 0-5 08/01/2018 01:13 PM         Medications Reviewed:     Current Facility-Administered Medications   Medication Dose Route Frequency    amLODIPine (NORVASC) tablet 5 mg  5 mg Oral DAILY    aspirin delayed-release tablet 81 mg  81 mg Oral QPM    atorvastatin (LIPITOR) tablet 10 mg  10 mg Oral QHS    sodium chloride (NS) flush 5-40 mL  5-40 mL IntraVENous Q8H    sodium chloride (NS) flush 5-40 mL  5-40 mL IntraVENous PRN    acetaminophen (TYLENOL) tablet 650 mg  650 mg Oral Q6H PRN    Or    acetaminophen (TYLENOL) suppository 650 mg  650 mg Rectal Q6H PRN    polyethylene glycol (MIRALAX) packet 17 g  17 g Oral DAILY PRN    promethazine (PHENERGAN) tablet 12.5 mg  12.5 mg Oral Q6H PRN    Or    ondansetron (ZOFRAN) injection 4 mg  4 mg IntraVENous Q6H PRN    heparin (porcine) injection 5,000 Units  5,000 Units SubCUTAneous Q8H    lactobac ac& pc-s.therm-b.anim (JABIER Q/RISAQUAD)  1 Cap Oral DAILY    doxycycline (VIBRAMYCIN) 100 mg in 0.9% sodium chloride (MBP/ADV) 100 mL MBP  100 mg IntraVENous Q12H    cefTRIAXone (ROCEPHIN) 1 g in 0.9% sodium chloride (MBP/ADV) 50 mL MBP  1 g IntraVENous Q24H    insulin lispro (HUMALOG) injection   SubCUTAneous AC&HS    glucose chewable tablet 16 g  4 Tab Oral PRN    dextrose (D50W) injection syrg 12.5-25 g  12.5-25 g IntraVENous PRN    glucagon (GLUCAGEN) injection 1 mg  1 mg IntraMUSCular PRN    lactated Ringers infusion  75 mL/hr IntraVENous CONTINUOUS     ______________________________________________________________________  EXPECTED LENGTH OF STAY: - - -  ACTUAL LENGTH OF STAY:          3                 Katherine Gold MD

## 2020-12-28 LAB
ANION GAP SERPL CALC-SCNC: 4 MMOL/L (ref 5–15)
BASOPHILS # BLD: 0 K/UL (ref 0–0.1)
BASOPHILS NFR BLD: 0 % (ref 0–1)
BUN SERPL-MCNC: 21 MG/DL (ref 6–20)
BUN/CREAT SERPL: 16 (ref 12–20)
CALCIUM SERPL-MCNC: 8.6 MG/DL (ref 8.5–10.1)
CHLORIDE SERPL-SCNC: 107 MMOL/L (ref 97–108)
CO2 SERPL-SCNC: 27 MMOL/L (ref 21–32)
CREAT SERPL-MCNC: 1.28 MG/DL (ref 0.7–1.3)
DIFFERENTIAL METHOD BLD: ABNORMAL
EOSINOPHIL # BLD: 0.1 K/UL (ref 0–0.4)
EOSINOPHIL NFR BLD: 2 % (ref 0–7)
ERYTHROCYTE [DISTWIDTH] IN BLOOD BY AUTOMATED COUNT: 14 % (ref 11.5–14.5)
GLUCOSE BLD STRIP.AUTO-MCNC: 103 MG/DL (ref 65–100)
GLUCOSE BLD STRIP.AUTO-MCNC: 104 MG/DL (ref 65–100)
GLUCOSE BLD STRIP.AUTO-MCNC: 106 MG/DL (ref 65–100)
GLUCOSE BLD STRIP.AUTO-MCNC: 109 MG/DL (ref 65–100)
GLUCOSE BLD STRIP.AUTO-MCNC: 113 MG/DL (ref 65–100)
GLUCOSE BLD STRIP.AUTO-MCNC: 89 MG/DL (ref 65–100)
GLUCOSE SERPL-MCNC: 87 MG/DL (ref 65–100)
HCT VFR BLD AUTO: 33.2 % (ref 36.6–50.3)
HGB BLD-MCNC: 10.5 G/DL (ref 12.1–17)
IMM GRANULOCYTES # BLD AUTO: 0 K/UL (ref 0–0.04)
IMM GRANULOCYTES NFR BLD AUTO: 0 % (ref 0–0.5)
LYMPHOCYTES # BLD: 1.1 K/UL (ref 0.8–3.5)
LYMPHOCYTES NFR BLD: 23 % (ref 12–49)
MCH RBC QN AUTO: 28.5 PG (ref 26–34)
MCHC RBC AUTO-ENTMCNC: 31.6 G/DL (ref 30–36.5)
MCV RBC AUTO: 90.2 FL (ref 80–99)
MONOCYTES # BLD: 0.6 K/UL (ref 0–1)
MONOCYTES NFR BLD: 13 % (ref 5–13)
NEUTS SEG # BLD: 2.9 K/UL (ref 1.8–8)
NEUTS SEG NFR BLD: 62 % (ref 32–75)
NRBC # BLD: 0 K/UL (ref 0–0.01)
NRBC BLD-RTO: 0 PER 100 WBC
PLATELET # BLD AUTO: 114 K/UL (ref 150–400)
PMV BLD AUTO: 12.1 FL (ref 8.9–12.9)
POTASSIUM SERPL-SCNC: 3.6 MMOL/L (ref 3.5–5.1)
RBC # BLD AUTO: 3.68 M/UL (ref 4.1–5.7)
SERVICE CMNT-IMP: ABNORMAL
SERVICE CMNT-IMP: NORMAL
SODIUM SERPL-SCNC: 138 MMOL/L (ref 136–145)
WBC # BLD AUTO: 4.7 K/UL (ref 4.1–11.1)

## 2020-12-28 PROCEDURE — 74011250637 HC RX REV CODE- 250/637: Performed by: INTERNAL MEDICINE

## 2020-12-28 PROCEDURE — 82962 GLUCOSE BLOOD TEST: CPT

## 2020-12-28 PROCEDURE — 80048 BASIC METABOLIC PNL TOTAL CA: CPT

## 2020-12-28 PROCEDURE — 36415 COLL VENOUS BLD VENIPUNCTURE: CPT

## 2020-12-28 PROCEDURE — 74011250636 HC RX REV CODE- 250/636: Performed by: INTERNAL MEDICINE

## 2020-12-28 PROCEDURE — 74011000258 HC RX REV CODE- 258: Performed by: INTERNAL MEDICINE

## 2020-12-28 PROCEDURE — 65270000029 HC RM PRIVATE

## 2020-12-28 PROCEDURE — 85025 COMPLETE CBC W/AUTO DIFF WBC: CPT

## 2020-12-28 RX ORDER — DOXYCYCLINE HYCLATE 100 MG
100 TABLET ORAL EVERY 12 HOURS
Status: COMPLETED | OUTPATIENT
Start: 2020-12-28 | End: 2020-12-29

## 2020-12-28 RX ADMIN — HEPARIN SODIUM 5000 UNITS: 5000 INJECTION INTRAVENOUS; SUBCUTANEOUS at 10:30

## 2020-12-28 RX ADMIN — HEPARIN SODIUM 5000 UNITS: 5000 INJECTION INTRAVENOUS; SUBCUTANEOUS at 16:35

## 2020-12-28 RX ADMIN — HEPARIN SODIUM 5000 UNITS: 5000 INJECTION INTRAVENOUS; SUBCUTANEOUS at 23:20

## 2020-12-28 RX ADMIN — AMLODIPINE BESYLATE 5 MG: 5 TABLET ORAL at 10:30

## 2020-12-28 RX ADMIN — DOXYCYCLINE HYCLATE 100 MG: 100 TABLET, COATED ORAL at 22:15

## 2020-12-28 RX ADMIN — Medication 1 CAPSULE: at 10:30

## 2020-12-28 RX ADMIN — CEFTRIAXONE SODIUM 1 G: 1 INJECTION, POWDER, FOR SOLUTION INTRAMUSCULAR; INTRAVENOUS at 22:15

## 2020-12-28 RX ADMIN — CEFTRIAXONE SODIUM 1 G: 1 INJECTION, POWDER, FOR SOLUTION INTRAMUSCULAR; INTRAVENOUS at 00:16

## 2020-12-28 RX ADMIN — ASPIRIN 81 MG: 81 TABLET, COATED ORAL at 17:45

## 2020-12-28 RX ADMIN — DOXYCYCLINE HYCLATE 100 MG: 100 TABLET, COATED ORAL at 12:18

## 2020-12-28 RX ADMIN — HEPARIN SODIUM 5000 UNITS: 5000 INJECTION INTRAVENOUS; SUBCUTANEOUS at 00:15

## 2020-12-28 RX ADMIN — Medication 10 ML: at 07:11

## 2020-12-28 RX ADMIN — Medication 10 ML: at 14:00

## 2020-12-28 RX ADMIN — ATORVASTATIN CALCIUM 10 MG: 10 TABLET, FILM COATED ORAL at 22:15

## 2020-12-28 RX ADMIN — DOXYCYCLINE 100 MG: 100 INJECTION, POWDER, LYOPHILIZED, FOR SOLUTION INTRAVENOUS at 00:16

## 2020-12-28 NOTE — PROGRESS NOTES
Bedside and Verbal shift change report given to AMADEO Arias (oncoming nurse) by Brunilda Byrnes RN (offgoing nurse). Report included the following information SBAR, Kardex, Intake/Output, MAR, Recent Results and Cardiac Rhythm NSR.

## 2020-12-28 NOTE — PROGRESS NOTES
TRANSFER - OUT REPORT:    Verbal report given to [facility], RN(name) on [employer]  being transferred to  (unit) for routine progression of care       Report consisted of patients Situation, Background, Assessment and   Recommendations(SBAR). Information from the following report(s) SBAR, Kardex, MAR, Cardiac Rhythm NSR and Dual Neuro Assessment was reviewed with the receiving nurse. Lines:   Peripheral IV 12/27/20 Right Arm (Active)   Site Assessment Clean, dry, & intact 12/28/20 0800   Phlebitis Assessment 0 12/28/20 0800   Infiltration Assessment 0 12/28/20 0800   Dressing Status Clean, dry, & intact 12/28/20 0800   Dressing Type Transparent;Tape 12/28/20 0800   Hub Color/Line Status Blue; Infusing 12/28/20 0800   Action Taken Open ports on tubing capped 12/28/20 0800   Alcohol Cap Used Yes 12/28/20 0800        Opportunity for questions and clarification was provided.       Patient transported with:   Registered Nurse

## 2020-12-28 NOTE — PROGRESS NOTES
Hospitalist Progress Note  Fabian Travis MD  Answering service: [phone] OR 36 from in house phone        Date of Service:  2020  NAME:  Skylar Becerra  :  9/3/1931  MRN:  [id number]      Admission Summary:   As per initial admission summary  This an 77-year-old man with past medical history significant for hypertension, dyslipidemia, type 2 diabetes, and dementia, who was in his usual state of health until the day of his presentation at the emergency room when it was reported that the patient developed chest pain at the nursing home where the patient resides. The patient developed the chest pain at about 04:30 p.m., located at the right side of the chest.  The patient is not able to state the severity and the quality of the pain because of his dementia. According to report, the patient fell into a door frame, hitting his right shoulder the day before. When the patient arrived at the emergency room, the patient did not complain of any chest pain. The patient was confused, agitated, and restless at times. CTA of the chest was obtained. The CTA was negative for pulmonary embolism but shows consolidation. The patient was then referred to the hospitalist service for evaluation for admission. The patient was last admitted to the hospital from 2018 to 2018. He was admitted and treated for acute renal failure. There was no history of fever, rigors or chills reported. Interval history / Subjective:   Patient seen for Follow up of PNA    Patient seen and examined by the bedside, Labs, images and notes reviewed  No acute issues overnight. Pt w/o any acute complaints. Placement pending      Assessment & Plan:     1. HCAP  Cont Rocephin and doxycycline x5d total    Chest pain on admission likely 2/2 above, resolved  Troponin neg x3, no evidence of ACS  CTA of the chest is negative for pulmonary embolism    2.   Acute kidney injury:  resolved  Ct abd negative for any acute abnormality     3. Elevated BNP level:  No pulmonary edema or s/sx of CHF  systolic murmur on exam  Order Echo    4. Elevated D-dimer likely d/t ERICA since CTA chest is negative for pulmonary embolism and  ultrasound of the lower extremity is negative for DVT    5. Hypertension, controlled  Cont preadmission medication. We will monitor the patient's blood pressure closely. 6.  Dyslipidemia: We will continue with Lipitor. 7.  PreDM, a1c 5.7    8. Thrombocytopenia, mild, The patient is asymptomatic  monitor    9. Dementia: We will continue supportive treatment. The patient's dementia is associated with significant behavioral disorder. The patient received Geodon in the emergency room for the associated behavioral disorder. 8.  Fall:    Patient fell in Ed  Imaging negative for any fracture     D/c pending placement    Code status: DNR. Signed DDNR in the chart   DVT prophylaxis: heparin SC     Care Plan discussed with: Patient/Family  Disposition: SNF/LTC and TBD     Hospital Problems  Date Reviewed: 12/24/2020          Codes Class Noted POA    * (Principal) Bacterial pneumonia ICD-10-CM: J15.9  ICD-9-CM: 482.9  12/24/2020 Yes              Review of Systems:   A comprehensive review of systems was negative except for that written in the HPI. Vital Signs:    Last 24hrs VS reviewed since prior progress note.  Most recent are:  Visit Vitals  /80 (BP 1 Location: Right arm, BP Patient Position: At rest)   Pulse 63   Temp 97.7 °F (36.5 °C)   Resp 13   Ht 5' 6\" (1.676 m)   Wt 70.4 kg (155 lb 3.3 oz)   SpO2 93%   BMI 25.05 kg/m²         Intake/Output Summary (Last 24 hours) at 12/28/2020 1610  Last data filed at 12/28/2020 0033  Gross per 24 hour   Intake    Output 250 ml   Net -250 ml        Physical Examination:   I had a face to face encounter with this patient and independently examined them on December 28, 2020 as outlined below:        Constitutional:  awake, no acute distress   ENT:  Oral mucous moist, oropharynx benign. Neck supple   Resp:  CTA bilaterally. No wheezing/rhonchi/rales. No accessory muscle use   CV:  Regular rhythm, normal rate, 3/6 SM    GI:  Soft, non distended, non tender     Musculoskeletal:  No edema, warm    Neurologic:  Moves all extremities            Data Review:    Review and/or order of clinical lab test  Review and/or order of tests in the radiology section of CPT  Review and/or order of tests in the medicine section of CPT      Labs:     Recent Labs     12/28/20  0400 12/27/20  0033   WBC 4.7 5.7   HGB 10.5* 12.3   HCT 33.2* 38.1   * 135*     Recent Labs     12/28/20  0400 12/27/20 0033    139   K 3.6 3.6    104   CO2 27 28   BUN 21* 20   CREA 1.28 1. 35*   GLU 87 112*   CA 8.6 8.9     No results for input(s): ALT, AP, TBIL, TBILI, TP, ALB, GLOB, GGT, AML, LPSE in the last 72 hours. No lab exists for component: SGOT, GPT, AMYP, HLPSE  No results for input(s): INR, PTP, APTT, INREXT, INREXT in the last 72 hours. No results for input(s): FE, TIBC, PSAT, FERR in the last 72 hours. No results found for: FOL, RBCF   No results for input(s): PH, PCO2, PO2 in the last 72 hours. No results for input(s): CPK, CKNDX, TROIQ in the last 72 hours.     No lab exists for component: CPKMB  Lab Results   Component Value Date/Time    Cholesterol, total 135 02/21/2019 12:50 PM    HDL Cholesterol 55 02/21/2019 12:50 PM    LDL, calculated 68 02/21/2019 12:50 PM    Triglyceride 59 02/21/2019 12:50 PM    CHOL/HDL Ratio 2.8 03/20/2017 12:21 AM     Lab Results   Component Value Date/Time    Glucose (POC) 109 (H) 12/28/2020 11:20 AM    Glucose (POC) 103 (H) 12/28/2020 07:43 AM    Glucose (POC) 89 12/28/2020 07:10 AM    Glucose (POC) 95 12/27/2020 10:33 PM    Glucose (POC) 101 (H) 12/27/2020 05:04 PM     Lab Results   Component Value Date/Time    Color YELLOW/STRAW 08/01/2018 01:13 PM    Appearance CLEAR 08/01/2018 01:13 PM    Specific gravity 1.021 08/01/2018 01:13 PM    pH (UA) 5.0 08/01/2018 01:13 PM    Protein 30 (A) 08/01/2018 01:13 PM    Glucose NEGATIVE  08/01/2018 01:13 PM    Ketone TRACE (A) 08/01/2018 01:13 PM    Bilirubin NEGATIVE  08/01/2018 01:13 PM    Urobilinogen 1.0 08/01/2018 01:13 PM    Nitrites NEGATIVE  08/01/2018 01:13 PM    Leukocyte Esterase NEGATIVE  08/01/2018 01:13 PM    Epithelial cells FEW 08/01/2018 01:13 PM    Bacteria NEGATIVE  08/01/2018 01:13 PM    WBC 0-4 08/01/2018 01:13 PM    RBC 0-5 08/01/2018 01:13 PM         Medications Reviewed:     Current Facility-Administered Medications   Medication Dose Route Frequency    doxycycline (VIBRA-TABS) tablet 100 mg  100 mg Oral Q12H    amLODIPine (NORVASC) tablet 5 mg  5 mg Oral DAILY    aspirin delayed-release tablet 81 mg  81 mg Oral QPM    atorvastatin (LIPITOR) tablet 10 mg  10 mg Oral QHS    sodium chloride (NS) flush 5-40 mL  5-40 mL IntraVENous Q8H    sodium chloride (NS) flush 5-40 mL  5-40 mL IntraVENous PRN    acetaminophen (TYLENOL) tablet 650 mg  650 mg Oral Q6H PRN    Or    acetaminophen (TYLENOL) suppository 650 mg  650 mg Rectal Q6H PRN    polyethylene glycol (MIRALAX) packet 17 g  17 g Oral DAILY PRN    promethazine (PHENERGAN) tablet 12.5 mg  12.5 mg Oral Q6H PRN    Or    ondansetron (ZOFRAN) injection 4 mg  4 mg IntraVENous Q6H PRN    heparin (porcine) injection 5,000 Units  5,000 Units SubCUTAneous Q8H    lactobac ac& pc-s.therm-b.anim (JABIER Q/RISAQUAD)  1 Cap Oral DAILY    cefTRIAXone (ROCEPHIN) 1 g in 0.9% sodium chloride (MBP/ADV) 50 mL MBP  1 g IntraVENous Q24H    insulin lispro (HUMALOG) injection   SubCUTAneous AC&HS    glucose chewable tablet 16 g  4 Tab Oral PRN    dextrose (D50W) injection syrg 12.5-25 g  12.5-25 g IntraVENous PRN    glucagon (GLUCAGEN) injection 1 mg  1 mg IntraMUSCular PRN    lactated Ringers infusion  75 mL/hr IntraVENous CONTINUOUS     ______________________________________________________________________  EXPECTED LENGTH OF STAY: - - -  ACTUAL LENGTH OF STAY:          4                 Cynthia Mckee MD

## 2020-12-28 NOTE — PROGRESS NOTES
Transition of Care Plan   RUR- 12 % Low Risks    DISPOSITION: TBD, SNF vs back to [facility]; pending medical progression    Transport: likely BLS      Reason for Admission:   right sided chest pain                    RUR Score:  12 % low risks                    Plan for utilizing home health:  TBD, SNF vs TBD       PCP: First and Last name:  Shantelle Bullock MD    Name of Practice: [address] [place] [address] Physicians    Are you a current patient: Yes/No: Yes   Approximate date of last visit: unknown    Can you participate in a virtual visit with your PCP: NO                     Current Advanced Directive/Advance Care Plan: No AMD on file, has Lennox Jaun Berry [phone] is the healthcare decision maker                          Transition of Care Plan:  TBD pending care & therapy recommendations. Reviewed chart for transitions of care,and discussed in rounds. CM met with patient at bedside to explain role and offer support. Patient is alert and oriented x3. He is very hard of hearing-unable to verify most of the information. Patient states that he lives at [address] [place] [address]. They provide assistance with ADLs. He states that he ambulates with a cane. The facility provides medication management and the PCP sees patient at the facility. CM unable to assess other information. Called and left a VM to healthcare decision maker Jaun Feliz [phone]. Medicare pt has received, reviewed, and signed 1st IM letter informing them of their right to appeal the discharge. Signed copied has been placed on pt bedside chart. Unable to discuss due to hard of hearing/dementia. Care Management Interventions  PCP Verified by CM: Yes(unknown )  Palliative Care Criteria Met (RRAT>21 & CHF Dx)?: No  Mode of Transport at Discharge:  Other (see comment)(TBD at discharge, may need BLS )  Transition of Care Consult (CM Consult): Discharge Planning  MyChart Signup: Yes  Discharge Durable Medical Equipment: No  Health Maintenance Reviewed: Yes  Physical Therapy Consult: Yes  Occupational Therapy Consult: Yes  Speech Therapy Consult: Yes  Current Support Network: Assisted Living([facility] )  Confirm Follow Up Transport: Other (see comment)(likely BLS )  The Patient and/or Patient Representative was Provided with a Choice of Provider and Agrees with the Discharge Plan?: Yes  Freedom of Choice List was Provided with Basic Dialogue that Supports the Patient's Individualized Plan of Care/Goals, Treatment Preferences and Shares the Quality Data Associated with the Providers?: Yes   Resource Information Provided?: No  Discharge Location  Discharge Placement: Assisted Living(TBD )    CLAYTON Franklin

## 2020-12-28 NOTE — PROGRESS NOTES
TRANSFER - IN REPORT:    Verbal report received from Dominique(name) on [facility]  being received from ED(unit) for routine progression of care      Report consisted of patients Situation, Background, Assessment and   Recommendations(SBAR). Information from the following report(s) SBAR, Kardex, Intake/Output and MAR was reviewed with the receiving nurse. Opportunity for questions and clarification was provided. Assessment completed upon patients arrival to unit and care assumed.

## 2020-12-28 NOTE — PROGRESS NOTES
Primary Nurse Pricilla Patton, AMADEO and Theresa Celeste RN, RN performed a dual skin assessment on this patient No impairment noted.

## 2020-12-28 NOTE — PROGRESS NOTES
Bedside and Verbal shift change report given to AMADEO Fox (oncoming nurse) by Jeet Laughlin RN (offgoing nurse). Report included the following information SBAR, Kardex, Intake/Output, MAR, Recent Results and Cardiac Rhythm NSR.

## 2020-12-28 NOTE — PROGRESS NOTES
Clinical Pharmacy Note: Re: IV to PO Automatic Conversion - Antibiotic    Please note: Theron Jean Baptiste medication(s) (doxycycline) has/have been changed from IV to PO based on the following criteria:    The patient:  Received IV therapy for at least 24 hours (antibiotics only)  Is tolerating diet more advanced than clear liquids  Is tolerating oral medications  Is not on vasopressor blood pressure support (i.e. no signs of shock)  Patient hasnt had a seizure for 72 hours (applicable for antiepileptic medications only)    This IV to PO conversion is based on the [employer] approved automatic conversion policy for eligible patients. Please call with questions.

## 2020-12-29 ENCOUNTER — APPOINTMENT (OUTPATIENT)
Dept: NON INVASIVE DIAGNOSTICS | Age: 85
DRG: 194 | End: 2020-12-29
Attending: INTERNAL MEDICINE
Payer: MEDICARE

## 2020-12-29 LAB
ANION GAP SERPL CALC-SCNC: 6 MMOL/L (ref 5–15)
BASOPHILS # BLD: 0 K/UL (ref 0–0.1)
BASOPHILS NFR BLD: 0 % (ref 0–1)
BUN SERPL-MCNC: 27 MG/DL (ref 6–20)
BUN/CREAT SERPL: 21 (ref 12–20)
CALCIUM SERPL-MCNC: 8.9 MG/DL (ref 8.5–10.1)
CHLORIDE SERPL-SCNC: 107 MMOL/L (ref 97–108)
CO2 SERPL-SCNC: 27 MMOL/L (ref 21–32)
CREAT SERPL-MCNC: 1.26 MG/DL (ref 0.7–1.3)
DIFFERENTIAL METHOD BLD: ABNORMAL
EOSINOPHIL # BLD: 0.1 K/UL (ref 0–0.4)
EOSINOPHIL NFR BLD: 3 % (ref 0–7)
ERYTHROCYTE [DISTWIDTH] IN BLOOD BY AUTOMATED COUNT: 14.3 % (ref 11.5–14.5)
GLUCOSE BLD STRIP.AUTO-MCNC: 105 MG/DL (ref 65–100)
GLUCOSE BLD STRIP.AUTO-MCNC: 91 MG/DL (ref 65–100)
GLUCOSE BLD STRIP.AUTO-MCNC: 94 MG/DL (ref 65–100)
GLUCOSE BLD STRIP.AUTO-MCNC: 99 MG/DL (ref 65–100)
GLUCOSE SERPL-MCNC: 99 MG/DL (ref 65–100)
HCT VFR BLD AUTO: 34.4 % (ref 36.6–50.3)
HGB BLD-MCNC: 11 G/DL (ref 12.1–17)
IMM GRANULOCYTES # BLD AUTO: 0 K/UL (ref 0–0.04)
IMM GRANULOCYTES NFR BLD AUTO: 0 % (ref 0–0.5)
LYMPHOCYTES # BLD: 0.8 K/UL (ref 0.8–3.5)
LYMPHOCYTES NFR BLD: 18 % (ref 12–49)
MAGNESIUM SERPL-MCNC: 2.1 MG/DL (ref 1.6–2.4)
MCH RBC QN AUTO: 28.6 PG (ref 26–34)
MCHC RBC AUTO-ENTMCNC: 32 G/DL (ref 30–36.5)
MCV RBC AUTO: 89.6 FL (ref 80–99)
MONOCYTES # BLD: 0.6 K/UL (ref 0–1)
MONOCYTES NFR BLD: 13 % (ref 5–13)
NEUTS SEG # BLD: 3 K/UL (ref 1.8–8)
NEUTS SEG NFR BLD: 66 % (ref 32–75)
NRBC # BLD: 0 K/UL (ref 0–0.01)
NRBC BLD-RTO: 0 PER 100 WBC
PLATELET # BLD AUTO: 113 K/UL (ref 150–400)
PMV BLD AUTO: 11.6 FL (ref 8.9–12.9)
POTASSIUM SERPL-SCNC: 3.8 MMOL/L (ref 3.5–5.1)
RBC # BLD AUTO: 3.84 M/UL (ref 4.1–5.7)
SERVICE CMNT-IMP: ABNORMAL
SERVICE CMNT-IMP: NORMAL
SODIUM SERPL-SCNC: 140 MMOL/L (ref 136–145)
WBC # BLD AUTO: 4.6 K/UL (ref 4.1–11.1)

## 2020-12-29 PROCEDURE — 65270000029 HC RM PRIVATE

## 2020-12-29 PROCEDURE — 74011250636 HC RX REV CODE- 250/636: Performed by: INTERNAL MEDICINE

## 2020-12-29 PROCEDURE — 36415 COLL VENOUS BLD VENIPUNCTURE: CPT

## 2020-12-29 PROCEDURE — 80048 BASIC METABOLIC PNL TOTAL CA: CPT

## 2020-12-29 PROCEDURE — 97116 GAIT TRAINING THERAPY: CPT | Performed by: PHYSICAL THERAPIST

## 2020-12-29 PROCEDURE — 74011250637 HC RX REV CODE- 250/637: Performed by: INTERNAL MEDICINE

## 2020-12-29 PROCEDURE — 83735 ASSAY OF MAGNESIUM: CPT

## 2020-12-29 PROCEDURE — 85025 COMPLETE CBC W/AUTO DIFF WBC: CPT

## 2020-12-29 PROCEDURE — 94760 N-INVAS EAR/PLS OXIMETRY 1: CPT

## 2020-12-29 PROCEDURE — 93306 TTE W/DOPPLER COMPLETE: CPT

## 2020-12-29 PROCEDURE — 82962 GLUCOSE BLOOD TEST: CPT

## 2020-12-29 RX ORDER — LISINOPRIL 5 MG/1
5 TABLET ORAL DAILY
Status: DISCONTINUED | OUTPATIENT
Start: 2020-12-29 | End: 2021-01-04 | Stop reason: HOSPADM

## 2020-12-29 RX ADMIN — SODIUM CHLORIDE, SODIUM LACTATE, POTASSIUM CHLORIDE, AND CALCIUM CHLORIDE 75 ML/HR: 600; 310; 30; 20 INJECTION, SOLUTION INTRAVENOUS at 23:25

## 2020-12-29 RX ADMIN — DOXYCYCLINE HYCLATE 100 MG: 100 TABLET, COATED ORAL at 09:50

## 2020-12-29 RX ADMIN — HEPARIN SODIUM 5000 UNITS: 5000 INJECTION INTRAVENOUS; SUBCUTANEOUS at 23:23

## 2020-12-29 RX ADMIN — AMLODIPINE BESYLATE 5 MG: 5 TABLET ORAL at 09:43

## 2020-12-29 RX ADMIN — HEPARIN SODIUM 5000 UNITS: 5000 INJECTION INTRAVENOUS; SUBCUTANEOUS at 17:14

## 2020-12-29 RX ADMIN — HEPARIN SODIUM 5000 UNITS: 5000 INJECTION INTRAVENOUS; SUBCUTANEOUS at 09:45

## 2020-12-29 RX ADMIN — ASPIRIN 81 MG: 81 TABLET, COATED ORAL at 17:18

## 2020-12-29 RX ADMIN — Medication 1 CAPSULE: at 09:44

## 2020-12-29 RX ADMIN — ATORVASTATIN CALCIUM 10 MG: 10 TABLET, FILM COATED ORAL at 21:25

## 2020-12-29 RX ADMIN — LISINOPRIL 5 MG: 5 TABLET ORAL at 17:13

## 2020-12-29 NOTE — PROGRESS NOTES
Hospitalist Progress Note  Vilma Mcleod MD  Answering service: [phone] OR 36 from in house phone        Date of Service:  2020  NAME:  Giovanny Lora  :  9/3/1931  MRN:  [id number]      Admission Summary:   As per initial admission summary  This an 80-year-old man with past medical history significant for hypertension, dyslipidemia, type 2 diabetes, and dementia, who was in his usual state of health until the day of his presentation at the emergency room when it was reported that the patient developed chest pain at the nursing home where the patient resides. The patient developed the chest pain at about 04:30 p.m., located at the right side of the chest.  The patient is not able to state the severity and the quality of the pain because of his dementia. According to report, the patient fell into a door frame, hitting his right shoulder the day before. When the patient arrived at the emergency room, the patient did not complain of any chest pain. The patient was confused, agitated, and restless at times. CTA of the chest was obtained. The CTA was negative for pulmonary embolism but shows consolidation. The patient was then referred to the hospitalist service for evaluation for admission. The patient was last admitted to the hospital from 2018 to 2018. He was admitted and treated for acute renal failure. There was no history of fever, rigors or chills reported. Interval history / Subjective:   Patient seen for Follow up of PNA    Patient seen and examined by the bedside, Labs, images and notes reviewed  No acute issues overnight. Pt w/o any acute complaints. Placement pending      Assessment & Plan:     1. HCAP  S/p Rocephin and doxycycline x5d total    Chest pain on admission likely 2/2 above, resolved  Troponin neg x3, no evidence of ACS  CTA of the chest negative for pulmonary embolism    2.   Acute kidney injury:  resolved  Ct abd negative for any acute abnormality     3. Elevated BNP level:  No pulmonary edema or s/sx of CHF  systolic murmur on exam  Echo pending    4. Elevated D-dimer likely d/t ERICA since CTA chest is negative for pulmonary embolism and  ultrasound of the lower extremity is negative for DVT    5. Hypertension, uncontrolled  Cont norvasc, add lisinopril    6. Dyslipidemia: We will continue with Lipitor. 7.  PreDM, a1c 5.7    8. Thrombocytopenia, mild, The patient is asymptomatic  monitor    9. Dementia: We will continue supportive treatment. The patient's dementia is associated with significant behavioral disorder. The patient received Geodon in the emergency room for the associated behavioral disorder. 8.  Fall:    Patient fell in Ed  Imaging negative for any fracture     D/c pending placement    Code status: DNR. Signed DDNR in the chart   DVT prophylaxis: heparin SC     Care Plan discussed with: Patient/Family  Disposition: SNF/LTC and TBD     Hospital Problems  Date Reviewed: 12/24/2020          Codes Class Noted POA    * (Principal) Bacterial pneumonia ICD-10-CM: J15.9  ICD-9-CM: 482.9  12/24/2020 Yes              Review of Systems:   A comprehensive review of systems was negative except for that written in the HPI. Vital Signs:    Last 24hrs VS reviewed since prior progress note. Most recent are:  Visit Vitals  /74 (BP 1 Location: Left arm)   Pulse 78   Temp 98.1 °F (36.7 °C)   Resp 16   Ht 5' 6\" (1.676 m)   Wt 70 kg (154 lb 5.2 oz)   SpO2 99%   BMI 24.91 kg/m²         Intake/Output Summary (Last 24 hours) at 12/29/2020 1523  Last data filed at 12/28/2020 2020  Gross per 24 hour   Intake    Output 50 ml   Net -50 ml        Physical Examination:   I had a face to face encounter with this patient and independently examined them on December 29, 2020 as outlined below:        Constitutional:  awake, no acute distress   ENT:  Oral mucous moist, oropharynx benign.  Neck supple   Resp:  CTA bilaterally. No wheezing/rhonchi/rales. No accessory muscle use   CV:  Regular rhythm, normal rate, 3/6 SM    GI:  Soft, non distended, non tender     Musculoskeletal:  No edema, warm    Neurologic:  Moves all extremities            Data Review:    Review and/or order of clinical lab test  Review and/or order of tests in the radiology section of [facility]  Review and/or order of tests in the medicine section of [facility]      Labs:     Recent Labs     12/29/20 0517 12/28/20  0400   WBC 4.6 4.7   HGB 11.0* 10.5*   HCT 34.4* 33.2*   * 114*     Recent Labs     12/29/20  0517 12/28/20  0400 12/27/20  0033    138 139   K 3.8 3.6 3.6    107 104   CO2 27 27 28   BUN 27* 21* 20   CREA 1.26 1.28 1. 35*   GLU 99 87 112*   CA 8.9 8.6 8.9   MG 2.1  --   --      No results for input(s): ALT, AP, TBIL, TBILI, TP, ALB, GLOB, GGT, AML, LPSE in the last 72 hours. No lab exists for component: SGOT, GPT, AMYP, HLPSE  No results for input(s): INR, PTP, APTT, INREXT, INREXT in the last 72 hours. No results for input(s): FE, TIBC, PSAT, FERR in the last 72 hours. No results found for: FOL, RBCF   No results for input(s): PH, PCO2, PO2 in the last 72 hours. No results for input(s): CPK, CKNDX, TROIQ in the last 72 hours.     No lab exists for component: CPKMB  Lab Results   Component Value Date/Time    Cholesterol, total 135 02/21/2019 12:50 PM    HDL Cholesterol 55 02/21/2019 12:50 PM    LDL, calculated 68 02/21/2019 12:50 PM    Triglyceride 59 02/21/2019 12:50 PM    CHOL/HDL Ratio 2.8 03/20/2017 12:21 AM     Lab Results   Component Value Date/Time    Glucose (POC) 91 12/29/2020 11:48 AM    Glucose (POC) 94 12/29/2020 06:16 AM    Glucose (POC) 113 (H) 12/28/2020 09:07 PM    Glucose (POC) 106 (H) 12/28/2020 04:59 PM    Glucose (POC) 104 (H) 12/28/2020 04:17 PM     Lab Results   Component Value Date/Time    Color YELLOW/STRAW 08/01/2018 01:13 PM    Appearance CLEAR 08/01/2018 01:13 PM    Specific gravity 1.021 08/01/2018 01:13 PM    pH (UA) 5.0 08/01/2018 01:13 PM    Protein 30 (A) 08/01/2018 01:13 PM    Glucose NEGATIVE  08/01/2018 01:13 PM    Ketone TRACE (A) 08/01/2018 01:13 PM    Bilirubin NEGATIVE  08/01/2018 01:13 PM    Urobilinogen 1.0 08/01/2018 01:13 PM    Nitrites NEGATIVE  08/01/2018 01:13 PM    Leukocyte Esterase NEGATIVE  08/01/2018 01:13 PM    Epithelial cells FEW 08/01/2018 01:13 PM    Bacteria NEGATIVE  08/01/2018 01:13 PM    WBC 0-4 08/01/2018 01:13 PM    RBC 0-5 08/01/2018 01:13 PM         Medications Reviewed:     Current Facility-Administered Medications   Medication Dose Route Frequency    amLODIPine (NORVASC) tablet 5 mg  5 mg Oral DAILY    aspirin delayed-release tablet 81 mg  81 mg Oral QPM    atorvastatin (LIPITOR) tablet 10 mg  10 mg Oral QHS    sodium chloride (NS) flush 5-40 mL  5-40 mL IntraVENous Q8H    sodium chloride (NS) flush 5-40 mL  5-40 mL IntraVENous PRN    acetaminophen (TYLENOL) tablet 650 mg  650 mg Oral Q6H PRN    Or    acetaminophen (TYLENOL) suppository 650 mg  650 mg Rectal Q6H PRN    polyethylene glycol (MIRALAX) packet 17 g  17 g Oral DAILY PRN    promethazine (PHENERGAN) tablet 12.5 mg  12.5 mg Oral Q6H PRN    Or    ondansetron (ZOFRAN) injection 4 mg  4 mg IntraVENous Q6H PRN    heparin (porcine) injection 5,000 Units  5,000 Units SubCUTAneous Q8H    lactobac ac& pc-s.therm-b.anim (JABIER Q/RISAQUAD)  1 Cap Oral DAILY    insulin lispro (HUMALOG) injection   SubCUTAneous AC&HS    glucose chewable tablet 16 g  4 Tab Oral PRN    dextrose (D50W) injection syrg 12.5-25 g  12.5-25 g IntraVENous PRN    glucagon (GLUCAGEN) injection 1 mg  1 mg IntraMUSCular PRN    lactated Ringers infusion  75 mL/hr IntraVENous CONTINUOUS     ______________________________________________________________________  EXPECTED LENGTH OF STAY: - - -  ACTUAL LENGTH OF STAY:          5                 Jovana Tse MD

## 2020-12-29 NOTE — ROUTINE PROCESS
Bedside shift change report given to Annmarie Fitzpatrick (oncoming nurse) by Maxwell Rooney (offgoing nurse). Report included the following information SBAR.

## 2020-12-29 NOTE — PROGRESS NOTES
Bedside shift change report given to Brian Pires   (oncoming nurse) by Lv Cano (offgoing nurse). Report included the following information SBAR, Kardex, Intake/Output and MAR.

## 2020-12-29 NOTE — PROGRESS NOTES
Problem: Mobility Impaired (Adult and Pediatric)  Goal: *Acute Goals and Plan of Care (Insert Text)  Description: FUNCTIONAL STATUS PRIOR TO ADMISSION: Patient was modified independent using a rollator for functional mobility. HOME SUPPORT PRIOR TO ADMISSION: Pt lived in a local detention, hard to determine exactly how much help he needed due to patient being a poor historian     Physical Therapy Goals  Initiated 12/27/2020  1. Patient will move from supine to sit and sit to supine  in bed with modified independence within 7 day(s). 2.  Patient will transfer from bed to chair and chair to bed with modified independence using the least restrictive device within 7 day(s). 3.  Patient will perform sit to stand with modified independence within 7 day(s). 4.  Patient will ambulate with supervision/set-up for 400 feet with the least restrictive device within 7 day(s). Outcome: Progressing Towards Goal   PHYSICAL THERAPY TREATMENT  Patient: Tia Dickey (16 y.o. male)  Date: 12/29/2020  Diagnosis: Bacterial pneumonia [J15.9] Bacterial pneumonia       Precautions: Fall, DNR  Chart, physical therapy assessment, plan of care and goals were reviewed. ASSESSMENT  Patient continues with skilled PT services and is progressing towards goals. Patient limited by [facility], intermittent confusion, impaired balance and decreased activity tolerance. Needing supervision to come to EOB and CGA for transfers. Amb approx 300 feet with RW and CGA-Lakshmi with intermittent path deviations but no overt LOB. Overall has decreased safety awareness. Other factors to consider for discharge: at risk for falls, dementia         PLAN :  Patient continues to benefit from skilled intervention to address the above impairments. Continue treatment per established plan of care. to address goals.     Recommendation for discharge: (in order for the patient to meet his/her long term goals)  Therapy up to 5 days/week in SNF setting      IF patient discharges home will need the following DME: to be determined (TBD)       SUBJECTIVE:   Patient stated I have no home. My wife threw me out and is  to someone else now.     OBJECTIVE DATA SUMMARY:   Critical Behavior:  Neurologic State: Alert  Orientation Level: Disoriented to person, Disoriented to place  Cognition: Follows commands  Safety/Judgement: Decreased awareness of environment, Decreased awareness of need for assistance, Decreased awareness of need for safety, Decreased insight into deficits, Fall prevention  Functional Mobility Training:  Bed Mobility:     Supine to Sit: Supervision              Transfers:  Sit to Stand: Contact guard assistance  Stand to Sit: Contact guard assistance                             Balance:  Sitting: Intact  Standing: Impaired  Standing - Static: Constant support;Good  Standing - Dynamic : Constant support; Fair  Ambulation/Gait Training:  Distance (ft): 300 Feet (ft)  Assistive Device: Gait belt;Walker, rolling  Ambulation - Level of Assistance: Contact guard assistance        Gait Abnormalities: Decreased step clearance;Shuffling gait        Base of Support: Widened     Speed/Yudith: Pace decreased (<100 feet/min); Slow  Step Length: Left shortened;Right shortened          Pain Rating:  No c/o pain    Activity Tolerance:   Fair and requires rest breaks    After treatment patient left in no apparent distress:   Sitting in chair, Call bell within reach, and Bed / chair alarm activated    COMMUNICATION/COLLABORATION:   The patients plan of care was discussed with: Physical therapist and Registered nurse.      Jesusita Villavicencio PT, DPT   Time Calculation: 15 mins

## 2020-12-30 LAB
ANION GAP SERPL CALC-SCNC: 5 MMOL/L (ref 5–15)
BUN SERPL-MCNC: 27 MG/DL (ref 6–20)
BUN/CREAT SERPL: 23 (ref 12–20)
CALCIUM SERPL-MCNC: 9 MG/DL (ref 8.5–10.1)
CHLORIDE SERPL-SCNC: 107 MMOL/L (ref 97–108)
CO2 SERPL-SCNC: 27 MMOL/L (ref 21–32)
CREAT SERPL-MCNC: 1.19 MG/DL (ref 0.7–1.3)
ECHO AO ROOT DIAM: 3.55 CM
ECHO AR MAX VEL PISA: 402.54 CM/S
ECHO AV AREA PEAK VELOCITY: 0.78 CM2
ECHO AV AREA VTI: 0.85 CM2
ECHO AV AREA/BSA PEAK VELOCITY: 0.4 CM2/M2
ECHO AV AREA/BSA VTI: 0.5 CM2/M2
ECHO AV MEAN GRADIENT: 27.82 MMHG
ECHO AV PEAK GRADIENT: 42.15 MMHG
ECHO AV PEAK VELOCITY: 324.63 CM/S
ECHO AV REGURGITANT PHT: 761.76 MS
ECHO AV VTI: 76.29 CM
ECHO LA MAJOR AXIS: 3.73 CM
ECHO LA MINOR AXIS: 2.08 CM
ECHO LV INTERNAL DIMENSION DIASTOLIC: 3.19 CM (ref 4.2–5.9)
ECHO LV INTERNAL DIMENSION SYSTOLIC: 2.31 CM
ECHO LV IVSD: 1.03 CM (ref 0.6–1)
ECHO LV MASS 2D: 75.7 G (ref 88–224)
ECHO LV MASS INDEX 2D: 42.3 G/M2 (ref 49–115)
ECHO LV POSTERIOR WALL DIASTOLIC: 0.75 CM (ref 0.6–1)
ECHO LVOT DIAM: 1.99 CM
ECHO LVOT PEAK GRADIENT: 2.67 MMHG
ECHO LVOT PEAK VELOCITY: 81.64 CM/S
ECHO LVOT SV: 64.9 ML
ECHO LVOT VTI: 20.96 CM
ECHO MV A VELOCITY: 113.33 CM/S
ECHO MV AREA PHT: 3.42 CM2
ECHO MV E DECELERATION TIME (DT): 222.08 MS
ECHO MV E VELOCITY: 66.6 CM/S
ECHO MV E/A RATIO: 0.59
ECHO MV PRESSURE HALF TIME (PHT): 64.4 MS
ECHO PV PEAK INSTANTANEOUS GRADIENT SYSTOLIC: 7.9 MMHG
ECHO RV TAPSE: 1.56 CM (ref 1.5–2)
ECHO TV REGURGITANT MAX VELOCITY: 261.19 CM/S
ECHO TV REGURGITANT PEAK GRADIENT: 27.29 MMHG
ERYTHROCYTE [DISTWIDTH] IN BLOOD BY AUTOMATED COUNT: 14.1 % (ref 11.5–14.5)
GLUCOSE BLD STRIP.AUTO-MCNC: 100 MG/DL (ref 65–100)
GLUCOSE BLD STRIP.AUTO-MCNC: 102 MG/DL (ref 65–100)
GLUCOSE BLD STRIP.AUTO-MCNC: 122 MG/DL (ref 65–100)
GLUCOSE SERPL-MCNC: 95 MG/DL (ref 65–100)
HCT VFR BLD AUTO: 33.7 % (ref 36.6–50.3)
HGB BLD-MCNC: 10.7 G/DL (ref 12.1–17)
MCH RBC QN AUTO: 29.1 PG (ref 26–34)
MCHC RBC AUTO-ENTMCNC: 31.8 G/DL (ref 30–36.5)
MCV RBC AUTO: 91.6 FL (ref 80–99)
NRBC # BLD: 0 K/UL (ref 0–0.01)
NRBC BLD-RTO: 0 PER 100 WBC
PLATELET # BLD AUTO: 109 K/UL (ref 150–400)
PMV BLD AUTO: 11.5 FL (ref 8.9–12.9)
POTASSIUM SERPL-SCNC: 3.9 MMOL/L (ref 3.5–5.1)
RBC # BLD AUTO: 3.68 M/UL (ref 4.1–5.7)
SERVICE CMNT-IMP: ABNORMAL
SERVICE CMNT-IMP: ABNORMAL
SERVICE CMNT-IMP: NORMAL
SODIUM SERPL-SCNC: 139 MMOL/L (ref 136–145)
WBC # BLD AUTO: 5.1 K/UL (ref 4.1–11.1)

## 2020-12-30 PROCEDURE — 74011250636 HC RX REV CODE- 250/636: Performed by: INTERNAL MEDICINE

## 2020-12-30 PROCEDURE — 65270000029 HC RM PRIVATE

## 2020-12-30 PROCEDURE — 85027 COMPLETE CBC AUTOMATED: CPT

## 2020-12-30 PROCEDURE — 74011250637 HC RX REV CODE- 250/637: Performed by: INTERNAL MEDICINE

## 2020-12-30 PROCEDURE — 36415 COLL VENOUS BLD VENIPUNCTURE: CPT

## 2020-12-30 PROCEDURE — 51798 US URINE CAPACITY MEASURE: CPT

## 2020-12-30 PROCEDURE — 87635 SARS-COV-2 COVID-19 AMP PRB: CPT

## 2020-12-30 PROCEDURE — 80048 BASIC METABOLIC PNL TOTAL CA: CPT

## 2020-12-30 PROCEDURE — 82962 GLUCOSE BLOOD TEST: CPT

## 2020-12-30 RX ORDER — LISINOPRIL 5 MG/1
5 TABLET ORAL DAILY
Qty: 30 TAB | Refills: 0 | Status: SHIPPED | OUTPATIENT
Start: 2020-12-30

## 2020-12-30 RX ORDER — ATORVASTATIN CALCIUM 10 MG/1
10 TABLET, FILM COATED ORAL
Qty: 30 TAB | Refills: 0 | Status: SHIPPED | OUTPATIENT
Start: 2020-12-30

## 2020-12-30 RX ADMIN — AMLODIPINE BESYLATE 5 MG: 5 TABLET ORAL at 08:20

## 2020-12-30 RX ADMIN — Medication 1 CAPSULE: at 08:20

## 2020-12-30 RX ADMIN — HEPARIN SODIUM 5000 UNITS: 5000 INJECTION INTRAVENOUS; SUBCUTANEOUS at 16:46

## 2020-12-30 RX ADMIN — Medication 10 ML: at 16:47

## 2020-12-30 RX ADMIN — HEPARIN SODIUM 5000 UNITS: 5000 INJECTION INTRAVENOUS; SUBCUTANEOUS at 07:14

## 2020-12-30 RX ADMIN — ASPIRIN 81 MG: 81 TABLET, COATED ORAL at 17:16

## 2020-12-30 NOTE — PROGRESS NOTES
CM noted that PT is recommending SNF rehab for this pt. CM called pt's Palak So to discuss. He is in agreement with SNF rehab before this pt goes back to [facility]. Facilities discussed. He would like a referral to be sent to [facility] since it very close to [facility]. CM sent a referral to [facility] via [facility]. CM obtained OT orders and COVID test for this pt. CM will need to submit for auth after OT has completed the eval. KIEL sent a perfectserve message to pt's attending. [employer]

## 2020-12-30 NOTE — PROGRESS NOTES
Bedside shift change report given to [facility] Emile EDWARD (oncoming nurse) by Nevin Tan (offgoing nurse). Report included the following information SBAR, Kardex, Intake/Output and MAR.

## 2020-12-30 NOTE — PROGRESS NOTES
Bedside shift change report given to [facility] JUSTIN (oncoming nurse) by Jac Keen (offgoing nurse). Report included the following information SBAR, Kardex, Intake/Output and MAR.

## 2020-12-30 NOTE — PROGRESS NOTES
Bedside and Verbal shift change report given to Nimo Wilson RN (oncoming nurse) by Chad Contreras RN (offgoing nurse). Report included the following information SBAR.

## 2020-12-30 NOTE — PROGRESS NOTES
Hospitalist Progress Note  Maylin Shaw MD  Answering service: [phone] OR [phone] from in house phone        Date of Service:  2020  NAME:  Helio Nguyen  :  9/3/1931  MRN:  [id number]      Admission Summary:   As per initial admission summary  This an 71-year-old man with past medical history significant for hypertension, dyslipidemia, type 2 diabetes, and dementia, who was in his usual state of health until the day of his presentation at the emergency room when it was reported that the patient developed chest pain at the nursing home where the patient resides. The patient developed the chest pain at about 04:30 p.m., located at the right side of the chest.  The patient is not able to state the severity and the quality of the pain because of his dementia. According to report, the patient fell into a door frame, hitting his right shoulder the day before. When the patient arrived at the emergency room, the patient did not complain of any chest pain. The patient was confused, agitated, and restless at times. CTA of the chest was obtained. The CTA was negative for pulmonary embolism but shows consolidation. The patient was then referred to the hospitalist service for evaluation for admission. The patient was last admitted to the hospital from 2018 to 2018. He was admitted and treated for acute renal failure. There was no history of fever, rigors or chills reported. Interval history / Subjective:   Patient seen for Follow up of PNA    Patient seen and examined by the bedside, Labs, images and notes reviewed  No acute issues overnight. Pt w/o any acute complaints.   Placement pending      Assessment & Plan:     1.  HCAP  S/p Rocephin and doxycycline x5d total     Chest pain on admission likely 2/2 above, resolved  Troponin neg x3, no evidence of ACS  CTA of the chest negative for pulmonary embolism     2.  Acute kidney injury:  resolved  Ct abd negative for any acute abnormality      3.  Elevated BNP level:  No pulmonary edema or s/sx of CHF  systolic murmur on exam  Echo EF 55%     4.  Elevated D-dimer likely d/t ERICA since CTA chest is negative for pulmonary embolism and ultrasound of the lower extremity is negative for DVT     5.  Hypertension, improving  Cont norvasc, added lisinopril     6.  Dyslipidemia: continue Lipitor     7.  PreDM, a1c 5.7     8.  Thrombocytopenia, mild >100k, asymptomatic  monitor     9.  Dementia:  We will continue supportive treatment.  The patient's dementia is associated with significant behavioral disorder.  The patient received Geodon in the emergency room for the associated behavioral disorder.     10.  Fall:    Patient fell in Ed  Imaging negative for any fracture   D/c pending placement    Code status: DNR. Signed DDNR in the chart   DVT prophylaxis: heparin SC     Care Plan discussed with: Patient/Family  Disposition: SNF/LTC and TBD     Hospital Problems  Date Reviewed: 12/24/2020          Codes Class Noted POA    * (Principal) Bacterial pneumonia ICD-10-CM: J15.9  ICD-9-CM: 482.9  12/24/2020 Yes              Review of Systems:   A comprehensive review of systems was negative except for that written in the HPI. Vital Signs:    Last 24hrs VS reviewed since prior progress note.  Most recent are:  Visit Vitals  /75 (BP 1 Location: Left arm, BP Patient Position: Sitting)   Pulse 74   Temp 97.6 °F (36.4 °C)   Resp 16   Ht 5' 6\" (1.676 m)   Wt 70 kg (154 lb 5.2 oz)   SpO2 98%   BMI 24.91 kg/m²         Intake/Output Summary (Last 24 hours) at 12/30/2020 1705  Last data filed at 12/30/2020 1021  Gross per 24 hour   Intake 360 ml   Output 200 ml   Net 160 ml        Physical Examination:   I had a face to face encounter with this patient and independently examined them on December 30, 2020 as outlined below:        Constitutional:  awake, no acute distress   ENT:  Oral mucous moist, oropharynx benign. Neck supple   Resp:  CTA bilaterally. No wheezing/rhonchi/rales. No accessory muscle use   CV:  Regular rhythm, normal rate, 3/6 SM    GI:  Soft, non distended, non tender     Musculoskeletal:  No edema, warm    Neurologic:  Moves all extremities            Data Review:    Review and/or order of clinical lab test  Review and/or order of tests in the radiology section of CPT  Review and/or order of tests in the medicine section of CPT      Labs:     Recent Labs     12/30/20 0325 12/29/20 0517   WBC 5.1 4.6   HGB 10.7* 11.0*   HCT 33.7* 34.4*   * 113*     Recent Labs     12/30/20 0325 12/29/20 0517 12/28/20  0400    140 138   K 3.9 3.8 3.6    107 107   CO2 27 27 27   BUN 27* 27* 21*   CREA 1.19 1.26 1.28   GLU 95 99 87   CA 9.0 8.9 8.6   MG  --  2.1  --      No results for input(s): ALT, AP, TBIL, TBILI, TP, ALB, GLOB, GGT, AML, LPSE in the last 72 hours. No lab exists for component: SGOT, GPT, AMYP, HLPSE  No results for input(s): INR, PTP, APTT, INREXT, INREXT in the last 72 hours. No results for input(s): FE, TIBC, PSAT, FERR in the last 72 hours. No results found for: FOL, RBCF   No results for input(s): PH, PCO2, PO2 in the last 72 hours. No results for input(s): CPK, CKNDX, TROIQ in the last 72 hours.     No lab exists for component: CPKMB  Lab Results   Component Value Date/Time    Cholesterol, total 135 02/21/2019 12:50 PM    HDL Cholesterol 55 02/21/2019 12:50 PM    LDL, calculated 68 02/21/2019 12:50 PM    Triglyceride 59 02/21/2019 12:50 PM    CHOL/HDL Ratio 2.8 03/20/2017 12:21 AM     Lab Results   Component Value Date/Time    Glucose (POC) 102 (H) 12/30/2020 04:39 PM    Glucose (POC) 122 (H) 12/30/2020 11:04 AM    Glucose (POC) 100 12/30/2020 06:08 AM    Glucose (POC) 99 12/29/2020 09:56 PM    Glucose (POC) 105 (H) 12/29/2020 04:28 PM     Lab Results   Component Value Date/Time    Color YELLOW/STRAW 08/01/2018 01:13 PM    Appearance CLEAR 08/01/2018 01:13 PM    Specific gravity 1.021 08/01/2018 01:13 PM    pH (UA) 5.0 08/01/2018 01:13 PM    Protein 30 (A) 08/01/2018 01:13 PM    Glucose NEGATIVE  08/01/2018 01:13 PM    Ketone TRACE (A) 08/01/2018 01:13 PM    Bilirubin NEGATIVE  08/01/2018 01:13 PM    Urobilinogen 1.0 08/01/2018 01:13 PM    Nitrites NEGATIVE  08/01/2018 01:13 PM    Leukocyte Esterase NEGATIVE  08/01/2018 01:13 PM    Epithelial cells FEW 08/01/2018 01:13 PM    Bacteria NEGATIVE  08/01/2018 01:13 PM    WBC 0-4 08/01/2018 01:13 PM    RBC 0-5 08/01/2018 01:13 PM         Medications Reviewed:     Current Facility-Administered Medications   Medication Dose Route Frequency    lisinopriL (PRINIVIL, ZESTRIL) tablet 5 mg  5 mg Oral DAILY    amLODIPine (NORVASC) tablet 5 mg  5 mg Oral DAILY    aspirin delayed-release tablet 81 mg  81 mg Oral QPM    atorvastatin (LIPITOR) tablet 10 mg  10 mg Oral QHS    sodium chloride (NS) flush 5-40 mL  5-40 mL IntraVENous Q8H    sodium chloride (NS) flush 5-40 mL  5-40 mL IntraVENous PRN    acetaminophen (TYLENOL) tablet 650 mg  650 mg Oral Q6H PRN    Or    acetaminophen (TYLENOL) suppository 650 mg  650 mg Rectal Q6H PRN    polyethylene glycol (MIRALAX) packet 17 g  17 g Oral DAILY PRN    promethazine (PHENERGAN) tablet 12.5 mg  12.5 mg Oral Q6H PRN    Or    ondansetron (ZOFRAN) injection 4 mg  4 mg IntraVENous Q6H PRN    heparin (porcine) injection 5,000 Units  5,000 Units SubCUTAneous Q8H    lactobac ac& pc-s.therm-b.anim (JABIER Q/RISAQUAD)  1 Cap Oral DAILY    insulin lispro (HUMALOG) injection   SubCUTAneous AC&HS    glucose chewable tablet 16 g  4 Tab Oral PRN    dextrose (D50W) injection syrg 12.5-25 g  12.5-25 g IntraVENous PRN    glucagon (GLUCAGEN) injection 1 mg  1 mg IntraMUSCular PRN    lactated Ringers infusion  75 mL/hr IntraVENous CONTINUOUS     ______________________________________________________________________  EXPECTED LENGTH OF STAY: 3d 4h  ACTUAL LENGTH OF STAY:          6 Gale Wolfe MD

## 2020-12-31 LAB
GLUCOSE BLD STRIP.AUTO-MCNC: 103 MG/DL (ref 65–100)
GLUCOSE BLD STRIP.AUTO-MCNC: 109 MG/DL (ref 65–100)
GLUCOSE BLD STRIP.AUTO-MCNC: 95 MG/DL (ref 65–100)
HEALTH STATUS, XMCV2T: NORMAL
SARS-COV-2, COV2: NOT DETECTED
SERVICE CMNT-IMP: ABNORMAL
SERVICE CMNT-IMP: ABNORMAL
SERVICE CMNT-IMP: NORMAL
SOURCE, COVRS: NORMAL
SPECIMEN SOURCE, FCOV2M: NORMAL
SPECIMEN TYPE, XMCV1T: NORMAL

## 2020-12-31 PROCEDURE — 74011250637 HC RX REV CODE- 250/637: Performed by: INTERNAL MEDICINE

## 2020-12-31 PROCEDURE — 97535 SELF CARE MNGMENT TRAINING: CPT

## 2020-12-31 PROCEDURE — 82962 GLUCOSE BLOOD TEST: CPT

## 2020-12-31 PROCEDURE — 65270000029 HC RM PRIVATE

## 2020-12-31 PROCEDURE — 74011250636 HC RX REV CODE- 250/636: Performed by: INTERNAL MEDICINE

## 2020-12-31 PROCEDURE — 97116 GAIT TRAINING THERAPY: CPT

## 2020-12-31 RX ADMIN — HEPARIN SODIUM 5000 UNITS: 5000 INJECTION INTRAVENOUS; SUBCUTANEOUS at 07:13

## 2020-12-31 RX ADMIN — ASPIRIN 81 MG: 81 TABLET, COATED ORAL at 17:28

## 2020-12-31 RX ADMIN — AMLODIPINE BESYLATE 5 MG: 5 TABLET ORAL at 09:53

## 2020-12-31 RX ADMIN — HEPARIN SODIUM 5000 UNITS: 5000 INJECTION INTRAVENOUS; SUBCUTANEOUS at 00:10

## 2020-12-31 RX ADMIN — Medication 1 CAPSULE: at 09:53

## 2020-12-31 RX ADMIN — ATORVASTATIN CALCIUM 10 MG: 10 TABLET, FILM COATED ORAL at 23:22

## 2020-12-31 RX ADMIN — LISINOPRIL 5 MG: 5 TABLET ORAL at 09:53

## 2020-12-31 RX ADMIN — Medication 10 ML: at 13:46

## 2020-12-31 RX ADMIN — HEPARIN SODIUM 5000 UNITS: 5000 INJECTION INTRAVENOUS; SUBCUTANEOUS at 17:30

## 2020-12-31 NOTE — PROGRESS NOTES
RUR 13 %-Low    GENNA- Referral to [facility] and accepted- CM to start the [facility] for admission today Ref # [id number]- CM will need updated PT and OT notes for the Auth. Transport TBD- the patient may be able to go by car vs BLS. MPOA is Trent Burnette - [phone]. Covid test 12/30- negative. CM LVM for Eugenia Garcia [phone]. CM call the main # to the Facility several times- [phone], and no answer. 12:15pm-CM spoke with Eugenia Garcia- the patient has been accepted. CM to start [facility]. The following information was submitted to [facility] for initial authorization:  Face Sheet/Demographics    (Include: Usual living situation)  History and Physical  Last 24 hours of MD and RN notes   (Include: Current Level of Functioning; cognitive status)  PT/OT/ST Evaluations and/or notes within the last 48 hours  MAR  MD orders  (Include: Attending or ordering MDs name and phone #; skilled nursing needs; Wound care/etc)  Projected Date of Transfer to SNF  Requested SNF  SNF Point of Contact and Phone #  CM Point of Contact and Phone #  NPI # for SNF      Will follow for SNF placement and auth.      Jasiel Wright, [address]

## 2020-12-31 NOTE — PROGRESS NOTES
[address] [facility]  Hospitalist Group                                                                                          Hospitalist Progress Note  Jeff Live NP  Answering service: [phone] or [phone] from in house phone        Date of Service:  2020  NAME:  Cheryl Lopez  :  9/3/1931  MRN:  [id number]      Admission Summary:   As per initial admission summary  This an 80-year-old man with past medical history significant for hypertension, dyslipidemia, type 2 diabetes, and dementia, who was in his usual state of health until the day of his presentation at the emergency room when it was reported that the patient developed chest pain at the nursing home where the patient resides.  The patient developed the chest pain at about 04:30 p.m., located at the right side of the chest.  The patient is not able to state the severity and the quality of the pain because of his dementia.  According to report, the patient fell into a door frame, hitting his right shoulder the day before.  When the patient arrived at the emergency room, the patient did not complain of any chest pain.  The patient was confused, agitated, and restless at times.  CTA of the chest was obtained.  The CTA was negative for pulmonary embolism but shows consolidation.  The patient was then referred to the hospitalist service for evaluation for admission. Aleisha Li patient was last admitted to the hospital from 2018 to 2018. Lily Benitez was admitted and treated for acute renal failure.  There was no history of fever, rigors or chills reported. Interval history / Subjective:    Seen and examined patient sitting in bedside chair. States that he is feeling good. Oriented to person. Disoriented to place and situation. Denies any pain. No new complaints. No over nights events noted. No acute distress noted. Discharge pending placement. Discussed with CM.      Assessment & Plan:     1.  HCAP  S/p Rocephin and doxycycline x5d total- completed.      Chest pain on admission likely 2/2 above, resolved  Troponin neg x3, no evidence of ACS  CTA of the chest negative for pulmonary embolism     2.  Acute kidney injury:  resolved  Ct abd negative for any acute abnormality      3.  Elevated BNP level:  No pulmonary edema or s/sx of CHF  systolic murmur on exam  Echo EF 55%     4.  Elevated D-dimer likely d/t ERICA since CTA chest is negative for pulmonary embolism and ultrasound of the lower extremity is negative for DVT     5.  Hypertension  - Stable   - Continue Norvasc and Lisinopril   - Monitor VS.      6.  Dyslipidemia:   - continue Lipitor     7.  PreDM, a1c 5.7  - Monitor BG   - Insulin sliding scale   - Carb consistent diet      8.  Thrombocytopenia, mild >100k, asymptomatic  monitor     9.  Dementia:  We will continue supportive treatment.  The patient's dementia is associated with significant behavioral disorder.  The patient received Geodon in the emergency room for the associated behavioral disorder.     10.  Fall:    Patient fell in Ed  Imaging negative for any fracture  Discharge pending placement.      Code status: DNR  DVT prophylaxis: Heparin     Care Plan discussed with: Patient/Family, Nurse and   Anticipated Disposition: SNF/LTC  Anticipated Discharge: Pending placement. Hospital Problems  Date Reviewed: 12/24/2020          Codes Class Noted POA    * (Principal) Bacterial pneumonia ICD-10-CM: J15.9  ICD-9-CM: 482.9  12/24/2020 Yes                Review of Systems:   A comprehensive review of systems was negative except for that written in the HPI. Vital Signs:    Last 24hrs VS reviewed since prior progress note.  Most recent are:  Visit Vitals  /69 (BP 1 Location: Right arm, BP Patient Position: At rest)   Pulse 70   Temp 97.5 °F (36.4 °C)   Resp 15   Ht 5' 6\" (1.676 m)   Wt 70 kg (154 lb 5.2 oz)   SpO2 99%   BMI 24.91 kg/m²       No intake or output data in the 24 hours ending 12/31/20 1549     Physical Examination:             Constitutional:  No acute distress, cooperative, pleasant, answers simple questions. ENT:  Oral mucosa moist, oropharynx benign. Resp:  CTA bilaterally. No wheezing/rhonchi/rales. No accessory muscle use   CV:  Regular rhythm, normal rate, no murmurs, gallops, rubs    GI:  Soft, non distended, non tender. normoactive bowel sounds, no hepatosplenomegaly     Musculoskeletal:  No edema, warm, 2+ pulses throughout    Neurologic:  Moves all extremities. AAOx2, CN II-XII reviewed, follows commands. Psych:  Poor insight. Not anxious or agitated       Data Review:    Review and/or order of clinical lab test  Review and/or order of tests in the radiology section of CPT  Review and/or order of tests in the medicine section of CPT      Labs:     Recent Labs     12/30/20 0325 12/29/20 0517   WBC 5.1 4.6   HGB 10.7* 11.0*   HCT 33.7* 34.4*   * 113*     Recent Labs     12/30/20 0325 12/29/20 0517    140   K 3.9 3.8    107   CO2 27 27   BUN 27* 27*   CREA 1.19 1.26   GLU 95 99   CA 9.0 8.9   MG  --  2.1     No results for input(s): ALT, AP, TBIL, TBILI, TP, ALB, GLOB, GGT, AML, LPSE in the last 72 hours. No lab exists for component: SGOT, GPT, AMYP, HLPSE  No results for input(s): INR, PTP, APTT, INREXT in the last 72 hours. No results for input(s): FE, TIBC, PSAT, FERR in the last 72 hours. No results found for: FOL, RBCF   No results for input(s): PH, PCO2, PO2 in the last 72 hours. No results for input(s): CPK, CKNDX, TROIQ in the last 72 hours.     No lab exists for component: CPKMB  Lab Results   Component Value Date/Time    Cholesterol, total 135 02/21/2019 12:50 PM    HDL Cholesterol 55 02/21/2019 12:50 PM    LDL, calculated 68 02/21/2019 12:50 PM    Triglyceride 59 02/21/2019 12:50 PM    CHOL/HDL Ratio 2.8 03/20/2017 12:21 AM     Lab Results   Component Value Date/Time    Glucose (POC) 109 (H) 12/31/2020 11:03 AM    Glucose (POC) 95 12/31/2020 07:16 AM    Glucose (POC) 102 (H) 12/30/2020 04:39 PM    Glucose (POC) 122 (H) 12/30/2020 11:04 AM    Glucose (POC) 100 12/30/2020 06:08 AM     Lab Results   Component Value Date/Time    Color YELLOW/STRAW 08/01/2018 01:13 PM    Appearance CLEAR 08/01/2018 01:13 PM    Specific gravity 1.021 08/01/2018 01:13 PM    pH (UA) 5.0 08/01/2018 01:13 PM    Protein 30 (A) 08/01/2018 01:13 PM    Glucose NEGATIVE  08/01/2018 01:13 PM    Ketone TRACE (A) 08/01/2018 01:13 PM    Bilirubin NEGATIVE  08/01/2018 01:13 PM    Urobilinogen 1.0 08/01/2018 01:13 PM    Nitrites NEGATIVE  08/01/2018 01:13 PM    Leukocyte Esterase NEGATIVE  08/01/2018 01:13 PM    Epithelial cells FEW 08/01/2018 01:13 PM    Bacteria NEGATIVE  08/01/2018 01:13 PM    WBC 0-4 08/01/2018 01:13 PM    RBC 0-5 08/01/2018 01:13 PM         Medications Reviewed:     Current Facility-Administered Medications   Medication Dose Route Frequency    lisinopriL (PRINIVIL, ZESTRIL) tablet 5 mg  5 mg Oral DAILY    amLODIPine (NORVASC) tablet 5 mg  5 mg Oral DAILY    aspirin delayed-release tablet 81 mg  81 mg Oral QPM    atorvastatin (LIPITOR) tablet 10 mg  10 mg Oral QHS    sodium chloride (NS) flush 5-40 mL  5-40 mL IntraVENous Q8H    sodium chloride (NS) flush 5-40 mL  5-40 mL IntraVENous PRN    acetaminophen (TYLENOL) tablet 650 mg  650 mg Oral Q6H PRN    Or    acetaminophen (TYLENOL) suppository 650 mg  650 mg Rectal Q6H PRN    polyethylene glycol (MIRALAX) packet 17 g  17 g Oral DAILY PRN    promethazine (PHENERGAN) tablet 12.5 mg  12.5 mg Oral Q6H PRN    Or    ondansetron (ZOFRAN) injection 4 mg  4 mg IntraVENous Q6H PRN    heparin (porcine) injection 5,000 Units  5,000 Units SubCUTAneous Q8H    lactobac ac& pc-s.therm-b.anim (JABIER Q/RISAQUAD)  1 Cap Oral DAILY    insulin lispro (HUMALOG) injection   SubCUTAneous AC&HS    glucose chewable tablet 16 g  4 Tab Oral PRN    dextrose (D50W) injection syrg 12.5-25 g  12.5-25 g IntraVENous PRN    glucagon (GLUCAGEN) injection 1 mg  1 mg IntraMUSCular PRN    lactated Ringers infusion  75 mL/hr IntraVENous CONTINUOUS     ______________________________________________________________________  EXPECTED LENGTH OF STAY: 3d 4h  ACTUAL LENGTH OF STAY:          [address] [facility], NP

## 2020-12-31 NOTE — PROGRESS NOTES
Occupational Therapy Note:     Received reorders this date due to SNF placement. Pt evaluated by OT services 12/27/20 and on caseload. Will provide intervention as able.   Thank you,      Irving Dubin, OT

## 2020-12-31 NOTE — PROGRESS NOTES
Bedside and Verbal shift change report given to Kinjal Beyer (oncoming nurse) by Taryn Barry (offgoing nurse). Report included the following information SBAR, Kardex, Intake/Output and MAR.

## 2020-12-31 NOTE — PROGRESS NOTES
Patient's IV not working...per MOISE Rogers okay to leave out.    Patient refused nighttime BG check.

## 2020-12-31 NOTE — PROGRESS NOTES
Bedside and Verbal shift change report given to lAiya Knox RN (oncoming nurse) by Janusz Terrazas RN (offgoing nurse). Report included the following information SBAR.

## 2020-12-31 NOTE — PROGRESS NOTES
Problem: Mobility Impaired (Adult and Pediatric)  Goal: *Acute Goals and Plan of Care (Insert Text)  Description: FUNCTIONAL STATUS PRIOR TO ADMISSION: Patient was modified independent using a rollator for functional mobility. HOME SUPPORT PRIOR TO ADMISSION: Pt lived in a local [facility], hard to determine exactly how much help he needed due to patient being a poor historian     Physical Therapy Goals  Initiated 12/27/2020  1. Patient will move from supine to sit and sit to supine  in bed with modified independence within 7 day(s). 2.  Patient will transfer from bed to chair and chair to bed with modified independence using the least restrictive device within 7 day(s). 3.  Patient will perform sit to stand with modified independence within 7 day(s). 4.  Patient will ambulate with supervision/set-up for 400 feet with the least restrictive device within 7 day(s). Outcome: Progressing Towards Goal   PHYSICAL THERAPY TREATMENT  Patient: Cheryl Lopez (1 y.o. male)  Date: 12/31/2020  Diagnosis: Bacterial pneumonia [J15.9] Bacterial pneumonia       Precautions: Fall, DNR  Chart, physical therapy assessment, plan of care and goals were reviewed. ASSESSMENT  Patient continues with skilled PT services and is progressing towards goals. Patient resting in bed. Arouses easily, eager to participate in PT. Confused but pleasant and cooperative. Current Level of Function Impacting Discharge (mobility/balance): Supervision for bed mobility with head of bed elevated, use of bed rail. Stand by assistance for transfers. Ambulated around the circumference of the unit with RW, contact guard assistance, occasionally minimal assistance to correct RW path (it tended to veer to the left). Occasional LOB with head turns, gaze stabilization. Making good progress toward goals.     Other factors to consider for discharge: Cooperative/modified independence with rollator (PLOF)         PLAN :  Patient continues to benefit from skilled intervention to address the above impairments. Continue treatment per established plan of care. to address goals. Recommendation for discharge: (in order for the patient to meet his/her long term goals)  Therapy up to 5 days/week in SNF setting    This discharge recommendation:  Has been made in collaboration with the attending provider and/or case management    IF patient discharges home will need the following DME: patient owns DME required for discharge       SUBJECTIVE:   Patient stated I am doing fine. I just can't hear very well. Elena Gonzalez    OBJECTIVE DATA SUMMARY:   Critical Behavior:  Neurologic State: Agitated, Alert, Confused  Orientation Level: Oriented to person, Oriented to place  Cognition: Memory loss, Poor safety awareness  Safety/Judgement: Decreased awareness of environment, Decreased awareness of need for assistance, Decreased awareness of need for safety, Decreased insight into deficits, Fall prevention  Functional Mobility Training:  Bed Mobility:     Supine to Sit: Supervision              Transfers:  Sit to Stand: Stand-by assistance  Stand to Sit: Stand-by assistance                             Balance:  Sitting: Intact  Standing: Impaired; Without support  Standing - Static: Good;Constant support  Standing - Dynamic : Fair;Constant support(occasional LOB with head turns)  Ambulation/Gait Training:  Distance (ft): 300 Feet (ft)  Assistive Device: Walker, rolling;Gait belt  Ambulation - Level of Assistance: Contact guard assistance        Gait Abnormalities: Decreased step clearance        Base of Support: Widened     Speed/Yudith: Slow       Pain Rating:  None reported or observed. Activity Tolerance:   Good    After treatment patient left in no apparent distress:   Supine in bed, Call bell within reach, Bed / chair alarm activated, Side rails x 3, and nurse notified.     COMMUNICATION/COLLABORATION:   The patients plan of care was discussed with: Registered nurse and Case management.      Davin Albarado   Time Calculation: 25 mins

## 2020-12-31 NOTE — PROGRESS NOTES
Problem: Self Care Deficits Care Plan (Adult)  Goal: *Acute Goals and Plan of Care (Insert Text)  Description:   FUNCTIONAL STATUS PRIOR TO ADMISSION: Lives at [facility], mod I functional mobility with rollaider PTA; staff assists with all self care tasks except feeding; \"they wont let me do anything. \" Incontinent of B & B per patient; \"I don't know it's there. \"      HOME SUPPORT: [facility]  Occupational Therapy Goals  Initiated 12/27/2020  1. Patient will perform self-feeding with modified independence within 5 day(s). 2.  Patient will perform grooming with supervision/set-up within 7 day(s). 3.  Patient will perform upper body dressing with minimal assistance/contact guard assist within 7 day(s). 4.  Patient will perform toilet transfers with supervision/set-up within 7 day(s). 5.  Patient will perform all aspects of toileting with moderate assistance  within 7 day(s). 6.  Patient will participate in upper extremity therapeutic exercise/activities with supervision/set-up for 5 minutes within 7 day(s). 7.  Patient will utilize energy conservation, fall prevention, pain management techniques during functional activities with verbal cues within 7 day(s). Outcome: Progressing Towards Goal    OCCUPATIONAL THERAPY TREATMENT  Patient: Kenya Sotelo (12 y.o. male)  Date: 12/31/2020  Diagnosis: Bacterial pneumonia [J15.9] Bacterial pneumonia       Precautions: Fall, DNR  Chart, occupational therapy assessment, plan of care, and goals were reviewed. ASSESSMENT  Patient continues with skilled OT services and is progressing towards goals. Pt received in bathroom and OT asked to assist nursing with toileting activities. Pt able to have bowel movement but demonstrating impulsive behaviors and poor compliance with use of the walker to complete functional transfers. Pt transferred from bathroom mobilizing to the bathroom with min A and returned to supine in bed.   Pt follows commands but confused. Bed alarm activated. Pt left resting in bed. Pt has decreased safety awareness and decreased compliance with calling for support and assistance. He does require verbal cues for safety with all functional mobility due to increased fall risk. This includes toileting activities. Current Level of Function Impacting Discharge (ADLs): min A    Other factors to consider for discharge: safety awareness         PLAN :  Patient continues to benefit from skilled intervention to address the above impairments. Continue treatment per established plan of care. to address goals. Recommend with staff: OOB to chair TID for meals. Recommend progression to and from bathroom with use of walker and gait belt for toileting. Recommend next OT session: grooming at the sink, dressing, toietling    Recommendation for discharge: (in order for the patient to meet his/her long term goals)  To be determined: rehab facility with 24/7 support versus increased support and assistance at [facility] (if facility can provide 24/7 support and assistance with all activities)     This discharge recommendation:  Has been made in collaboration with the attending provider and/or case management    IF patient discharges home will need the following DME: none       SUBJECTIVE:   Patient stated I am feeling alright.     OBJECTIVE DATA SUMMARY:   Cognitive/Behavioral Status:  Neurologic State: Alert;Confused  Orientation Level: Oriented to person;Disoriented to time;Disoriented to situation;Disoriented to place  Cognition: Decreased command following  Perception: Appears intact  Perseveration: Perseverates during mobility  Safety/Judgement: Decreased awareness of environment;Decreased awareness of need for assistance    Functional Mobility and Transfers for ADLs:  Bed Mobility:  Supine to Sit: (recieved in bathroom)  Sit to Supine: Minimum assistance    Transfers:  Sit to Stand: Minimum assistance  Functional Transfers  Bathroom Mobility: Minimum assistance  Toilet Transfer : Minimum assistance       Balance:  Sitting: Intact  Standing: Impaired; Without support  Standing - Static: Good;Constant support  Standing - Dynamic : Fair;Constant support    ADL Intervention:     Toileting activities completed. Pt noted with walking from door to toilet without use of walker. He is very unsteady on his feet. He has poor safety awareness for need for support with toileting activities. He moved to the sink to wash his hands. He walked back to the bed with min A using RW which improved his stability and balance overall. Pt returned to supine in bed. Bed alarm activities                           Toileting  Toileting Assistance: Supervision    Cognitive Retraining  Safety/Judgement: Decreased awareness of environment;Decreased awareness of need for assistance      Pain:  No pain    Activity Tolerance:   Good    After treatment patient left in no apparent distress:   Supine in bed, Heels elevated for pressure relief, Call bell within reach, and Bed / chair alarm activated    COMMUNICATION/COLLABORATION:   The patients plan of care was discussed with: Physical therapist and Registered nurse.      Christopher Davila OT  Time Calculation: 10 mins

## 2021-01-01 LAB
GLUCOSE BLD STRIP.AUTO-MCNC: 104 MG/DL (ref 65–100)
GLUCOSE BLD STRIP.AUTO-MCNC: 108 MG/DL (ref 65–100)
GLUCOSE BLD STRIP.AUTO-MCNC: 109 MG/DL (ref 65–100)
GLUCOSE BLD STRIP.AUTO-MCNC: 133 MG/DL (ref 65–100)
GLUCOSE BLD STRIP.AUTO-MCNC: 99 MG/DL (ref 65–100)
SERVICE CMNT-IMP: ABNORMAL
SERVICE CMNT-IMP: NORMAL

## 2021-01-01 PROCEDURE — 65270000029 HC RM PRIVATE

## 2021-01-01 PROCEDURE — 82962 GLUCOSE BLOOD TEST: CPT

## 2021-01-01 PROCEDURE — 74011250637 HC RX REV CODE- 250/637: Performed by: INTERNAL MEDICINE

## 2021-01-01 PROCEDURE — 74011250636 HC RX REV CODE- 250/636: Performed by: INTERNAL MEDICINE

## 2021-01-01 RX ADMIN — LISINOPRIL 5 MG: 5 TABLET ORAL at 09:16

## 2021-01-01 RX ADMIN — ATORVASTATIN CALCIUM 10 MG: 10 TABLET, FILM COATED ORAL at 23:25

## 2021-01-01 RX ADMIN — AMLODIPINE BESYLATE 5 MG: 5 TABLET ORAL at 09:16

## 2021-01-01 RX ADMIN — HEPARIN SODIUM 5000 UNITS: 5000 INJECTION INTRAVENOUS; SUBCUTANEOUS at 09:16

## 2021-01-01 RX ADMIN — HEPARIN SODIUM 5000 UNITS: 5000 INJECTION INTRAVENOUS; SUBCUTANEOUS at 02:00

## 2021-01-01 RX ADMIN — ACETAMINOPHEN 650 MG: 325 TABLET ORAL at 00:22

## 2021-01-01 RX ADMIN — Medication 1 CAPSULE: at 09:16

## 2021-01-01 NOTE — PROGRESS NOTES
RUR 13 %[facility] [employer] [facility]. has accepted patient, Yanet Flower is pending, Ref # [id number]. Transport is TBD- the patient may be able to go by car vs BLS. MITUL is Angy Abler - [phone]. Covid test 12/30- negative. Facility requires COVID testing within 3 days of discharge, therefore another test will be needed. CM called [facility] #[phone] and left message to follow-up on the status of the auth. Voicemail indicates that [facility] is closed today for the holiday. CM spoke with Nya Felder with [facility]  #[phone]. They will need another COVID test since patient will not be able to come until Monday due to auth pending/holiday weekend. CM notified NP.     Maite Prater, BSW/CRM

## 2021-01-01 NOTE — PROGRESS NOTES
Bedside and Verbal shift change report given to Suzy  (oncoming nurse) by [address] [place] [address] (offgoing nurse). Report included the following information SBAR, Kardex, Intake/Output and MAR.

## 2021-01-01 NOTE — PROGRESS NOTES
Bedside and Verbal shift change report given to [address] (oncoming nurse) by Anh Celeste (offgoing nurse). Report included the following information SBAR, Kardex, Intake/Output and MAR.

## 2021-01-01 NOTE — PROGRESS NOTES
[address] [facility] Adult  Hospitalist Group                                                                                          Hospitalist Progress Note  Caro Corey NP  Answering service: [phone] or [phone] from in house phone        Date of Service:  2021  NAME:  Penny Sung  :  9/3/1931  MRN:  [id number]      Admission Summary:   As per initial admission summary  This an 70-year-old man with past medical history significant for hypertension, dyslipidemia, type 2 diabetes, and dementia, who was in his usual state of health until the day of his presentation at the emergency room when it was reported that the patient developed chest pain at the nursing home where the patient resides.  The patient developed the chest pain at about 04:30 p.m., located at the right side of the chest.  The patient is not able to state the severity and the quality of the pain because of his dementia.  According to report, the patient fell into a door frame, hitting his right shoulder the day before.  When the patient arrived at the emergency room, the patient did not complain of any chest pain.  The patient was confused, agitated, and restless at times.  CTA of the chest was obtained.  The CTA was negative for pulmonary embolism but shows consolidation.  The patient was then referred to the hospitalist service for evaluation for admission. Fadumo Antonio patient was last admitted to the hospital from 2018 to 2018. [facility] was admitted and treated for acute renal failure.  There was no history of fever, rigors or chills reported. Interval history / Subjective:   Seen and examined patient sitting in bedside chair. States that he is doing fine today. Oriented to person and place. Wants to go home. Denies any pain. No new complaints. No overnight events noted. Discharge pending auth. Discussed with CM.       Assessment & Plan:     1.  HCAP  S/p Rocephin and doxycycline x5d total- completed.      Chest pain on admission likely 2/2 above, resolved  Troponin neg x3, no evidence of ACS  CTA of the chest negative for pulmonary embolism     2.  Acute kidney injury:  resolved  Ct abd negative for any acute abnormality      3.  Elevated BNP level:  No pulmonary edema or s/sx of CHF  systolic murmur on exam  Echo EF 55%     4.  Elevated D-dimer likely d/t ERICA since CTA chest is negative for pulmonary embolism and ultrasound of the lower extremity is negative for DVT     5.  Hypertension  - Stable   - Continue Norvasc and Lisinopril   - Monitor VS.      6.  Dyslipidemia:   - continue Lipitor     7.  PreDM, a1c 5.7  - Monitor BG   - Insulin sliding scale   - Carb consistent diet      8.  Thrombocytopenia, mild >100k, asymptomatic  monitor     9.  Dementia:  We will continue supportive treatment.  The patient's dementia is associated with significant behavioral disorder.  The patient received Geodon in the emergency room for the associated behavioral disorder.     10.  Fall:    Patient fell in Ed  Imaging negative for any fracture  Discharge pending placement.       Code status: DNR   DVT prophylaxis: Heparin     Care Plan discussed with: Patient/Family, Nurse and   Anticipated Disposition: SNF/LTC  Anticipated Discharge:Stable for discharge, Auth pending. Hospital Problems  Date Reviewed: 12/24/2020          Codes Class Noted POA    * (Principal) Bacterial pneumonia ICD-10-CM: J15.9  ICD-9-CM: 482.9  12/24/2020 Yes                Review of Systems:   A comprehensive review of systems was negative except for that written in the HPI. Vital Signs:    Last 24hrs VS reviewed since prior progress note.  Most recent are:  Visit Vitals  BP (!) 142/76 (BP 1 Location: Left arm, BP Patient Position: At rest)   Pulse 63   Temp 97.6 °F (36.4 °C)   Resp 16   Ht 5' 6\" (1.676 m)   Wt 70 kg (154 lb 5.2 oz)   SpO2 100%   BMI 24.91 kg/m²         Intake/Output Summary (Last 24 hours) at 1/1/2021 1523  Last data filed at 1/1/2021 1028  Gross per 24 hour   Intake    Output 400 ml   Net -400 ml        Physical Examination:             Constitutional:  No acute distress, cooperative, pleasant, answers questions   ENT:  Oral mucosa moist, oropharynx benign. Resp:  CTA bilaterally. No wheezing/rhonchi/rales. No accessory muscle use   CV:  Regular rhythm, normal rate, no murmurs, gallops, rubs    GI:  Soft, non distended, non tender. normoactive bowel sounds, no hepatosplenomegaly     Musculoskeletal:  No edema, warm, 2+ pulses throughout    Neurologic:  Moves all extremities. AAOx2, CN II-XII reviewed, follows commands     Psych:  Not anxious or agitated       Data Review:    Review and/or order of clinical lab test  Review and/or order of tests in the medicine section of CPT      Labs:     Recent Labs     12/30/20  0325   WBC 5.1   HGB 10.7*   HCT 33.7*   *     Recent Labs     12/30/20  0325      K 3.9      CO2 27   BUN 27*   CREA 1.19   GLU 95   CA 9.0     No results for input(s): ALT, AP, TBIL, TBILI, TP, ALB, GLOB, GGT, AML, LPSE in the last 72 hours. No lab exists for component: SGOT, GPT, AMYP, HLPSE  No results for input(s): INR, PTP, APTT, INREXT in the last 72 hours. No results for input(s): FE, TIBC, PSAT, FERR in the last 72 hours. No results found for: FOL, RBCF   No results for input(s): PH, PCO2, PO2 in the last 72 hours. No results for input(s): CPK, CKNDX, TROIQ in the last 72 hours.     No lab exists for component: CPKMB  Lab Results   Component Value Date/Time    Cholesterol, total 135 02/21/2019 12:50 PM    HDL Cholesterol 55 02/21/2019 12:50 PM    LDL, calculated 68 02/21/2019 12:50 PM    Triglyceride 59 02/21/2019 12:50 PM    CHOL/HDL Ratio 2.8 03/20/2017 12:21 AM     Lab Results   Component Value Date/Time    Glucose (POC) 133 (H) 01/01/2021 11:25 AM    Glucose (POC) 99 01/01/2021 07:16 AM    Glucose (POC) 109 (H) 01/01/2021 12:06 AM    Glucose (POC) 103 (H) 12/31/2020 04:16 PM    Glucose (POC) 109 (H) 12/31/2020 11:03 AM     Lab Results   Component Value Date/Time    Color YELLOW/STRAW 08/01/2018 01:13 PM    Appearance CLEAR 08/01/2018 01:13 PM    Specific gravity 1.021 08/01/2018 01:13 PM    pH (UA) 5.0 08/01/2018 01:13 PM    Protein 30 (A) 08/01/2018 01:13 PM    Glucose NEGATIVE  08/01/2018 01:13 PM    Ketone TRACE (A) 08/01/2018 01:13 PM    Bilirubin NEGATIVE  08/01/2018 01:13 PM    Urobilinogen 1.0 08/01/2018 01:13 PM    Nitrites NEGATIVE  08/01/2018 01:13 PM    Leukocyte Esterase NEGATIVE  08/01/2018 01:13 PM    Epithelial cells FEW 08/01/2018 01:13 PM    Bacteria NEGATIVE  08/01/2018 01:13 PM    WBC 0-4 08/01/2018 01:13 PM    RBC 0-5 08/01/2018 01:13 PM         Medications Reviewed:     Current Facility-Administered Medications   Medication Dose Route Frequency    lisinopriL (PRINIVIL, ZESTRIL) tablet 5 mg  5 mg Oral DAILY    amLODIPine (NORVASC) tablet 5 mg  5 mg Oral DAILY    aspirin delayed-release tablet 81 mg  81 mg Oral QPM    atorvastatin (LIPITOR) tablet 10 mg  10 mg Oral QHS    sodium chloride (NS) flush 5-40 mL  5-40 mL IntraVENous Q8H    sodium chloride (NS) flush 5-40 mL  5-40 mL IntraVENous PRN    acetaminophen (TYLENOL) tablet 650 mg  650 mg Oral Q6H PRN    Or    acetaminophen (TYLENOL) suppository 650 mg  650 mg Rectal Q6H PRN    polyethylene glycol (MIRALAX) packet 17 g  17 g Oral DAILY PRN    promethazine (PHENERGAN) tablet 12.5 mg  12.5 mg Oral Q6H PRN    Or    ondansetron (ZOFRAN) injection 4 mg  4 mg IntraVENous Q6H PRN    heparin (porcine) injection 5,000 Units  5,000 Units SubCUTAneous Q8H    lactobac ac& pc-s.therm-b.anim (JABIER Q/RISAQUAD)  1 Cap Oral DAILY    insulin lispro (HUMALOG) injection   SubCUTAneous AC&HS    glucose chewable tablet 16 g  4 Tab Oral PRN    dextrose (D50W) injection syrg 12.5-25 g  12.5-25 g IntraVENous PRN    glucagon (GLUCAGEN) injection 1 mg  1 mg IntraMUSCular PRN    lactated Ringers infusion  75 mL/hr IntraVENous CONTINUOUS ______________________________________________________________________  EXPECTED LENGTH OF STAY: 3d 4h  ACTUAL LENGTH OF STAY:          [address] [facility], NP

## 2021-01-02 LAB
ANION GAP SERPL CALC-SCNC: 2 MMOL/L (ref 5–15)
BASOPHILS # BLD: 0 K/UL (ref 0–0.1)
BASOPHILS NFR BLD: 0 % (ref 0–1)
BUN SERPL-MCNC: 21 MG/DL (ref 6–20)
BUN/CREAT SERPL: 18 (ref 12–20)
CALCIUM SERPL-MCNC: 9.2 MG/DL (ref 8.5–10.1)
CHLORIDE SERPL-SCNC: 107 MMOL/L (ref 97–108)
CO2 SERPL-SCNC: 29 MMOL/L (ref 21–32)
CREAT SERPL-MCNC: 1.17 MG/DL (ref 0.7–1.3)
DIFFERENTIAL METHOD BLD: ABNORMAL
EOSINOPHIL # BLD: 0.1 K/UL (ref 0–0.4)
EOSINOPHIL NFR BLD: 1 % (ref 0–7)
ERYTHROCYTE [DISTWIDTH] IN BLOOD BY AUTOMATED COUNT: 14.3 % (ref 11.5–14.5)
GLUCOSE BLD STRIP.AUTO-MCNC: 108 MG/DL (ref 65–100)
GLUCOSE BLD STRIP.AUTO-MCNC: 117 MG/DL (ref 65–100)
GLUCOSE BLD STRIP.AUTO-MCNC: 117 MG/DL (ref 65–100)
GLUCOSE BLD STRIP.AUTO-MCNC: 88 MG/DL (ref 65–100)
GLUCOSE SERPL-MCNC: 94 MG/DL (ref 65–100)
HCT VFR BLD AUTO: 35.6 % (ref 36.6–50.3)
HGB BLD-MCNC: 11 G/DL (ref 12.1–17)
IMM GRANULOCYTES # BLD AUTO: 0 K/UL (ref 0–0.04)
IMM GRANULOCYTES NFR BLD AUTO: 0 % (ref 0–0.5)
LYMPHOCYTES # BLD: 0.9 K/UL (ref 0.8–3.5)
LYMPHOCYTES NFR BLD: 19 % (ref 12–49)
MCH RBC QN AUTO: 29.1 PG (ref 26–34)
MCHC RBC AUTO-ENTMCNC: 30.9 G/DL (ref 30–36.5)
MCV RBC AUTO: 94.2 FL (ref 80–99)
MONOCYTES # BLD: 0.6 K/UL (ref 0–1)
MONOCYTES NFR BLD: 13 % (ref 5–13)
NEUTS SEG # BLD: 3 K/UL (ref 1.8–8)
NEUTS SEG NFR BLD: 67 % (ref 32–75)
NRBC # BLD: 0 K/UL (ref 0–0.01)
NRBC BLD-RTO: 0 PER 100 WBC
PLATELET # BLD AUTO: 116 K/UL (ref 150–400)
PMV BLD AUTO: 11.5 FL (ref 8.9–12.9)
POTASSIUM SERPL-SCNC: 3.9 MMOL/L (ref 3.5–5.1)
RBC # BLD AUTO: 3.78 M/UL (ref 4.1–5.7)
SERVICE CMNT-IMP: ABNORMAL
SERVICE CMNT-IMP: NORMAL
SODIUM SERPL-SCNC: 138 MMOL/L (ref 136–145)
WBC # BLD AUTO: 4.5 K/UL (ref 4.1–11.1)

## 2021-01-02 PROCEDURE — 82962 GLUCOSE BLOOD TEST: CPT

## 2021-01-02 PROCEDURE — 94760 N-INVAS EAR/PLS OXIMETRY 1: CPT

## 2021-01-02 PROCEDURE — 36415 COLL VENOUS BLD VENIPUNCTURE: CPT

## 2021-01-02 PROCEDURE — 74011250636 HC RX REV CODE- 250/636: Performed by: INTERNAL MEDICINE

## 2021-01-02 PROCEDURE — 65270000029 HC RM PRIVATE

## 2021-01-02 PROCEDURE — 74011250637 HC RX REV CODE- 250/637: Performed by: INTERNAL MEDICINE

## 2021-01-02 PROCEDURE — 80048 BASIC METABOLIC PNL TOTAL CA: CPT

## 2021-01-02 PROCEDURE — 85025 COMPLETE CBC W/AUTO DIFF WBC: CPT

## 2021-01-02 RX ADMIN — HEPARIN SODIUM 5000 UNITS: 5000 INJECTION INTRAVENOUS; SUBCUTANEOUS at 18:00

## 2021-01-02 RX ADMIN — HEPARIN SODIUM 5000 UNITS: 5000 INJECTION INTRAVENOUS; SUBCUTANEOUS at 09:28

## 2021-01-02 RX ADMIN — Medication 1 CAPSULE: at 09:21

## 2021-01-02 RX ADMIN — ATORVASTATIN CALCIUM 10 MG: 10 TABLET, FILM COATED ORAL at 21:37

## 2021-01-02 RX ADMIN — ASPIRIN 81 MG: 81 TABLET, COATED ORAL at 18:00

## 2021-01-02 RX ADMIN — AMLODIPINE BESYLATE 5 MG: 5 TABLET ORAL at 09:28

## 2021-01-02 NOTE — PROGRESS NOTES
Bedside shift change report given to Marolyn Denver, RN (oncoming nurse) by Jase Melo (offgoing nurse). Report included the following information SBAR, Kardex, Procedure Summary, Intake/Output, MAR and Recent Results.

## 2021-01-02 NOTE — PROGRESS NOTES
[facility] [facility] Adult  Hospitalist Group                                                                                          Hospitalist Progress Note  Shemar Velez NP  Answering service: [phone] or [phone] from in house phone        Date of Service:  2021  NAME:  Kenya Sotelo  :  9/3/1931  MRN:  [id number]      Admission Summary:   As per initial admission summary  This an 66-year-old man with past medical history significant for hypertension, dyslipidemia, type 2 diabetes, and dementia, who was in his usual state of health until the day of his presentation at the emergency room when it was reported that the patient developed chest pain at the nursing home where the patient resides.  The patient developed the chest pain at about 04:30 p.m., located at the right side of the chest.  The patient is not able to state the severity and the quality of the pain because of his dementia.  According to report, the patient fell into a door frame, hitting his right shoulder the day before.  When the patient arrived at the emergency room, the patient did not complain of any chest pain.  The patient was confused, agitated, and restless at times.  CTA of the chest was obtained.  The CTA was negative for pulmonary embolism but shows consolidation.  The patient was then referred to the hospitalist service for evaluation for admission. Jelani Alfaro patient was last admitted to the hospital from 2018 to 2018. [facility] was admitted and treated for acute renal failure.  There was no history of fever, rigors or chills reported. Interval history / Subjective:   Seen and examined patient sitting up in bed. States that he is feeling fine but wants to get up to go into the bathroom. Denies any pain. No new complaints. No overnight events noted. Discharge pending insurance auth.       Assessment & Plan:     1.  CAP  S/p Rocephin and doxycycline x5d total- completed.      Chest pain on admission likely 2/2 above, resolved  Troponin neg x3, no evidence of ACS  CTA of the chest negative for pulmonary embolism     2.  Acute kidney injury:  resolved  Ct abd negative for any acute abnormality      3.  Elevated BNP level:  No pulmonary edema or s/sx of CHF  systolic murmur on exam  Echo EF 55%     4.  Elevated D-dimer likely d/t ERICA since CTA chest is negative for pulmonary embolism and ultrasound of the lower extremity is negative for DVT     5.  Hypertension  - Stable   - Continue Norvasc and Lisinopril   - Monitor VS.      6.  Dyslipidemia:   - continue Lipitor     7.  PreDM, a1c 5.7  - Monitor BG   - Insulin sliding scale   - Carb consistent diet      8.  Thrombocytopenia, mild >100k, asymptomatic  monitor     9.  Dementia:  We will continue supportive treatment.  The patient's dementia is associated with significant behavioral disorder.  The patient received Geodon in the emergency room for the associated behavioral disorder.     10.  Fall:    Patient fell in Ed  Imaging negative for any fracture  Discharge pending placement.       Code status: DNR   DVT prophylaxis: Heparin     Care Plan discussed with: Patient/Family and Nurse  Anticipated Disposition: SNF/LTC  Anticipated Discharge: Stable for discharge, Auth. Pending.      Hospital Problems  Date Reviewed: 12/24/2020          Codes Class Noted POA    * (Principal) Bacterial pneumonia ICD-10-CM: J15.9  ICD-9-CM: 482.9  12/24/2020 Yes                Review of Systems:   A comprehensive review of systems was negative except for that written in the HPI.       Vital Signs:    Last 24hrs VS reviewed since prior progress note. Most recent are:  Visit Vitals  /76 (BP 1 Location: Right arm, BP Patient Position: At rest)   Pulse 64   Temp 97.4 °F (36.3 °C)   Resp 16   Ht 5' 6\" (1.676 m)   Wt 70 kg (154 lb 5.2 oz)   SpO2 97%   BMI 24.91 kg/m²         Intake/Output Summary (Last 24 hours) at 1/2/2021 1033  Last data filed at 1/2/2021 0550  Gross per 24 hour   Intake —   Output  500 ml   Net -500 ml        Physical Examination:             Constitutional:  No acute distress, cooperative, pleasant, answers questions   ENT:  Oral mucosa moist, oropharynx benign. Resp:  CTA bilaterally. No wheezing/rhonchi/rales. No accessory muscle use   CV:  Regular rhythm, normal rate, no murmurs, gallops, rubs    GI:  Soft, non distended, non tender. normoactive bowel sounds, no hepatosplenomegaly     Musculoskeletal:  No edema, warm, 2+ pulses throughout    Neurologic:  Moves all extremities. AAOx2, CN II-XII reviewed, follows commands     Psych:  Not anxious or agitated       Data Review:    Review and/or order of clinical lab test  Review and/or order of tests in the medicine section of [facility]      Labs:     Recent Labs     01/02/21  0628   WBC 4.5   HGB 11.0*   HCT 35.6*   *     Recent Labs     01/02/21  0628      K 3.9      CO2 29   BUN 21*   CREA 1.17   GLU 94   CA 9.2     No results for input(s): ALT, AP, TBIL, TBILI, TP, ALB, GLOB, GGT, AML, LPSE in the last 72 hours. No lab exists for component: SGOT, GPT, AMYP, HLPSE  No results for input(s): INR, PTP, APTT, INREXT in the last 72 hours. No results for input(s): FE, TIBC, PSAT, FERR in the last 72 hours. No results found for: FOL, RBCF   No results for input(s): PH, PCO2, PO2 in the last 72 hours. No results for input(s): CPK, CKNDX, TROIQ in the last 72 hours.     No lab exists for component: CPKMB  Lab Results   Component Value Date/Time    Cholesterol, total 135 02/21/2019 12:50 PM    HDL Cholesterol 55 02/21/2019 12:50 PM    LDL, calculated 68 02/21/2019 12:50 PM    Triglyceride 59 02/21/2019 12:50 PM    CHOL/HDL Ratio 2.8 03/20/2017 12:21 AM     Lab Results   Component Value Date/Time    Glucose (POC) 88 01/02/2021 06:26 AM    Glucose (POC) 104 (H) 01/01/2021 10:19 PM    Glucose (POC) 108 (H) 01/01/2021 04:12 PM    Glucose (POC) 133 (H) 01/01/2021 11:25 AM    Glucose (POC) 99 01/01/2021 07:16 AM     Lab Results Component Value Date/Time    Color YELLOW/STRAW 08/01/2018 01:13 PM    Appearance CLEAR 08/01/2018 01:13 PM    Specific gravity 1.021 08/01/2018 01:13 PM    pH (UA) 5.0 08/01/2018 01:13 PM    Protein 30 (A) 08/01/2018 01:13 PM    Glucose NEGATIVE  08/01/2018 01:13 PM    Ketone TRACE (A) 08/01/2018 01:13 PM    Bilirubin NEGATIVE  08/01/2018 01:13 PM    Urobilinogen 1.0 08/01/2018 01:13 PM    Nitrites NEGATIVE  08/01/2018 01:13 PM    Leukocyte Esterase NEGATIVE  08/01/2018 01:13 PM    Epithelial cells FEW 08/01/2018 01:13 PM    Bacteria NEGATIVE  08/01/2018 01:13 PM    WBC 0-4 08/01/2018 01:13 PM    RBC 0-5 08/01/2018 01:13 PM         Medications Reviewed:     Current Facility-Administered Medications   Medication Dose Route Frequency    lisinopriL (PRINIVIL, ZESTRIL) tablet 5 mg  5 mg Oral DAILY    amLODIPine (NORVASC) tablet 5 mg  5 mg Oral DAILY    aspirin delayed-release tablet 81 mg  81 mg Oral QPM    atorvastatin (LIPITOR) tablet 10 mg  10 mg Oral QHS    sodium chloride (NS) flush 5-40 mL  5-40 mL IntraVENous Q8H    sodium chloride (NS) flush 5-40 mL  5-40 mL IntraVENous PRN    acetaminophen (TYLENOL) tablet 650 mg  650 mg Oral Q6H PRN    Or    acetaminophen (TYLENOL) suppository 650 mg  650 mg Rectal Q6H PRN    polyethylene glycol (MIRALAX) packet 17 g  17 g Oral DAILY PRN    promethazine (PHENERGAN) tablet 12.5 mg  12.5 mg Oral Q6H PRN    Or    ondansetron (ZOFRAN) injection 4 mg  4 mg IntraVENous Q6H PRN    heparin (porcine) injection 5,000 Units  5,000 Units SubCUTAneous Q8H    lactobac ac& pc-s.therm-b.anim (JABIER Q/RISAQUAD)  1 Cap Oral DAILY    insulin lispro (HUMALOG) injection   SubCUTAneous AC&HS    glucose chewable tablet 16 g  4 Tab Oral PRN    dextrose (D50W) injection syrg 12.5-25 g  12.5-25 g IntraVENous PRN    glucagon (GLUCAGEN) injection 1 mg  1 mg IntraMUSCular PRN     ______________________________________________________________________  EXPECTED LENGTH OF STAY: 3d 4h  ACTUAL LENGTH OF STAY:          [address] [facility], NP

## 2021-01-03 LAB
GLUCOSE BLD STRIP.AUTO-MCNC: 101 MG/DL (ref 65–100)
GLUCOSE BLD STRIP.AUTO-MCNC: 102 MG/DL (ref 65–100)
GLUCOSE BLD STRIP.AUTO-MCNC: 111 MG/DL (ref 65–100)
GLUCOSE BLD STRIP.AUTO-MCNC: 98 MG/DL (ref 65–100)
SERVICE CMNT-IMP: ABNORMAL
SERVICE CMNT-IMP: NORMAL

## 2021-01-03 PROCEDURE — 74011250636 HC RX REV CODE- 250/636: Performed by: INTERNAL MEDICINE

## 2021-01-03 PROCEDURE — 74011250637 HC RX REV CODE- 250/637: Performed by: INTERNAL MEDICINE

## 2021-01-03 PROCEDURE — 87635 SARS-COV-2 COVID-19 AMP PRB: CPT

## 2021-01-03 PROCEDURE — 65270000029 HC RM PRIVATE

## 2021-01-03 PROCEDURE — 82962 GLUCOSE BLOOD TEST: CPT

## 2021-01-03 RX ADMIN — Medication 1 CAPSULE: at 09:17

## 2021-01-03 RX ADMIN — AMLODIPINE BESYLATE 5 MG: 5 TABLET ORAL at 09:17

## 2021-01-03 RX ADMIN — HEPARIN SODIUM 5000 UNITS: 5000 INJECTION INTRAVENOUS; SUBCUTANEOUS at 09:16

## 2021-01-03 RX ADMIN — PROMETHAZINE HYDROCHLORIDE 12.5 MG: 25 TABLET ORAL at 21:01

## 2021-01-03 RX ADMIN — HEPARIN SODIUM 5000 UNITS: 5000 INJECTION INTRAVENOUS; SUBCUTANEOUS at 17:32

## 2021-01-03 RX ADMIN — ASPIRIN 81 MG: 81 TABLET, COATED ORAL at 17:36

## 2021-01-03 RX ADMIN — ATORVASTATIN CALCIUM 10 MG: 10 TABLET, FILM COATED ORAL at 21:00

## 2021-01-03 RX ADMIN — LISINOPRIL 5 MG: 5 TABLET ORAL at 09:17

## 2021-01-03 RX ADMIN — ACETAMINOPHEN 650 MG: 325 TABLET ORAL at 21:01

## 2021-01-03 RX ADMIN — HEPARIN SODIUM 5000 UNITS: 5000 INJECTION INTRAVENOUS; SUBCUTANEOUS at 02:49

## 2021-01-03 NOTE — PROGRESS NOTES
Bedside shift change report given to Fariha Hong (oncoming nurse) by Makayla Rosales (offgoing nurse). Report included the following information SBAR, Kardex, Procedure Summary, Intake/Output, MAR and Recent Results.

## 2021-01-03 NOTE — PROGRESS NOTES
Bedside and Verbal shift change report given to [address] (oncoming nurse) by Missy Wheeler (offgoing nurse). Report included the following information SBAR, Kardex, Intake/Output and MAR.

## 2021-01-03 NOTE — PROGRESS NOTES
[address] [facility] Adult  Hospitalist Group                                                                                          Hospitalist Progress Note  Marilu Valerio NP  Answering service: [phone] OR [phone] from in house phone        Date of Service:  1/3/2021  NAME:  Luda Holder  :  9/3/1931  MRN:  [id number]      Admission Summary:   As per initial admission summary  This an 68-year-old man with past medical history significant for hypertension, dyslipidemia, type 2 diabetes, and dementia, who was in his usual state of health until the day of his presentation at the emergency room when it was reported that the patient developed chest pain at the nursing home where the patient resides.  The patient developed the chest pain at about 04:30 p.m., located at the right side of the chest.  The patient is not able to state the severity and the quality of the pain because of his dementia.  According to report, the patient fell into a door frame, hitting his right shoulder the day before.  When the patient arrived at the emergency room, the patient did not complain of any chest pain.  The patient was confused, agitated, and restless at times.  CTA of the chest was obtained.  The CTA was negative for pulmonary embolism but shows consolidation.  The patient was then referred to the hospitalist service for evaluation for admission. Cory Gutierrez patient was last admitted to the hospital from 2018 to 2018. Caitlin Gomez was admitted and treated for acute renal failure.  There was no history of fever, rigors or chills reported. Interval history / Subjective:    Seen and examined patient sitting up in bed eating breakfast. States that he is feeling fine. Oriented x 1. Denies any pain. No new complaints. No overnight events noted. Discharge pending insurance auth. Covid swab done for placement.       Assessment & Plan:     1.  CAP  S/p Rocephin and doxycycline x5d total- completed.      Chest pain on admission likely 2/2 above, resolved  Troponin neg x3, no evidence of ACS  CTA of the chest negative for pulmonary embolism     2.  Acute kidney injury:  resolved  Ct abd negative for any acute abnormality      3.  Elevated BNP level:  No pulmonary edema or s/sx of CHF  systolic murmur on exam  Echo EF 55%     4.  Elevated D-dimer likely d/t ERICA since CTA chest is negative for pulmonary embolism and ultrasound of the lower extremity is negative for DVT     5.  Hypertension  - Stable   - Continue Norvasc and Lisinopril   - Monitor VS.      6.  Dyslipidemia:   - continue Lipitor     7.  PreDM, a1c 5.7  - Monitor BG   - Insulin sliding scale   - Carb consistent diet      8.  Thrombocytopenia, mild >100k, asymptomatic  monitor     9.  Dementia:  We will continue supportive treatment.  The patient's dementia is associated with significant behavioral disorder.  The patient received Geodon in the emergency room for the associated behavioral disorder.     10.  Fall:    Patient fell in Ed  Imaging negative for any fracture  Discharge pending placement.      Code status: DNR  DVT prophylaxis: Heparin     Care Plan discussed with: Patient/Family and Nurse  Anticipated Disposition: SNF/LTC  Anticipated Discharge:Stable for placement, Auth pending. Covid swab for placement. Hospital Problems  Date Reviewed: 12/24/2020          Codes Class Noted POA    * (Principal) Bacterial pneumonia ICD-10-CM: J15.9  ICD-9-CM: 482.9  12/24/2020 Yes                Review of Systems:   A comprehensive review of systems was negative except for that written in the HPI. Vital Signs:    Last 24hrs VS reviewed since prior progress note.  Most recent are:  Visit Vitals  BP (!) 157/83 (BP 1 Location: Left arm, BP Patient Position: At rest)   Pulse (!) 58   Temp 98.2 °F (36.8 °C)   Resp 16   Ht 5' 6\" (1.676 m)   Wt 70 kg (154 lb 5.2 oz)   SpO2 98%   BMI 24.91 kg/m²         Intake/Output Summary (Last 24 hours) at 1/3/2021 2890  Last data filed at 1/2/2021 1609  Gross per 24 hour   Intake    Output 150 ml   Net -150 ml        Physical Examination:             Constitutional:  No acute distress, cooperative, pleasant    ENT:  Oral mucosa moist, oropharynx benign. Resp:  CTA bilaterally. No wheezing/rhonchi/rales. No accessory muscle use   CV:  Regular rhythm, normal rate, no murmurs, gallops, rubs    GI:  Soft, non distended, non tender. normoactive bowel sounds, no hepatosplenomegaly     Musculoskeletal:  No edema, warm, 2+ pulses throughout    Neurologic:  Moves all extremities. AAOx1-2, CN II-XII reviewed, follows commands     Psych:  Not anxious or agitated       Data Review:    Review and/or order of clinical lab test  Review and/or order of tests in the medicine section of [facility]      Labs:     Recent Labs     01/02/21  0628   WBC 4.5   HGB 11.0*   HCT 35.6*   *     Recent Labs     01/02/21  0628      K 3.9      CO2 29   BUN 21*   CREA 1.17   GLU 94   CA 9.2     No results for input(s): ALT, AP, TBIL, TBILI, TP, ALB, GLOB, GGT, AML, LPSE in the last 72 hours. No lab exists for component: SGOT, GPT, AMYP, HLPSE  No results for input(s): INR, PTP, APTT, INREXT in the last 72 hours. No results for input(s): FE, TIBC, PSAT, FERR in the last 72 hours. No results found for: FOL, RBCF   No results for input(s): PH, PCO2, PO2 in the last 72 hours. No results for input(s): CPK, CKNDX, TROIQ in the last 72 hours.     No lab exists for component: CPKMB  Lab Results   Component Value Date/Time    Cholesterol, total 135 02/21/2019 12:50 PM    HDL Cholesterol 55 02/21/2019 12:50 PM    LDL, calculated 68 02/21/2019 12:50 PM    Triglyceride 59 02/21/2019 12:50 PM    CHOL/HDL Ratio 2.8 03/20/2017 12:21 AM     Lab Results   Component Value Date/Time    Glucose (POC) 98 01/03/2021 06:26 AM    Glucose (POC) 117 (H) 01/02/2021 09:17 PM    Glucose (POC) 108 (H) 01/02/2021 04:25 PM    Glucose (POC) 117 (H) 01/02/2021 11:31 AM    Glucose (POC) 88 01/02/2021 06:26 AM     Lab Results   Component Value Date/Time    Color YELLOW/STRAW 08/01/2018 01:13 PM    Appearance CLEAR 08/01/2018 01:13 PM    Specific gravity 1.021 08/01/2018 01:13 PM    pH (UA) 5.0 08/01/2018 01:13 PM    Protein 30 (A) 08/01/2018 01:13 PM    Glucose NEGATIVE  08/01/2018 01:13 PM    Ketone TRACE (A) 08/01/2018 01:13 PM    Bilirubin NEGATIVE  08/01/2018 01:13 PM    Urobilinogen 1.0 08/01/2018 01:13 PM    Nitrites NEGATIVE  08/01/2018 01:13 PM    Leukocyte Esterase NEGATIVE  08/01/2018 01:13 PM    Epithelial cells FEW 08/01/2018 01:13 PM    Bacteria NEGATIVE  08/01/2018 01:13 PM    WBC 0-4 08/01/2018 01:13 PM    RBC 0-5 08/01/2018 01:13 PM         Medications Reviewed:     Current Facility-Administered Medications   Medication Dose Route Frequency    lisinopriL (PRINIVIL, ZESTRIL) tablet 5 mg  5 mg Oral DAILY    amLODIPine (NORVASC) tablet 5 mg  5 mg Oral DAILY    aspirin delayed-release tablet 81 mg  81 mg Oral QPM    atorvastatin (LIPITOR) tablet 10 mg  10 mg Oral QHS    sodium chloride (NS) flush 5-40 mL  5-40 mL IntraVENous Q8H    sodium chloride (NS) flush 5-40 mL  5-40 mL IntraVENous PRN    acetaminophen (TYLENOL) tablet 650 mg  650 mg Oral Q6H PRN    Or    acetaminophen (TYLENOL) suppository 650 mg  650 mg Rectal Q6H PRN    polyethylene glycol (MIRALAX) packet 17 g  17 g Oral DAILY PRN    promethazine (PHENERGAN) tablet 12.5 mg  12.5 mg Oral Q6H PRN    Or    ondansetron (ZOFRAN) injection 4 mg  4 mg IntraVENous Q6H PRN    heparin (porcine) injection 5,000 Units  5,000 Units SubCUTAneous Q8H    lactobac ac& pc-s.therm-b.anim (JABIER Q/RISAQUAD)  1 Cap Oral DAILY    insulin lispro (HUMALOG) injection   SubCUTAneous AC&HS    glucose chewable tablet 16 g  4 Tab Oral PRN    dextrose (D50W) injection syrg 12.5-25 g  12.5-25 g IntraVENous PRN    glucagon (GLUCAGEN) injection 1 mg  1 mg IntraMUSCular PRN ______________________________________________________________________  EXPECTED LENGTH OF STAY: 3d 4h  ACTUAL LENGTH OF STAY:          [address] [facility]

## 2021-01-04 VITALS
WEIGHT: 154.32 LBS | RESPIRATION RATE: 18 BRPM | DIASTOLIC BLOOD PRESSURE: 69 MMHG | TEMPERATURE: 98.7 F | HEART RATE: 73 BPM | SYSTOLIC BLOOD PRESSURE: 113 MMHG | BODY MASS INDEX: 24.8 KG/M2 | OXYGEN SATURATION: 98 % | HEIGHT: 66 IN

## 2021-01-04 LAB
ANION GAP SERPL CALC-SCNC: 4 MMOL/L (ref 5–15)
BASOPHILS # BLD: 0 K/UL (ref 0–0.1)
BASOPHILS NFR BLD: 1 % (ref 0–1)
BUN SERPL-MCNC: 20 MG/DL (ref 6–20)
BUN/CREAT SERPL: 17 (ref 12–20)
CALCIUM SERPL-MCNC: 8.9 MG/DL (ref 8.5–10.1)
CHLORIDE SERPL-SCNC: 108 MMOL/L (ref 97–108)
CO2 SERPL-SCNC: 27 MMOL/L (ref 21–32)
CREAT SERPL-MCNC: 1.17 MG/DL (ref 0.7–1.3)
DIFFERENTIAL METHOD BLD: ABNORMAL
EOSINOPHIL # BLD: 0.1 K/UL (ref 0–0.4)
EOSINOPHIL NFR BLD: 2 % (ref 0–7)
ERYTHROCYTE [DISTWIDTH] IN BLOOD BY AUTOMATED COUNT: 14.1 % (ref 11.5–14.5)
GLUCOSE BLD STRIP.AUTO-MCNC: 89 MG/DL (ref 65–100)
GLUCOSE BLD STRIP.AUTO-MCNC: 90 MG/DL (ref 65–100)
GLUCOSE SERPL-MCNC: 85 MG/DL (ref 65–100)
HCT VFR BLD AUTO: 33.7 % (ref 36.6–50.3)
HEALTH STATUS, XMCV2T: NORMAL
HGB BLD-MCNC: 10.7 G/DL (ref 12.1–17)
IMM GRANULOCYTES # BLD AUTO: 0 K/UL (ref 0–0.04)
IMM GRANULOCYTES NFR BLD AUTO: 0 % (ref 0–0.5)
LYMPHOCYTES # BLD: 1.1 K/UL (ref 0.8–3.5)
LYMPHOCYTES NFR BLD: 26 % (ref 12–49)
MCH RBC QN AUTO: 29 PG (ref 26–34)
MCHC RBC AUTO-ENTMCNC: 31.8 G/DL (ref 30–36.5)
MCV RBC AUTO: 91.3 FL (ref 80–99)
MONOCYTES # BLD: 0.5 K/UL (ref 0–1)
MONOCYTES NFR BLD: 13 % (ref 5–13)
NEUTS SEG # BLD: 2.3 K/UL (ref 1.8–8)
NEUTS SEG NFR BLD: 58 % (ref 32–75)
NRBC # BLD: 0 K/UL (ref 0–0.01)
NRBC BLD-RTO: 0 PER 100 WBC
PLATELET # BLD AUTO: 118 K/UL (ref 150–400)
PMV BLD AUTO: 11.7 FL (ref 8.9–12.9)
POTASSIUM SERPL-SCNC: 3.9 MMOL/L (ref 3.5–5.1)
RBC # BLD AUTO: 3.69 M/UL (ref 4.1–5.7)
SARS-COV-2, COV2: NOT DETECTED
SERVICE CMNT-IMP: NORMAL
SERVICE CMNT-IMP: NORMAL
SODIUM SERPL-SCNC: 139 MMOL/L (ref 136–145)
SOURCE, COVRS: NORMAL
SPECIMEN SOURCE, FCOV2M: NORMAL
SPECIMEN TYPE, XMCV1T: NORMAL
WBC # BLD AUTO: 4 K/UL (ref 4.1–11.1)

## 2021-01-04 PROCEDURE — 97116 GAIT TRAINING THERAPY: CPT

## 2021-01-04 PROCEDURE — 97530 THERAPEUTIC ACTIVITIES: CPT

## 2021-01-04 PROCEDURE — 82962 GLUCOSE BLOOD TEST: CPT

## 2021-01-04 PROCEDURE — 85025 COMPLETE CBC W/AUTO DIFF WBC: CPT

## 2021-01-04 PROCEDURE — 80048 BASIC METABOLIC PNL TOTAL CA: CPT

## 2021-01-04 PROCEDURE — 74011250637 HC RX REV CODE- 250/637: Performed by: INTERNAL MEDICINE

## 2021-01-04 PROCEDURE — 97535 SELF CARE MNGMENT TRAINING: CPT

## 2021-01-04 PROCEDURE — 74011250636 HC RX REV CODE- 250/636: Performed by: INTERNAL MEDICINE

## 2021-01-04 PROCEDURE — 36415 COLL VENOUS BLD VENIPUNCTURE: CPT

## 2021-01-04 RX ADMIN — AMLODIPINE BESYLATE 5 MG: 5 TABLET ORAL at 08:51

## 2021-01-04 RX ADMIN — HEPARIN SODIUM 5000 UNITS: 5000 INJECTION INTRAVENOUS; SUBCUTANEOUS at 11:44

## 2021-01-04 RX ADMIN — HEPARIN SODIUM 5000 UNITS: 5000 INJECTION INTRAVENOUS; SUBCUTANEOUS at 04:00

## 2021-01-04 RX ADMIN — LISINOPRIL 5 MG: 5 TABLET ORAL at 08:51

## 2021-01-04 RX ADMIN — Medication 1 CAPSULE: at 08:51

## 2021-01-04 NOTE — PROGRESS NOTES
RUR: 13%     GENNA-Return to [facility] with home health to be arranged by the [facility]. Covid test negative 12/30 and 1/4. [phone] [facility] patient via car. Discharge folder located on hard chart to include ambulance form and discharge instructions. RN to follow with kardex MAR and call report to #[phone]. Chart reviewed. Doc is requesting a peer to peer for SNF, contact number is [phone] option #5 reference #[id number]. P2P must be done by 2 PM today. KIEL called [facility] #[phone] to follow-up on auth. KIEL was told that a P2P must be done by 2 PM today. 10:30 AM: KIEL spoke with NP. The patient may be able to return to [facility]. CM called [facility]#[phone] and spoke with [place] [facility]. They will need to review clinicals to determine if patient can return to [facility]. KIEL faxed clinicals to #[phone].    11:19 AM: CM received call from [facility] at [facility] [place]. They can accept patient back into [facility]. They will need orders for [facility] PT/OT and then facility will arrange this. KIEL notified NP. [facility] - [phone] . Confirmed plans for return to [facility] [place] today. Renato Levy would prefer patient be transported via ambulance. CM requested transport with [facility]. [facility] cannot accommodate transport until 5:20 PM.     2:38 PM: CM spoke with [facility]. He will be here to transport patient via car as the transport is too late this evening. Medicare pt has received, reviewed, and signed 2nd IM letter informing them of their right to appeal the discharge. Signed copy has been placed on pt bedside chart.       ISIDRO MurrayW/CRM

## 2021-01-04 NOTE — DISCHARGE SUMMARY
Discharge Summary       PATIENT ID: Shanon Milan  MRN: [id number]   YOB: 1931    DATE OF ADMISSION: 12/24/2020  5:49 PM    DATE OF DISCHARGE: 01/04/2021  PRIMARY CARE PROVIDER: Irma Fontana MD     ATTENDING PHYSICIAN: Brad Paige MD  DISCHARGING PROVIDER: Radha Ryan NP    To contact this individual call [phone] and ask the  to page. If unavailable ask to be transferred the Adult Hospitalist Department. CONSULTATIONS: None    PROCEDURES/SURGERIES: * No surgery found *    ADMITTING DIAGNOSES & HOSPITAL COURSE:   As per initial admission summary  This an 70-year-old man with past medical history significant for hypertension, dyslipidemia, type 2 diabetes, and dementia, who was in his usual state of health until the day of his presentation at the emergency room when it was reported that the patient developed chest pain at the nursing home where the patient resides.  The patient developed the chest pain at about 04:30 p.m., located at the right side of the chest.  The patient is not able to state the severity and the quality of the pain because of his dementia.  According to report, the patient fell into a door frame, hitting his right shoulder the day before.  When the patient arrived at the emergency room, the patient did not complain of any chest pain.  The patient was confused, agitated, and restless at times.  CTA of the chest was obtained.  The CTA was negative for pulmonary embolism but shows consolidation.  The patient was then referred to the hospitalist service for evaluation for admission. Jonathon Coleman patient was last admitted to the hospital from 08/01/2018 to 08/05/2018. [facility] was admitted and treated for acute renal failure.  There was no history of fever, rigors or chills reported.       DISCHARGE DIAGNOSES / PLAN:      1.CAP  S/p Rocephin and doxycycline x5d total- completed.      Chest pain on admission likely 2/2 above, resolved  Troponin neg x3, no evidence of ACS  CTA of the chest:  IMPRESSION:  1. No evidence of pulmonary embolism. 2. Bilateral lower lobe consolidation. 3. Fibrosis. 4. Emphysema.   - Follow up with pcp outpatient       2.  Acute kidney injury:  resolved  Ct abd negative for any acute abnormality      3.  Elevated BNP level:  No pulmonary edema or s/sx of CHF  systolic murmur on exam  Echo EF 55%     4.  Elevated D-dimer likely d/t ERICA since CTA chest is negative for pulmonary embolism and ultrasound of the lower extremity is negative for DVT     5.  Hypertension  - Stable   - Continue Norvasc and Lisinopril   - Monitor VS.      6.  Dyslipidemia:   - continue Lipitor     7.  PreDM, a1c 5.7  - Monitor BG   - Carb consistent diet      8.  Thrombocytopenia, mild >100k, asymptomatic  monitor     9.  Dementia:  We will continue supportive treatment.  The patient's dementia is associated with significant behavioral disorder.  The patient received Geodon in the emergency room for the associated behavioral disorder.     10.  Fall:    Patient fell in Ed  Imaging negative for any fracture     ADDITIONAL CARE RECOMMENDATIONS:   Take medications as prescribed. PENDING TEST RESULTS:   At the time of discharge the following test results are still pending: None     FOLLOW UP APPOINTMENTS:    Follow-up Information     Follow up With Specialties Details Why Contact Oral MD [facility] Schedule an appointment as soon as possible for a visit  [address] [facility] [phone] [address] Schedule an appointment as soon as possible for a visit  [address] [facility] [address] [phone]             DIET: Diabetic Diet      WOUND CARE: None     EQUIPMENT needed: Mardee Shallow. DISCHARGE MEDICATIONS:  Current Discharge Medication List      START taking these medications    Details   atorvastatin (LIPITOR) 10 mg tablet Take 1 Tab by mouth nightly.   Qty: 30 Tab, Refills: 0      lisinopriL (PRINIVIL, ZESTRIL) 5 mg tablet Take 1 Tab by mouth daily. Qty: 30 Tab, Refills: 0         CONTINUE these medications which have NOT CHANGED    Details   peg 400-propylene glycol (Systane, propylene glycol,) 0.4-0.3 % drop Administer 1 Drop to both eyes two (2) times a day. calcium carb/magnesium hydrox (MYLANTA PO) Take 20 mL by mouth three (3) times daily as needed. acetaminophen (TYLENOL) 325 mg tablet Take 650 mg by mouth every four (4) hours as needed for Pain. amLODIPine (NORVASC) 5 mg tablet Take 1 Tab by mouth daily. Qty: 30 Tab, Refills: 2      cholecalciferol (Vitamin D3) (2,000 UNITS /50 MCG) cap capsule Take 2,000 Units by mouth every evening. aspirin delayed-release 81 mg tablet Take 81 mg by mouth every evening. STOP taking these medications       simvastatin (ZOCOR) 20 mg tablet Comments:   Reason for Stopping:         [facility] Comments:   Reason for Stopping:                 NOTIFY YOUR PHYSICIAN FOR ANY OF THE FOLLOWING:   Fever over 101 degrees for 24 hours. Chest pain, shortness of breath, fever, chills, nausea, vomiting, diarrhea, change in mentation, falling, weakness, bleeding. Severe pain or pain not relieved by medications. Or, any other signs or symptoms that you may have questions about. DISPOSITION:    Home With:   OT  PT  HH  RN       Long term SNF/Inpatient Rehab   X Independent/assisted living    Hospice    Other:       PATIENT CONDITION AT DISCHARGE:     Functional status    Poor    X Deconditioned     Independent      Cognition     Lucid     Forgetful    X Dementia      Catheters/lines (plus indication)    Pichardo     PICC     PEG    X None      Code status     Full code    X DNR      PHYSICAL EXAMINATION AT DISCHARGE:  General:          Alert, cooperative, no distress, appears stated age.      HEENT:           Atraumatic, anicteric sclerae, pink conjunctivae                          No oral ulcers, mucosa moist, throat clear, dentition fair  Neck:               Supple, symmetrical  Lungs:             Clear to auscultation bilaterally on room air. No Wheezing or Rhonchi. No rales. Chest wall:      No tenderness  No Accessory muscle use. Heart:              Regular  rhythm,  No  murmur   No edema  Abdomen:        Soft, non-tender. Not distended. Bowel sounds normal  Extremities:     No cyanosis. No clubbing,                            Skin turgor normal, Capillary refill normal  Skin:                Not pale. Not Jaundiced  No rashes   Psych:             Not anxious or agitated. Neurologic:      Alert, moves all extremities,Oriented X1, follows commands. 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 [place], [place]  ICD-9-CM: E888.9, E849.0 Present on Admission 3/19/2017 - Present        Essential hypertension ICD-10-CM: I10  ICD-9-CM: 401.9 Chronic 3/19/2017 - Present        Hyperlipidemia ICD-10-CM: E78.5  ICD-9-CM: 272.4 Chronic 3/19/2017 - Present        Prostatism ICD-10-CM: N40.0  ICD-9-CM: 600.90 Chronic 3/19/2017 - Present        * (Principal) Bacterial pneumonia ICD-10-CM: J15.9  ICD-9-CM: 482.9  12/24/2020 - Present        Acute renal failure (ARF) (HCC) ICD-10-CM: N17.9  ICD-9-CM: 584.9  8/1/2018 - Present        Gastric volvulus ICD-10-CM: K31.89  ICD-9-CM: 537.89  2/20/2018 - Present              Greater than 45 minutes were spent with the patient on counseling and coordination of care    Signed:   Seema Henderson NP  1/4/2021  8:19 AM

## 2021-01-04 NOTE — PROGRESS NOTES
I have reviewed discharge instructions with the POA. The POA verbalized understanding. Reviewed prescriptions, signs and symptoms to report and gave opportunity for questions. Patient left via wheelchair with volunteer assistance and driven (assisted living facility) home by POA.

## 2021-01-04 NOTE — PROGRESS NOTES
Spiritual Care Assessment/Progress Note  [address]      NAME: Whit Dougherty      MRN: [id number]  AGE: 80 y.o. SEX: male  Tenriism Affiliation: Anabaptism   Language: English     1/4/2021     Total Time (in minutes): 5     Spiritual Assessment begun in [facility] through conversation with:         []Patient        [] Family    [] Friend(s)        Reason for Consult: Initial/Spiritual assessment, patient floor     Spiritual beliefs: (Please include comment if needed)     [] Identifies with a oskar tradition:         [] Supported by a oskar community:            [] Claims no spiritual orientation:           [] Seeking spiritual identity:                [] Adheres to an individual form of spirituality:           [x] Not able to assess:                           Identified resources for coping:      [] Prayer                               [] Music                  [] Guided Imagery     [] Family/friends                 [] Pet visits     [] Devotional reading                         [x] Unknown     [] Other:                                            Interventions offered during this visit: (See comments for more details)                Plan of Care:     [] Support spiritual and/or cultural needs    [] Support AMD and/or advance care planning process      [] Support grieving process   [] Coordinate Rites and/or Rituals    [] Coordination with community clergy   [] No spiritual needs identified at this time   [] Detailed Plan of Care below (See Comments)  [] Make referral to Music Therapy  [] Make referral to Pet Therapy     [] Make referral to Addiction services  [] Make referral to [facility]  [] Make referral to Spiritual Care Partner  [] No future visits requested        [x] Follow up upon further referrals     Comments:  visit for initial spiritual assessment. Staff with patient providing care, door closed for privacy.   Please contact spiritual care for further referral or consult. Rev.  Allison Caicedo MDiv, [facility], [address] [facility] Cent[facility] service: [phone])

## 2021-01-04 NOTE — DISCHARGE INSTRUCTIONS
Discharge SNF/Rehab Instructions/LTAC       PATIENT ID: Whit Dougherty  MRN: [id number]   YOB: 1931    DATE OF ADMISSION: 12/24/2020  5:49 PM    DATE OF DISCHARGE: 1/4/2021    PRIMARY CARE PROVIDER: Margaret Velazquez MD       ATTENDING PHYSICIAN: Martin Shah*  DISCHARGING PROVIDER: Kenny Weaver NP     To contact this individual call [phone] and ask the  to page. If unavailable ask to be transferred the Adult Hospitalist Department. CONSULTATIONS: None    PROCEDURES/SURGERIES: * No surgery found *    ADMITTING DIAGNOSES & HOSPITAL COURSE:   As per initial admission summary  This an 75-year-old man with past medical history significant for hypertension, dyslipidemia, type 2 diabetes, and dementia, who was in his usual state of health until the day of his presentation at the emergency room when it was reported that the patient developed chest pain at the nursing home where the patient resides.  The patient developed the chest pain at about 04:30 p.m., located at the right side of the chest.  The patient is not able to state the severity and the quality of the pain because of his dementia.  According to report, the patient fell into a door frame, hitting his right shoulder the day before.  When the patient arrived at the emergency room, the patient did not complain of any chest pain.  The patient was confused, agitated, and restless at times.   CTA of the chest was obtained.  The CTA was negative for pulmonary embolism but shows consolidation.  The patient was then referred to the hospitalist service for evaluation for admission. Jenni Soliman patient was last admitted to the hospital from 08/01/2018 to 08/05/2018. [facility] was admitted and treated for acute renal failure.  There was no history of fever, rigors or chills reported        DISCHARGE DIAGNOSES / PLAN:      1.  CAP  S/p Rocephin and doxycycline x5d total- completed.      Chest pain on admission likely 2/2 above, resolved  Troponin neg x3, no evidence of ACS  CTA of the chest: IMPRESSION:  1. No evidence of pulmonary embolism. 2. Bilateral lower lobe consolidation. 3. Fibrosis. 4. Emphysema.   - Follow up with pcp outpatient     2.  Acute kidney injury:  resolved  Ct abd negative for any acute abnormality      3.  Elevated BNP level:  No pulmonary edema or s/sx of CHF  systolic murmur on exam  Echo EF 55%     4.  Elevated D-dimer likely d/t ERICA since CTA chest is negative for pulmonary embolism and ultrasound of the lower extremity is negative for DVT     5.  Hypertension  - Stable   - Continue Norvasc and Lisinopril   - Monitor VS.      6.  Dyslipidemia:   - continue Lipitor     7.  PreDM, a1c 5.7  - Monitor BG   - Carb consistent diet      8.  Thrombocytopenia, mild >100k, asymptomatic  monitor     9.  Dementia:  We will continue supportive treatment.  The patient's dementia is associated with significant behavioral disorder.  The patient received Geodon in the emergency room for the associated behavioral disorder.     10.  Fall:    Patient fell in Ed  Imaging negative for any fracture       PENDING TEST RESULTS:   At the time of discharge the following test results are still pending: ***    FOLLOW UP APPOINTMENTS:    Follow-up Information     Follow up With Specialties Details Why Contact Info    Silvia South MD Family Medicine Schedule an appointment as soon as possible for a visit  [address] [place] [address] Schedule an appointment as soon as possible for a visit  [address]  [phone]           ADDITIONAL CARE RECOMMENDATIONS: ***    DIET: {diet:67937}  Oral Nutritional Supplements: {RDSUPPLEMENTLIST:20094} {RDSUPPFREQUENCY:20080}    TUBE FEEDING INSTRUCTIONS: ***    OXYGEN / BiPAP SETTINGS: ***    ACTIVITY: {discharge activity:03184}    WOUND CARE: ***    EQUIPMENT needed: ***      DISCHARGE MEDICATIONS:   See Medication Reconciliation Form      NOTIFY YOUR PHYSICIAN FOR ANY OF THE FOLLOWING:   Fever over 101 degrees for 24 hours. Chest pain, shortness of breath, fever, chills, nausea, vomiting, diarrhea, change in mentation, falling, weakness, bleeding. Severe pain or pain not relieved by medications. Or, any other signs or symptoms that you may have questions about. 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 [place], [place]  ICD-9-CM: E888.9, E849.0 Present on Admission 3/19/2017 - Present        Essential hypertension ICD-10-CM: I10  ICD-9-CM: 401.9 Chronic 3/19/2017 - Present        Hyperlipidemia ICD-10-CM: E78.5  ICD-9-CM: 272.4 Chronic 3/19/2017 - Present        Prostatism ICD-10-CM: N40.0  ICD-9-CM: 600.90 Chronic 3/19/2017 - Present        * (Principal) Bacterial pneumonia ICD-10-CM: J15.9  ICD-9-CM: 482.9  12/24/2020 - Present        Acute renal failure (ARF) ([facility] ICD-10-CM: N17.9  ICD-9-CM: 584.9  8/1/2018 - Present        Gastric volvulus ICD-10-CM: K31.89  ICD-9-CM: 537.89  2/20/2018 - Present                CDMP Checked:   Yes ***     PROBLEM LIST Updated:  Yes ***         Signed:   Dewey Camilo NP  1/4/2021  8:19 AM

## 2021-01-04 NOTE — PROGRESS NOTES
TRANSFER - OUT REPORT:    Verbal report given to Kandace(name) on [facility]  being transferred to [facility]) for routine progression of care       Report consisted of patients Situation, Background, Assessment and   Recommendations(SBAR). Information from the following report(s) SBAR, Kardex, Intake/Output and MAR was reviewed with the receiving nurse. Lines:       Opportunity for questions and clarification was provided.

## 2021-01-04 NOTE — PROGRESS NOTES
Problem: Mobility Impaired (Adult and Pediatric)  Goal: *Acute Goals and Plan of Care (Insert Text)  Description: FUNCTIONAL STATUS PRIOR TO ADMISSION: Patient was modified independent using a rollator for functional mobility. HOME SUPPORT PRIOR TO ADMISSION: Pt lived in a local [facility], hard to determine exactly how much help he needed due to patient being a poor historian     Physical Therapy Goals  Initiated 12/27/2020- revised 1/4  1. Patient will move from supine to sit and sit to supine  in bed with modified independence within 7 day(s). 2.  Patient will transfer from bed to chair and chair to bed with modified independence using the least restrictive device within 7 day(s). 3.  Patient will perform sit to stand with modified independence within 7 day(s). 4.  Patient will ambulate with mod I for 400 feet with the least restrictive device within 7 day(s). - upgraded 1/4 1/4/2021 1155 by Marleny Gay, PT  Outcome: Progressing Towards Goal  PHYSICAL THERAPY TREATMENT: WEEKLY REASSESSMENT  Patient: Inna Mcbride (81 y.o. male)  Date: 1/4/2021  Primary Diagnosis: Bacterial pneumonia [J15.9]        Precautions:   Fall, DNR      ASSESSMENT  Patient continues with skilled PT services and is progressing towards goals. Patient's biggest concern continues to be decline in cognitive status and safety awareness concerns. However, functionally Patient is approaching functional baseline. He uses a rollator for distance ambulation and no device or a cane for short apartment distance. Patient demonstrated gait both with and without device today. Patient scored 17/28 on Tinetti balance score which indicated high fall risk, however suspect this Patient baseline. Secondary to cognitive concerns, Patient will most likely be the most successful in a familiar environment upon discharge.      Patient's progression toward goals since last assessment:  Ambulating well in the hallway with and without RW    Current Level of Function Impacting Discharge (mobility/balance): close supervision secondary to intermittent poor safety awareness     Functional Outcome Measure: The patient scored 17/28 on the Tinetti balance outcome measure which is indicative of high fall risk. Other factors to consider for discharge: In custodial, dementia at baseline         PLAN :  Goals have been updated based on progression since last assessment. Patient continues to benefit from skilled intervention to address the above impairments. Recommendations and Planned Interventions: transfer training, gait training, therapeutic exercises, neuromuscular re-education, patient and family training/education and therapeutic activities      Frequency/Duration: Patient will be followed by physical therapy:  3 times a week to address goals. Recommendation for discharge: (in order for the patient to meet his/her long term goals)  Physical therapy at least 2 days/week in the [facility] AND ensure supervision for safety with mobility    This discharge recommendation:  Has been made in collaboration with the attending provider and/or case management    IF patient discharges home will need the following DME: patient owns DME required for discharge         SUBJECTIVE:   Patient stated I feel like I'm walking normal... but sometimes I don't use this walker.     OBJECTIVE DATA SUMMARY:   HISTORY:    Past Medical History:   Diagnosis Date    GERD (gastroesophageal reflux disease)     High cholesterol     Hypertension     Stroke ([facility] [address] [facility] 03/2017    numbness in fingertips on R hand; legs;stammering     Past Surgical History:   Procedure Laterality Date    HX CHOLECYSTECTOMY      HX HEENT Bilateral     cataract    HX HERNIA REPAIR  02/21/2018     Robot-assisted laparoscopic repair of paraesophageal hernia with gastric volvulus.        Personal factors and/or comorbidities impacting plan of care: HTN, CVA in the past, dementia    Home Situation  Home Environment: Assisted living  Care Facility Name: [facility] [place]  Wheelchair Ramp: Yes  One/Two Story Residence: One story  Living Alone: No  Support Systems: Assisted living  Patient Expects to be Discharged to[de-identified] Assisted living  Current DME Used/Available at Home: Nestora Dom, rollator  Tub or Shower Type: Shower    EXAMINATION/PRESENTATION/DECISION MAKING:   Critical Behavior:  Neurologic State: Alert  Orientation Level: Oriented to person  Cognition: Follows commands  Safety/Judgement: Decreased awareness of environment, Decreased awareness of need for assistance, Decreased awareness of need for safety, Decreased insight into deficits, Fall prevention     Hearing: Auditory  Auditory Impairment: Hard of hearing, bilateral    Strength:    Strength: Generally decreased, functional    Tone & Sensation:   Tone: Normal    Coordination:  Coordination: Generally decreased, functional    Functional Mobility:  Bed Mobility:  Supine to Sit: Stand-by assistance  Sit to Supine: (Pt up with PT at end of session)     Transfers:  Sit to Stand: Contact guard assistance  Stand to Sit: Stand-by assistance  Bed to Chair: Stand-by assistance     Balance:   Sitting: Intact  Standing: Impaired  Standing - Static: Good;Fair  Standing - Dynamic : Good;Fair     Ambulation/Gait Training:  Distance (ft): 400 Feet (ft)  Assistive Device: Gait belt;Walker, rolling(and 100 feet without device)  Ambulation - Level of Assistance: Contact guard assistance  Gait Abnormalities: Decreased step clearance; Path deviations  Base of Support: Widened  Speed/Yudith: Slow  Step Length: Right shortened;Left shortened    Functional Measure:  Tinetti test:    Sitting Balance: 1  Arises: 1  Attempts to Rise: 2  Immediate Standing Balance: 1  Standing Balance: 1  Nudged: 2  Eyes Closed: 1  Turn 360 Degrees - Continuous/Discontinuous: 1  Turn 360 Degrees - Steady/Unsteady: 0  Sitting Down: 1  Balance Score: 11 Balance total score  Indication of Gait: 1  R Step Length/Height: 0  L Step Length/Height: 0  R Foot Clearance: 1  L Foot Clearance: 1  Step Symmetry: 1  Step Continuity: 1  Path: 1  Trunk: 0  Walking Time: 0  Gait Score: 6 Gait total score  Total Score: 17/28 Overall total score         Tinetti Tool Score Risk of Falls  <19 = High Fall Risk  19-24 = Moderate Fall Risk  25-28 = Low Fall Risk  Tinetti ME. Performance-Oriented Assessment of Mobility Problems in Elderly Patients. [facility]; [id number]. (Scoring Description: PT Bulletin Feb. 10, 1993)    Older adults: Maxi Baires et al, 2009; n = 1000 [facility] elderly evaluated with ABC, JOSEMANUEL, ADL, and IADL)  · Mean JOSEMANUEL score for males aged 69-68 years = 26.21(3.40)  · Mean JOSEMANUEL score for females age 69-68 years = 25.16(4.30)  · Mean JOSEMANUEL score for males over 80 years = 23.29(6.02)  · Mean JOSEMANUEL score for females over 80 years = 17.20(8.32)          Pain Rating:  No pain reproted    Activity Tolerance:   Good and SpO2 stable on RA    After treatment patient left in no apparent distress:   Sitting in chair, Call bell within reach, and Bed / chair alarm activated    COMMUNICATION/EDUCATION:   The patients plan of care was discussed with: Occupational therapist, Registered nurse, and Case management. Fall prevention education was provided and the patient/caregiver indicated understanding., Patient/family have participated as able in goal setting and plan of care. , and Patient/family agree to work toward stated goals and plan of care.     Thank you for this referral.  Rahel Croft, PT, DPT  Geriatric Clinical Specialist     Time Calculation: 20 mins

## 2021-01-04 NOTE — ROUTINE PROCESS
Attempted to schedule [facility] P[facility] appt with Dr. Aquiles Aleman,  informed me that the office no longer see's the patient. The patient had requested his records be sent to another PCP facility. I have reached out the CM to confirm this. I will update the AVS when information is gathered. [facility] has been scheduled for 1/6/2021.  Office will call the patient prior to arrival.

## 2021-01-04 NOTE — PROGRESS NOTES
Problem: Self Care Deficits Care Plan (Adult)  Goal: *Acute Goals and Plan of Care (Insert Text)  Description:   FUNCTIONAL STATUS PRIOR TO ADMISSION: Lives at [facility] [place] long-term, mod I functional mobility with rollaider PTA; staff assists with all self care tasks except feeding; \"they wont let me do anything. \" Incontinent of B & B per patient; \"I don't know it's there. \"      HOME SUPPORT: [facility]  Occupational Therapy Goals  Initiated 12/27/2020; Goals reviewed 1/4/2021  1. Patient will perform self-feeding with modified independence within 5 day(s). Continue for consistency  2. Patient will perform grooming with supervision/set-up within 7 day(s). Modify to include standing at sink  3. Patient will perform upper body dressing with minimal assistance/contact guard assist within 7 day(s). Continue for consistency  4. Patient will perform toilet transfers with supervision/set-up within 7 day(s). Continue for consistency  5. Patient will perform all aspects of toileting with moderate assistance  within 7 day(s). MET. Upgrade to SBA  6. Patient will participate in upper extremity therapeutic exercise/activities with supervision/set-up for 5 minutes within 7 day(s). Continue  7. Patient will utilize energy conservation, fall prevention, pain management techniques during functional activities with verbal cues within 7 day(s). Continue       Outcome: Progressing Towards Goal     OCCUPATIONAL THERAPY TREATMENT/WEEKLY RE-ASSESSMENT  Patient: Heather Tang (12 y.o. male)  Date: 1/4/2021  Diagnosis: Bacterial pneumonia [J15.9] Bacterial pneumonia       Precautions: Fall, DNR  Chart, occupational therapy assessment, plan of care, and goals were reviewed. ASSESSMENT  Patient continues with skilled OT services and is progressing towards goals. Patient has met 1/7 OT goals and has demonstrated improvement in balance, strength, and activity tolerance.  Pt continues to be most limited functionally by impaired cognition and decreased safety awareness. Pt is received in bed, pleasant and agreeable to participate. He performs mobility to bathroom for toileting and standing grooming tasks, requiring cues for safety during ADL transfers and for RW use. Pt able to perform standing ADL tasks with overall SBA/CGA. Anticipate pt is approaching functional baseline but will require supervision/assistance for safety due to cognition. Will decrease POC to 3x/week to continue to address deficits impacting safety. Recommend return to [facility] as pt is likely to be most successful in a familiar environment. Current Level of Function Impacting Discharge (ADLs): overall CGA to occasional min A for standing portions of ADLS; min to mod A for LB dressing; SBA/CGA for mobility/ADL transfers    Other factors to consider for discharge: from jail; fall risk; cognition/safety awareness; very [place]         PLAN :  Goals have been updated based on progression since last assessment. Patient continues to benefit from skilled intervention to address the above impairments. Continue to follow patient 3 times a week to address goals. Recommend with staff: OOB to chair with chair alarm for all meals; mobility to bathroom for toileting with 1 person assist    Recommend next OT session: LB ADLs    Recommendation for discharge: (in order for the patient to meet his/her long term goals)  Occupational therapy at least 2 days/week in jail and ensure supervision for safety with all mobility and OOB ADLs    This discharge recommendation:  Has been made in collaboration with the attending provider and/or case management    IF patient discharges home will need the following DME: patient owns DME required for discharge       SUBJECTIVE:   Patient stated I would normally be more clean shaven than this.     OBJECTIVE DATA SUMMARY:   Cognitive/Behavioral Status:  Neurologic State: Alert  Orientation Level: Oriented to person  Cognition: Follows commands  Perception: Appears intact  Perseveration: No perseveration noted  Safety/Judgement: Decreased awareness of environment;Decreased awareness of need for assistance;Decreased awareness of need for safety;Decreased insight into deficits; Fall prevention    Functional Mobility and Transfers for ADLs:  Bed Mobility:  Supine to Sit: Stand-by assistance  Sit to Supine: (Pt up with PT at end of session)    Transfers:  Sit to Stand: Contact guard assistance  Functional Transfers  Bathroom Mobility: Contact guard assistance     Balance:  Sitting: Intact  Standing: Impaired  Standing - Static: Good;Fair  Standing - Dynamic : Good;Fair    ADL Intervention:  Grooming  Grooming Assistance: Stand-by assistance;Contact guard assistance  Position Performed: Standing(at sink)  Washing Face: Stand-by assistance  Washing Hands: Stand-by assistance  Brushing Teeth: Stand-by assistance;Contact guard assistance  Cues: Verbal cues provided(cues for positioning at sink)    Toileting  Bladder Hygiene: Stand-by assistance;Contact guard assistance(for standing toileting at sink)  Clothing Management: Contact guard assistance  Cues: Verbal cues provided  Adaptive Equipment: Grab bars; Walker    Cognitive Retraining  Safety/Judgement: Decreased awareness of environment;Decreased awareness of need for assistance;Decreased awareness of need for safety;Decreased insight into deficits; Fall prevention    Barthel Index:    Bathin  Bladder: 5  Bowels: 5  Groomin  Dressin  Feedin  Mobility: 10  Stairs: 5  Toilet Use: 5  Transfer (Bed to Chair and Back): 10  Total: 50/100        The Barthel ADL Index: Guidelines  1. The index should be used as a record of what a patient does, not as a record of what a patient could do. 2. The main aim is to establish degree of independence from any help, physical or verbal, however minor and for whatever reason. 3. The need for supervision renders the patient not independent.   4. A patient's performance should be established using the best available evidence. Asking the patient, friends/relatives and nurses are the usual sources, but direct observation and common sense are also important. However direct testing is not needed. 5. Usually the patient's performance over the preceding 24-48 hours is important, but occasionally longer periods will be relevant. 6. Middle categories imply that the patient supplies over 50 per cent of the effort. 7. Use of aids to be independent is allowed. Latonia Vasques., Barthel, D.W. (2154). Functional evaluation: the Barthel Index. [facility] (14)2. Silvia Li aby NAJMA RaygozaF, Jailyn Egan., Luke Wright., [place], [address] (1999). Measuring the change indisability after inpatient rehabilitation; comparison of the responsiveness of the Barthel Index and Functional [place] Measure. Journal of Neurology, Neurosurgery, and Psychiatry, 66(4), 556-667. Janice Unger, N.J.A, ADAN KevinJ.VAZQUEZ, & Sivakumar Forbes MHugoA. (2004.) Assessment of post-stroke quality of life in cost-effectiveness studies: The usefulness of the Barthel Index and the EuroQoL-5D. Quality of Life Research, 13, 427-43       Pain:  Pt reporting minimal pain    Activity Tolerance:   Good and SpO2 stable on RA    After treatment patient left in no apparent distress:   up with PT who arrived at end of session    COMMUNICATION/COLLABORATION:   The patients plan of care was discussed with: Physical therapist, Registered nurse and Case management.      Gretel Villafana OT  Time Calculation: 23 mins

## 2021-01-31 ENCOUNTER — HOSPITAL ENCOUNTER (EMERGENCY)
Age: 86
Discharge: HOME | End: 2021-01-31
Attending: STUDENT IN AN ORGANIZED HEALTH CARE EDUCATION/TRAINING PROGRAM
Payer: MEDICARE

## 2021-01-31 ENCOUNTER — APPOINTMENT (OUTPATIENT)
Dept: GENERAL RADIOLOGY | Age: 86
End: 2021-01-31
Attending: STUDENT IN AN ORGANIZED HEALTH CARE EDUCATION/TRAINING PROGRAM
Payer: MEDICARE

## 2021-01-31 VITALS
OXYGEN SATURATION: 95 % | TEMPERATURE: 98 F | WEIGHT: 154.32 LBS | BODY MASS INDEX: 24.8 KG/M2 | DIASTOLIC BLOOD PRESSURE: 65 MMHG | HEIGHT: 66 IN | SYSTOLIC BLOOD PRESSURE: 146 MMHG | HEART RATE: 83 BPM | RESPIRATION RATE: 18 BRPM

## 2021-01-31 DIAGNOSIS — R45.1 AGITATION: Primary | ICD-10-CM

## 2021-01-31 LAB
ALBUMIN SERPL-MCNC: 3.5 G/DL (ref 3.5–5)
ALBUMIN/GLOB SERPL: 0.6 {RATIO} (ref 1.1–2.2)
ALP SERPL-CCNC: 82 U/L (ref 45–117)
ALT SERPL-CCNC: 20 U/L (ref 12–78)
ANION GAP SERPL CALC-SCNC: 4 MMOL/L (ref 5–15)
APPEARANCE UR: CLEAR
AST SERPL-CCNC: 22 U/L (ref 15–37)
BACTERIA URNS QL MICRO: NEGATIVE /HPF
BASOPHILS # BLD: 0 K/UL (ref 0–0.1)
BASOPHILS NFR BLD: 0 % (ref 0–1)
BILIRUB SERPL-MCNC: 0.7 MG/DL (ref 0.2–1)
BILIRUB UR QL CFM: NEGATIVE
BUN SERPL-MCNC: 27 MG/DL (ref 6–20)
BUN/CREAT SERPL: 18 (ref 12–20)
CALCIUM SERPL-MCNC: 9.6 MG/DL (ref 8.5–10.1)
CAOX CRY URNS QL MICRO: ABNORMAL
CHLORIDE SERPL-SCNC: 104 MMOL/L (ref 97–108)
CO2 SERPL-SCNC: 29 MMOL/L (ref 21–32)
COLOR UR: ABNORMAL
CREAT SERPL-MCNC: 1.46 MG/DL (ref 0.7–1.3)
DIFFERENTIAL METHOD BLD: ABNORMAL
EOSINOPHIL # BLD: 0 K/UL (ref 0–0.4)
EOSINOPHIL NFR BLD: 0 % (ref 0–7)
EPITH CASTS URNS QL MICRO: ABNORMAL /LPF
ERYTHROCYTE [DISTWIDTH] IN BLOOD BY AUTOMATED COUNT: 14.4 % (ref 11.5–14.5)
GLOBULIN SER CALC-MCNC: 5.4 G/DL (ref 2–4)
GLUCOSE SERPL-MCNC: 132 MG/DL (ref 65–100)
GLUCOSE UR STRIP.AUTO-MCNC: NEGATIVE MG/DL
HCT VFR BLD AUTO: 38.3 % (ref 36.6–50.3)
HGB BLD-MCNC: 12.4 G/DL (ref 12.1–17)
HGB UR QL STRIP: NEGATIVE
HYALINE CASTS URNS QL MICRO: ABNORMAL /LPF (ref 0–5)
IMM GRANULOCYTES # BLD AUTO: 0 K/UL (ref 0–0.04)
IMM GRANULOCYTES NFR BLD AUTO: 0 % (ref 0–0.5)
KETONES UR QL STRIP.AUTO: ABNORMAL MG/DL
LEUKOCYTE ESTERASE UR QL STRIP.AUTO: NEGATIVE
LYMPHOCYTES # BLD: 0.7 K/UL (ref 0.8–3.5)
LYMPHOCYTES NFR BLD: 18 % (ref 12–49)
MCH RBC QN AUTO: 29.3 PG (ref 26–34)
MCHC RBC AUTO-ENTMCNC: 32.4 G/DL (ref 30–36.5)
MCV RBC AUTO: 90.5 FL (ref 80–99)
MONOCYTES # BLD: 0.6 K/UL (ref 0–1)
MONOCYTES NFR BLD: 14 % (ref 5–13)
MUCOUS THREADS URNS QL MICRO: ABNORMAL /LPF
NEUTS SEG # BLD: 2.8 K/UL (ref 1.8–8)
NEUTS SEG NFR BLD: 68 % (ref 32–75)
NITRITE UR QL STRIP.AUTO: NEGATIVE
NRBC # BLD: 0 K/UL (ref 0–0.01)
NRBC BLD-RTO: 0 PER 100 WBC
PH UR STRIP: 5 [PH] (ref 5–8)
PLATELET # BLD AUTO: 123 K/UL (ref 150–400)
PMV BLD AUTO: 10.9 FL (ref 8.9–12.9)
POTASSIUM SERPL-SCNC: 4 MMOL/L (ref 3.5–5.1)
PROT SERPL-MCNC: 8.9 G/DL (ref 6.4–8.2)
PROT UR STRIP-MCNC: 100 MG/DL
RBC # BLD AUTO: 4.23 M/UL (ref 4.1–5.7)
RBC #/AREA URNS HPF: ABNORMAL /HPF (ref 0–5)
RBC MORPH BLD: ABNORMAL
SODIUM SERPL-SCNC: 137 MMOL/L (ref 136–145)
SP GR UR REFRACTOMETRY: 1.02 (ref 1–1.03)
UROBILINOGEN UR QL STRIP.AUTO: 1 EU/DL (ref 0.2–1)
WBC # BLD AUTO: 4.1 K/UL (ref 4.1–11.1)
WBC URNS QL MICRO: ABNORMAL /HPF (ref 0–4)

## 2021-01-31 PROCEDURE — 80053 COMPREHEN METABOLIC PANEL: CPT

## 2021-01-31 PROCEDURE — 85025 COMPLETE CBC W/AUTO DIFF WBC: CPT

## 2021-01-31 PROCEDURE — 93005 ELECTROCARDIOGRAM TRACING: CPT

## 2021-01-31 PROCEDURE — 81001 URINALYSIS AUTO W/SCOPE: CPT

## 2021-01-31 PROCEDURE — 36415 COLL VENOUS BLD VENIPUNCTURE: CPT

## 2021-01-31 PROCEDURE — 99285 EMERGENCY DEPT VISIT HI MDM: CPT

## 2021-01-31 PROCEDURE — 71045 X-RAY EXAM CHEST 1 VIEW: CPT

## 2021-01-31 PROCEDURE — 74011250636 HC RX REV CODE- 250/636: Performed by: STUDENT IN AN ORGANIZED HEALTH CARE EDUCATION/TRAINING PROGRAM

## 2021-01-31 RX ADMIN — SODIUM CHLORIDE 500 ML: 9 INJECTION, SOLUTION INTRAVENOUS at 13:52

## 2021-01-31 NOTE — ED NOTES
Discharge instructions given to patient by Dr. Christina Villarreal. Verbalized understanding of instructions. Patient discharged without difficulty. Patient discharged in stable condition via W/C accompanied by POA.

## 2021-01-31 NOTE — ED PROVIDER NOTES
EMERGENCY DEPARTMENT HISTORY AND PHYSICAL EXAM      Date: 1/31/2021  Patient Name: Kenya Sotelo    History of Presenting Illness     Chief Complaint   Patient presents with    Altered mental status     Pt arrives via EMS who reports pt comes from [facility] [place]. Per [facility] [place] pt's baseline is A&O to self, however called EMS for increase confusion, agitation, and trying to leave. Per EMS pt was standing outside on their arrival with .  Pt reports he has been in and out of hospitals and does not want to stay. Pt is algreeing to allow this RN to assess vitals. Pt denies any symptoms at this time. HPI: Kenya Sotelo, 80 y.o. male presents to the ED with cc of agitation. He comes from a living facility, apparently he has been more agitated lately, he has been stating that he does not want to stay there. He currently denies any pain or complaints, he denies any headaches, no weakness numbness or tingling in arms or legs, no chest pain, shortness of breath, fevers, abdominal pain, vomiting or diarrhea. He denies any trauma. He is calm, and states he does not know why he was sent to the hospital.          There are no other complaints, changes, or physical findings at this time. PCP: Justin Agustin MD    No current facility-administered medications on file prior to encounter. Current Outpatient Medications on File Prior to Encounter   Medication Sig Dispense Refill    atorvastatin (LIPITOR) 10 mg tablet Take 1 Tab by mouth nightly. 30 Tab 0    lisinopriL (PRINIVIL, ZESTRIL) 5 mg tablet Take 1 Tab by mouth daily. 30 Tab 0    peg 400-propylene glycol (Systane, propylene glycol,) 0.4-0.3 % drop Administer 1 Drop to both eyes two (2) times a day.  calcium carb/magnesium hydrox (MYLANTA PO) Take 20 mL by mouth three (3) times daily as needed.  acetaminophen (TYLENOL) 325 mg tablet Take 650 mg by mouth every four (4) hours as needed for Pain.       amLODIPine (NORVASC) 5 mg tablet Take 1 Tab by mouth daily. 30 Tab 2    cholecalciferol (Vitamin D3) (2,000 UNITS /50 MCG) cap capsule Take 2,000 Units by mouth every evening.  aspirin delayed-release 81 mg tablet Take 81 mg by mouth every evening. Past History     Past Medical History:  Past Medical History:   Diagnosis Date    GERD (gastroesophageal reflux disease)     High cholesterol     Hypertension     Stroke ([address] 03/2017    numbness in fingertips on R hand; legs;stammering       Past Surgical History:  Past Surgical History:   Procedure Laterality Date    HX CHOLECYSTECTOMY      HX HEENT Bilateral     cataract    HX HERNIA REPAIR  02/21/2018     Robot-assisted laparoscopic repair of paraesophageal hernia with gastric volvulus. Family History:  Family History   Problem Relation Age of Onset    No Known Problems Sister     No Known Problems Brother        Social History:  Social History     Tobacco Use    Smoking status: Former Smoker    Smokeless tobacco: Never Used   Substance Use Topics    Alcohol use: Not Currently     Alcohol/week: 1.0 standard drinks     Types: 1 Cans of beer per week    Drug use: No       Allergies:  No Known Allergies      Review of Systems   no fever  No eye pain  No ear pain  no shortness of breath  no chest pain  no abdominal pain  no dysuria  no leg pain  No rash  No lymphadenopathy  No weight loss    Physical Exam   Physical Exam  Constitutional:       General: He is not in acute distress. HENT:      Head: Normocephalic and atraumatic. Eyes:      Extraocular Movements: Extraocular movements intact. Pupils: Pupils are equal, round, and reactive to light. Neck:      Musculoskeletal: Neck supple. Cardiovascular:      Rate and Rhythm: Normal rate. Pulmonary:      Effort: Pulmonary effort is normal.      Breath sounds: Normal breath sounds. Abdominal:      Palpations: Abdomen is soft. Tenderness: There is no abdominal tenderness.    Musculoskeletal: General: No swelling or tenderness. Skin:     General: Skin is warm and dry. Neurological:      Mental Status: He is alert. Comments: Oriented to person and place, not sure of the year, does know the month ago. Speech is clear and coherent, he follows all commands appropriately, 5/5 strength with bicep flexion and extension bilaterally, 5/5 strength with ankle flexion and extension bilaterally. Sensation to light touch intact over upper and lower extremities bilaterally. Diagnostic Study Results     Labs -     Recent Results (from the past 24 hour(s))   EKG, 12 LEAD, INITIAL    Collection Time: 01/31/21 11:25 AM   Result Value Ref Range    Ventricular Rate 74 BPM    Atrial Rate 74 BPM    P-R Interval 152 ms    QRS Duration 88 ms    Q-T Interval 390 ms    QTC Calculation (Bezet) 432 ms    Calculated P Axis 67 degrees    Calculated R Axis 5 degrees    Calculated T Axis 51 degrees    Diagnosis       Normal sinus rhythm  Normal ECG  When compared with ECG of 24-DEC-2020 18:01,  premature ventricular complexes are no longer present     CBC WITH AUTOMATED DIFF    Collection Time: 01/31/21 12:32 PM   Result Value Ref Range    WBC 4.1 4.1 - 11.1 K/uL    RBC 4.23 4. 10 - 5.70 M/uL    HGB 12.4 12.1 - 17.0 g/dL    HCT 38.3 36.6 - 50.3 %    MCV 90.5 80.0 - 99.0 FL    MCH 29.3 26.0 - 34.0 PG    MCHC 32.4 30.0 - 36.5 g/dL    RDW 14.4 11.5 - 14.5 %    PLATELET 109 (L) 909 - 400 K/uL    MPV 10.9 8.9 - 12.9 FL    NRBC 0.0 0  WBC    ABSOLUTE NRBC 0.00 0.00 - 0.01 K/uL    NEUTROPHILS 68 32 - 75 %    LYMPHOCYTES 18 12 - 49 %    MONOCYTES 14 (H) 5 - 13 %    EOSINOPHILS 0 0 - 7 %    BASOPHILS 0 0 - 1 %    IMMATURE GRANULOCYTES 0 0.0 - 0.5 %    ABS. NEUTROPHILS 2.8 1.8 - 8.0 K/UL    ABS. LYMPHOCYTES 0.7 (L) 0.8 - 3.5 K/UL    ABS. MONOCYTES 0.6 0.0 - 1.0 K/UL    ABS. EOSINOPHILS 0.0 0.0 - 0.4 K/UL    ABS. BASOPHILS 0.0 0.0 - 0.1 K/UL    ABS. IMM.  GRANS. 0.0 0.00 - 0.04 K/UL    DF SMEAR SCANNED      RBC COMMENTS NORMOCYTIC, NORMOCHROMIC     METABOLIC PANEL, COMPREHENSIVE    Collection Time: 01/31/21 12:32 PM   Result Value Ref Range    Sodium 137 136 - 145 mmol/L    Potassium 4.0 3.5 - 5.1 mmol/L    Chloride 104 97 - 108 mmol/L    CO2 29 21 - 32 mmol/L    Anion gap 4 (L) 5 - 15 mmol/L    Glucose 132 (H) 65 - 100 mg/dL    BUN 27 (H) 6 - 20 MG/DL    Creatinine 1.46 (H) 0.70 - 1.30 MG/DL    BUN/Creatinine ratio 18 12 - 20      GFR est AA 55 (L) >60 ml/min/1.73m2    GFR est non-AA 45 (L) >60 ml/min/1.73m2    Calcium 9.6 8.5 - 10.1 MG/DL    Bilirubin, total 0.7 0.2 - 1.0 MG/DL    ALT (SGPT) 20 12 - 78 U/L    AST (SGOT) 22 15 - 37 U/L    Alk. phosphatase 82 45 - 117 U/L    Protein, total 8.9 (H) 6.4 - 8.2 g/dL    Albumin 3.5 3.5 - 5.0 g/dL    Globulin 5.4 (H) 2.0 - 4.0 g/dL    A-G Ratio 0.6 (L) 1.1 - 2.2         Radiologic Studies -   XR CHEST PORT   Final Result   Impression: No acute process. Stable pulmonary fibrosis. CT Results  (Last 48 hours)    None        CXR Results  (Last 48 hours)               01/31/21 1231  XR CHEST PORT Final result    Impression:  Impression: No acute process. Stable pulmonary fibrosis. Narrative: Indication: Altered mental status       Comparison: 12/24/2020       Portable exam of the chest obtained at 1212 demonstrates normal heart size. Diffuse interstitial thickening is stable compared to the prior examination and   is compatible with fibrosis. There is no acute process in the lung fields. The   osseous structures are unremarkable. Medical Decision Making   I am the first provider for this patient. I reviewed the vital signs, available nursing notes, past medical history, past surgical history, family history and social history. Vital Signs-Reviewed the patient's vital signs.   Patient Vitals for the past 24 hrs:   Temp Pulse Resp BP SpO2   01/31/21 1118 98 °F (36.7 °C) 83 18 (!) 149/72 97 %         Provider Notes (Medical Decision Making): 26-year-old male presenting from living facility for agitation. He is currently calm and appropriate. He denies any complaints. Concern for possible electrolyte or metabolic abnormalities, possible UTI, will assess as well for pneumonia. Vital signs are normal, no fevers, he is nontoxic-appearing. He does have a history of dementia, however his neurologic exam is nonfocal and seems to be at baseline. ED Course:     Initial assessment performed. The patients presenting problems have been discussed, and they are in agreement with the care plan formulated and outlined with them. I have encouraged them to ask questions as they arise throughout their visit. CBC negative for leukocytosis or anemia, basic metabolic panel shows elevated creatinine at 1.46 consistent with ERICA, no worrisome electrolyte abnormalities. Chest x-ray unremarkable. On reevaluation, the patient is resting comfortably with stable vital signs and continues to deny complaints. Caregiver is at bedside and states that he is at his baseline. UA unremarkable for UTI. Patient continues to state that he wants to go home. Patient is counseled on supportive care and return precautions. Will return to the ED for any worsening pain, any changes in mental status, any fevers or new or worrisome symptoms. Will followup with primary care doctor within 3-5 days. Critical Care Time:         Disposition:  Home    PLAN:  1. Current Discharge Medication List        2.    Follow-up Information    None       Return to ED if worse     Diagnosis     Clinical Impression: Acute agitation, resolved, acute kidney injury

## 2021-02-01 LAB
ATRIAL RATE: 74 BPM
CALCULATED P AXIS, ECG09: 67 DEGREES
CALCULATED R AXIS, ECG10: 5 DEGREES
CALCULATED T AXIS, ECG11: 51 DEGREES
DIAGNOSIS, 93000: NORMAL
P-R INTERVAL, ECG05: 152 MS
Q-T INTERVAL, ECG07: 390 MS
QRS DURATION, ECG06: 88 MS
QTC CALCULATION (BEZET), ECG08: 432 MS
VENTRICULAR RATE, ECG03: 74 BPM

## 2021-07-31 ENCOUNTER — APPOINTMENT (OUTPATIENT)
Dept: CT IMAGING | Age: 86
End: 2021-07-31
Attending: EMERGENCY MEDICINE
Payer: MEDICARE

## 2021-07-31 ENCOUNTER — APPOINTMENT (OUTPATIENT)
Dept: GENERAL RADIOLOGY | Age: 86
End: 2021-07-31
Attending: EMERGENCY MEDICINE
Payer: MEDICARE

## 2021-07-31 ENCOUNTER — HOSPITAL ENCOUNTER (EMERGENCY)
Age: 86
Discharge: HOME | End: 2021-07-31
Attending: EMERGENCY MEDICINE
Payer: MEDICARE

## 2021-07-31 VITALS
WEIGHT: 140 LBS | BODY MASS INDEX: 23.9 KG/M2 | TEMPERATURE: 97.9 F | HEART RATE: 62 BPM | SYSTOLIC BLOOD PRESSURE: 137 MMHG | DIASTOLIC BLOOD PRESSURE: 83 MMHG | RESPIRATION RATE: 16 BRPM | OXYGEN SATURATION: 95 % | HEIGHT: 64 IN

## 2021-07-31 DIAGNOSIS — R51.9 ACUTE NONINTRACTABLE HEADACHE, UNSPECIFIED HEADACHE TYPE: ICD-10-CM
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
## 2021-07-31 LAB
ALBUMIN SERPL-MCNC: 2.9 G/DL (ref 3.5–5)
ALBUMIN/GLOB SERPL: 0.5 {RATIO} (ref 1.1–2.2)
ALP SERPL-CCNC: 71 U/L (ref 45–117)
ALT SERPL-CCNC: 17 U/L (ref 12–78)
ANION GAP SERPL CALC-SCNC: 7 MMOL/L (ref 5–15)
APPEARANCE UR: CLEAR
AST SERPL-CCNC: 31 U/L (ref 15–37)
BACTERIA URNS QL MICRO: NEGATIVE /HPF
BASOPHILS # BLD: 0 K/UL (ref 0–0.1)
BASOPHILS NFR BLD: 0 % (ref 0–1)
BILIRUB SERPL-MCNC: 0.9 MG/DL (ref 0.2–1)
BILIRUB UR QL: NEGATIVE
BUN SERPL-MCNC: 22 MG/DL (ref 6–20)
BUN/CREAT SERPL: 17 (ref 12–20)
CALCIUM SERPL-MCNC: 9 MG/DL (ref 8.5–10.1)
CHLORIDE SERPL-SCNC: 104 MMOL/L (ref 97–108)
CK SERPL-CCNC: 186 U/L (ref 39–308)
CO2 SERPL-SCNC: 26 MMOL/L (ref 21–32)
COLOR UR: ABNORMAL
CREAT SERPL-MCNC: 1.26 MG/DL (ref 0.7–1.3)
DIFFERENTIAL METHOD BLD: ABNORMAL
EOSINOPHIL # BLD: 0.1 K/UL (ref 0–0.4)
EOSINOPHIL NFR BLD: 1 % (ref 0–7)
EPITH CASTS URNS QL MICRO: ABNORMAL /LPF
ERYTHROCYTE [DISTWIDTH] IN BLOOD BY AUTOMATED COUNT: 13.4 % (ref 11.5–14.5)
GLOBULIN SER CALC-MCNC: 5.4 G/DL (ref 2–4)
GLUCOSE SERPL-MCNC: 106 MG/DL (ref 65–100)
GLUCOSE UR STRIP.AUTO-MCNC: NEGATIVE MG/DL
HCT VFR BLD AUTO: 40.3 % (ref 36.6–50.3)
HGB BLD-MCNC: 13.2 G/DL (ref 12.1–17)
HGB UR QL STRIP: NEGATIVE
HYALINE CASTS URNS QL MICRO: ABNORMAL /LPF (ref 0–5)
IMM GRANULOCYTES # BLD AUTO: 0 K/UL (ref 0–0.04)
IMM GRANULOCYTES NFR BLD AUTO: 0 % (ref 0–0.5)
KETONES UR QL STRIP.AUTO: ABNORMAL MG/DL
LEUKOCYTE ESTERASE UR QL STRIP.AUTO: NEGATIVE
LYMPHOCYTES # BLD: 0.6 K/UL (ref 0.8–3.5)
LYMPHOCYTES NFR BLD: 8 % (ref 12–49)
MAGNESIUM SERPL-MCNC: 2.1 MG/DL (ref 1.6–2.4)
MCH RBC QN AUTO: 29.3 PG (ref 26–34)
MCHC RBC AUTO-ENTMCNC: 32.8 G/DL (ref 30–36.5)
MCV RBC AUTO: 89.6 FL (ref 80–99)
MONOCYTES # BLD: 0.8 K/UL (ref 0–1)
MONOCYTES NFR BLD: 11 % (ref 5–13)
NEUTS SEG # BLD: 5.4 K/UL (ref 1.8–8)
NEUTS SEG NFR BLD: 80 % (ref 32–75)
NITRITE UR QL STRIP.AUTO: NEGATIVE
NRBC # BLD: 0 K/UL (ref 0–0.01)
NRBC BLD-RTO: 0 PER 100 WBC
PH UR STRIP: 5.5 [PH] (ref 5–8)
PLATELET # BLD AUTO: 118 K/UL (ref 150–400)
PMV BLD AUTO: 11.7 FL (ref 8.9–12.9)
POTASSIUM SERPL-SCNC: 4.3 MMOL/L (ref 3.5–5.1)
PROT SERPL-MCNC: 8.3 G/DL (ref 6.4–8.2)
PROT UR STRIP-MCNC: 30 MG/DL
RBC # BLD AUTO: 4.5 M/UL (ref 4.1–5.7)
RBC #/AREA URNS HPF: ABNORMAL /HPF (ref 0–5)
RBC MORPH BLD: ABNORMAL
SODIUM SERPL-SCNC: 137 MMOL/L (ref 136–145)
SP GR UR REFRACTOMETRY: 1.02 (ref 1–1.03)
TROPONIN I SERPL-MCNC: <0.05 NG/ML
UROBILINOGEN UR QL STRIP.AUTO: 0.2 EU/DL (ref 0.2–1)
WBC # BLD AUTO: 6.9 K/UL (ref 4.1–11.1)
WBC URNS QL MICRO: ABNORMAL /HPF (ref 0–4)

## 2021-07-31 PROCEDURE — 72125 CT NECK SPINE W/O DYE: CPT

## 2021-07-31 PROCEDURE — 84484 ASSAY OF TROPONIN QUANT: CPT

## 2021-07-31 PROCEDURE — 83735 ASSAY OF MAGNESIUM: CPT

## 2021-07-31 PROCEDURE — 80053 COMPREHEN METABOLIC PANEL: CPT

## 2021-07-31 PROCEDURE — 74011250636 HC RX REV CODE- 250/636: Performed by: EMERGENCY MEDICINE

## 2021-07-31 PROCEDURE — 70450 CT HEAD/BRAIN W/O DYE: CPT

## 2021-07-31 PROCEDURE — 81001 URINALYSIS AUTO W/SCOPE: CPT

## 2021-07-31 PROCEDURE — 72170 X-RAY EXAM OF PELVIS: CPT

## 2021-07-31 PROCEDURE — 36415 COLL VENOUS BLD VENIPUNCTURE: CPT

## 2021-07-31 PROCEDURE — 99285 EMERGENCY DEPT VISIT HI MDM: CPT

## 2021-07-31 PROCEDURE — 93005 ELECTROCARDIOGRAM TRACING: CPT

## 2021-07-31 PROCEDURE — 85025 COMPLETE CBC W/AUTO DIFF WBC: CPT

## 2021-07-31 PROCEDURE — 82550 ASSAY OF CK (CPK): CPT

## 2021-07-31 PROCEDURE — 96360 HYDRATION IV INFUSION INIT: CPT

## 2021-07-31 RX ADMIN — SODIUM CHLORIDE 1000 ML: 9 INJECTION, SOLUTION INTRAVENOUS at 08:08

## 2021-07-31 NOTE — ED NOTES
Pt's dual power of  at bedside, also pt's  who has cared for pt since 2017. Visitor also stated that pt fell 2 days ago but did not get seen for that.

## 2021-07-31 NOTE — ED NOTES
Attempted multiple times to call [facility] to give report on this patient. Phone call was not answered. Will give [facility] the ED number to have RN contact us for report.

## 2021-07-31 NOTE — ED NOTES
Attempted to get a urine sample, but patient does not have to urinate right now. Will let Zach Louise know and try again in a few minutes.

## 2021-07-31 NOTE — DISCHARGE INSTRUCTIONS
Mr. Baldemar Sahni was seen in our emergency department today after an unwitnessed fall out of bed. His evaluation here today included an exam, blood work, urine evaluation, EKG, CAT scan of the head and cervical spine and pelvic x-ray. His evaluation here was reassuring for any acute emergency and it is safe for him to be discharged back to the nursing home. Please resume his normal medications.

## 2021-07-31 NOTE — ED NOTES
Pt discharged to assisted living. Discharge papers given to power of . POA states he will meet [facility] at facility to help patient get settled with staff.

## 2021-07-31 NOTE — ED NOTES
Pt arrived via EMS with a cc of GLF and headache. Pt lives in a nursing home and during morning rounds, patient was found lying on the floor with an unknown down time. Unknown LOC. Pt is complaining of a headache that started this morning. C-collar in place per Dr. Nellie Robert. Nursing home states that patient has also not eaten in 3 days and pt is complaining of nausea. Pt has a history of dementia and right sided deficit from previous stroke. Pt is alert and oriented to person, place and situation. Nursing home states that patient is acting to baseline. Pt is resting comfortably in stretcher with side rails up and call bell within reach. Attempted to help patient stand to use urinal with second RN. Pt unable to urinate at this time.

## 2021-08-01 LAB
ATRIAL RATE: 70 BPM
CALCULATED P AXIS, ECG09: 7 DEGREES
CALCULATED R AXIS, ECG10: 7 DEGREES
CALCULATED T AXIS, ECG11: 31 DEGREES
DIAGNOSIS, 93000: NORMAL
P-R INTERVAL, ECG05: 162 MS
Q-T INTERVAL, ECG07: 426 MS
QRS DURATION, ECG06: 88 MS
QTC CALCULATION (BEZET), ECG08: 460 MS
VENTRICULAR RATE, ECG03: 70 BPM

## 2021-08-22 ENCOUNTER — APPOINTMENT (OUTPATIENT)
Dept: CT IMAGING | Age: 86
End: 2021-08-22
Attending: PHYSICIAN ASSISTANT
Payer: MEDICARE

## 2021-08-22 ENCOUNTER — HOSPITAL ENCOUNTER (EMERGENCY)
Age: 86
Discharge: HOME | End: 2021-08-22
Attending: EMERGENCY MEDICINE
Payer: MEDICARE

## 2021-08-22 VITALS
HEART RATE: 70 BPM | OXYGEN SATURATION: 98 % | SYSTOLIC BLOOD PRESSURE: 126 MMHG | DIASTOLIC BLOOD PRESSURE: 57 MMHG | RESPIRATION RATE: 16 BRPM | HEIGHT: 67 IN | WEIGHT: 136 LBS | BODY MASS INDEX: 21.35 KG/M2 | TEMPERATURE: 98 F

## 2021-08-22 DIAGNOSIS — S09.90XA CLOSED HEAD INJURY, INITIAL ENCOUNTER: Primary | ICD-10-CM

## 2021-08-22 PROCEDURE — 99282 EMERGENCY DEPT VISIT SF MDM: CPT

## 2021-08-22 PROCEDURE — 70450 CT HEAD/BRAIN W/O DYE: CPT

## 2021-08-23 NOTE — ED PROVIDER NOTES
EMERGENCY DEPARTMENT HISTORY AND PHYSICAL EXAM      Date: 8/22/2021  Patient Name: Kaleigh Burnett    History of Presenting Illness     Chief Complaint   Patient presents with    Fall     Pt arrived to ED from [facility] via EMS, per squad pt fell trying to transfer from kitchen chair to wheelchair. Pt hit head , no loc, no complaints. History Provided By: Patient, EMS and Nursing Home/SNF/Rehab Center    HPI: Kaleigh Burnett, 80 y.o. male with PMHx significant for dementia, CVA, hypertension, presents to the ED with cc of fall today. The patient fell trying to transfer from a chair to his wheelchair. He hit his head but reportedly did not lose consciousness. The patient has a history of dementia and denies any complaints at this time. History limited due to dementia. There are no other complaints, changes, or physical findings at this time. PCP: Dominic Wilkerson MD    No current facility-administered medications on file prior to encounter. Current Outpatient Medications on File Prior to Encounter   Medication Sig Dispense Refill    atorvastatin (LIPITOR) 10 mg tablet Take 1 Tab by mouth nightly. 30 Tab 0    lisinopriL (PRINIVIL, ZESTRIL) 5 mg tablet Take 1 Tab by mouth daily. 30 Tab 0    peg 400-propylene glycol (Systane, propylene glycol,) 0.4-0.3 % drop Administer 1 Drop to both eyes two (2) times a day.  calcium carb/magnesium hydrox (MYLANTA PO) Take 20 mL by mouth three (3) times daily as needed.  acetaminophen (TYLENOL) 325 mg tablet Take 650 mg by mouth every four (4) hours as needed for Pain.  amLODIPine (NORVASC) 5 mg tablet Take 1 Tab by mouth daily. 30 Tab 2    cholecalciferol (Vitamin D3) (2,000 UNITS /50 MCG) cap capsule Take 2,000 Units by mouth every evening.  aspirin delayed-release 81 mg tablet Take 81 mg by mouth every evening.          Past History     Past Medical History:  Past Medical History:   Diagnosis Date    GERD (gastroesophageal reflux disease)  High cholesterol     Hypertension     Stroke ([facility] 03/2017    numbness in fingertips on R hand; legs;stammering       Past Surgical History:  Past Surgical History:   Procedure Laterality Date    HX CHOLECYSTECTOMY      HX HEENT Bilateral     cataract    HX HERNIA REPAIR  02/21/2018     Robot-assisted laparoscopic repair of paraesophageal hernia with gastric volvulus. Family History:  Family History   Problem Relation Age of Onset    No Known Problems Sister     No Known Problems Brother        Social History:  Social History     Tobacco Use    Smoking status: Former Smoker    Smokeless tobacco: Never Used   Substance Use Topics    Alcohol use: Not Currently     Alcohol/week: 1.0 standard drinks     Types: 1 Cans of beer per week    Drug use: No       Allergies:  No Known Allergies      Review of Systems   Review of Systems   Unable to perform ROS: Dementia         Physical Exam   Physical Exam  Constitutional:       Appearance: He is not toxic-appearing. Comments: Patient is pleasantly demented. HENT:      Head: Normocephalic. Comments: No visible hematoma or abrasion to the scalp. No bony tenderness to palpation or deformity. Mouth/Throat:      Mouth: Mucous membranes are moist.   Eyes:      Extraocular Movements: Extraocular movements intact. Pupils: Pupils are equal, round, and reactive to light. Cardiovascular:      Rate and Rhythm: Normal rate and regular rhythm. Pulmonary:      Effort: Pulmonary effort is normal. No respiratory distress. Musculoskeletal:         General: No deformity. Normal range of motion. Cervical back: Normal range of motion and neck supple. Skin:     General: Skin is warm and dry. Neurological:      General: No focal deficit present. Mental Status: He is alert. Mental status is at baseline.    Psychiatric:         Behavior: Behavior normal.           Diagnostic Study Results     Labs -   No results found for this or any previous visit (from the past 12 hour(s)). Radiologic Studies -   CT HEAD WO CONT   Final Result   No acute intracranial process. Imaging findings consistent with moderate chronic microvascular ischemic change. There is a moderate to severe degree of cerebral atrophy. CT Results  (Last 48 hours)               08/22/21 2145  CT HEAD WO CONT Final result    Impression:  No acute intracranial process. Imaging findings consistent with moderate chronic microvascular ischemic change. There is a moderate to severe degree of cerebral atrophy. Narrative:  CLINICAL HISTORY: hit head   INDICATION: hit head   COMPARISON: 7/31/2020. CT dose reduction was achieved through use of a standardized protocol tailored   for this examination and automatic exposure control for dose modulation. TECHNIQUE: Serial axial images with a collimation of 5 mm were obtained from the   skull base through the vertex     FINDINGS:    There is sulcal and ventricular prominence. Confluent periventricular and   scattered foci of hypodensity in the cerebral white matter. There is no evidence   of an acute infarction, hemorrhage, or mass-effect. There is no evidence of   midline shift or hydrocephalus. Posterior fossa structures are unremarkable. No   extra-axial collections are seen. Mastoid air cells are well pneumatized and clear. There is no evidence of depressed skull fractures of soft tissue swelling. CXR Results  (Last 48 hours)    None            Medical Decision Making   I am the first provider for this patient. I reviewed the vital signs, available nursing notes, past medical history, past surgical history, family history and social history. Vital Signs-Reviewed the patient's vital signs.   Patient Vitals for the past 12 hrs:   Temp Pulse Resp BP SpO2   08/22/21 1919 98 °F (36.7 °C) 70 16 (!) 126/57 98 %         Records Reviewed: Nursing Notes and Old Medical Records      Provider Notes (Medical Decision Making):   DDx: closed head injury, [address]    Patient presents with head injury. He has a history of dementia and has no complaints. Plan for CT head. ED Course:   Initial assessment performed. The patients presenting problems have been discussed, and they are in agreement with the care plan formulated and outlined with them. I have encouraged them to ask questions as they arise throughout their visit. Disposition:  10:13 PM  The patient has been re-evaluated and is ready for discharge. Reviewed available results with patient. Counseled patient on diagnosis and care plan. Patient has expressed understanding, and all questions have been answered. Patient agrees with plan and agrees to follow up as recommended, or to return to the ED if their symptoms worsen. Discharge instructions have been provided and explained to the patient, along with reasons to return to the ED. PLAN:  1. Discharge Medication List as of 8/22/2021 10:13 PM        2. Follow-up Information     Follow up With Specialties Details Why Contact Rahel Richard MD Family Medicine Go to  as needed [address] [facility] [address]  [phone]      [facility] EMERGENCY DEPT Emergency Medicine Go to  If symptoms worsen [address] [place] [address]  [phone]        Return to ED if worse     Diagnosis     Clinical Impression:   1. Closed head injury, initial encounter            Renae Wallace.  ZBIGNIEW Hilliard

## 2021-08-26 ENCOUNTER — HOSPITAL ENCOUNTER (EMERGENCY)
Age: 86
Discharge: HOME | End: 2021-08-27
Attending: EMERGENCY MEDICINE
Payer: MEDICARE

## 2021-08-26 DIAGNOSIS — S00.03XA HEMATOMA OF SCALP, INITIAL ENCOUNTER: ICD-10-CM

## 2021-08-26 DIAGNOSIS — S09.90XA CLOSED HEAD INJURY, INITIAL ENCOUNTER: Primary | ICD-10-CM

## 2021-08-26 PROCEDURE — 99284 EMERGENCY DEPT VISIT MOD MDM: CPT

## 2021-08-27 ENCOUNTER — APPOINTMENT (OUTPATIENT)
Dept: CT IMAGING | Age: 86
End: 2021-08-27
Attending: EMERGENCY MEDICINE
Payer: MEDICARE

## 2021-08-27 VITALS
RESPIRATION RATE: 16 BRPM | HEART RATE: 57 BPM | BODY MASS INDEX: 21.35 KG/M2 | SYSTOLIC BLOOD PRESSURE: 171 MMHG | TEMPERATURE: 98 F | DIASTOLIC BLOOD PRESSURE: 92 MMHG | OXYGEN SATURATION: 95 % | HEIGHT: 67 IN | WEIGHT: 136 LBS

## 2021-08-27 PROCEDURE — 70450 CT HEAD/BRAIN W/O DYE: CPT

## 2021-08-27 NOTE — ED NOTES
Pt was trying to get out of bed; he is alert and identifies his name and that he is at a hospital emergency center'   Return demonstration for use of the call bell

## 2021-08-27 NOTE — ED NOTES
Report given to Renée Powell RN. They were informed of patient chief complaint, current status, orders completed (to include IV access/medications/radiology testing), outstanding orders that still need to be completed, and the treatment plan. Ensured no questions or concerns regarding the patient prior to departure.

## 2021-08-27 NOTE — ED PROVIDER NOTES
EMERGENCY DEPARTMENT HISTORY AND PHYSICAL EXAM      Date: 8/26/2021  Patient Name: Dianna Miguel    History of Presenting Illness     Chief Complaint   Patient presents with   Wilhelminia Blazing Fall     Patient had fall from sitting position and hit R side of his head a little. No LOC, no complaints, small bump to R head, no open wound or tenderness on palpation. Patient did not want to come but SNF insisted he be evaluated. Dementia at baseline. History Provided By: EMS and Nursing Home/SNF/Rehab Center    HPI: Dianna Miguel, 80 y.o. male presents to the ED with cc of head injury. Pt resident at SNF with hx of dementia. Pt had fallen this evening and hit the back of his head. There was no loss of consciousness. Pt told EMS that he did not want to come to the hospital but her facility protocol and his dementia he required Medical evaluation. Pt denies any complaint specifically headache, neck pain, n/v. There are no other complaints, changes, or physical findings at this time. PCP: Bulmaro Ballard MD    No current facility-administered medications on file prior to encounter. Current Outpatient Medications on File Prior to Encounter   Medication Sig Dispense Refill    atorvastatin (LIPITOR) 10 mg tablet Take 1 Tab by mouth nightly. 30 Tab 0    lisinopriL (PRINIVIL, ZESTRIL) 5 mg tablet Take 1 Tab by mouth daily. 30 Tab 0    peg 400-propylene glycol (Systane, propylene glycol,) 0.4-0.3 % drop Administer 1 Drop to both eyes two (2) times a day.  calcium carb/magnesium hydrox (MYLANTA PO) Take 20 mL by mouth three (3) times daily as needed.  acetaminophen (TYLENOL) 325 mg tablet Take 650 mg by mouth every four (4) hours as needed for Pain.  amLODIPine (NORVASC) 5 mg tablet Take 1 Tab by mouth daily. 30 Tab 2    cholecalciferol (Vitamin D3) (2,000 UNITS /50 MCG) cap capsule Take 2,000 Units by mouth every evening.  aspirin delayed-release 81 mg tablet Take 81 mg by mouth every evening. Past History     Past Medical History:  Past Medical History:   Diagnosis Date    GERD (gastroesophageal reflux disease)     High cholesterol     Hypertension     Stroke ([facility] [address] 03/2017    numbness in fingertips on R hand; legs;stammering       Past Surgical History:  Past Surgical History:   Procedure Laterality Date    HX CHOLECYSTECTOMY      HX HEENT Bilateral     cataract    HX HERNIA REPAIR  02/21/2018     Robot-assisted laparoscopic repair of paraesophageal hernia with gastric volvulus. Family History:  Family History   Problem Relation Age of Onset    No Known Problems Sister     No Known Problems Brother        Social History:  Social History     Tobacco Use    Smoking status: Former Smoker    Smokeless tobacco: Never Used   Substance Use Topics    Alcohol use: Not Currently     Alcohol/week: 1.0 standard drinks     Types: 1 Cans of beer per week    Drug use: No       Allergies:  No Known Allergies      Review of Systems   Review of Systems   Unable to perform ROS: Dementia       Physical Exam   Physical Exam  Vitals and nursing note reviewed. Constitutional:       General: He is not in acute distress. Appearance: He is well-developed. He is not diaphoretic. HENT:      Head: Normocephalic. Comments: Right posterior scalp hematoma       Mouth/Throat:      Mouth: Mucous membranes are moist.      Pharynx: No oropharyngeal exudate. Eyes:      Extraocular Movements: Extraocular movements intact. Conjunctiva/sclera: Conjunctivae normal.      Pupils: Pupils are equal, round, and reactive to light. Neck:      Vascular: No JVD. Trachea: No tracheal deviation. Cardiovascular:      Rate and Rhythm: Normal rate and regular rhythm. Heart sounds: Normal heart sounds. No murmur heard. Pulmonary:      Effort: Pulmonary effort is normal. No respiratory distress. Breath sounds: Normal breath sounds. No stridor. No wheezing or rales.    Chest:      Chest wall: No tenderness. Abdominal:      General: There is no distension. Palpations: Abdomen is soft. Tenderness: There is no abdominal tenderness. There is no guarding or rebound. Musculoskeletal:         General: No tenderness. Normal range of motion. Cervical back: Normal range of motion and neck supple. No tenderness. Comments: No T/L spine ttp, no ttp to the danielle upper and lower extremities, no ttp to pelvis      Skin:     General: Skin is warm and dry. Capillary Refill: Capillary refill takes less than 2 seconds. Neurological:      Mental Status: He is alert. Mental status is at baseline. He is disoriented. Cranial Nerves: No cranial nerve deficit. Comments: No gross motor or sensory deficits    Psychiatric:         Mood and Affect: Mood normal.         Behavior: Behavior normal.         Diagnostic Study Results     Labs -  None    Radiologic Studies -   CT HEAD WO CONT   Final Result   No acute intracranial process. Chronic right pontine infarction. Imaging findings consistent with moderate chronic microvascular ischemic change. There is a moderate to severe temporal predominant degree of cerebral atrophy. CT Results  (Last 48 hours)               08/27/21 0001  CT HEAD WO CONT Final result    Impression:  No acute intracranial process. Chronic right pontine infarction. Imaging findings consistent with moderate chronic microvascular ischemic change. There is a moderate to severe temporal predominant degree of cerebral atrophy. Narrative:  CLINICAL HISTORY: fall with closed head injury   INDICATION: fall with closed head injury   COMPARISON: None. CT dose reduction was achieved through use of a standardized protocol tailored   for this examination and automatic exposure control for dose modulation.     TECHNIQUE: Serial axial images with a collimation of 5 mm were obtained from the   skull base through the vertex     FINDINGS:    There is sulcal and ventricular prominence. Confluent periventricular and   scattered foci of hypodensity in the cerebral white matter. There is no evidence   of an acute infarction, hemorrhage, or mass-effect. There is no evidence of   midline shift or hydrocephalus. Posterior fossa structures are unremarkable. No   extra-axial collections are seen. Mastoid air cells are well pneumatized and clear. Remote infarction in the right km. CXR Results  (Last 48 hours)    None          Medical Decision Making   I am the first provider for this patient. I reviewed the vital signs, available nursing notes, past medical history, past surgical history, family history and social history. Vital Signs-Reviewed the patient's vital signs. Patient Vitals for the past 12 hrs:   Temp Pulse Resp BP SpO2   08/26/21 2253 98.4 °F (36.9 °C) 83 20 128/63 99 %     Records Reviewed: Nursing Notes, Old Medical Records, Ambulance Run Sheet, Previous Radiology Studies and Previous Laboratory Studies, seen on 8/22 and 7/31 for GLF with head injury      Provider Notes (Medical Decision Making):   DDx- CHI, ICH, scalp hematoma       ED Course:   Initial assessment performed. The patients presenting problems have been discussed, and they are in agreement with the care plan formulated and outlined with them. I have encouraged them to ask questions as they arise throughout their visit. Heat CT neg, no open wound to scalp. Pt does have hematoma to back of head and given advanced age will obtain head CT to r/o ICH. Pt has no complaints      Disposition:  Discharge     DISCHARGE PLAN:  1. Current Discharge Medication List        2. Follow-up Information     Follow up With Specialties Details Why Contact Isabell Hernandes MD Internal Medicine  As needed [address]  [phone]          3. Return to ED if worse     Diagnosis     Clinical Impression:   1.  Closed head injury, initial encounter    2. Hematoma of scalp, initial encounter        Attestations:    Liane Boyer, DO    Please note that this dictation was completed with [employer], the computer voice recognition software. Quite often unanticipated grammatical, syntax, homophones, and other interpretive errors are inadvertently transcribed by the computer software. Please disregard these errors. Please excuse any errors that have escaped final proofreading. Thank you.

## 2022-02-23 NOTE — PROGRESS NOTES
Discussed advanced directive. Patient states that  He does not have an advanced directive. 1. Have you been to the ER, urgent care clinic since your last visit no  Hospitalized since your last visit no  2. Have you seen or consulted any other health care providers outside of the [facility] since your last visit pcp Dr. Rancho Smith  4/4 . Include any pap smears or colon screening.
To: Katherine Marcus MD  From: Shy Vidal MD    Thank you for referring Damion [facility]. Encounter Date: 4/16/2018    Subjective:      Keya Ansari is a 80 y.o. male presents for postop care following emergent robotic repair of huge paraesophageal hernia. Eating well. No weight loss. Complains that his stamina is not back yet. Gets some WEST. Notes phlegm in the morning. Objective:     General:  alert, cooperative, no distress, appears stated age   Abdomen: soft, bowel sounds active, non-tender   Incisions: Well healed. Lungs are clear. Assessment:     Status post above. Doing well postoperatively. Plan:     Patient understands no further follow-up required. Knows to call for any concerns. Asked to him to address his WEST at his next appt with Dr. Ata Jean if its persistent, but told him as his activity increases with the warmer weather, his stamina should improve. Told him to d/w Dr. Ata Jean the possibility of post-nasal drip contributing to his morning phlegm.       Shy Vidal MD
Detail Level: Generalized
Detail Level: Detailed

## 2022-03-18 PROBLEM — E78.5 HYPERLIPIDEMIA: Status: ACTIVE | Noted: 2017-03-19

## 2022-03-18 PROBLEM — I69.322 DYSARTHRIA FOLLOWING CEREBROVASCULAR ACCIDENT: Status: ACTIVE | Noted: 2017-03-19

## 2022-03-19 PROBLEM — N17.9 ACUTE RENAL FAILURE (ARF) (HCC): Status: ACTIVE | Noted: 2018-08-01
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
## 2022-03-19 PROBLEM — I10 ESSENTIAL HYPERTENSION: Status: ACTIVE | Noted: 2017-03-19

## 2022-03-19 PROBLEM — I69.351 HEMIPLEGIA AND HEMIPARESIS FOLLOWING CEREBRAL INFARCTION AFFECTING RIGHT DOMINANT SIDE (HCC): Status: ACTIVE | Noted: 2017-03-19

## 2022-03-19 PROBLEM — J15.9 BACTERIAL PNEUMONIA: Status: ACTIVE | Noted: 2020-12-24
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
## 2022-03-19 PROBLEM — R20.2 HEMIPARESTHESIA: Status: ACTIVE | Noted: 2017-03-19

## 2022-03-19 PROBLEM — N40.0 PROSTATISM: Status: ACTIVE | Noted: 2017-03-19

## 2022-03-19 PROBLEM — K31.89 GASTRIC VOLVULUS: Status: ACTIVE | Noted: 2018-02-20

## 2022-03-19 PROBLEM — I63.02 CEREBROVASCULAR ACCIDENT (CVA) DUE TO THROMBOSIS OF BASILAR ARTERY (HCC): Status: ACTIVE | Noted: 2017-03-19

## 2023-05-10 RX ORDER — ASPIRIN 81 MG/1
TABLET ORAL EVERY EVENING
COMMUNITY

## 2023-05-10 RX ORDER — ACETAMINOPHEN 325 MG/1
TABLET ORAL EVERY 4 HOURS PRN
COMMUNITY

## 2023-05-10 RX ORDER — ATORVASTATIN CALCIUM 10 MG/1
TABLET, FILM COATED ORAL
COMMUNITY
Start: 2020-12-30

## 2023-05-10 RX ORDER — LISINOPRIL 5 MG/1
TABLET ORAL DAILY
COMMUNITY
Start: 2020-12-30

## 2023-05-10 RX ORDER — AMLODIPINE BESYLATE 5 MG/1
TABLET ORAL DAILY
COMMUNITY
Start: 2018-08-05

## 2024-11-09 NOTE — PROCEDURES
[place] Office: [phone]      Esophagogastroduodenoscopy Procedure Note      Gopi Ol  9/3/1931  [id number]    Indication: nausea,vomiting, abnormal UGI series    : Praveen Sal MD    Referring Provider:  Lidia Conn MD    Sedation:  MAC anesthesia Propofol    Procedure Details:  After detailed informed consent was obtained for the procedure, with all risks and benefits of procedure explained the patient was taken to the endoscopy suite and placed in the left lateral decubitus position. Following sequential administration of sedation as per above, the endoscope was inserted into the mouth and advanced under direct vision to second portion of the duodenum. A careful inspection was made as the gastroscope was withdrawn, including a retroflexed view of the proximal stomach; findings and interventions are described below. Findings:     Esophagus: The esophageal mucosa in the proximal esophagus is normal.   Esophagus is mildly torturous. A medium sized diverticulum is noted at 35 cm,  Thre is a benign inflamed stricture seen at 40 cm at the squamo-columnar junction. There is a large hiatal hernia with Dino's erosions at the hiatus(see below)    Stomach: The stomach had an unusual configuration; There is a hernia with food noted , as documented above). 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 I could very easily access the antrum though. Duodenum:   The bulb and D2 junction has mild erythema with some edema I took biopsies quickly. .       Please note that no gastric biopsies were taken and no dilation was performed as he has a lot of retained solid gastric food mixed with liquid which he regurgitates easily. Constant suction was used. Therapies:  biopsy of duodenal ,as above    Specimen:  Specimens were collected as described and send to the laboratory.            Complications:   None were encountered during the procedure. EBL:  None. Recommendations:     -Acid suppression with a proton pump inhibitor. ,   -Liquids only for now. -CT chest and abdomen.  -Surgical consult (for hernia/ volvulus)        Ángel Dempsey MD  7/20/2018  12:45 PM Primary

## 2025-06-06 NOTE — ED PROVIDER NOTES
"Pharmacy Chemotherapy Verification Note:    Dx: met Breast Cancer (ER+, HER2-)       Protocol: Sacituzumab Govitecan (Trodelvy)     Sacituzumab govitecan-hziy 10 mg/kg IV on Days 1 and 8  21-day cycle until disease progression or unacceptable toxicity   1. NCCNGuidelines® for Breast Cancer V.2.2024.   2. Staci FITZPATRICK et al. N Engl J Med. 2019;380(8):741-751.a   3. Andre et al. J Clin Oncol. 2022;40(17_suppl):KOM1729.a    Allergies:  Codeine, Hydrocodone, and Other drug     BP (!) 143/74   Pulse 83   Temp (!) 35.4 °C (95.8 °F) (Temporal)   Resp 18   Ht 1.6 m (5' 2.99\")   Wt 91.9 kg (202 lb 9.6 oz)   SpO2 92%   BMI 35.90 kg/m²  Body surface area is 2.02 meters squared.    Labs from 6/9/25 reviewed - all within treatment parameters.    Drug Order   (Drug name, dose, route, IV Fluid & volume, frequency, number of doses) Cycle: 5 Day 8  Previous treatment: C5D1 on 6/2/25; s/p Capecitabine x 2C, Ribociclib + Faslodex     Medication = Sacituzumab govitecan  Base Dose = 10 mg/kg  Calc Dose: Base Dose x 91.9 kg = 919 mg  Final Dose = 900 mg  Route = IV  Fluid & Volume =  mL  Admin Duration = Over 1-2 hours          <10% difference, okay to treat with final dose       By my signature below, I confirm this process was performed independently with the BSA and all final chemotherapy dosing calculations congruent. I have reviewed the above chemotherapy order and that my calculation of the final dose and BSA (when applicable) corroborate those calculations of the  pharmacist.     Zena Gore, PharmD, BCOP    " EMERGENCY DEPARTMENT HISTORY AND PHYSICAL EXAM      Date: 7/31/2021  Patient Name: Martínez Almeida    History of Presenting Illness     Chief Complaint   Patient presents with   Del Henle Fall    Headache    Nausea       History Provided By: Patient and EMS    HPI: Martínez Almeida, 80 y.o. male presents to the ED via EMS from assisted living after an unwitnessed ground-level fall at his nursing home sometime overnight. Patient was discovered on the floor during morning rounds and the patient was unable to, secondary to his dementia, say when or how he fell. It is unclear whether he lost consciousness and has, again secondary to his dementia, limited history capabilities. The nursing home also stated that he had not eaten in 3 days and was complaining of nausea. Patient denies any nausea currently or any abdominal pain, chest pain or shortness of breath. 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 Patient denies any neck pain but says he has a headache. He denies any back pain, hip pain or leg pain. The nursing home did relay that the patient is DNR. Patient is not on any anticoagulation on medication review. There are no other complaints, changes, or physical findings at this time. PCP: Emma Martinez MD    No current facility-administered medications on file prior to encounter. Current Outpatient Medications on File Prior to Encounter   Medication Sig Dispense Refill    atorvastatin (LIPITOR) 10 mg tablet Take 1 Tab by mouth nightly. 30 Tab 0    lisinopriL (PRINIVIL, ZESTRIL) 5 mg tablet Take 1 Tab by mouth daily. 30 Tab 0    peg 400-propylene glycol (Systane, propylene glycol,) 0.4-0.3 % drop Administer 1 Drop to both eyes two (2) times a day.  calcium carb/magnesium hydrox (MYLANTA PO) Take 20 mL by mouth three (3) times daily as needed.  acetaminophen (TYLENOL) 325 mg tablet Take 650 mg by mouth every four (4) hours as needed for Pain.       amLODIPine (NORVASC) 5 mg tablet Take 1 Tab by mouth daily. 30 Tab 2    cholecalciferol (Vitamin D3) (2,000 UNITS /50 MCG) cap capsule Take 2,000 Units by mouth every evening.  aspirin delayed-release 81 mg tablet Take 81 mg by mouth every evening. Past History     Past Medical History:  Past Medical History:   Diagnosis Date    GERD (gastroesophageal reflux disease)     High cholesterol     Hypertension     Stroke ([facility] [address] 03/2017    numbness in fingertips on R hand; legs;stammering       Past Surgical History:  Past Surgical History:   Procedure Laterality Date    HX CHOLECYSTECTOMY      HX HEENT Bilateral     cataract    HX HERNIA REPAIR  02/21/2018     Robot-assisted laparoscopic repair of paraesophageal hernia with gastric volvulus. Family History:  Family History   Problem Relation Age of Onset    No Known Problems Sister     No Known Problems Brother        Social History:  Social History     Tobacco Use    Smoking status: Former Smoker    Smokeless tobacco: Never Used   Substance Use Topics    Alcohol use: Not Currently     Alcohol/week: 1.0 standard drinks     Types: 1 Cans of beer per week    Drug use: No       Allergies:  No Known Allergies      Review of Systems   Review of Systems   Unable to perform ROS: Dementia   Genitourinary:        Patient states he needs to urinate. Physical Exam   Physical Exam   Vital signs and nursing notes reviewed    CONSTITUTIONAL: Alert, in mild distress; well-developed; well-nourished. HEAD:  Normocephalic, atraumatic hearing aids in both ears. EYES: PERRL; EOM's intact. ENTM: Nose: no rhinorrhea; Throat: no erythema or exudate, mucous membranes moist, no broken teeth. Neck:  Supple. trachea is midline. No midline C-spine tenderness on palpation. RESP: Chest clear, equal breath sounds. - W/R/R, no chest tenderness along the anterior chest wall. CV: S1 and S2 WNL; No murmurs, gallops or rubs.  2+ radial and DP pulses bilaterally. GI: non-distended, normal bowel sounds, abdomen soft and non-tender. No masses or organomegaly. No hip tenderness on direct palpation downward and inward. : No costo-vertebral angle tenderness. BACK:  Non-tender, normal appearance, no midline spine tenderness under palpation. UPPER EXT:  Normal inspection. no joint or soft tissue swelling  LOWER EXT: No edema, no calf tenderness. There is no shortening or rotation of the legs. NEURO: Alert and oriented to person and place, not to year., 5/5 strength and light touch sensation intact in bilateral upper and lower extremities save for 5 - strength on the right upper extremity and right lower extremity. SKIN: No rashes; Warm and dry  PSYCH: Normal mood, normal affect    Diagnostic Study Results     Labs -     Recent Results (from the past 12 hour(s))   EKG, 12 LEAD, INITIAL    Collection Time: 07/31/21  7:40 AM   Result Value Ref Range    Ventricular Rate 70 BPM    Atrial Rate 70 BPM    P-R Interval 162 ms    QRS Duration 88 ms    Q-T Interval 426 ms    QTC Calculation (Bezet) 460 ms    Calculated P Axis 7 degrees    Calculated R Axis 7 degrees    Calculated T Axis 31 degrees    Diagnosis       Normal sinus rhythm  Normal ECG  When compared with ECG of 31-JAN-2021 11:25,  No significant change was found     CBC WITH AUTOMATED DIFF    Collection Time: 07/31/21  7:58 AM   Result Value Ref Range    WBC 6.9 4.1 - 11.1 K/uL    RBC 4.50 4. 10 - 5.70 M/uL    HGB 13.2 12.1 - 17.0 g/dL    HCT 40.3 36.6 - 50.3 %    MCV 89.6 80.0 - 99.0 FL    MCH 29.3 26.0 - 34.0 PG    MCHC 32.8 30.0 - 36.5 g/dL    RDW 13.4 11.5 - 14.5 %    PLATELET 262 (L) 668 - 400 K/uL    MPV 11.7 8.9 - 12.9 FL    NRBC 0.0 0  WBC    ABSOLUTE NRBC 0.00 0.00 - 0.01 K/uL    NEUTROPHILS 80 (H) 32 - 75 %    LYMPHOCYTES 8 (L) 12 - 49 %    MONOCYTES 11 5 - 13 %    EOSINOPHILS 1 0 - 7 %    BASOPHILS 0 0 - 1 %    IMMATURE GRANULOCYTES 0 0.0 - 0.5 %    ABS.  NEUTROPHILS 5.4 1.8 - 8.0 K/UL ABS. LYMPHOCYTES 0.6 (L) 0.8 - 3.5 K/UL    ABS. MONOCYTES 0.8 0.0 - 1.0 K/UL    ABS. EOSINOPHILS 0.1 0.0 - 0.4 K/UL    ABS. BASOPHILS 0.0 0.0 - 0.1 K/UL    ABS. IMM. GRANS. 0.0 0.00 - 0.04 K/UL    DF SMEAR SCANNED      RBC COMMENTS NORMOCYTIC, NORMOCHROMIC     METABOLIC PANEL, COMPREHENSIVE    Collection Time: 07/31/21  7:58 AM   Result Value Ref Range    Sodium 137 136 - 145 mmol/L    Potassium 4.3 3.5 - 5.1 mmol/L    Chloride 104 97 - 108 mmol/L    CO2 26 21 - 32 mmol/L    Anion gap 7 5 - 15 mmol/L    Glucose 106 (H) 65 - 100 mg/dL    BUN 22 (H) 6 - 20 MG/DL    Creatinine 1.26 0.70 - 1.30 MG/DL    BUN/Creatinine ratio 17 12 - 20      GFR est AA >60 >60 ml/min/1.73m2    GFR est non-AA 54 (L) >60 ml/min/1.73m2    Calcium 9.0 8.5 - 10.1 MG/DL    Bilirubin, total 0.9 0.2 - 1.0 MG/DL    ALT (SGPT) 17 12 - 78 U/L    AST (SGOT) 31 15 - 37 U/L    Alk.  phosphatase 71 45 - 117 U/L    Protein, total 8.3 (H) 6.4 - 8.2 g/dL    Albumin 2.9 (L) 3.5 - 5.0 g/dL    Globulin 5.4 (H) 2.0 - 4.0 g/dL    A-G Ratio 0.5 (L) 1.1 - 2.2     TROPONIN I    Collection Time: 07/31/21  7:58 AM   Result Value Ref Range    Troponin-I, Qt. <0.05 <0.05 ng/mL   CK    Collection Time: 07/31/21  7:58 AM   Result Value Ref Range     39 - 308 U/L   MAGNESIUM    Collection Time: 07/31/21  7:58 AM   Result Value Ref Range    Magnesium 2.1 1.6 - 2.4 mg/dL   URINALYSIS W/ RFLX MICROSCOPIC    Collection Time: 07/31/21 10:53 AM   Result Value Ref Range    Color YELLOW/STRAW      Appearance CLEAR CLEAR      Specific gravity 1.019 1.003 - 1.030      pH (UA) 5.5 5.0 - 8.0      Protein 30 (A) NEG mg/dL    Glucose Negative NEG mg/dL    Ketone TRACE (A) NEG mg/dL    Bilirubin Negative NEG      Blood Negative NEG      Urobilinogen 0.2 0.2 - 1.0 EU/dL    Nitrites Negative NEG      Leukocyte Esterase Negative NEG      WBC 0-4 0 - 4 /hpf    RBC 0-5 0 - 5 /hpf    Epithelial cells FEW FEW /lpf    Bacteria Negative NEG /hpf    Hyaline cast 0-2 0 - 5 /lpf Radiologic Studies -   XR PELV AP ONLY   Final Result   No acute process                  CT HEAD WO CONT   Final Result   No evidence of acute process. CT SPINE CERV WO CONT   Final Result   No acute abnormality. CT Results  (Last 48 hours)               07/31/21 0838  CT HEAD WO CONT Final result    Impression:  No evidence of acute process. Narrative:  EXAM: CT HEAD WO CONT       INDICATION: Status post fall out of bed, complained of headache, history of   dementia, no anticoagulant history. COMPARISON: 12/25/2020. CONTRAST: None. TECHNIQUE: Unenhanced CT of the head was performed using 5 mm images. Brain and   bone windows were generated. Coronal and sagittal reformats. CT dose reduction   was achieved through use of a standardized protocol tailored for this   examination and automatic exposure control for dose modulation. FINDINGS:   There is mild atrophy and compensatory dilatation of ventricles. . There is mild   white matter disease likely to chronic small vessel ischemic disease. . There is   no intracranial hemorrhage, extra-axial collection, or mass effect. The basilar   cisterns are open. No CT evidence of acute infarct. The bone windows demonstrate no abnormalities. The visualized portions of the   paranasal sinuses and mastoid air cells are clear. The patient is status post   bilateral lens surgery. 07/31/21 0838  CT SPINE CERV WO CONT Final result    Impression:  No acute abnormality. Narrative:  EXAM:  CT CERVICAL SPINE WITHOUT CONTRAST       INDICATION: s/p fall, dementia, eval for cervical spine injury. .       COMPARISON: None. CONTRAST:  None. TECHNIQUE: Multislice helical CT of the cervical spine was performed without   intravenous contrast administration. Sagittal and coronal reformats were   generated.   CT dose reduction was achieved through use of a standardized   protocol tailored for this examination and automatic exposure control for dose   modulation. FINDINGS:       The alignment is within normal limits. There is no fracture or compression   deformity. The odontoid process is intact. The craniocervical junction is within   normal limits. Mild emphysema is seen in the lung apices. C2-C3: There is no spinal canal or neural foraminal stenosis. C3-C4: Mild bilateral neural foraminal narrowing. Michael  C4-C5: Mild right neural foraminal narrowing. Michael  C5-C6: Mild left neural foraminal narrowing. Michael  C6-C7: Mild bilateral neural foraminal narrowing. .       C7-T1: There is no spinal canal or neural foraminal stenosis. CXR Results  (Last 48 hours)    None          Medical Decision Making   I am the first provider for this patient. I reviewed the vital signs, available nursing notes, past medical history, past surgical history, family history and social history. Vital Signs-Reviewed the patient's vital signs. Patient Vitals for the past 12 hrs:   Temp Pulse Resp BP SpO2   07/31/21 1100  65 19 (!) 151/68 99 %   07/31/21 1030  63 14 120/70 97 %   07/31/21 1000  68 18 (!) 141/74 97 %   07/31/21 0930  65 12 (!) 170/66 (!) 82 %   07/31/21 0900  68 19 (!) 157/83 98 %   07/31/21 0815  66 17 131/67 97 %   07/31/21 0733 97.9 °F (36.6 °C) 76 18 (!) 162/115 95 %       EKG interpretation: (Preliminary)  EKG performed at 7:40 AM, as interpreted by me shows normal sinus rhythm at a rate of 70, normal axis, normal intervals, peaked T wave visible in V2 only without visible acute ischemic changes. Records Reviewed: Nursing Notes and Old Medical Records    Provider Notes (Medical Decision Making):   80-year-old male with fall out of bed at nursing home, unwitnessed. No focal injury findings here today, screening EKG reassuring, will check electrolytes, urinalysis.   Have conservatively placed cervical collar given dementia status to rule out cervical spine injury along with intracranial bleed given patient's headache complaint though he seems unfazed by the headache pain on initial presentation. Do not have concerns for pelvic fracture aced on clinical exam, will get x-ray but low suspicion. Will check CK to rule out rhabdo given unknown time on the ground. Peak T waves on EKG, not diffuse but will check for hyperkalemia. Evaluation reassuring, will plan for discharge back to the nursing home. Do not have concern for acute abdomen, will provide IV hydration given no p.o. intake for 3 days, check electrolytes. ED Course:   Initial assessment performed. The patients presenting problems have been discussed, and they are in agreement with the care plan formulated and outlined with them. I have encouraged them to ask questions as they arise throughout their visit. 11:24 AM  Reassessed patient, no complaints at this time. His  and primary decision maker is at the bedside and updated on results. Given reassuring evaluation, patient can be discharged back to his nursing home. Disposition:  Discharge    Discharge Note:  11:24 AM    The pt is ready for discharge. The pt's signs, symptoms, diagnosis, and discharge instructions have been discussed and pt has conveyed their understanding. The pt is to follow up as recommended or return to ER should their symptoms worsen. Plan has been discussed and pt is in agreement. DISCHARGE PLAN:  1. Current Discharge Medication List        2. Follow-up Information     Follow up With Specialties Details Why Contact Info    [facility] EMERGENCY DEPT Emergency Medicine  If symptoms worsen including change in mental status, new chest pain, new difficulty breathing or new weakness in his face arms or legs. [address] [place] [address] [place]  [phone]        3. Return to ED if worse     Diagnosis     Clinical Impression:   1. Fall, initial encounter    2.  Acute nonintractable headache, unspecified headache type        Attestations:    Alicia Perez MD    Please note that this dictation was completed with [employer], the computer voice recognition software. Quite often unanticipated grammatical, syntax, homophones, and other interpretive errors are inadvertently transcribed by the computer software. Please disregard these errors. Please excuse any errors that have escaped final proofreading. Thank you.

## (undated) DEVICE — BLADELESS OPTICAL TROCAR WITH FIXATION CANNULA: Brand: VERSAONE

## (undated) DEVICE — MEDI-VAC YANK SUCT HNDL W/TPRD BULBOUS TIP: Brand: CARDINAL HEALTH

## (undated) DEVICE — FORCEPS BX L160CM DIA8MM GRSP DISECT CUP TIP NONLOCKING ROT

## (undated) DEVICE — ARM DRAPE

## (undated) DEVICE — NDL FLTR TIP 5 MIC 18GX1.5IN --

## (undated) DEVICE — DEVON™ KNEE AND BODY STRAP 60" X 3" (1.5 M X 7.6 CM): Brand: DEVON

## (undated) DEVICE — STERILE POLYISOPRENE POWDER-FREE SURGICAL GLOVES: Brand: PROTEXIS

## (undated) DEVICE — Device

## (undated) DEVICE — SURGICAL PROCEDURE KIT GEN LAPAROSCOPY LF

## (undated) DEVICE — NEONATAL-ADULT SPO2 SENSOR: Brand: NELLCOR

## (undated) DEVICE — 3M™ TEGADERM™ TRANSPARENT FILM DRESSING FRAME STYLE, 1626W, 4 IN X 4-3/4 IN (10 CM X 12 CM), 50/CT 4CT/CASE: Brand: 3M™ TEGADERM™

## (undated) DEVICE — BASIN EMSIS 16OZ GRAPHITE PLAS KID SHP MOLD GRAD FOR ORAL

## (undated) DEVICE — KENDALL RADIOLUCENT FOAM MONITORING ELECTRODE RECTANGULAR SHAPE: Brand: KENDALL

## (undated) DEVICE — CONTINU-FLO SOLUTION SET, 2 INJECTION SITES, MALE LUER LOCK ADAPTER WITH RETRACTABLE COLLAR, LARGE BORE STOPCOCK WITH ROTATING MALE LUER LOCK EXTENSION SET, 2 INJECTION SITES, MALE LUER LOCK ADAPTER WITH RETRACTABLE COLLAR: Brand: INTERLINK/CONTINU-FLO

## (undated) DEVICE — DRAIN WND PENRS RADPQ 0.5X18IN --

## (undated) DEVICE — SURGICAL PROCEDURE PACK EYE CUST DR CHNDLR

## (undated) DEVICE — KENDALL DL ECG CABLE AND LEAD WIRE SYSTEM, 3-LEAD, SINGLE PATIENT USE: Brand: KENDALL

## (undated) DEVICE — HANDLE LT SNAP ON ULT DURABLE LENS FOR TRUMPF ALC DISPOSABLE

## (undated) DEVICE — COLUMN DRAPE

## (undated) DEVICE — ASTOUND STANDARD SURGICAL GOWN, XL: Brand: CONVERTORS

## (undated) DEVICE — DERMABOND SKIN ADH 0.7ML -- DERMABOND ADVANCED 12/BX

## (undated) DEVICE — SURGICAL PROCEDURE PACK CATRCT CUST

## (undated) DEVICE — DRAIN SURG 19FR L025IN DIA63MM SIL CHN RND FULL FLUT CLS

## (undated) DEVICE — TOWEL 4 PLY TISS 19X30 SUE WHT

## (undated) DEVICE — 1200 GUARD II KIT W/5MM TUBE W/O VAC TUBE: Brand: GUARDIAN

## (undated) DEVICE — DRSG PATCH ANTIMIC 1INX4.0MM -- CONVERT TO ITEM 356053

## (undated) DEVICE — CONTAINER SPEC 20 ML LID NEUT BUFF FORMALIN 10 % POLYPR STS

## (undated) DEVICE — CADIERE FORCEPS: Brand: ENDOWRIST

## (undated) DEVICE — (D)PREP SKN CHLRAPRP APPL 26ML -- CONVERT TO ITEM 371833

## (undated) DEVICE — 3M™ TEGADERM™ TRANSPARENT FILM DRESSING FRAME STYLE, 1624W, 2-3/8 IN X 2-3/4 IN (6 CM X 7 CM), 100/CT 4CT/CASE: Brand: 3M™ TEGADERM™

## (undated) DEVICE — MEGA SUTURECUT ND: Brand: ENDOWRIST

## (undated) DEVICE — MEGA NEEDLE DRIVER: Brand: ENDOWRIST

## (undated) DEVICE — TOWEL SURG W17XL27IN STD BLU COT NONFENESTRATED PREWASHED

## (undated) DEVICE — SET ADMIN 16ML TBNG L100IN 2 Y INJ SITE IV PIGGY BK DISP

## (undated) DEVICE — THE MONARCH® "D" CARTRIDGE IS A SINGLE-USE POLYPROPYLENE CARTRIDGE FOR POSTERIOR CHAMBER IOL DELIVERY: Brand: MONARCH® III

## (undated) DEVICE — SOLUTION IV 250ML 0.9% SOD CHL CLR INJ FLX BG CONT PRT CLSR

## (undated) DEVICE — DRAPE,REIN 53X77,STERILE: Brand: MEDLINE

## (undated) DEVICE — CATH IV AUTOGRD BC PNK 20GA 25 -- INSYTE

## (undated) DEVICE — FENESTRATED BIPOLAR FORCEPS: Brand: ENDOWRIST

## (undated) DEVICE — DRAIN WND PENRS RADPQ 0.25X18 -- CONVERT TO ITEM 360112

## (undated) DEVICE — VESSEL SEALER: Brand: ENDOWRIST

## (undated) DEVICE — SUTURE VCRL SZ 3-0 L27IN ABSRB VLT L26MM SH 1/2 CIR J316H

## (undated) DEVICE — SYR 5ML 1/5 GRAD LL NSAF LF --

## (undated) DEVICE — SOLUTION IRRIG 1000ML H2O STRL BLT

## (undated) DEVICE — SOLIDIFIER MEDC 1200ML -- CONVERT TO 356117

## (undated) DEVICE — ELECTRO LUBE IS A SINGLE PATIENT USE DEVICE THAT IS INTENDED TO BE USED ON ELECTROSURGICAL ELECTRODES TO REDUCE STICKING.: Brand: KEY SURGICAL ELECTRO LUBE

## (undated) DEVICE — SOLUTION IV STRL H2O 500 ML AQUALITE POUR BTL

## (undated) DEVICE — SYR 3ML LL TIP 1/10ML GRAD --

## (undated) DEVICE — COTTON TIPPED APPLICATORS: Brand: DEROYAL

## (undated) DEVICE — BLOCK BITE ENDOSCP AD 21 MM W/ DIL BLU LF DISP

## (undated) DEVICE — SUTURE ABSRB L6IN L37MM SZ 2 0 GS 21 VLT GLYCOLIDE DIOXANONE VLOCM0305

## (undated) DEVICE — PERMANENT CAUTERY HOOK: Brand: ENDOWRIST

## (undated) DEVICE — NEEDLE HYPO 18GA L1.5IN PNK S STL HUB POLYPR SHLD REG BVL

## (undated) DEVICE — INFECTION CONTROL KIT SYS

## (undated) DEVICE — SUTURE DEV SZ 0 L6IN N ABSORBABLE

## (undated) DEVICE — BAG SPEC BIOHZRD 10 X 10 IN --

## (undated) DEVICE — SUT SLK 0 30IN SH BLK --

## (undated) DEVICE — GOWN,SIRUS,NONRNF,SETINSLV,2XL,18/CS: Brand: MEDLINE

## (undated) DEVICE — BLADELESS OPTICAL TROCAR WITH FIXATION CANNULA: Brand: VERSAPORT

## (undated) DEVICE — SYR 10ML LUER LOK 1/5ML GRAD --

## (undated) DEVICE — SUTURE MCRYL SZ 4-0 L27IN ABSRB UD L19MM PS-2 1/2 CIR PRIM Y426H

## (undated) DEVICE — SUT ETHLN 3-0 18IN PS1 BLK --

## (undated) DEVICE — TRAY CATH 16F DRN BG LTX -- CONVERT TO ITEM 363158

## (undated) DEVICE — Z DISCONTINUED PER MEDLINE LINE GAS SAMPLING O2/CO2 LNG AD 13 FT NSL W/ TBNG FILTERLINE

## (undated) DEVICE — COVER,TABLE,60X90,STERILE: Brand: MEDLINE

## (undated) DEVICE — REM POLYHESIVE ADULT PATIENT RETURN ELECTRODE: Brand: VALLEYLAB

## (undated) DEVICE — SUTURE SZ 0 27IN 5/8 CIR UR-6  TAPER PT VIOLET ABSRB VICRYL J603H

## (undated) DEVICE — SEAL UNIV 5-8MM DISP BX/10 -- DA VINCI XI - SNGL USE

## (undated) DEVICE — VISUALIZATION SYSTEM: Brand: CLEARIFY

## (undated) DEVICE — NEEDLE HYPO 22GA L1.5IN BLK S STL HUB POLYPR SHLD REG BVL